# Patient Record
Sex: FEMALE | Race: WHITE | NOT HISPANIC OR LATINO | Employment: FULL TIME | URBAN - METROPOLITAN AREA
[De-identification: names, ages, dates, MRNs, and addresses within clinical notes are randomized per-mention and may not be internally consistent; named-entity substitution may affect disease eponyms.]

---

## 2017-01-06 ENCOUNTER — ALLSCRIPTS OFFICE VISIT (OUTPATIENT)
Dept: OTHER | Facility: OTHER | Age: 52
End: 2017-01-06

## 2017-01-06 DIAGNOSIS — Z01.818 ENCOUNTER FOR OTHER PREPROCEDURAL EXAMINATION: ICD-10-CM

## 2017-03-20 ENCOUNTER — ALLSCRIPTS OFFICE VISIT (OUTPATIENT)
Dept: OTHER | Facility: OTHER | Age: 52
End: 2017-03-20

## 2017-03-20 DIAGNOSIS — Z11.59 ENCOUNTER FOR SCREENING FOR OTHER VIRAL DISEASES: ICD-10-CM

## 2017-03-20 DIAGNOSIS — Z12.31 ENCOUNTER FOR SCREENING MAMMOGRAM FOR MALIGNANT NEOPLASM OF BREAST: ICD-10-CM

## 2017-03-20 DIAGNOSIS — L40.50 ARTHROPATHIC PSORIASIS (HCC): ICD-10-CM

## 2017-03-20 DIAGNOSIS — I10 ESSENTIAL (PRIMARY) HYPERTENSION: ICD-10-CM

## 2017-03-20 DIAGNOSIS — F41.1 GENERALIZED ANXIETY DISORDER: ICD-10-CM

## 2017-03-20 DIAGNOSIS — Z12.11 ENCOUNTER FOR SCREENING FOR MALIGNANT NEOPLASM OF COLON: ICD-10-CM

## 2017-07-03 ENCOUNTER — ALLSCRIPTS OFFICE VISIT (OUTPATIENT)
Dept: OTHER | Facility: OTHER | Age: 52
End: 2017-07-03

## 2017-09-11 ENCOUNTER — ALLSCRIPTS OFFICE VISIT (OUTPATIENT)
Dept: OTHER | Facility: OTHER | Age: 52
End: 2017-09-11

## 2017-09-11 DIAGNOSIS — L40.50 ARTHROPATHIC PSORIASIS (HCC): ICD-10-CM

## 2017-09-11 DIAGNOSIS — I10 ESSENTIAL (PRIMARY) HYPERTENSION: ICD-10-CM

## 2017-09-11 DIAGNOSIS — Z13.820 ENCOUNTER FOR SCREENING FOR OSTEOPOROSIS: ICD-10-CM

## 2017-09-11 DIAGNOSIS — Z12.31 ENCOUNTER FOR SCREENING MAMMOGRAM FOR MALIGNANT NEOPLASM OF BREAST: ICD-10-CM

## 2017-09-11 DIAGNOSIS — M85.80 OTHER SPECIFIED DISORDERS OF BONE DENSITY AND STRUCTURE, UNSPECIFIED SITE: ICD-10-CM

## 2017-10-05 ENCOUNTER — GENERIC CONVERSION - ENCOUNTER (OUTPATIENT)
Dept: OTHER | Facility: OTHER | Age: 52
End: 2017-10-05

## 2017-12-13 ENCOUNTER — ALLSCRIPTS OFFICE VISIT (OUTPATIENT)
Dept: OTHER | Facility: OTHER | Age: 52
End: 2017-12-13

## 2017-12-14 NOTE — PROGRESS NOTES
Assessment    1  Psoriatic arthropathy (696 0) (L40 50)   · To f/u with Rheum  2  Fatigue (780 79) (R53 83)   3  Gait abnormality (781 2) (R26 9)   4  At risk for decreased quality of life (V49 89) (Z91 89)   5  Difficulty performing writing activities (796 4) (R68 89)   6  Chronic pain syndrome (338 4) (G89 4)    Plan  At risk for decreased quality of life, Chronic pain syndrome, Difficulty performing writing activities,Fatigue, Gait abnormality, Psoriatic arthropathy    · Humira 40 MG/0 8ML Subcutaneous Prefilled Syringe Kit; Inject one syringe sq q every OTHERweek    Discussion/Summary  Discussion Summary:   Pt has progressed to the point where her quality of life is suffering  Constant pain and difficulty with ADL's  Continues to work full time  Struggling at times and more often  MTX and NSAID's  not working  Agree the Humira may help  Long discussion (25 minutes) with pt about the pro, cons, safety, and dosing  Pt understands that humira with or w/o the MTX impedes immunity and puts her at risk for infections and even some cancers  Pt understands she need to be compliant with f/u and labs  Needs to be proactive with monitoring for infections  Has had recent PPD that was negaive  Has not travel to [place], [place], [place], or out of the country  No h/o CHF, DM, Hepatitis, MS, or GB  Notes some GB in the family, but none personally and is willing to try the humira  Discussed the need to stay away from live vaccines, injection site reactions, etc  Pt wants to try humira  Given minimal skin involvement, would start with 40mg sq q other week rather than with 80mg initially  Would continue the MTX for now as the humira takes effect and re-eval to see if we should keep it  Pt understands this increase even further her immunocompromised state  Will orde the med and once available, will proceed with baseline labs, urine, Hep B status, and possibly a CXR prior to giving the first injection  RTC once med available  Medication SE Review and Pt Understands Tx: Possible side effects of new medications were reviewed with the patient/guardian today  The treatment plan was reviewed with the patient/guardian  The patient/guardian understands and agrees with the treatment plan      Chief Complaint  Chief Complaint Free Text Note Form: Patient is being seen today for a follow up visit  Patient would like to discuss medications and starting on a new medication  History of Present Illness  HPI: WF with Psoriatic arthropathy with minor skin involvement for the past several years, presents due to progression of overall total body pain, but especially her hands and legs  Now with limitations at work due to the pain and decrease ROM  Pt having difficulty writing and with fine motor skills  Difficulty getting to the day due to pain and fatigue  Notes increase and more persistent joint inflammation  Weight increasing due to inability to exercise  MTX and NSAID's not helping  Becoming depressed and notes a poor quality of life both professionally and socially  Doesn't want to go back to rheum and is considering Humira  Review of Systems  Complete-Female:  Constitutional: feeling poorly-- and-- feeling tired, but-- no fever-- and-- no chills  Eyes: No complaints of eye pain, no red eyes, no eyesight problems, no discharge, no dry eyes, no itching of eyes  ENT: no complaints of earache, no loss of hearing, no nose bleeds, no nasal discharge, no sore throat, no hoarseness  Cardiovascular: no chest pain,-- no intermittent leg claudication,-- no palpitations-- and-- no lower extremity edema  Respiratory: no shortness of breath,-- no cough,-- no orthopnea,-- no wheezing,-- no shortness of breath during exertion-- and-- no PND  Gastrointestinal: no abdominal pain,-- no nausea,-- no constipation-- and-- no diarrhea    Genitourinary: No complaints of dysuria, no incontinence, no pelvic pain, no dysmenorrhea, no vaginal discharge or bleeding  Musculoskeletal: arthralgias,-- joint swelling,-- myalgias-- and-- joint stiffness, but-- as noted in HPI  Integumentary: No complaints of skin rash or lesions, no itching, no skin wounds, no breast pain or lump  Neurological: difficulty walking, but-- no headache,-- no confusion-- and-- no convulsions  Psychiatric: anxiety,-- sleep disturbances-- and-- depression, but-- not suicidal   Endocrine: No complaints of proptosis, no hot flashes, no muscle weakness, no deepening of the voice, no feelings of weakness  Hematologic/Lymphatic: No complaints of swollen glands, no swollen glands in the neck, does not bleed easily, does not bruise easily  Active Problems  1  Allergic rhinitis (477 9) (J30 9)   2  Asthma (493 90) (J45 909)   3  Atrophy of muscle of right hand (728 2) (M62 541)   4  Benign essential hypertension (401 1) (I10)   5  Chronic pain of both wrists (729 37,131 13) (M25 531,M25 532,G89 29)   6  Depression screening (V79 0) (Z13 89)   7  Encounter for screening colonoscopy (V76 51) (Z12 11)   8  Encounter for screening mammogram for malignant neoplasm of breast (V76 12) (Z12 31)   9  Generalized anxiety disorder (300 02) (F41 1)   10  GERD without esophagitis (530 81) (K21 9)   11  Hand weakness (728 87) (R29 898)   12  Metatarsalgia of right foot (726 70) (M77 41)   13  Need for hepatitis C screening test (V73 89) (Z11 59)   14  Ophthalmoplegic migraine headache (346 20) (G43 B0)   15  Osteopenia (733 90) (M85 80)   16  Paresthesia of both hands (782 0) (R20 2)   17  Pre-operative examination (V72 84) (Z01 818)   18  Psoriatic arthropathy (696 0) (L40 50)   19  Screen for colon cancer (V76 51) (Z12 11)   20  Screening for osteoporosis (V82 81) (Z13 820)   21  Sleep disorder (780 50) (G47 9)   22  Visit for screening (V82 9) (Z13 9)    Past Medical History  1  History of Hemangioma of other sites (228 09) (D18 09)   2  History of dysfunctional uterine bleeding (V13 29) (Z87 42)   3  History of urinary tract infection (V13 02) (Z87 440)  Active Problems And Past Medical History Reviewed: The active problems and past medical history were reviewed and updated today  Surgical History  1  History of Appendectomy   2  History of Cholecystectomy   3  History of Foot Surgery   4  History of Hysterectomy   5  History of Kidney Surgery   6  History of Tonsillectomy   7  History of Tubal Ligation  Surgical History Reviewed: The surgical history was reviewed and updated today  Family History  Mother    1  No pertinent family history  Maternal Grandmother    2  Family history of Hypertension (V17 49)  Paternal Grandfather    3  Family history of Acute Myocardial Infarction (V17 3)   4  Family history of Hypertension (V17 49)   5  Family history of Malignant Pancreatic Neoplasm  Family History Reviewed: The family history was reviewed and updated today  Social History     · Always uses seat belt   · Employed   · Has 2 children   ·    · Never a smoker   · No illicit drug use   · Occasional caffeine consumption   · Social alcohol use (Z78 9)  Social History Reviewed: The social history was reviewed and updated today  Current Meds   1  ALPRAZolam 0 5 MG Oral Tablet; TAKE 1 TABLET EVERY 8 HOURS AS NEEDED; Therapy: [id number] to (Evaluate:11Oct2017)  Requested for: 79Qnq3159; Last Rx:05Gbk9587 Ordered   2  Folic Acid 1 MG Oral Tablet; TAKE 1 TABLET DAILY; Therapy: 32KMW3371 to (Carleton Sever)  Requested for: 17MDR2143; Last Rx:09Nov2015 Ordered   3  Methotrexate 2 5 MG Oral Tablet; TAKE 5 PILLS WEEKLY; Therapy: 98YDR3379 to (Evaluate:01Jan2018)  Requested for: 09UPE5260; Last Rx:06Jan2017 Ordered   4  Metoprolol Succinate ER 25 MG Oral Tablet Extended Release 24 Hour; TAKE 1 TABLET ONCE DAILY; Therapy: 19Apr2016 to (Sharri Henriquez)  Requested for: 52Gix5904 Recorded  Medication List Reviewed: The medication list was reviewed and updated today  Allergies  1   Arthrotec TABS   2  Bactrim TABS   3  Indocin SOLR   4  ZyrTEC Allergy TABS    Vitals  Vital Signs    Recorded: 29Yfs6406 10:25AM   Temperature 97 F, Tympanic   Heart Rate 91   Respiration 16   Systolic 931, Sitting   Diastolic 70, Sitting   Height 5 ft 5 in   Weight 180 lb 4 0 oz   BMI Calculated 30   BSA Calculated 1 89   O2 Saturation 97       Physical Exam   Constitutional  General appearance: No acute distress, well appearing and well nourished  Eyes  Conjunctiva and lids: No swelling, erythema or discharge  Pupils and irises: Equal, round and reactive to light  Ears, Nose, Mouth, and Throat  Oropharynx: Normal with no erythema, edema, exudate or lesions  Pulmonary  Respiratory effort: No increased work of breathing or signs of respiratory distress  Auscultation of lungs: Clear to auscultation  Cardiovascular  Auscultation of heart: Normal rate and rhythm, normal S1 and S2, without murmurs  Examination of extremities for edema and/or varicosities: Normal    Abdomen  Abdomen: Non-tender, no masses  Psychiatric  Orientation to person, place, and time: Normal    Mood and affect: Normal    Additional Exam:  Multiple joints w/o gross deformity, slight increase in temp, but no erythema  No swan neck deformity, ulnar deviation  Effusions noted with swelling and tenderness  Future Appointments    Date/Time Provider Specialty Site   02/05/2018 05:00 PM THOMAS Bryan   Internal Medicine [facility] 9[address]       Signatures   Electronically signed by : THOMAS Chatterjee ; Dec 13 2017 12:37PM EST                       (Author)

## 2017-12-22 DIAGNOSIS — Z51.81 ENCOUNTER FOR THERAPEUTIC DRUG LEVEL MONITORING: ICD-10-CM

## 2018-01-13 VITALS
DIASTOLIC BLOOD PRESSURE: 70 MMHG | BODY MASS INDEX: 28.82 KG/M2 | RESPIRATION RATE: 16 BRPM | SYSTOLIC BLOOD PRESSURE: 110 MMHG | HEART RATE: 70 BPM | TEMPERATURE: 97.7 F | WEIGHT: 173 LBS | HEIGHT: 65 IN

## 2018-01-14 VITALS
WEIGHT: 169 LBS | SYSTOLIC BLOOD PRESSURE: 112 MMHG | TEMPERATURE: 97.6 F | RESPIRATION RATE: 16 BRPM | HEART RATE: 78 BPM | BODY MASS INDEX: 28.16 KG/M2 | DIASTOLIC BLOOD PRESSURE: 70 MMHG | HEIGHT: 65 IN

## 2018-01-14 VITALS
RESPIRATION RATE: 16 BRPM | WEIGHT: 176 LBS | BODY MASS INDEX: 29.32 KG/M2 | DIASTOLIC BLOOD PRESSURE: 60 MMHG | HEIGHT: 65 IN | TEMPERATURE: 97.5 F | HEART RATE: 64 BPM | SYSTOLIC BLOOD PRESSURE: 90 MMHG

## 2018-01-14 VITALS
DIASTOLIC BLOOD PRESSURE: 60 MMHG | SYSTOLIC BLOOD PRESSURE: 100 MMHG | WEIGHT: 173 LBS | HEIGHT: 65 IN | RESPIRATION RATE: 16 BRPM | TEMPERATURE: 97.2 F | HEART RATE: 74 BPM | BODY MASS INDEX: 28.82 KG/M2

## 2018-01-23 VITALS
HEART RATE: 91 BPM | HEIGHT: 65 IN | DIASTOLIC BLOOD PRESSURE: 70 MMHG | SYSTOLIC BLOOD PRESSURE: 104 MMHG | WEIGHT: 180.25 LBS | OXYGEN SATURATION: 97 % | RESPIRATION RATE: 16 BRPM | TEMPERATURE: 97 F | BODY MASS INDEX: 30.03 KG/M2

## 2018-02-05 ENCOUNTER — OFFICE VISIT (OUTPATIENT)
Dept: INTERNAL MEDICINE CLINIC | Facility: CLINIC | Age: 53
End: 2018-02-05
Payer: COMMERCIAL

## 2018-02-05 VITALS
BODY MASS INDEX: 30.32 KG/M2 | WEIGHT: 182 LBS | HEART RATE: 72 BPM | SYSTOLIC BLOOD PRESSURE: 104 MMHG | TEMPERATURE: 97.2 F | DIASTOLIC BLOOD PRESSURE: 68 MMHG | HEIGHT: 65 IN | RESPIRATION RATE: 16 BRPM

## 2018-02-05 DIAGNOSIS — K21.9 GERD WITHOUT ESOPHAGITIS: ICD-10-CM

## 2018-02-05 DIAGNOSIS — F41.1 GENERALIZED ANXIETY DISORDER: ICD-10-CM

## 2018-02-05 DIAGNOSIS — G89.4 CHRONIC PAIN DISORDER: ICD-10-CM

## 2018-02-05 DIAGNOSIS — I10 BENIGN ESSENTIAL HYPERTENSION: ICD-10-CM

## 2018-02-05 DIAGNOSIS — J45.20 MILD INTERMITTENT ASTHMA WITHOUT COMPLICATION: ICD-10-CM

## 2018-02-05 DIAGNOSIS — G43.B0 OPHTHALMOPLEGIC MIGRAINE, NOT INTRACTABLE: ICD-10-CM

## 2018-02-05 DIAGNOSIS — L40.50 PSORIASIS WITH ARTHROPATHY (HCC): ICD-10-CM

## 2018-02-05 DIAGNOSIS — I10 HYPERTENSION, UNSPECIFIED TYPE: Primary | ICD-10-CM

## 2018-02-05 DIAGNOSIS — M85.80 OSTEOPENIA, UNSPECIFIED LOCATION: ICD-10-CM

## 2018-02-05 DIAGNOSIS — G47.9 SLEEP DISORDER: ICD-10-CM

## 2018-02-05 DIAGNOSIS — L40.50 PSORIATIC ARTHRITIS (HCC): ICD-10-CM

## 2018-02-05 PROCEDURE — 96372 THER/PROPH/DIAG INJ SC/IM: CPT | Performed by: INTERNAL MEDICINE

## 2018-02-05 PROCEDURE — 99214 OFFICE O/P EST MOD 30 MIN: CPT | Performed by: INTERNAL MEDICINE

## 2018-02-05 RX ORDER — METOPROLOL SUCCINATE 25 MG/1
25 TABLET, EXTENDED RELEASE ORAL DAILY
Qty: 90 TABLET | Refills: 3 | Status: SHIPPED | OUTPATIENT
Start: 2018-02-05 | End: 2018-10-22 | Stop reason: SDUPTHER

## 2018-02-05 RX ORDER — FOLIC ACID 1 MG/1
1 TABLET ORAL DAILY
COMMUNITY
Start: 2011-06-13 | End: 2018-10-22 | Stop reason: SDUPTHER

## 2018-02-05 RX ORDER — METOPROLOL SUCCINATE 25 MG/1
1 TABLET, EXTENDED RELEASE ORAL DAILY
COMMUNITY
Start: 2016-04-19 | End: 2018-02-05 | Stop reason: SDUPTHER

## 2018-02-05 RX ORDER — ALPRAZOLAM 0.5 MG/1
1 TABLET ORAL EVERY 8 HOURS PRN
COMMUNITY
Start: 2011-01-29 | End: 2018-04-16 | Stop reason: SDUPTHER

## 2018-02-05 NOTE — PROGRESS NOTES
Assessment/Plan:    No problem-specific Assessment & Plan notes found for this encounter  Diagnoses and all orders for this visit:    Hypertension, unspecified type  -     metoprolol succinate (TOPROL-XL) 25 mg 24 hr tablet; Take 1 tablet (25 mg total) by mouth daily    Psoriatic arthritis (HCC)  -     folic acid (FOLVITE) 1 mg tablet; Take 1 tablet by mouth daily  -     adalimumab (HUMIRA) 40 mg/0 8 mL PSKT; Inject under the skin  -     methotrexate 2 5 mg tablet; Take 5 tabs weekly as directed  GERD without esophagitis    Benign essential hypertension    Psoriasis with arthropathy (HCC)    Osteopenia, unspecified location    Ophthalmoplegic migraine, not intractable    Mild intermittent asthma without complication    Chronic pain disorder  -     adalimumab (HUMIRA) 40 mg/0 8 mL PSKT; Inject under the skin    Generalized anxiety disorder  -     ALPRAZolam (XANAX) 0 5 mg tablet; Take 1 tablet by mouth every 8 (eight) hours as needed    Sleep disorder    Other orders  -     Discontinue: methotrexate 2 5 mg tablet; Take by mouth  -     Discontinue: metoprolol succinate (TOPROL-XL) 25 mg 24 hr tablet; Take 1 tablet by mouth daily      A/P: Doing ok, but still with considerable  No s/s of infection  Will give her first injection of humira  Continue with MTX for now and consider weaning in several weeks  Due for LDL only, but want to wait since she just had labs  To get her mammo and dexa  Continue current treatment otherwise  RTC four weeks for f/u injections  Subjective:      Patient ID: Jaimee Brown is a 47 y o  female  WF RTC for f/u psoriatic arthropathy, htn, etc  Continues to do poorly due to increase pain and difficulty walking and sleeping  Increase stress at due to several issues and her decreased ability to perform her job from the pain and decrease ROM  No new issues  Awaiting dexa and mammo  Labs up to date  No recent illness, fever, chills, or sore throat             The following portions of the patient's history were reviewed and updated as appropriate:   She  has a past medical history of Hemangioma of other sites  She  does not have any pertinent problems on file  She  has a past surgical history that includes Appendectomy; Cholecystectomy; Foot surgery; Hysterectomy; Kidney surgery; Tonsillectomy; and Tubal ligation  Her family history includes Heart attack in her paternal grandfather; Hypertension in her maternal grandmother and paternal grandfather; Pancreatic cancer in her paternal grandfather  She  reports that she has never smoked  She has never used smokeless tobacco  She reports that she drinks alcohol  She reports that she does not use drugs  Current Outpatient Prescriptions   Medication Sig Dispense Refill    adalimumab (HUMIRA) 40 mg/0 8 mL PSKT Inject under the skin      ALPRAZolam (XANAX) 0 5 mg tablet Take 1 tablet by mouth every 8 (eight) hours as needed      folic acid (FOLVITE) 1 mg tablet Take 1 tablet by mouth daily      methotrexate 2 5 mg tablet Take 5 tabs weekly as directed  30 tablet 5    metoprolol succinate (TOPROL-XL) 25 mg 24 hr tablet Take 1 tablet (25 mg total) by mouth daily 90 tablet 3     No current facility-administered medications for this visit  No current outpatient prescriptions on file prior to visit  No current facility-administered medications on file prior to visit  She is allergic to cetirizine; diclofenac-misoprostol; indomethacin; and sulfamethoxazole-trimethoprim       Review of Systems   Constitutional: Positive for fatigue  Negative for activity change, chills, diaphoresis and fever  HENT: Negative for congestion, ear pain, sinus pain and sore throat  Eyes: Negative for pain  Respiratory: Negative for cough, chest tightness, shortness of breath and wheezing  Cardiovascular: Negative for chest pain, palpitations and leg swelling  Gastrointestinal: Negative for abdominal pain, constipation, diarrhea, nausea and vomiting  Genitourinary: Negative for difficulty urinating, dysuria, frequency and hematuria  Musculoskeletal: Positive for arthralgias, gait problem, joint swelling and myalgias  Neurological: Negative for dizziness, seizures, weakness, light-headedness and headaches  Hematological: Negative for adenopathy  Psychiatric/Behavioral: Positive for sleep disturbance  Negative for behavioral problems, confusion and suicidal ideas  The patient is not nervous/anxious  Objective:     Physical Exam   Constitutional: She is oriented to person, place, and time  She appears well-developed and well-nourished  No distress  HENT:   Head: Normocephalic and atraumatic  Mouth/Throat: No oropharyngeal exudate  Eyes: Conjunctivae and EOM are normal  Pupils are equal, round, and reactive to light  Cardiovascular: Normal rate, regular rhythm and normal heart sounds  Pulmonary/Chest: Breath sounds normal  No respiratory distress  Abdominal: Soft  Bowel sounds are normal  There is no tenderness  Musculoskeletal: She exhibits tenderness and deformity  She exhibits no edema  Lymphadenopathy:     She has no cervical adenopathy  Neurological: She is alert and oriented to person, place, and time  Psychiatric: She has a normal mood and affect  Her behavior is normal  Judgment and thought content normal    Nursing note and vitals reviewed

## 2018-02-05 NOTE — PATIENT INSTRUCTIONS
????? (Adalimumab) (??)   ????? (Adalimumab) (a-yl-SIQ-ue-mab)  ?????????????????????????????????????????????   ???? ? Humira   ?????????????  ??????????????   ???????????????????????????????  ?????   ???  · ?????????????????????????????????????  · ?????????????????  · ??????????????????????????????????????????????????????????????  · ?????????????????????????????????????????????????????? ??????????????????????????????????????????????????????????  · ??????????????????????????  · ???????????????????????????????????????????????????????????????  · ???????????????????? ??????????????????????????????????  ????????   ?????????????????????????????????????????  · ???????????????????????????????????????  ¨ ???? (abatacept)?????? (anakinra)?????? (azathioprine)? ???? (cyclosporine)? ??? (mercaptopurine)?????? (rituximab)? ?? (theophylline)  ¨ ?????????? (warfarin)? ¨ ????????????????????????  · ??????????????????????????????  ???????   · ??????????????????????????????????? (COPD)?????????????????????????????????-????????????????????????????????????????????????????  · ?????????????  ¨ ???????  ¨ ????????????????????  ¨ ???????????  · ???????????????????????????????????????  · ????????????? (TB) ????????????? TB ???????? TB???????  · ?????????????????????????????????????????  · ???????????????????????????????  · ??????????????????????????????????????  · ?????????????????????????????????  ??????????????   ????????????????????  · ??????????????????????????/???????????????  · ???????????????????  · ???????????????  · ????  · ?????????????  · ?????????????????????????  · ?????????????????????????????????  · ???????????????????????????  · ??????????????????  · ???????????????  · ?????????????  · ??????????????????  ?????????????????????????   · ??  · ??  · ???????????????????????  ?????????????????????????   ?????????????????????????????? 1-800-FDA-1088???????????????? (FDA)?   © 2017 [address] Information is for End User's use only and may not be sold, redistributed or otherwise used for commercial purposes  The above information is an  only  It is not intended as medical advice for individual conditions or treatments  Talk to your doctor, nurse or pharmacist before following any medical regimen to see if it is safe and effective for you  Adalimumab (By injection)   Adalimumab (s-oz-RDU-ue-mab)  Treats arthritis, plaque psoriasis, ankylosing spondylitis, Crohn disease, ulcerative colitis, hidradenitis suppurativa, and uveitis  Brand Name(s): Humira   There may be other brand names for this medicine  When This Medicine Should Not Be Used: This medicine is not right for everyone  Do not use it if you had an allergic reaction to adalimumab  How to Use This Medicine:   Injectable  · Your doctor will prescribe your exact dose and tell you how often it should be given  This medicine is given as a shot under your skin  · A nurse or other health provider will give you this medicine  · You may be taught how to give your medicine at home  Make sure you understand all instructions before giving yourself an injection  Do not use more medicine or use it more often than your doctor tells you to  · You will be shown the body areas where this shot can be given  Use a different body area each time you give yourself a shot  Keep track of where you give each shot to make sure you rotate body areas  Do not inject into skin areas that are red, bruised, tender, or hard  If you have psoriasis, do not inject into a raised, thick, red, or scaly skin patch or into skin lesions  · This medicine should come with a Medication Guide  Ask your pharmacist for a copy if you do not have one  · Missed dose: Take a dose as soon as you remember  If it is almost time for your next dose, wait until then and take a regular dose  Do not take extra medicine to make up for a missed dose    · If you store this medicine at home, keep it in the refrigerator  Do not freeze  Protect the medicine from light  Keep your medicine and supplies in the original packages until you are ready to use them  Drugs and Foods to Avoid:   Ask your doctor or pharmacist before using any other medicine, including over-the-counter medicines, vitamins, and herbal products  · Some foods and medicines can affect how adalimumab works  Tell your doctor if you are using any of the following:   ¨ Abatacept, anakinra, azathioprine, cyclosporine, mercaptopurine, rituximab, theophylline  ¨ A blood thinner (including warfarin)  ¨ Medicine that weakens the immune system (including a steroid or cancer medicine)  · This medicine may interfere with vaccines  Ask your doctor before you get a flu shot or any other vaccines  Warnings While Using This Medicine:   · Tell your doctor if you are pregnant or breastfeeding, or if you have liver disease, a history of cancer, COPD, heart failure, diabetes, psoriasis, multiple sclerosis, optic neuritis, problems with your immune system, or a history of Guillain-Barré syndrome  Tell your doctor if you have any type of infection (such as hepatitis B or tuberculosis) or an infection that keeps coming back  · This medicine may cause the following problems:   ¨ Increased risk for infection  ¨ Increased risk of certain cancers, such as lymphoma or leukemia  ¨ New or worsening heart failure  · Tell your doctor if you have a latex allergy  The needle cover of the syringe contains latex and may cause allergic reactions  · You will need to have a skin test for tuberculosis (TB) before you start this medicine  Tell your doctor if you or anyone in your home has ever had a positive TB skin test or been exposed to TB  · This medicine may make you bleed, bruise, or get infections more easily  Take precautions to prevent illness and injury  Wash your hands often  · Your doctor will do lab tests at regular visits to check on the effects of this medicine   Keep all appointments  · Throw away used needles in a hard, closed container that the needles cannot poke through  Keep this container away from children and pets  · Keep all medicine out of the reach of children  Never share your medicine with anyone  Possible Side Effects While Using This Medicine:   Call your doctor right away if you notice any of these side effects:  · Allergic reaction: Itching or hives, swelling in your face or hands, swelling or tingling in your mouth or throat, chest tightness, trouble breathing  · Blistering, peeling, red skin rash, or red, scaly patches on the skin  · Change in how much or how often you urinate, painful urination  · Changes in vision  · Chest pain, uneven heartbeat, trouble breathing  · Cough, fever, chills, runny or stuffy nose, sore throat, and body aches  · Dark urine or pale stools, nausea, vomiting, loss of appetite, stomach pain, yellow skin or eyes  · Numbness, tingling, or burning pain in your hands, arms, legs, or feet, or joint pain  · Rapid weight gain, swelling in your hands, ankles, lower legs, or feet  · Sores or white patches on your lips, mouth, or throat  · Swollen glands in your neck, underarms, or groin  · Unusual bleeding, bruising, tiredness, weakness, or weight loss  If you notice these less serious side effects, talk with your doctor:   · Back pain  · Headache  · Redness, itching, bruising, bleeding, pain, or swelling where the shot was given  If you notice other side effects that you think are caused by this medicine, tell your doctor  Call your doctor for medical advice about side effects  You may report side effects to FDA at 5-342-FDA-2311  © 2017 [address] Information is for End User's use only and may not be sold, redistributed or otherwise used for commercial purposes  The above information is an  only  It is not intended as medical advice for individual conditions or treatments   Talk to your doctor, nurse or pharmacist before following any medical regimen to see if it is safe and effective for you

## 2018-03-05 ENCOUNTER — OFFICE VISIT (OUTPATIENT)
Dept: INTERNAL MEDICINE CLINIC | Facility: CLINIC | Age: 53
End: 2018-03-05
Payer: COMMERCIAL

## 2018-03-05 VITALS
HEART RATE: 78 BPM | DIASTOLIC BLOOD PRESSURE: 62 MMHG | BODY MASS INDEX: 29.99 KG/M2 | WEIGHT: 180 LBS | HEIGHT: 65 IN | RESPIRATION RATE: 14 BRPM | SYSTOLIC BLOOD PRESSURE: 100 MMHG

## 2018-03-05 DIAGNOSIS — G89.4 CHRONIC PAIN DISORDER: ICD-10-CM

## 2018-03-05 DIAGNOSIS — L40.50 PSORIASIS WITH ARTHROPATHY (HCC): Primary | ICD-10-CM

## 2018-03-05 PROCEDURE — 99213 OFFICE O/P EST LOW 20 MIN: CPT | Performed by: INTERNAL MEDICINE

## 2018-03-05 NOTE — PATIENT INSTRUCTIONS
Adalimumab (By injection)   Adalimumab ([id number]-[id number])  Treats arthritis, plaque psoriasis, ankylosing spondylitis, Crohn disease, ulcerative colitis, hidradenitis suppurativa, and uveitis  Brand Name(s): Humira   There may be other brand names for this medicine  When This Medicine Should Not Be Used: This medicine is not right for everyone  Do not use it if you had an allergic reaction to adalimumab  How to Use This Medicine:   Injectable  · Your doctor will prescribe your exact dose and tell you how often it should be given  This medicine is given as a shot under your skin  · A nurse or other health provider will give you this medicine  · You may be taught how to give your medicine at home  Make sure you understand all instructions before giving yourself an injection  Do not use more medicine or use it more often than your doctor tells you to  · You will be shown the body areas where this shot can be given  Use a different body area each time you give yourself a shot  Keep track of where you give each shot to make sure you rotate body areas  Do not inject into skin areas that are red, bruised, tender, or hard  If you have psoriasis, do not inject into a raised, thick, red, or scaly skin patch or into skin lesions  · This medicine should come with a Medication Guide  Ask your pharmacist for a copy if you do not have one  · Missed dose: Take a dose as soon as you remember  If it is almost time for your next dose, wait until then and take a regular dose  Do not take extra medicine to make up for a missed dose  · If you store this medicine at home, keep it in the refrigerator  Do not freeze  Protect the medicine from light  Keep your medicine and supplies in the original packages until you are ready to use them  Drugs and Foods to Avoid:   Ask your doctor or pharmacist before using any other medicine, including over-the-counter medicines, vitamins, and herbal products    · Some foods and medicines can affect how adalimumab works  Tell your doctor if you are using any of the following:   ¨ Abatacept, anakinra, azathioprine, cyclosporine, mercaptopurine, rituximab, theophylline  ¨ A blood thinner (including warfarin)  ¨ Medicine that weakens the immune system (including a steroid or cancer medicine)  · This medicine may interfere with vaccines  Ask your doctor before you get a flu shot or any other vaccines  Warnings While Using This Medicine:   · Tell your doctor if you are pregnant or breastfeeding, or if you have liver disease, a history of cancer, COPD, heart failure, diabetes, psoriasis, multiple sclerosis, optic neuritis, problems with your immune system, or a history of Guillain-Barré syndrome  Tell your doctor if you have any type of infection (such as hepatitis B or tuberculosis) or an infection that keeps coming back  · This medicine may cause the following problems:   ¨ Increased risk for infection  ¨ Increased risk of certain cancers, such as lymphoma or leukemia  ¨ New or worsening heart failure  · Tell your doctor if you have a latex allergy  The needle cover of the syringe contains latex and may cause allergic reactions  · You will need to have a skin test for tuberculosis (TB) before you start this medicine  Tell your doctor if you or anyone in your home has ever had a positive TB skin test or been exposed to TB  · This medicine may make you bleed, bruise, or get infections more easily  Take precautions to prevent illness and injury  Wash your hands often  · Your doctor will do lab tests at regular visits to check on the effects of this medicine  Keep all appointments  · Throw away used needles in a hard, closed container that the needles cannot poke through  Keep this container away from children and pets  · Keep all medicine out of the reach of children  Never share your medicine with anyone    Possible Side Effects While Using This Medicine:   Call your doctor right away if you notice any of these side effects:  · Allergic reaction: Itching or hives, swelling in your face or hands, swelling or tingling in your mouth or throat, chest tightness, trouble breathing  · Blistering, peeling, red skin rash, or red, scaly patches on the skin  · Change in how much or how often you urinate, painful urination  · Changes in vision  · Chest pain, uneven heartbeat, trouble breathing  · Cough, fever, chills, runny or stuffy nose, sore throat, and body aches  · Dark urine or pale stools, nausea, vomiting, loss of appetite, stomach pain, yellow skin or eyes  · Numbness, tingling, or burning pain in your hands, arms, legs, or feet, or joint pain  · Rapid weight gain, swelling in your hands, ankles, lower legs, or feet  · Sores or white patches on your lips, mouth, or throat  · Swollen glands in your neck, underarms, or groin  · Unusual bleeding, bruising, tiredness, weakness, or weight loss  If you notice these less serious side effects, talk with your doctor:   · Back pain  · Headache  · Redness, itching, bruising, bleeding, pain, or swelling where the shot was given  If you notice other side effects that you think are caused by this medicine, tell your doctor  Call your doctor for medical advice about side effects  You may report side effects to FDA at 4-205-FDA-5891  © 2017 [address] [employer] Information is for End User's use only and may not be sold, redistributed or otherwise used for commercial purposes  The above information is an  only  It is not intended as medical advice for individual conditions or treatments  Talk to your doctor, nurse or pharmacist before following any medical regimen to see if it is safe and effective for you

## 2018-04-09 DIAGNOSIS — L40.50 PSORIATIC ARTHRITIS (HCC): ICD-10-CM

## 2018-04-09 DIAGNOSIS — G89.4 CHRONIC PAIN DISORDER: ICD-10-CM

## 2018-04-16 ENCOUNTER — OFFICE VISIT (OUTPATIENT)
Dept: INTERNAL MEDICINE CLINIC | Facility: CLINIC | Age: 53
End: 2018-04-16
Payer: COMMERCIAL

## 2018-04-16 VITALS
DIASTOLIC BLOOD PRESSURE: 62 MMHG | RESPIRATION RATE: 16 BRPM | HEART RATE: 76 BPM | SYSTOLIC BLOOD PRESSURE: 94 MMHG | HEIGHT: 65 IN | BODY MASS INDEX: 29.99 KG/M2 | TEMPERATURE: 97.6 F | WEIGHT: 180 LBS

## 2018-04-16 DIAGNOSIS — F41.1 GENERALIZED ANXIETY DISORDER: ICD-10-CM

## 2018-04-16 DIAGNOSIS — L40.50 PSORIASIS WITH ARTHROPATHY (HCC): Primary | ICD-10-CM

## 2018-04-16 DIAGNOSIS — I10 BENIGN ESSENTIAL HYPERTENSION: ICD-10-CM

## 2018-04-16 PROCEDURE — 99213 OFFICE O/P EST LOW 20 MIN: CPT | Performed by: INTERNAL MEDICINE

## 2018-04-16 RX ORDER — ALPRAZOLAM 0.5 MG/1
0.5 TABLET ORAL EVERY 8 HOURS PRN
Qty: 90 TABLET | Refills: 0 | Status: SHIPPED | OUTPATIENT
Start: 2018-04-16 | End: 2018-06-25 | Stop reason: SDUPTHER

## 2018-04-16 NOTE — PROGRESS NOTES
Assessment/Plan:    No problem-specific Assessment & Plan notes found for this encounter  Diagnoses and all orders for this visit:    Psoriasis with arthropathy ([facility] [address] )    Generalized anxiety disorder  -     ALPRAZolam (XANAX) 0 5 mg tablet; Take 1 tablet (0 5 mg total) by mouth every 8 (eight) hours as needed (prn)    Benign essential hypertension      A/P: Doing ok and no worse, but would like to see some more improvement  May just need more time  Continue current treatment along with the MTX and monitor labs  RTC two months for routine  Subjective:      Patient ID: Mary Mendez is a 47 y o  female  WF RTC for f/u psoriatic arthritis after starting humira  Remains on MTX as well  Doing ok, but minimal improvement so far  No worse except for the right thumb MCP pain got so bad, pt seen by ortho and injected  Told she may need a joint replacement  No fever or chills and labs were acceptable today with slight shift in the differential  BP is back to her normal range and no s/s  Still gets a headache after dosing the humira for three days, but not as bad as initially  The following portions of the patient's history were reviewed and updated as appropriate:   She  has a past medical history of Hemangioma of other sites  She   Patient Active Problem List    Diagnosis Date Noted    Chronic pain disorder 12/13/2017    Osteopenia 09/11/2017    Benign essential hypertension 04/19/2016    Ophthalmoplegic migraine headache 08/20/2012    Allergic rhinitis 08/15/2012    Asthma 08/15/2012    Generalized anxiety disorder 08/15/2012    GERD without esophagitis 08/15/2012    Psoriasis with arthropathy ([facility] ) 08/15/2012    Sleep disorder 08/15/2012     She  has a past surgical history that includes Appendectomy; Cholecystectomy; Foot surgery; Hysterectomy; Kidney surgery; Tonsillectomy; and Tubal ligation    Her family history includes Heart attack in her paternal grandfather; Hypertension in her maternal grandmother and paternal grandfather; Pancreatic cancer in her paternal grandfather  She  reports that she has never smoked  She has never used smokeless tobacco  She reports that she drinks alcohol  She reports that she does not use drugs  Current Outpatient Prescriptions   Medication Sig Dispense Refill    adalimumab (HUMIRA) 40 mg/0 8 mL PSKT Inject 0 8 mL (40 mg total) under the skin every 14 (fourteen) days 6 each 3    ALPRAZolam (XANAX) 0 5 mg tablet Take 1 tablet (0 5 mg total) by mouth every 8 (eight) hours as needed (prn) 90 tablet 0    folic acid (FOLVITE) 1 mg tablet Take 1 tablet by mouth daily      methotrexate 2 5 mg tablet Take 5 tabs weekly as directed  30 tablet 5    metoprolol succinate (TOPROL-XL) 25 mg 24 hr tablet Take 1 tablet (25 mg total) by mouth daily 90 tablet 3     No current facility-administered medications for this visit  Current Outpatient Prescriptions on File Prior to Visit   Medication Sig    adalimumab (HUMIRA) 40 mg/0 8 mL PSKT Inject 0 8 mL (40 mg total) under the skin every 14 (fourteen) days    folic acid (FOLVITE) 1 mg tablet Take 1 tablet by mouth daily    methotrexate 2 5 mg tablet Take 5 tabs weekly as directed   metoprolol succinate (TOPROL-XL) 25 mg 24 hr tablet Take 1 tablet (25 mg total) by mouth daily    [DISCONTINUED] ALPRAZolam (XANAX) 0 5 mg tablet Take 1 tablet by mouth every 8 (eight) hours as needed     No current facility-administered medications on file prior to visit  She is allergic to cetirizine; diclofenac-misoprostol; indomethacin; and sulfamethoxazole-trimethoprim       Review of Systems   Constitutional: Negative for activity change, chills, diaphoresis, fatigue and fever  Respiratory: Negative for cough, chest tightness, shortness of breath and wheezing  Cardiovascular: Negative for chest pain, palpitations and leg swelling  Gastrointestinal: Negative for abdominal pain, constipation, diarrhea, nausea and vomiting  Genitourinary: Negative for difficulty urinating, dysuria and frequency  Musculoskeletal: Positive for arthralgias  Negative for gait problem, joint swelling and myalgias  Neurological: Positive for headaches  Negative for dizziness, seizures and light-headedness  Hematological: Negative for adenopathy  Psychiatric/Behavioral: Negative for confusion and dysphoric mood  The patient is not nervous/anxious  Objective:      BP 94/62   Pulse 76   Temp 97 6 °F (36 4 °C) (Tympanic)   Resp 16   Ht 5' 5" (1 651 m)   Wt 81 6 kg (180 lb)   BMI 29 95 kg/m²          Physical Exam   Constitutional: She is oriented to person, place, and time  She appears well-developed and well-nourished  No distress  HENT:   Head: Normocephalic and atraumatic  Mouth/Throat: Oropharynx is clear and moist    Eyes: Conjunctivae and EOM are normal  Pupils are equal, round, and reactive to light  Cardiovascular: Normal rate, regular rhythm and normal heart sounds  No murmur heard  Pulmonary/Chest: Effort normal and breath sounds normal  No respiratory distress  She has no wheezes  Abdominal: Bowel sounds are normal  There is no tenderness  Musculoskeletal: She exhibits tenderness  She exhibits no edema or deformity  Lymphadenopathy:     She has no cervical adenopathy  Neurological: She is alert and oriented to person, place, and time  Psychiatric: She has a normal mood and affect  Her behavior is normal  Judgment and thought content normal    Nursing note and vitals reviewed

## 2018-06-25 ENCOUNTER — OFFICE VISIT (OUTPATIENT)
Dept: INTERNAL MEDICINE CLINIC | Facility: CLINIC | Age: 53
End: 2018-06-25
Payer: COMMERCIAL

## 2018-06-25 VITALS
HEART RATE: 72 BPM | WEIGHT: 186 LBS | BODY MASS INDEX: 30.99 KG/M2 | SYSTOLIC BLOOD PRESSURE: 102 MMHG | DIASTOLIC BLOOD PRESSURE: 70 MMHG | HEIGHT: 65 IN | RESPIRATION RATE: 14 BRPM | TEMPERATURE: 98.2 F

## 2018-06-25 DIAGNOSIS — K21.9 GERD WITHOUT ESOPHAGITIS: ICD-10-CM

## 2018-06-25 DIAGNOSIS — Z12.39 SCREENING FOR BREAST CANCER: Primary | ICD-10-CM

## 2018-06-25 DIAGNOSIS — Z13.220 SCREENING FOR LIPID DISORDERS: ICD-10-CM

## 2018-06-25 DIAGNOSIS — Z11.59 NEED FOR HEPATITIS C SCREENING TEST: ICD-10-CM

## 2018-06-25 DIAGNOSIS — G47.9 SLEEP DISORDER: ICD-10-CM

## 2018-06-25 DIAGNOSIS — G89.4 CHRONIC PAIN DISORDER: ICD-10-CM

## 2018-06-25 DIAGNOSIS — J45.20 MILD INTERMITTENT ASTHMA WITHOUT COMPLICATION: ICD-10-CM

## 2018-06-25 DIAGNOSIS — L40.50 PSORIASIS WITH ARTHROPATHY (HCC): ICD-10-CM

## 2018-06-25 DIAGNOSIS — F41.1 GENERALIZED ANXIETY DISORDER: ICD-10-CM

## 2018-06-25 DIAGNOSIS — M85.80 OSTEOPENIA, UNSPECIFIED LOCATION: ICD-10-CM

## 2018-06-25 DIAGNOSIS — I10 BENIGN ESSENTIAL HYPERTENSION: ICD-10-CM

## 2018-06-25 PROCEDURE — 3008F BODY MASS INDEX DOCD: CPT | Performed by: INTERNAL MEDICINE

## 2018-06-25 PROCEDURE — 99214 OFFICE O/P EST MOD 30 MIN: CPT | Performed by: INTERNAL MEDICINE

## 2018-06-25 RX ORDER — ALPRAZOLAM 0.5 MG/1
0.5 TABLET ORAL EVERY 8 HOURS PRN
Qty: 90 TABLET | Refills: 0 | Status: SHIPPED | OUTPATIENT
Start: 2018-06-25 | End: 2018-10-05 | Stop reason: SDUPTHER

## 2018-06-25 NOTE — PROGRESS NOTES
Assessment/Plan:    No problem-specific Assessment & Plan notes found for this encounter  Diagnoses and all orders for this visit:    Screening for breast cancer  -     Mammo screening bilateral w 3d & cad; Future    Generalized anxiety disorder  -     ALPRAZolam (XANAX) 0 5 mg tablet; Take 1 tablet (0 5 mg total) by mouth every 8 (eight) hours as needed (prn)    Benign essential hypertension  -     Comprehensive metabolic panel; Future  -     TSH, 3rd generation; Future  -     Microalbumin / creatinine urine ratio; Future    Psoriasis with arthropathy (HCC)  -     CBC and differential; Future  -     Comprehensive metabolic panel; Future  -     Vitamin B12; Future  -     Folate; Future    GERD without esophagitis    Mild intermittent asthma without complication    Chronic pain disorder    Sleep disorder    Osteopenia, unspecified location  -     TSH, 3rd generation; Future    Screening for lipid disorders  -     Lipid Panel with Direct LDL reflex; Future    Need for hepatitis C screening test  -     Hepatitis C antibody; Future      A/P: Pt looks worn out  Discussed the need to take time for herself, but needs to get her work done  Recommend pt see rheum for a second opinion  Check labs  Order mammo  Talked about some distractions to improve her mental state  Continue current treatment pending labs  RTC four months for routine  Subjective:      Patient ID: Giorgio Cruz is a 47 y o  female  WF RTC with her  for f/u Psoriatric athritis, htn, etc  Doing poorly  Very stressed at work and working long hours  Feels tired and humira not really helping her pain  Remains active in a way, but not as physical as before due to job and pain  Not eating or sleeping well  On edge a lot  Asthma is well controlled  Due for labs and mammo  The following portions of the patient's history were reviewed and updated as appropriate:   She  has a past medical history of Hemangioma of other sites    She   Patient Active Problem List    Diagnosis Date Noted    Chronic pain disorder 12/13/2017    Osteopenia 09/11/2017    Benign essential hypertension 04/19/2016    Ophthalmoplegic migraine headache 08/20/2012    Allergic rhinitis 08/15/2012    Asthma 08/15/2012    Generalized anxiety disorder 08/15/2012    GERD without esophagitis 08/15/2012    Psoriasis with arthropathy ([facility] [address] ) 08/15/2012    Sleep disorder 08/15/2012     She  has a past surgical history that includes Appendectomy; Cholecystectomy; Foot surgery; Hysterectomy; Kidney surgery; Tonsillectomy; and Tubal ligation  Her family history includes Heart attack in her paternal grandfather; Hypertension in her maternal grandmother and paternal grandfather; Pancreatic cancer in her paternal grandfather  She  reports that she has never smoked  She has never used smokeless tobacco  She reports that she drinks alcohol  She reports that she does not use drugs  Current Outpatient Prescriptions   Medication Sig Dispense Refill    adalimumab (HUMIRA) 40 mg/0 8 mL PSKT Inject 0 8 mL (40 mg total) under the skin every 14 (fourteen) days 6 each 3    folic acid (FOLVITE) 1 mg tablet Take 1 tablet by mouth daily      methotrexate 2 5 mg tablet Take 5 tabs weekly as directed  30 tablet 5    metoprolol succinate (TOPROL-XL) 25 mg 24 hr tablet Take 1 tablet (25 mg total) by mouth daily 90 tablet 3    ALPRAZolam (XANAX) 0 5 mg tablet Take 1 tablet (0 5 mg total) by mouth every 8 (eight) hours as needed (prn) 90 tablet 0     No current facility-administered medications for this visit  Current Outpatient Prescriptions on File Prior to Visit   Medication Sig    adalimumab (HUMIRA) 40 mg/0 8 mL PSKT Inject 0 8 mL (40 mg total) under the skin every 14 (fourteen) days    folic acid (FOLVITE) 1 mg tablet Take 1 tablet by mouth daily    methotrexate 2 5 mg tablet Take 5 tabs weekly as directed      metoprolol succinate (TOPROL-XL) 25 mg 24 hr tablet Take 1 tablet (25 mg total) by mouth daily    [DISCONTINUED] ALPRAZolam (XANAX) 0 5 mg tablet Take 1 tablet (0 5 mg total) by mouth every 8 (eight) hours as needed (prn)     No current facility-administered medications on file prior to visit  She is allergic to cetirizine; diclofenac-misoprostol; indomethacin; and sulfamethoxazole-trimethoprim       Review of Systems   Constitutional: Positive for activity change and fatigue  Negative for chills, diaphoresis and fever  HENT: Negative  Eyes: Negative for visual disturbance  Respiratory: Negative for cough, chest tightness, shortness of breath and wheezing  Cardiovascular: Negative for chest pain, palpitations and leg swelling  Gastrointestinal: Negative for abdominal pain, constipation, diarrhea, nausea and vomiting  Endocrine: Negative for cold intolerance and heat intolerance  Genitourinary: Negative for difficulty urinating, dysuria and frequency  Musculoskeletal: Positive for arthralgias and joint swelling  Negative for gait problem and myalgias  Neurological: Positive for headaches  Negative for dizziness, seizures, syncope, weakness and light-headedness  Psychiatric/Behavioral: Positive for dysphoric mood and sleep disturbance  Negative for confusion and suicidal ideas  The patient is nervous/anxious  Objective:      /70   Pulse 72   Temp 98 2 °F (36 8 °C) (Tympanic)   Resp 14   Ht 5' 5" (1 651 m)   Wt 84 4 kg (186 lb)   BMI 30 95 kg/m²          Physical Exam   Constitutional: She is oriented to person, place, and time  She appears well-developed and well-nourished  She appears distressed  Tired an pale  HENT:   Head: Normocephalic and atraumatic  Mouth/Throat: Oropharynx is clear and moist    Eyes: Conjunctivae and EOM are normal  Pupils are equal, round, and reactive to light  Neck: Normal range of motion  Neck supple  No JVD present  Cardiovascular: Normal rate, regular rhythm and normal heart sounds      No murmur heard   Pulmonary/Chest: Effort normal and breath sounds normal  No respiratory distress  She has no wheezes  Abdominal: Soft  Bowel sounds are normal  She exhibits no distension  There is no tenderness  Musculoskeletal: She exhibits tenderness  She exhibits no edema or deformity  Neurological: She is alert and oriented to person, place, and time  Psychiatric: Her behavior is normal  Judgment and thought content normal    Appears very tense  Nursing note and vitals reviewed

## 2018-08-11 LAB — HCV AB SER-ACNC: NONREACTIVE

## 2018-10-05 DIAGNOSIS — F41.1 GENERALIZED ANXIETY DISORDER: ICD-10-CM

## 2018-10-05 RX ORDER — ALPRAZOLAM 0.5 MG/1
0.5 TABLET ORAL EVERY 8 HOURS PRN
Qty: 90 TABLET | Refills: 0 | Status: SHIPPED | OUTPATIENT
Start: 2018-10-05 | End: 2018-10-12 | Stop reason: SDUPTHER

## 2018-10-12 DIAGNOSIS — F41.1 GENERALIZED ANXIETY DISORDER: ICD-10-CM

## 2018-10-12 RX ORDER — ALPRAZOLAM 0.5 MG/1
0.5 TABLET ORAL EVERY 8 HOURS PRN
Qty: 90 TABLET | Refills: 0 | Status: SHIPPED | OUTPATIENT
Start: 2018-10-12 | End: 2019-02-25

## 2018-10-17 RX ORDER — ERGOCALCIFEROL 1.25 MG/1
50000 CAPSULE ORAL WEEKLY
Refills: 0 | COMMUNITY
Start: 2018-08-22 | End: 2019-02-25 | Stop reason: SDUPTHER

## 2018-10-22 ENCOUNTER — OFFICE VISIT (OUTPATIENT)
Dept: INTERNAL MEDICINE CLINIC | Facility: CLINIC | Age: 53
End: 2018-10-22
Payer: COMMERCIAL

## 2018-10-22 VITALS
TEMPERATURE: 98.2 F | WEIGHT: 182 LBS | RESPIRATION RATE: 14 BRPM | HEART RATE: 70 BPM | SYSTOLIC BLOOD PRESSURE: 104 MMHG | DIASTOLIC BLOOD PRESSURE: 64 MMHG | HEIGHT: 65 IN | BODY MASS INDEX: 30.32 KG/M2

## 2018-10-22 DIAGNOSIS — K21.9 GERD WITHOUT ESOPHAGITIS: ICD-10-CM

## 2018-10-22 DIAGNOSIS — E55.9 VITAMIN D DEFICIENCY: ICD-10-CM

## 2018-10-22 DIAGNOSIS — E53.8 FOLATE DEFICIENCY: ICD-10-CM

## 2018-10-22 DIAGNOSIS — M85.80 OSTEOPENIA, UNSPECIFIED LOCATION: ICD-10-CM

## 2018-10-22 DIAGNOSIS — L40.50 PSORIASIS WITH ARTHROPATHY (HCC): ICD-10-CM

## 2018-10-22 DIAGNOSIS — G89.4 CHRONIC PAIN DISORDER: ICD-10-CM

## 2018-10-22 DIAGNOSIS — F41.1 GENERALIZED ANXIETY DISORDER: ICD-10-CM

## 2018-10-22 DIAGNOSIS — I10 HYPERTENSION, UNSPECIFIED TYPE: ICD-10-CM

## 2018-10-22 DIAGNOSIS — Z12.39 SCREENING FOR BREAST CANCER: ICD-10-CM

## 2018-10-22 DIAGNOSIS — I10 BENIGN ESSENTIAL HYPERTENSION: Primary | ICD-10-CM

## 2018-10-22 DIAGNOSIS — L40.50 PSORIATIC ARTHRITIS (HCC): ICD-10-CM

## 2018-10-22 DIAGNOSIS — G43.B0 OPHTHALMOPLEGIC MIGRAINE, NOT INTRACTABLE: ICD-10-CM

## 2018-10-22 DIAGNOSIS — J45.20 MILD INTERMITTENT ASTHMA WITHOUT COMPLICATION: ICD-10-CM

## 2018-10-22 PROCEDURE — 3074F SYST BP LT 130 MM HG: CPT | Performed by: INTERNAL MEDICINE

## 2018-10-22 PROCEDURE — 3078F DIAST BP <80 MM HG: CPT | Performed by: INTERNAL MEDICINE

## 2018-10-22 PROCEDURE — 99214 OFFICE O/P EST MOD 30 MIN: CPT | Performed by: INTERNAL MEDICINE

## 2018-10-22 RX ORDER — METOPROLOL SUCCINATE 25 MG/1
25 TABLET, EXTENDED RELEASE ORAL DAILY
Qty: 90 TABLET | Refills: 3 | Status: SHIPPED | OUTPATIENT
Start: 2018-10-22 | End: 2019-11-05 | Stop reason: SDUPTHER

## 2018-10-22 RX ORDER — FOLIC ACID 1 MG/1
1000 TABLET ORAL DAILY
Qty: 90 TABLET | Refills: 3 | Status: CANCELLED | OUTPATIENT
Start: 2018-10-22

## 2018-10-22 RX ORDER — FOLIC ACID 1 MG/1
1000 TABLET ORAL DAILY
Qty: 90 TABLET | Refills: 3 | Status: SHIPPED | OUTPATIENT
Start: 2018-10-22 | End: 2021-01-29

## 2018-10-22 NOTE — PROGRESS NOTES
Assessment/Plan:    No problem-specific Assessment & Plan notes found for this encounter  Diagnoses and all orders for this visit:    Benign essential hypertension  -     Cancel: Comprehensive metabolic panel; Future  -     Microalbumin / creatinine urine ratio  -     Cancel: TSH, 3rd generation with Free T4 reflex; Future    Ophthalmoplegic migraine, not intractable    Mild intermittent asthma without complication    GERD without esophagitis    Osteopenia, unspecified location  -     Cancel: TSH, 3rd generation with Free T4 reflex; Future    Psoriasis with arthropathy (HCC)  -     Cancel: CBC and differential; Future  -     Cancel: Comprehensive metabolic panel; Future  -     Cancel: Folate; Future  -     Cancel: Vitamin B12; Future    Chronic pain disorder    Generalized anxiety disorder    Psoriatic arthritis (HCC)  -     methotrexate 2 5 mg tablet; Take 5 tabs weekly as directed  -     folic acid (FOLVITE) 1 mg tablet; Take 1 tablet (1,000 mcg total) by mouth daily    Hypertension, unspecified type  -     metoprolol succinate (TOPROL-XL) 25 mg 24 hr tablet; Take 1 tablet (25 mg total) by mouth daily    Vitamin D deficiency    Folate deficiency    Screening for breast cancer  -     Mammo screening bilateral w 3d & cad; Future    Other orders  -     ergocalciferol (VITAMIN D2) 50,000 units; Take 50,000 Units by mouth once a week  -     Cancel: Hemoglobin A1C; Future  -     Cancel: Hepatitis C antibody; Future  -     Cancel: Lipid Panel with Direct LDL reflex; Future  -     Cancel: folic acid (FOLVITE) 1 mg tablet; Take 1 tablet (1,000 mcg total) by mouth daily      A/P: Doing okay, but no improvement  Discussed labs  Pt started on folate and vit d replacement  Discussed keeping active and try to get some stress relief  Order mammo and refusing all vaccines and CRC  Continue current treatment and RTC four months for routine  Subjective:      Patient ID: Brielle Bear is a 46 y o  female      WF RTC for f/u Psoriatric Arthritis, HTN, etc  Doing ok and no new issues  Remains as active as her arthritis will allow and no falls to report  Continues under a lot of work related stress  No longer on Humira and was stopped by the Rheum  Pain no worse  Sleep still an issue w/o the meds  No heartburn s/s and asthma well controlled  Due for CRC and vaccine  Also due for mammo  Labs done and show a new vit d and folate deficiencies  The following portions of the patient's history were reviewed and updated as appropriate:   She  has a past medical history of Generalized anxiety disorder; Hemangioma of other sites; and Hypertension  She   Patient Active Problem List    Diagnosis Date Noted    Vitamin D deficiency 10/22/2018    Folate deficiency 10/22/2018    Chronic pain disorder 12/13/2017    Osteopenia 09/11/2017    Benign essential hypertension 04/19/2016    Ophthalmoplegic migraine headache 08/20/2012    Allergic rhinitis 08/15/2012    Asthma 08/15/2012    Generalized anxiety disorder 08/15/2012    GERD without esophagitis 08/15/2012    Psoriasis with arthropathy ([facility] [address] ) 08/15/2012    Sleep disorder 08/15/2012     She  has a past surgical history that includes Appendectomy; Cholecystectomy; Foot surgery; Hysterectomy; Kidney surgery; Tonsillectomy; and Tubal ligation  Her family history includes Heart attack in her paternal grandfather; Hypertension in her maternal grandmother and paternal grandfather; Pancreatic cancer in her paternal grandfather  She  reports that she has never smoked  She has never used smokeless tobacco  She reports that she drinks alcohol  She reports that she does not use drugs    Current Outpatient Prescriptions   Medication Sig Dispense Refill    ALPRAZolam (XANAX) 0 5 mg tablet Take 1 tablet (0 5 mg total) by mouth every 8 (eight) hours as needed (prn) 90 tablet 0    ergocalciferol (VITAMIN D2) 50,000 units Take 50,000 Units by mouth once a week  0    folic acid (FOLVITE) 1 mg tablet Take 1 tablet (1,000 mcg total) by mouth daily 90 tablet 3    methotrexate 2 5 mg tablet Take 5 tabs weekly as directed  60 tablet 3    metoprolol succinate (TOPROL-XL) 25 mg 24 hr tablet Take 1 tablet (25 mg total) by mouth daily 90 tablet 3     No current facility-administered medications for this visit  Current Outpatient Prescriptions on File Prior to Visit   Medication Sig    ALPRAZolam (XANAX) 0 5 mg tablet Take 1 tablet (0 5 mg total) by mouth every 8 (eight) hours as needed (prn)    [DISCONTINUED] folic acid (FOLVITE) 1 mg tablet Take 1 tablet by mouth daily    [DISCONTINUED] methotrexate 2 5 mg tablet Take 5 tabs weekly as directed   [DISCONTINUED] metoprolol succinate (TOPROL-XL) 25 mg 24 hr tablet Take 1 tablet (25 mg total) by mouth daily    [DISCONTINUED] adalimumab (HUMIRA) 40 mg/0 8 mL PSKT Inject 0 8 mL (40 mg total) under the skin every 14 (fourteen) days (Patient not taking: Reported on 10/22/2018 )     No current facility-administered medications on file prior to visit  She is allergic to cetirizine; diclofenac-misoprostol; indomethacin; and sulfamethoxazole-trimethoprim       Review of Systems   Constitutional: Negative for activity change, chills, diaphoresis, fatigue and fever  HENT: Negative  Eyes: Negative for visual disturbance  Respiratory: Negative for cough, chest tightness, shortness of breath and wheezing  Cardiovascular: Negative for chest pain, palpitations and leg swelling  Gastrointestinal: Negative for abdominal pain, constipation, diarrhea, nausea and vomiting  Endocrine: Negative for cold intolerance and heat intolerance  Genitourinary: Negative for difficulty urinating, dysuria and frequency  Musculoskeletal: Positive for arthralgias and joint swelling  Negative for gait problem and myalgias  Allergic/Immunologic: Positive for environmental allergies     Neurological: Negative for dizziness, seizures, syncope, weakness, light-headedness and headaches  Psychiatric/Behavioral: Negative for confusion, dysphoric mood and sleep disturbance  The patient is nervous/anxious  Objective:      /64   Pulse 70   Temp 98 2 °F (36 8 °C) (Tympanic)   Resp 14   Ht 5' 5" (1 651 m)   Wt 82 6 kg (182 lb)   BMI 30 29 kg/m²          Physical Exam   Constitutional: She is oriented to person, place, and time  She appears well-developed and well-nourished  No distress  HENT:   Head: Normocephalic and atraumatic  Mouth/Throat: Oropharynx is clear and moist    Eyes: Pupils are equal, round, and reactive to light  Conjunctivae and EOM are normal    Neck: Neck supple  No JVD present  Cardiovascular: Normal rate, regular rhythm and normal heart sounds  No murmur heard  Pulmonary/Chest: Effort normal and breath sounds normal  No respiratory distress  She has no wheezes  Abdominal: Soft  Bowel sounds are normal  She exhibits no distension  There is no tenderness  Musculoskeletal: She exhibits edema, tenderness and deformity  Right hand and knee with diffuse swelling w/o increase temp or erythema  Tenderness to touch especially over the thumb  ROM wnl with decreased fine movement  Neurological: She is alert and oriented to person, place, and time  Psychiatric: She has a normal mood and affect  Her behavior is normal  Judgment and thought content normal    Nursing note and vitals reviewed

## 2018-10-22 NOTE — PATIENT INSTRUCTIONS

## 2018-11-09 DIAGNOSIS — L40.50 PSORIASIS WITH ARTHROPATHY (HCC): Primary | ICD-10-CM

## 2018-11-09 RX ORDER — PREDNISONE 10 MG/1
TABLET ORAL
Qty: 50 TABLET | Refills: 0 | Status: SHIPPED | OUTPATIENT
Start: 2018-11-09 | End: 2019-02-25

## 2018-11-26 ENCOUNTER — OFFICE VISIT (OUTPATIENT)
Dept: INTERNAL MEDICINE CLINIC | Facility: CLINIC | Age: 53
End: 2018-11-26
Payer: COMMERCIAL

## 2018-11-26 ENCOUNTER — TRANSCRIBE ORDERS (OUTPATIENT)
Dept: RADIOLOGY | Facility: CLINIC | Age: 53
End: 2018-11-26

## 2018-11-26 ENCOUNTER — APPOINTMENT (OUTPATIENT)
Dept: RADIOLOGY | Facility: CLINIC | Age: 53
End: 2018-11-26
Payer: COMMERCIAL

## 2018-11-26 VITALS
HEIGHT: 65 IN | DIASTOLIC BLOOD PRESSURE: 74 MMHG | BODY MASS INDEX: 30.99 KG/M2 | SYSTOLIC BLOOD PRESSURE: 104 MMHG | RESPIRATION RATE: 18 BRPM | WEIGHT: 186 LBS | HEART RATE: 85 BPM | TEMPERATURE: 98.7 F | OXYGEN SATURATION: 99 %

## 2018-11-26 DIAGNOSIS — M25.561 ACUTE PAIN OF RIGHT KNEE: ICD-10-CM

## 2018-11-26 DIAGNOSIS — L40.50 PSORIASIS WITH ARTHROPATHY (HCC): ICD-10-CM

## 2018-11-26 DIAGNOSIS — M23.91 INTERNAL DERANGEMENT OF RIGHT KNEE: Primary | ICD-10-CM

## 2018-11-26 DIAGNOSIS — R26.9 GAIT ABNORMALITY: ICD-10-CM

## 2018-11-26 DIAGNOSIS — M23.91 INTERNAL DERANGEMENT OF RIGHT KNEE: ICD-10-CM

## 2018-11-26 PROCEDURE — 73564 X-RAY EXAM KNEE 4 OR MORE: CPT

## 2018-11-26 PROCEDURE — 1036F TOBACCO NON-USER: CPT | Performed by: INTERNAL MEDICINE

## 2018-11-26 PROCEDURE — 99213 OFFICE O/P EST LOW 20 MIN: CPT | Performed by: INTERNAL MEDICINE

## 2018-11-26 PROCEDURE — 3008F BODY MASS INDEX DOCD: CPT | Performed by: INTERNAL MEDICINE

## 2018-11-26 NOTE — PROGRESS NOTES
Assessment/Plan:    No problem-specific Assessment & Plan notes found for this encounter  Diagnoses and all orders for this visit:    Internal derangement of right knee  -     XR knee 4+ vw right injury; Future  -     MRI knee right  wo contrast; Future    Gait abnormality  -     XR knee 4+ vw right injury; Future  -     MRI knee right  wo contrast; Future    Acute pain of right knee  -     XR knee 4+ vw right injury; Future  -     MRI knee right  wo contrast; Future    Psoriasis with arthropathy (HCC)  -     XR knee 4+ vw right injury; Future  -     MRI knee right  wo contrast; Future    Other orders  -     Discontinue: adalimumab (HUMIRA) 40 mg/0 8 mL PSKT; Humira 40 mg/0 8 mL subcutaneous syringe kit      A/P: Suspect a meniscal tear  Continue with rest, ice/heat, and start NSAID's when off the steroids  Discussed PT and defers for now  Will check an xray and most likely need an MRI and ortho eval  RTC two weeks for f/u  Subjective:      Patient ID: Rhiannon Em is a 46 y o  female  WF with a h/o psoriatric arthropathy, presents with a two week h/o acute onset right knee pain and swelling with difficulty working and walking  Unsure of any trauma, but occurred when she was walking and may have misstep  No prior problems  Felt a "pop" with the pain  Trouble wt bearing and ambulating since despite being on steroids and applying ice  Knee Pain          The following portions of the patient's history were reviewed and updated as appropriate:   She  has a past medical history of Asthma; Generalized anxiety disorder; GERD (gastroesophageal reflux disease); Hemangioma of other sites; and Hypertension    She   Patient Active Problem List    Diagnosis Date Noted    Vitamin D deficiency 10/22/2018    Folate deficiency 10/22/2018    Chronic pain disorder 12/13/2017    Osteopenia 09/11/2017    Benign essential hypertension 04/19/2016    Ophthalmoplegic migraine headache 08/20/2012    Allergic rhinitis 08/15/2012    Asthma 08/15/2012    Generalized anxiety disorder 08/15/2012    GERD without esophagitis 08/15/2012    Psoriasis with arthropathy ([facility] [address] ) 08/15/2012    Sleep disorder 08/15/2012     She  has a past surgical history that includes Appendectomy; Cholecystectomy; Foot surgery; Hysterectomy; Kidney surgery; Tonsillectomy; and Tubal ligation  Her family history includes Heart attack in her paternal grandfather; Hypertension in her maternal grandmother and paternal grandfather; Pancreatic cancer in her paternal grandfather  She  reports that she has never smoked  She has never used smokeless tobacco  She reports that she drinks alcohol  She reports that she does not use drugs  Current Outpatient Prescriptions   Medication Sig Dispense Refill    ALPRAZolam (XANAX) 0 5 mg tablet Take 1 tablet (0 5 mg total) by mouth every 8 (eight) hours as needed (prn) 90 tablet 0    ergocalciferol (VITAMIN D2) 50,000 units Take 50,000 Units by mouth once a week  0    folic acid (FOLVITE) 1 mg tablet Take 1 tablet (1,000 mcg total) by mouth daily 90 tablet 3    methotrexate 2 5 mg tablet Take 5 tabs weekly as directed  60 tablet 3    metoprolol succinate (TOPROL-XL) 25 mg 24 hr tablet Take 1 tablet (25 mg total) by mouth daily 90 tablet 3    predniSONE 10 mg tablet 40mg po q d x 5, then 30mg po q d x 5, then 20mg po q d x 5, then 10mg po q d x 5, then d/c  50 tablet 0     No current facility-administered medications for this visit  Current Outpatient Prescriptions on File Prior to Visit   Medication Sig    ALPRAZolam (XANAX) 0 5 mg tablet Take 1 tablet (0 5 mg total) by mouth every 8 (eight) hours as needed (prn)    ergocalciferol (VITAMIN D2) 50,000 units Take 50,000 Units by mouth once a week    folic acid (FOLVITE) 1 mg tablet Take 1 tablet (1,000 mcg total) by mouth daily    methotrexate 2 5 mg tablet Take 5 tabs weekly as directed      metoprolol succinate (TOPROL-XL) 25 mg 24 hr tablet Take 1 tablet (25 mg total) by mouth daily    predniSONE 10 mg tablet 40mg po q d x 5, then 30mg po q d x 5, then 20mg po q d x 5, then 10mg po q d x 5, then d/c  No current facility-administered medications on file prior to visit  She is allergic to cetirizine; diclofenac-misoprostol; indomethacin; and sulfamethoxazole-trimethoprim       Review of Systems   Constitutional: Positive for activity change  Negative for chills, diaphoresis, fatigue and fever  Respiratory: Negative for cough, chest tightness, shortness of breath and wheezing  Cardiovascular: Negative for chest pain, palpitations and leg swelling  Gastrointestinal: Negative for abdominal pain, constipation, diarrhea, nausea and vomiting  Genitourinary: Negative for difficulty urinating, dysuria and frequency  Musculoskeletal: Positive for arthralgias and gait problem  Negative for myalgias  Neurological: Negative for light-headedness and headaches  Psychiatric/Behavioral: Negative for confusion  The patient is not nervous/anxious  Objective:      /74 (BP Location: Left arm, Patient Position: Sitting, Cuff Size: Adult)   Pulse 85   Temp 98 7 °F (37 1 °C) (Tympanic)   Resp 18   Ht 5' 5" (1 651 m)   Wt 84 4 kg (186 lb)   SpO2 99%   BMI 30 95 kg/m²          Physical Exam   Constitutional: She appears well-developed and well-nourished  No distress  HENT:   Head: Normocephalic and atraumatic  Mouth/Throat: Oropharynx is clear and moist    Eyes: Pupils are equal, round, and reactive to light  Conjunctivae and EOM are normal    Musculoskeletal: She exhibits tenderness  She exhibits no edema or deformity  Right knee w/o any gross deformity, increase temp, erythema, or swelling, but small pre patellar effusion noted  ROM wnl with increase tenderness with flexion  Colat ligments are laxed  Negative drawer's  ??nayely's  Nursing note and vitals reviewed

## 2018-12-03 ENCOUNTER — HOSPITAL ENCOUNTER (OUTPATIENT)
Dept: MRI IMAGING | Facility: HOSPITAL | Age: 53
Discharge: HOME | End: 2018-12-03
Payer: COMMERCIAL

## 2018-12-03 DIAGNOSIS — L40.50 PSORIASIS WITH ARTHROPATHY (HCC): ICD-10-CM

## 2018-12-03 DIAGNOSIS — M25.561 ACUTE PAIN OF RIGHT KNEE: ICD-10-CM

## 2018-12-03 DIAGNOSIS — R26.9 GAIT ABNORMALITY: ICD-10-CM

## 2018-12-03 DIAGNOSIS — M23.91 INTERNAL DERANGEMENT OF RIGHT KNEE: ICD-10-CM

## 2018-12-03 PROCEDURE — 73721 MRI JNT OF LWR EXTRE W/O DYE: CPT

## 2018-12-14 ENCOUNTER — EVALUATION (OUTPATIENT)
Dept: PHYSICAL THERAPY | Facility: MEDICAL CENTER | Age: 53
End: 2018-12-14
Payer: COMMERCIAL

## 2018-12-14 DIAGNOSIS — M25.561 ACUTE PAIN OF RIGHT KNEE: ICD-10-CM

## 2018-12-14 DIAGNOSIS — S83.281A TEAR OF LATERAL MENISCUS OF RIGHT KNEE, CURRENT, UNSPECIFIED TEAR TYPE, INITIAL ENCOUNTER: Primary | ICD-10-CM

## 2018-12-14 PROCEDURE — 97535 SELF CARE MNGMENT TRAINING: CPT | Performed by: PHYSICAL THERAPIST

## 2018-12-14 PROCEDURE — G8978 MOBILITY CURRENT STATUS: HCPCS | Performed by: PHYSICAL THERAPIST

## 2018-12-14 PROCEDURE — G8979 MOBILITY GOAL STATUS: HCPCS | Performed by: PHYSICAL THERAPIST

## 2018-12-14 PROCEDURE — 97161 PT EVAL LOW COMPLEX 20 MIN: CPT | Performed by: PHYSICAL THERAPIST

## 2018-12-17 ENCOUNTER — OFFICE VISIT (OUTPATIENT)
Dept: PHYSICAL THERAPY | Facility: MEDICAL CENTER | Age: 53
End: 2018-12-17
Payer: COMMERCIAL

## 2018-12-17 DIAGNOSIS — S83.281A TEAR OF LATERAL MENISCUS OF RIGHT KNEE, CURRENT, UNSPECIFIED TEAR TYPE, INITIAL ENCOUNTER: Primary | ICD-10-CM

## 2018-12-17 PROCEDURE — 97110 THERAPEUTIC EXERCISES: CPT

## 2018-12-17 PROCEDURE — 97010 HOT OR COLD PACKS THERAPY: CPT

## 2018-12-17 NOTE — PROGRESS NOTES
Daily Note     Today's date: 2018  Patient name: Dory Mahan  : 1965  MRN: [id number]  Referring provider: Luis Stafford MD  Dx:   Encounter Diagnosis     ICD-10-CM    1  Tear of lateral meniscus of right knee, current, unspecified tear type, initial encounter S88 281A                   Subjective: Knee becoming progressively worse  Feels injection is wearing off  At rest no pain walking 6/10      Objective: See treatment diary below      Assessment: Tolerated treatment fair and Instructed patient only to perform therapeutic activity in pain free ranges  Patient exhibited good technique with therapeutic exercises, would benefit from continued PT and Monitor closely for signs of inflammation Significant palpable creptitense with SAQ 40 to 0 degrees  This motion is painful as is side lying abduction in medial knee area  No pain or crepitence noted 20 to 0 degrees      Plan: Continue per plan of care  Progress treatment as tolerated     progress ranges as pain decreases      Precautions Small Lateral Meniscus Tear in Right knee    Specialty Daily Treatment Diary     Manual         Right knee and LE stretching                             Exercise Diary         Pain level pre 6/10       Pain level post 10       NuStep        SRC        TR/HR X 30       Mini Squats        Fwd/Lat Step Ups        Standing SLR 3-way        Leg/Calf Press        Quad Set 30 x 5 sec hold       SAQ X 10       LAQ 90 to 50 X 30       Heel Slide        Hip Abd  SL  X 10       Hip Add Sl X 30       Bridge        SLR X 15       Hip extension prone x30       Terminal knee extension with red half roll x30       Sitting hamstring stretch 1 min           Modalities        CP x10

## 2018-12-19 ENCOUNTER — OFFICE VISIT (OUTPATIENT)
Dept: PHYSICAL THERAPY | Facility: MEDICAL CENTER | Age: 53
End: 2018-12-19
Payer: COMMERCIAL

## 2018-12-19 DIAGNOSIS — M25.561 ACUTE PAIN OF RIGHT KNEE: ICD-10-CM

## 2018-12-19 DIAGNOSIS — S83.281A TEAR OF LATERAL MENISCUS OF RIGHT KNEE, CURRENT, UNSPECIFIED TEAR TYPE, INITIAL ENCOUNTER: Primary | ICD-10-CM

## 2018-12-19 PROCEDURE — 97110 THERAPEUTIC EXERCISES: CPT

## 2018-12-19 PROCEDURE — 97010 HOT OR COLD PACKS THERAPY: CPT

## 2018-12-19 NOTE — PROGRESS NOTES
Daily Note     Today's date: 2018  Patient name: Adrián Reid  : 1965  MRN: [id number]  Referring provider: Ralph Pena MD  Dx:   Encounter Diagnosis     ICD-10-CM    1  Tear of lateral meniscus of right knee, current, unspecified tear type, initial encounter S83 281A    2  Acute pain of right knee M25 561                   Subjective: Pt denies R knee pain at rest  Reports pain increasing to, at times,  8/10 with ambulation  Objective: See treatment diary below      Assessment: Tolerated treatment well  Patient exhibited good technique with therapeutic exercises and would benefit from continued PT      Plan: Continue per plan of care           Precautions Small Lateral Meniscus Tear in Right knee    Specialty Daily Treatment Diary     Manual         Right knee and LE stretching                             Exercise Diary        Pain level pre 6/10 0/10     At rest      Pain level post 2/10 0/10        3/10            with amb      NuStep  L1 x6'      [facility]        TR/HR X 30 x30      Mini Squats        Fwd/Lat Step Ups        Standing SLR 3-way        Leg/Calf Press        Quad Set 30 x 5 sec hold 30 x5"      SAQ X 10 hold      LAQ 90 to 50 X 30 x30      Heel Slide  x30      Hip Abd  SL  X 10 x30      Hip Add Sl X 30 x30      Bridge        SLR X 15 x20      Hip extension prone x30 x30      Terminal knee extension with red half roll x30 x30      Sitting hamstring stretch 1 min 1 min          Modalities       CP x10 x10 min

## 2018-12-21 ENCOUNTER — OFFICE VISIT (OUTPATIENT)
Dept: PHYSICAL THERAPY | Facility: MEDICAL CENTER | Age: 53
End: 2018-12-21
Payer: COMMERCIAL

## 2018-12-21 DIAGNOSIS — S83.281A TEAR OF LATERAL MENISCUS OF RIGHT KNEE, CURRENT, UNSPECIFIED TEAR TYPE, INITIAL ENCOUNTER: Primary | ICD-10-CM

## 2018-12-21 DIAGNOSIS — M25.561 ACUTE PAIN OF RIGHT KNEE: ICD-10-CM

## 2018-12-21 PROCEDURE — 97110 THERAPEUTIC EXERCISES: CPT

## 2018-12-21 PROCEDURE — 97010 HOT OR COLD PACKS THERAPY: CPT

## 2018-12-21 NOTE — PROGRESS NOTES
Daily Note     Today's date: 2018  Patient name: Dory Mahan  : 1965  MRN: [id number]  Referring provider: Luis Stafford MD  Dx:   Encounter Diagnosis     ICD-10-CM    1  Tear of lateral meniscus of right knee, current, unspecified tear type, initial encounter S83 281A    2  Acute pain of right knee M25 561                   Subjective:  I don't see any difference with therapy yet  Notes pain 7-8/10 today mid lateral knee down to below knee cap      Objective: See treatment diary below      Assessment: Tolerated treatment well and occasiional verbal cues for correct performance    Patient would benefit from continued PT      Plan: Continue per plan of care  Progress treatment as tolerated     Continue with HEP    Precautions Small Lateral Meniscus Tear in Right knee    Specialty Daily Treatment Diary     Manual         Right knee and LE stretching                             Exercise Diary       Pain level pre 6/10 0/10     At rest 7-8/10     Pain level post 2/10 0/10        3/10            with amb      NuStep  L1 x6' L1 x 7'     [facility]        TR/HR X 30 x30 X 30     Mini Squats        Fwd/Lat Step Ups        Standing SLR 3-way        Leg/Calf Press        Quad Set 30 x 5 sec hold 30 x5" 30 x 5"     SAQ X 10 hold Hold      LAQ 90 to 50 X 30 x30 X 30     Heel Slide  x30 1# x 30      Hip Abd  SL  X 10 x30 X 30     Hip Add Sl X 30 x30 X 30     Bridge   X 10     SLR X 15 x20 X 30     Hip extension prone x30 x30 X 30     Terminal knee extension with red half roll x30 x30 X 30     Sitting hamstring stretch 1 min 1 min 1 min         Modalities      CP x10 x10 min X 10 min

## 2018-12-24 ENCOUNTER — OFFICE VISIT (OUTPATIENT)
Dept: PHYSICAL THERAPY | Facility: MEDICAL CENTER | Age: 53
End: 2018-12-24
Payer: COMMERCIAL

## 2018-12-24 DIAGNOSIS — S83.281A TEAR OF LATERAL MENISCUS OF RIGHT KNEE, CURRENT, UNSPECIFIED TEAR TYPE, INITIAL ENCOUNTER: Primary | ICD-10-CM

## 2018-12-24 PROCEDURE — G8978 MOBILITY CURRENT STATUS: HCPCS

## 2018-12-24 PROCEDURE — 97110 THERAPEUTIC EXERCISES: CPT

## 2018-12-24 PROCEDURE — G8979 MOBILITY GOAL STATUS: HCPCS

## 2018-12-24 NOTE — PROGRESS NOTES
Daily Note     Today's date: 2018  Patient name: Leanna Rodas  : 1965  MRN: [id number]  Referring provider: Domo Young MD  Dx:   Encounter Diagnosis     ICD-10-CM    1  Tear of lateral meniscus of right knee, current, unspecified tear type, initial encounter S83 281A        Start Time: 1102          Subjective: 2/10 when awakening in AM by 5PM 5/10 be bed 8/10      Objective: See treatment diary below      Assessment: Tolerated treatment well and able to complete full exercise program with progression  Still with swelling    Patient exhibited good technique with therapeutic exercises, would benefit from continued PT and Will see MD       Plan: Continue per plan of care  Progress treatment as tolerated            Precautions Small Lateral Meniscus Tear in Right knee    Specialty Daily Treatment Diary     Manual         Right knee and LE stretching                             Exercise Diary      Pain level pre 6/10 0/10     At rest 7-810 See above    Pain level post 2/10 0/10        3/10            with amb      NuStep  L1 x6' L1 x 7' L1 8 min    [facility]        TR/HR X 30 x30 X 30 X 30    Incline stretch    1 min    Mini Squats        Fwd/Lat Step Ups        Standing SLR 3-way        Leg/Calf Press    #10 x 30 quads    Quad Set 30 x 5 sec hold 30 x5" 30 x 5" 30 x 5 sec    SAQ X 10 hold Hold  hold    LAQ 90 to 50 X 30 x30 X 30 #1 x 30    Heel Slide  x30 1# x 30  #2 x 30    Hip Abd  SL  X 10 x30 X 30 #1 x 30    Hip Add Sl X 30 x30 X 30 #1 x 30    Bridge   X 10 x15    SLR X 15 x20 X 30 #1 x 30    Hip extension prone x30 x30 X 30 #1 x 30    Terminal knee extension with red half roll x30 x30 X 30 #1 x 30    Sitting hamstring stretch 1 min 1 min 1 min 1 min        Modalities      CP x10 x10 min X 10 min x10 min

## 2018-12-27 ENCOUNTER — OFFICE VISIT (OUTPATIENT)
Dept: PHYSICAL THERAPY | Facility: MEDICAL CENTER | Age: 53
End: 2018-12-27
Payer: COMMERCIAL

## 2018-12-27 DIAGNOSIS — S83.281A TEAR OF LATERAL MENISCUS OF RIGHT KNEE, CURRENT, UNSPECIFIED TEAR TYPE, INITIAL ENCOUNTER: Primary | ICD-10-CM

## 2018-12-27 DIAGNOSIS — M25.561 ACUTE PAIN OF RIGHT KNEE: ICD-10-CM

## 2018-12-27 PROCEDURE — 97110 THERAPEUTIC EXERCISES: CPT

## 2018-12-27 PROCEDURE — 97010 HOT OR COLD PACKS THERAPY: CPT

## 2018-12-27 NOTE — PROGRESS NOTES
Daily Note     Today's date: 2018  Patient name: Glenna Peterson  : 1965  MRN: [id number]  Referring provider: Delmy Ramirez MD  Dx:   Encounter Diagnosis     ICD-10-CM    1  Tear of lateral meniscus of right knee, current, unspecified tear type, initial encounter S83 281A    2  Acute pain of right knee M25 561                   Subjective: Pt c/o 3/10 R knee pain today  Cont to report difficulty sleeping, occ painful "clicking" R knee with ambulation  Pt states she currently performs stairs one at a time  Objective: See treatment diary below      Assessment: Tolerated treatment well  Jackie gradual progression with PREs  Patient exhibited good technique with therapeutic exercises and would benefit from continued PT      Plan: Continue per plan of care           Precautions Small Lateral Meniscus Tear in Right knee    Specialty Daily Treatment Diary     Manual         Right knee and LE stretching                             Exercise Diary     Pain level pre 6/10 0/10     At rest 7-8/10 See above 3/10   Pain level post 2/10 0/10        3/10            with amb      NuStep  L1 x6' L1 x 7' L1 8 min L2 x9 min   [address] [facility]        TR/HR X 30 x30 X 30 X 30 x30   Incline stretch    1 min 1 min   Mini Squats        Fwd/Lat Step Ups        Standing SLR 3-way        Leg/Calf Press    #10 x 30 quads 20# x30   Quad Set 30 x 5 sec hold 30 x5" 30 x 5" 30 x 5 sec 30x5"   SAQ X 10 hold Hold  hold hold   LAQ 90 to 50 X 30 x30 X 30 #1 x 30 2# x30   Heel Slide  x30 1# x 30  #2 x 30 2# x30   Hip Abd  SL  X 10 x30 X 30 #1 x 30 2# x30   Hip Add Sl X 30 x30 X 30 #1 x 30 1# x30   Bridge   X 10 x15 x20   SLR X 15 x20 X 30 #1 x 30 2# x30   Hip extension prone x30 x30 X 30 #1 x 30 2# x30   Terminal knee extension with red half roll x30 x30 X 30 #1 x 30 2# x30   Sitting hamstring stretch 1 min 1 min 1 min 1 min 1 min       Modalities    CP x10 x10 min X 10 min x10 min

## 2018-12-31 ENCOUNTER — OFFICE VISIT (OUTPATIENT)
Dept: PHYSICAL THERAPY | Facility: MEDICAL CENTER | Age: 53
End: 2018-12-31
Payer: COMMERCIAL

## 2018-12-31 DIAGNOSIS — S83.281A TEAR OF LATERAL MENISCUS OF RIGHT KNEE, CURRENT, UNSPECIFIED TEAR TYPE, INITIAL ENCOUNTER: Primary | ICD-10-CM

## 2018-12-31 DIAGNOSIS — M25.561 ACUTE PAIN OF RIGHT KNEE: ICD-10-CM

## 2018-12-31 PROCEDURE — 97010 HOT OR COLD PACKS THERAPY: CPT

## 2018-12-31 PROCEDURE — 97110 THERAPEUTIC EXERCISES: CPT

## 2018-12-31 NOTE — PROGRESS NOTES
Daily Note     Today's date: 2018  Patient name: Marvin Rosado  : 1965  MRN: [id number]  Referring provider: Indigo Velasco MD  Dx:   Encounter Diagnosis     ICD-10-CM    1  Tear of lateral meniscus of right knee, current, unspecified tear type, initial encounter S83 281A    2  Acute pain of right knee M25 561        Start Time: 0851          Subjective: I'm good in the morning, it's the end of the day that is the killer      Objective: See treatment diary below      Assessment: Tolerated treatment well and all exercises performed within painfree ranges  Till with significant crepitance of patella with SAQ Patient would benefit from continued PT and Will see MD next week      Plan: Continue per plan of care  Progress treatment as tolerated     Patient may benefit from patellar stabilizing brace      Precautions Small Lateral Meniscus Tear in Right knee    Specialty Daily Treatment Diary   Reassessment due 2019    Exercise Diary         Pain level pre 2/10       Pain level post 2/10       NuStep Lev 2 x 10 min       SRC        ITB stretch sidelying 1 min       TR/HR X 30       Incline stretch 1 min       Mini Squats X 5 reps 5 sec hold       Fwd/Lat Step Ups        Standing SLR 3-way        Leg/Calf Press #30 x 30       Quad Set 30 x 5 sec hold       SAQ X 10       LAQ 90 to 50 #3 x 30       Heel Slide #3 x30       Hip Abd  SL  #3 x 30       Hip Add Sl #2 x 15       Bridge x25       SLR #3 x 30       Hip extension prone #3 x 30       Terminal knee extension with red half roll #3 x30       Sitting hamstring stretch 1 min           Modalities        CP x10

## 2019-01-03 ENCOUNTER — OFFICE VISIT (OUTPATIENT)
Dept: PHYSICAL THERAPY | Facility: MEDICAL CENTER | Age: 54
End: 2019-01-03
Payer: COMMERCIAL

## 2019-01-03 DIAGNOSIS — S83.281A TEAR OF LATERAL MENISCUS OF RIGHT KNEE, CURRENT, UNSPECIFIED TEAR TYPE, INITIAL ENCOUNTER: Primary | ICD-10-CM

## 2019-01-03 DIAGNOSIS — M25.561 ACUTE PAIN OF RIGHT KNEE: ICD-10-CM

## 2019-01-03 PROCEDURE — 97010 HOT OR COLD PACKS THERAPY: CPT

## 2019-01-03 PROCEDURE — 97110 THERAPEUTIC EXERCISES: CPT

## 2019-01-03 NOTE — PROGRESS NOTES
Daily Note     Today's date: 1/3/2019  Patient name: Jaimee Brown  : 1965  MRN: [id number]  Referring provider: Kervin Varela MD  Dx:   Encounter Diagnosis     ICD-10-CM    1  Tear of lateral meniscus of right knee, current, unspecified tear type, initial encounter S83 281A    2  Acute pain of right knee M25 [id number]                   Subjective: Will see MD next week  Pain level decreased but still worse with pain and swelling afternoon      Objective: See treatment diary below      Assessment: Tolerated treatment well and cannot perform adduction, or step ups forward or lateral without pain  Patient demonstrated fatigue post treatment, exhibited good technique with therapeutic exercises and would benefit from continued PT      Plan: Continue per plan of care  Progress note during next visit         Specialty Daily Treatment Diary   Reassessment due 2019    Exercise Diary  12/31 1/3/2019      Pain level pre 2/10 1/10      Pain level post 2/10 1/10      NuStep Lev 2 x 10 min Lev 3 x 10      ITB stretch sidelying 1 min 1 min      TR/HR X 30 X 30      Incline stretch 1 min 1 min      Mini Squats X 5 reps 5 sec hold       Fwd/Lat Step Ups  pain      Leg/Calf Press #30 x 30 #40 x 30      Quad Set 30 x 5 sec hold 30 x 5  sec      SAQ X 10 X 10      LAQ 90 to 50 #3 x 30 #4 x 30      Heel Slide #3 x30 #4 x 30      Hip Abd  SL  #3 x 30 #4 x 30      Hip Add Sl #2 x 15 Hold due to pain      Add sets -------- x30      Bridge x25 X 30      SLR #3 x 30 #4 x 30      Hip extension prone #3 x 30 #4 x 10      Terminal knee extension with red half roll #3 x30 #4 x 30      Sitting hamstring stretch 1 min 1 min          Modalities 12/31 1/3/2019      CP x10 CP x 10

## 2019-01-07 ENCOUNTER — TRANSCRIBE ORDERS (OUTPATIENT)
Dept: PHYSICAL THERAPY | Facility: MEDICAL CENTER | Age: 54
End: 2019-01-07

## 2019-01-07 ENCOUNTER — EVALUATION (OUTPATIENT)
Dept: PHYSICAL THERAPY | Facility: MEDICAL CENTER | Age: 54
End: 2019-01-07
Payer: COMMERCIAL

## 2019-01-07 DIAGNOSIS — S83.281A ACUTE LATERAL MENISCUS TEAR OF RIGHT KNEE, INITIAL ENCOUNTER: Primary | ICD-10-CM

## 2019-01-07 DIAGNOSIS — S83.281A TEAR OF LATERAL MENISCUS OF RIGHT KNEE, CURRENT, UNSPECIFIED TEAR TYPE, INITIAL ENCOUNTER: Primary | ICD-10-CM

## 2019-01-07 PROCEDURE — 97110 THERAPEUTIC EXERCISES: CPT

## 2019-01-07 PROCEDURE — 97010 HOT OR COLD PACKS THERAPY: CPT

## 2019-01-07 PROCEDURE — G8978 MOBILITY CURRENT STATUS: HCPCS

## 2019-01-07 PROCEDURE — G8979 MOBILITY GOAL STATUS: HCPCS

## 2019-01-07 NOTE — LETTER
2019    [address] [id number]    Patient: Angelica Zendejas   YOB: 1965   Date of Visit: 2019     Encounter Diagnosis     ICD-10-CM    1  Tear of lateral meniscus of right knee, current, unspecified tear type, initial encounter S83 281A        Dear Dr Yip Needs:    Please review the attached Plan of Care from [facility] recent visit  Please verify that you agree therapy should continue by signing the attached document and sending it back to our office  If you have any questions or concerns, please don't hesitate to call  Sincerely,    Lynnette Olson PT      Referring Provider:      I certify that I have read the below Plan of Care and certify the need for these services furnished under this plan of treatment while under my care  MD [address]  [facility]: [phone]          Daily Note     Today's date: 2019  Patient name: Angelica Zendejas  : 1965  MRN: [id number]  Referring provider: Kylah Suero MD  Dx:   Encounter Diagnosis     ICD-10-CM    1  Tear of lateral meniscus of right knee, current, unspecified tear type, initial encounter S83 281A        Start Time: 1302        Assessment  Assessment details: Pt presents with chief complaint of acute right knee pain and swelling  PT notes the patient with decreased mm strength and ROM/flexibility of the right knee, impaired gait mechanics, decreased stair mobility can not perform reciprocal stairs, descending inclines remain difficult, decreased standing balance, decreased activity tolerance, and decreased functional mobility  Patient has been seen for 8 physical therapy visits and feels although there is some improvement there is not as much improvement as she would have expected  Her pain is worse as the day progresses      Impairments: abnormal gait, abnormal or restricted ROM, activity intolerance, impaired balance, impaired physical strength, lacks appropriate home exercise program and pain with function  Understanding of Dx/Px/POC: good       Goals  Short Term Goals  1  Pt will decrease pain in the right knee 25-50% in 4 wks  2  Pt will improve right knee ROM equal to left knee in 4 wks  3  Initiate HEP    Long Term Goals  1  Pt will reduce swelling of the right knee equal to left knee in 8 wks  2  Pt will improve right knee and LE strength to 5/5  3  Pt will report no limitations with ADLs in 8 wks  4  Pt will report no limitations with ambulation or stair mobility in 8 wks    Plan  Plan details: Discussed findings of evaluation with the patient, patient agreeable to skilled PT 3x/wk for 4 wks  Completed   POC and treatment goals discussed with PT/PTA  Planned therapy interventions: balance, flexibility, functional ROM exercises, gait training, graded exercise, home exercise program, joint mobilization, manual therapy, massage, neuromuscular re-education, patient education, postural training, strengthening, stretching and therapeutic exercise  Please advise on further reabilitation    Subjective Evaluation    History of Present Illness  Mechanism of injury: Pt presents with chief complaint of acute right knee pain and swelling  Reports about 5 weeks ago she was walking down a hallway and felt a pop in her right knee with pain  Reports pain and swelling continued to increase in the right knee, until she went to see MD  Was put on prednisone for about 20 days, but no significant change in sx noted  Pt then went to see Dr Tamiko Ponce, x-ray taken showing mild degeneration and MRI showing small oblique tear in lateral meniscus of right knee  Also given cortisone injection and has noticed reduction in swelling  Referred to OPPT  No report of previous injuries or surgeries to the right knee  Reports sx are worse going up the stairs and especially down an incline   Pt is recently retired, but worked as nurse for 25 years  Quality of life: good    Pain  Current pain ratin/10 in the middle of the day with walking  Pain increases as the day goes on pain level in am 1/10  At best pain ratin  At worst pain ratin  Location: Right knee   Relieving factors: ice and medications  Aggravating factors: standing, stair climbing and walking  Progression: minimal improvement      Diagnostic Tests  X-ray: abnormal  MRI studies: abnormal  Treatments  Previous treatment: injection treatment and medication  Current treatment: physical therapy  Patient Goals  Patient goals for therapy: decreased edema, decreased pain, improved balance, increased motion, increased strength, independence with ADLs/IADLs and return to sport/leisure activities          Objective     Palpation     Right Tenderness of the rectus femoris  Tenderness     Right Knee   Tenderness in the lateral joint line, medial joint line and superior patella  There is significant crepitance noted with quadriceps activity and with patellar mobility    Neurological Testing     Sensation     Knee   Left Knee   Intact: light touch    Right Knee   Intact: light touch     Active Range of Motion   Left Knee   Normal active range of motion  Flexion: 135 degrees   Extension: 5 degrees     Right Knee   Flexion: 115 degrees  Flexion 122  Extension: 3 degrees  Now full    Additional Active Range of Motion Details  Hip and ankle flexibility WNL bilaterally         Patellar Static Positioning   Left Knee: WFL  Right Knee: [facility]    Strength  Right hip flexion 4+/5  Right hip abduction 5-/5  Right hip adduction painful lateral knee area 3/5 strength  Left Knee   Normal strength  Flexion: 5  Extension: 5  Quadriceps contraction: good    Right Knee   Flexion: 4  Extension: 4-  Quadriceps contraction: good  No pain with closed kinetic chain quadriceps activity    Additional Strength Details  Pt reports minor pain with resisted hip abd/add on right LE   No pain with resisted knee MMT  Good quadriceps contraction noted bilaterally  Decreased strength of right knee compared to left  She cannot perform a forward or lateral step up without pain  Tests     Right Knee   Positive Apley's compression and lateral Eva  Negative anterior drawer, anterior Lachman, Apley's distraction, medial Eva, patellar apprehension, patella-femoral grind, posterior drawer, posterior sag, valgus stress test at 0 degrees, valgus stress test at 30 degrees, varus stress test at 0 degrees and varus stress test at 30 degrees  Swelling     Left Knee Girth Measurement (cm)   Joint line: 37 cm  10 cm above joint line: 44 5 cm  10 cm below joint line: 38 cm    Right Knee Girth Measurement (cm)   Joint line: 39 5 cm  10 cm above joint line: 45 cm  Now 46 cm  10 cm below joint line: 38 5 cm now 39 cm    Ambulation     Observational Gait   Gait: antalgic   Walking speed and stride length within functional limits  Decreased right stance time, right swing time and right step length  Quality of Movement During Gait     Knee    Knee (Right): Positive stiff knee  Subjective: I might see a slight improvement but not as much progress as I would have expected      Objective: See treatment diary below      Assessment: Tolerated treatment well and however remains concered over lack of progress  Patient exhibited good technique with therapeutic exercises and Will discuss further therapy with MD   May benefit from patellar stabilizing brace      Plan: Reassessment performed  Plan of care will be determined by physicaians options of patient care          Specialty Daily Treatment Diary   Reassessment due 1/13/2019    Exercise Diary  12/31 1/3/2019 1/7/2019     Pain level pre 2/10 1/10 7/10     Pain level post 2/10 1/10      NuStep Lev 2 x 10 min Lev 3 x 10 Lev 3 x10     ITB stretch sidelying 1 min 1 min 1 min     TR/HR X 30 X 30 X 30     Incline stretch 1 min 1 min 1 min     Mini Squats X 5 reps 5 sec hold X 5 X 10     Fwd/Lat Step Ups  pain 5/10     Leg/Calf Press #30 x 30 #40 x 30 #50 x 30     Quad Set 30 x 5 sec hold 30 x 5  sec 30 x 5 sec     SAQ X 10 X 10 x15     LAQ 90 to 50 #3 x 30 #4 x 30 #5 x 30     Heel Slide #3 x30 #4 x 30 #5 x 30     Hip Abd  SL  #3 x 30 #4 x 30 #5 x 30     Hip Add Sl #2 x 15 Hold due to pain X 10     Add sets -------- x30 X 30     Bridge x25 X 30 X 30     SLR #3 x 30 #4 x 30 #5 x 30     Hip extension prone #3 x 30 #4 x 10 #4 x 20     Terminal knee extension with red half roll #3 x30 #4 x 30 #5 x 30     Sitting hamstring stretch 1 min 1 min 1 min         Modalities 12/31 1/3/2019 1/7/2019     CP x10 CP x 10 Cp x 10

## 2019-01-07 NOTE — LETTER
January 7, 2019    [address] [id number]    Patient: Halley Ernst   YOB: 1965   Date of Visit: 1/7/2019     Encounter Diagnosis     ICD-10-CM    1  Tear of lateral meniscus of right knee, current, unspecified tear type, initial encounter S83 281A        Dear Dr Sowmya Clinton:    Please review the attached Plan of Care from [facility] recent visit  Please verify that you agree therapy should continue by signing the attached document and sending it back to our office  If you have any questions or concerns, please don't hesitate to call  Sincerely,    Krista Piper, PT      Referring Provider:      I certify that I have read the below Plan of Care and certify the need for these services furnished under this plan of treatment while under my care  MD [address] [facility] 80: [phone]          No notes on file

## 2019-01-07 NOTE — PROGRESS NOTES
Daily Note     Today's date: 2019  Patient name: Melany Lemus  : 1965  MRN: [id number]  Referring provider: Saba Jeffries MD  Dx:   Encounter Diagnosis     ICD-10-CM    1  Tear of lateral meniscus of right knee, current, unspecified tear type, initial encounter S83 281A        Start Time: 1302        Assessment  Assessment details: Pt presents with chief complaint of acute right knee pain and swelling  PT notes the patient with decreased mm strength and ROM/flexibility of the right knee, impaired gait mechanics, decreased stair mobility can not perform reciprocal stairs, descending inclines remain difficult, decreased standing balance, decreased activity tolerance, and decreased functional mobility  Patient has been seen for 8 physical therapy visits and feels although there is some improvement there is not as much improvement as she would have expected  Her pain is worse as the day progresses  Impairments: abnormal gait, abnormal or restricted ROM, activity intolerance, impaired balance, impaired physical strength, lacks appropriate home exercise program and pain with function  Understanding of Dx/Px/POC: good       Goals  Short Term Goals  1  Pt will decrease pain in the right knee 25-50% in 4 wks  2  Pt will improve right knee ROM equal to left knee in 4 wks  3  Initiate HEP    Long Term Goals  1  Pt will reduce swelling of the right knee equal to left knee in 8 wks  2  Pt will improve right knee and LE strength to 5/5  3  Pt will report no limitations with ADLs in 8 wks  4  Pt will report no limitations with ambulation or stair mobility in 8 wks    Plan  Plan details: Discussed findings of evaluation with the patient, patient agreeable to skilled PT 3x/wk for 4 wks  Completed   POC and treatment goals discussed with PT/PTA     Planned therapy interventions: balance, flexibility, functional ROM exercises, gait training, graded exercise, home exercise program, joint mobilization, manual therapy, massage, neuromuscular re-education, patient education, postural training, strengthening, stretching and therapeutic exercise  Please advise on further reabilitation    Subjective Evaluation    History of Present Illness  Mechanism of injury: Pt presents with chief complaint of acute right knee pain and swelling  Reports about 5 weeks ago she was walking down a hallway and felt a pop in her right knee with pain  Reports pain and swelling continued to increase in the right knee, until she went to see MD  Was put on prednisone for about 20 days, but no significant change in sx noted  Pt then went to see Dr Tamiko Ponce, x-ray taken showing mild degeneration and MRI showing small oblique tear in lateral meniscus of right knee  Also given cortisone injection and has noticed reduction in swelling  Referred to OPPT  No report of previous injuries or surgeries to the right knee  Reports sx are worse going up the stairs and especially down an incline  Pt is recently retired, but worked as nurse for 25 years  Quality of life: good    Pain  Current pain ratin/10 in the middle of the day with walking  Pain increases as the day goes on pain level in am 1/10  At best pain ratin  At worst pain ratin  Location: Right knee   Relieving factors: ice and medications  Aggravating factors: standing, stair climbing and walking  Progression: minimal improvement      Diagnostic Tests  X-ray: abnormal  MRI studies: abnormal  Treatments  Previous treatment: injection treatment and medication  Current treatment: physical therapy  Patient Goals  Patient goals for therapy: decreased edema, decreased pain, improved balance, increased motion, increased strength, independence with ADLs/IADLs and return to sport/leisure activities          Objective     Palpation     Right Tenderness of the rectus femoris  Tenderness     Right Knee   Tenderness in the lateral joint line, medial joint line and superior patella   There is significant crepitance noted with quadriceps activity and with patellar mobility    Neurological Testing     Sensation     Knee   Left Knee   Intact: light touch    Right Knee   Intact: light touch     Active Range of Motion   Left Knee   Normal active range of motion  Flexion: 135 degrees   Extension: 5 degrees     Right Knee   Flexion: 115 degrees  Flexion 122  Extension: 3 degrees  Now full    Additional Active Range of Motion Details  Hip and ankle flexibility WNL bilaterally         Patellar Static Positioning   Left Knee: WFL  [facility]    Strength  Right hip flexion 4+/5  Right hip abduction 5-/5  Right hip adduction painful lateral knee area 3/5 strength  Left Knee   Normal strength  Flexion: 5  Extension: 5  Quadriceps contraction: good    Right Knee   Flexion: 4  Extension: 4-  Quadriceps contraction: good  No pain with closed kinetic chain quadriceps activity    Additional Strength Details  Pt reports minor pain with resisted hip abd/add on right LE  No pain with resisted knee MMT  Good quadriceps contraction noted bilaterally  Decreased strength of right knee compared to left  She cannot perform a forward or lateral step up without pain  Tests     Right Knee   Positive Apley's compression and lateral Eva  Negative anterior drawer, anterior Lachman, Apley's distraction, medial Eva, patellar apprehension, patella-femoral grind, posterior drawer, posterior sag, valgus stress test at 0 degrees, valgus stress test at 30 degrees, varus stress test at 0 degrees and varus stress test at 30 degrees  Swelling     Left Knee Girth Measurement (cm)   Joint line: 37 cm  10 cm above joint line: 44 5 cm  10 cm below joint line: 38 cm    Right Knee Girth Measurement (cm)   Joint line: 39 5 cm  10 cm above joint line: 45 cm  Now 46 cm  10 cm below joint line: 38 5 cm now 39 cm    Ambulation     Observational Gait   Gait: antalgic   Walking speed and stride length within functional limits  Decreased right stance time, right swing time and right step length  Quality of Movement During Gait     Knee    Knee (Right): Positive stiff knee  Subjective: I might see a slight improvement but not as much progress as I would have expected      Objective: See treatment diary below  Flowsheet Rows      Most Recent Value   PT/OT G-Codes   Current Score  33   Projected Score  54   Assessment Type  Re-evaluation   G code set  Mobility: Walking & Moving Around   Mobility: Walking and Moving Around Current Status ()  CK   Mobility: Walking and Moving Around Goal Status ()  CK        Assessment: Tolerated treatment well and however remains concered over lack of progress  Patient exhibited good technique with therapeutic exercises and Will discuss further therapy with MD   May benefit from patellar stabilizing brace      Plan: Reassessment performed  Plan of care will be determined by physicaians options of patient care          Specialty Daily Treatment Diary   Reassessment due 1/13/2019    Exercise Diary  12/31 1/3/2019 1/7/2019     Pain level pre 2/10 1/10 7/10     Pain level post 2/10 1/10      NuStep Lev 2 x 10 min Lev 3 x 10 Lev 3 x10     ITB stretch sidelying 1 min 1 min 1 min     TR/HR X 30 X 30 X 30     Incline stretch 1 min 1 min 1 min     Mini Squats X 5 reps 5 sec hold X 5 X 10     Fwd/Lat Step Ups  pain 5/10     Leg/Calf Press #30 x 30 #40 x 30 #50 x 30     Quad Set 30 x 5 sec hold 30 x 5  sec 30 x 5 sec     SAQ X 10 X 10 x15     LAQ 90 to 50 #3 x 30 #4 x 30 #5 x 30     Heel Slide #3 x30 #4 x 30 #5 x 30     Hip Abd  SL  #3 x 30 #4 x 30 #5 x 30     Hip Add Sl #2 x 15 Hold due to pain X 10     Add sets -------- x30 X 30     Bridge x25 X 30 X 30     SLR #3 x 30 #4 x 30 #5 x 30     Hip extension prone #3 x 30 #4 x 10 #4 x 20     Terminal knee extension with red half roll #3 x30 #4 x 30 #5 x 30     Sitting hamstring stretch 1 min 1 min 1 min         Modalities 12/31 1/3/2019 1/7/2019     CP x10 CP x 10 Cp x 10

## 2019-01-10 ENCOUNTER — APPOINTMENT (OUTPATIENT)
Dept: PHYSICAL THERAPY | Facility: MEDICAL CENTER | Age: 54
End: 2019-01-10
Payer: COMMERCIAL

## 2019-01-11 ENCOUNTER — APPOINTMENT (OUTPATIENT)
Dept: PHYSICAL THERAPY | Facility: MEDICAL CENTER | Age: 54
End: 2019-01-11
Payer: COMMERCIAL

## 2019-01-28 ENCOUNTER — EVALUATION (OUTPATIENT)
Dept: PHYSICAL THERAPY | Facility: MEDICAL CENTER | Age: 54
End: 2019-01-28
Payer: COMMERCIAL

## 2019-01-28 DIAGNOSIS — Z98.890 S/P RIGHT KNEE ARTHROSCOPY: ICD-10-CM

## 2019-01-28 DIAGNOSIS — S83.281A TEAR OF LATERAL MENISCUS OF RIGHT KNEE, CURRENT, UNSPECIFIED TEAR TYPE, INITIAL ENCOUNTER: Primary | ICD-10-CM

## 2019-01-28 PROCEDURE — 97110 THERAPEUTIC EXERCISES: CPT

## 2019-01-28 PROCEDURE — G8979 MOBILITY GOAL STATUS: HCPCS

## 2019-01-28 PROCEDURE — 97164 PT RE-EVAL EST PLAN CARE: CPT

## 2019-01-28 PROCEDURE — G8978 MOBILITY CURRENT STATUS: HCPCS

## 2019-01-28 NOTE — LETTER
2019    [address] [place] [id number]    Patient: Dieudonne Cr   YOB: 1965   Date of Visit: 2019     Encounter Diagnosis     ICD-10-CM    1  Tear of lateral meniscus of right knee, current, unspecified tear type, initial encounter S83 281A    2  S/P right knee arthroscopy Z98 890        Dear Dr Albrecht July:    Please review the attached Plan of Care from [facility] recent visit  Please verify that you agree therapy should continue by signing the attached document and sending it back to our office  If you have any questions or concerns, please don't hesitate to call  Sincerely,    Jack Fernandes PT      Referring Provider:      I certify that I have read the below Plan of Care and certify the need for these services furnished under this plan of treatment while under my care  MD [address]  [facility]: [phone]          Reassessment     Today's date: 2019  Patient name: Dieudonne Cr  : 1965  MRN: [id number]  Referring provider: Agustin Escobedo MD  Dx:   Encounter Diagnosis     ICD-10-CM    1  Tear of lateral meniscus of right knee, current, unspecified tear type, initial encounter S83 281A    2  S/P right knee arthroscopy Z98 890        Start Time:         Assessment  Assessment details: Pt seen after arthroscopic procedure for partial lateral menisectomy  PT notes the patient with decreased mm strength and ROM/flexibility of the right knee, impaired gait mechanics, decreased stair mobility can not perform reciprocal stairs, descending inclines remain difficult, decreased standing balance, decreased activity tolerance, and decreased functional mobility  She had only needed her crutches for one day post operatively  Dressings have been changed  Sutures remain in place  There is no sign of infection or drainage       Impairments: abnormal gait, abnormal or restricted ROM, activity intolerance, impaired balance, impaired physical strength, lacks appropriate home exercise program and pain with function  Understanding of Dx/Px/POC: good       Goals  Short Term Goals 2-4 weeks  1  Pt will decrease pain in the right knee to 13-/10 with functional activites  2  Pt will improve right knee ROM equal to left knee   3  Strength of right lower extremity will e in the 4-/5 range  4  Patient will be independent in home exercise program  5  Functional score as measured by FOTO will increase to 40%  6  Patient will ascend descend stairs reciprocal pattern without difficulty    Long Term Goals  1  Pt will reduce swelling of the right knee equal to left knee in 8 wks  2  Pt will improve right knee and LE strength to 4+/5  3  Pt will report no limitations with ADLs in 8 wks  4  Pt will report no limitations with ambulation or stair mobility in 8 wks  5  Functional score as measured by FOTO will increase to 54%    Plan  Plan details: Discussed findings of evaluation with the patient, patient agreeable to skilled PT 3x/wk for 4-6wks  Therapeutic interventions may be added or discontinued at the discretion of the therapist   POC and treatment goals discussed with PT/PTA  Planned therapy interventions: balance, flexibility, functional ROM exercises, gait training, graded exercise, home exercise program, joint mobilization, manual therapy,  neuromuscular re-education, patient education, strengthening, stretching and therapeutic exercise  Please advise on further reabilitation    Subjective Evaluation    History of Present Illness  Mechanism of injury: Pt presents with chief complaint of acute right knee pain and swelling which occurred when she was walking down a hallway and felt a pop in her right knee with pain   Reports pain and swelling continued to increase in the right knee, until she went to see MD  Was put on prednisone for about 20 days, but no significant change in sx noted  Pt then went to see Dr Kellen March, x-ray taken showing mild degeneration and MRI showing small oblique tear in lateral meniscus of right knee  Also given cortisone injection and has noticed reduction in swelling  Referred to OPPT where she received conservative treatment of therapeutic exercise but pain and functional limitations continued  She returned to Dr Kellen March and surgical intervention was recommended  No report of previous injuries or surgeries to the right knee  Reports sx are worse going up the stairs and especially down an incline  Pt is recently retired, but worked as nurse for 25 years     Quality of life: good    Pain  Current pain ratin-3/10   At best pain ratin  At worst pain ratin-5  Location: Right knee   Relieving factors: ice and medications  Aggravating factors: standing, stair climbing and walking  Progression: improving      Diagnostic Tests  X-ray: abnormal  MRI studies: abnormal  Treatments  Previous treatment: injection treatment and medication and previous physical therapy prior to surgery  Current treatment: physical therapy  Patient Goals  Patient goals for therapy: decreased edema, decreased pain, improved balance, increased motion, increased strength, independence with ADLs/IADLs and return to sport/leisure activities      Flowsheet Rows      Most Recent Value   PT/OT G-Codes   Current Score  33   Projected Score  54   Assessment Type  Re-evaluation   G code set  Mobility: Walking & Moving Around   Mobility: Walking and Moving Around Current Status ()  CL   Mobility: Walking and Moving Around Goal Status ()  CK            Objective          Neurological Testing       Active Range of Motion   Left Knee   Normal active range of motion  Flexion: 135 degrees   Extension: 5 degrees     Right Knee   Flexion: 65 degrees in supine, 85 degrees in sitting  Extension: 4 degrees    Additional Active Range of Motion Details  Hip and ankle flexibility WNL bilaterally         Patellar Static Positioning   Left Knee: WFL  [facility]    Strength  Right hip flexion 3+/5    Left Knee   Normal strength  Flexion: 5  Extension: 5  Quadriceps contraction: good    Right Knee   Flexion: 3-/5  Extension: 3+/5  Quadriceps contraction: good      Swelling moderate R lower extremity around knee    Ambulation     Observational Gait   Gait: antalgic   Walking speed and stride length within functional limits  Decreased right stance time, right swing time and right step length  Quality of Movement During Gait     Knee    Knee (Right): Positive stiff knee  Subjective: I'm feeling better than before the surgery but I just feel stiff      Objective: See treatment diary below      Assessment: Tolerated treatment well  Patient exhibited good technique with therapeutic exercises      Plan: Plan of care updated    Goals and treatment plan sent to Dr Teresa Richards    Specialty Daily Treatment Diary   Reassessment due 2/27/2019    Exercise Diary  1/28/2019       Pain level pre 0-3/10       Pain level post 2/10       Ankle pumps x30       Patellar mobs done       Heel slides X 10       NuStep Lev 1x 10 min       ITB stretch sidelying        TR X 30       Incline stretch 1 min       Mini Squats        Fwd/Lat Step Ups        Leg/Calf Press        Ext stretch 1 min       Quad Set 30 x 5 sec hold       SAQ        LAQ 90 to 50        Hip Abd  SL         Hip Add Sl        Add sets X 30       Bridge        SLR #0 x 30       Hip extension prone        Terminal knee extension with red half roll        Sitting hamstring stretch 1 min       Modalities        CP

## 2019-01-28 NOTE — PROGRESS NOTES
Reassessment     Today's date: 2019  Patient name: Glenna Peterson  : 1965  MRN: [id number]  Referring provider: Delmy Ramirez MD  Dx:   Encounter Diagnosis     ICD-10-CM    1  Tear of lateral meniscus of right knee, current, unspecified tear type, initial encounter S83 281A    2  S/P right knee arthroscopy Z98 890        Start Time: 1426        Assessment  Assessment details: Pt seen after arthroscopic procedure for partial lateral menisectomy  PT notes the patient with decreased mm strength and ROM/flexibility of the right knee, impaired gait mechanics, decreased stair mobility can not perform reciprocal stairs, descending inclines remain difficult, decreased standing balance, decreased activity tolerance, and decreased functional mobility  She had only needed her crutches for one day post operatively  Dressings have been changed  Sutures remain in place  There is no sign of infection or drainage  Impairments: abnormal gait, abnormal or restricted ROM, activity intolerance, impaired balance, impaired physical strength, lacks appropriate home exercise program and pain with function  Understanding of Dx/Px/POC: good       Goals  Short Term Goals 2-4 weeks  1  Pt will decrease pain in the right knee to 13-/10 with functional activites  2  Pt will improve right knee ROM equal to left knee   3  Strength of right lower extremity will e in the 4-/5 range  4  Patient will be independent in home exercise program  5  Functional score as measured by FOTO will increase to 40%  6  Patient will ascend descend stairs reciprocal pattern without difficulty    Long Term Goals  1  Pt will reduce swelling of the right knee equal to left knee in 8 wks  2  Pt will improve right knee and LE strength to 4+/5  3  Pt will report no limitations with ADLs in 8 wks  4  Pt will report no limitations with ambulation or stair mobility in 8 wks  5   Functional score as measured by FOTO will increase to 54%    Plan  Plan details: Discussed findings of evaluation with the patient, patient agreeable to skilled PT 3x/wk for 4-6wks  Therapeutic interventions may be added or discontinued at the discretion of the therapist   POC and treatment goals discussed with PT/PTA  Planned therapy interventions: balance, flexibility, functional ROM exercises, gait training, graded exercise, home exercise program, joint mobilization, manual therapy,  neuromuscular re-education, patient education, strengthening, stretching and therapeutic exercise  Please advise on further reabilitation    Subjective Evaluation    History of Present Illness  Mechanism of injury: Pt presents with chief complaint of acute right knee pain and swelling which occurred when she was walking down a hallway and felt a pop in her right knee with pain  Reports pain and swelling continued to increase in the right knee, until she went to see MD  Was put on prednisone for about 20 days, but no significant change in sx noted  Pt then went to see Dr Theodora Carreno, x-ray taken showing mild degeneration and MRI showing small oblique tear in lateral meniscus of right knee  Also given cortisone injection and has noticed reduction in swelling  Referred to OPPT where she received conservative treatment of therapeutic exercise but pain and functional limitations continued  She returned to Dr Theodora Carreno and surgical intervention was recommended  No report of previous injuries or surgeries to the right knee  Reports sx are worse going up the stairs and especially down an incline  Pt is recently retired, but worked as nurse for 25 years     Quality of life: good    Pain  Current pain ratin-3/10   At best pain ratin  At worst pain ratin-5  Location: Right knee   Relieving factors: ice and medications  Aggravating factors: standing, stair climbing and walking  Progression: improving      Diagnostic Tests  X-ray: abnormal  MRI studies: abnormal  Treatments  Previous treatment: injection treatment and medication and previous physical therapy prior to surgery  Current treatment: physical therapy  Patient Goals  Patient goals for therapy: decreased edema, decreased pain, improved balance, increased motion, increased strength, independence with ADLs/IADLs and return to sport/leisure activities      Flowsheet Rows      Most Recent Value   PT/OT G-Codes   Current Score  33   Projected Score  54   Assessment Type  Re-evaluation   G code set  Mobility: Walking & Moving Around   Mobility: Walking and Moving Around Current Status ()  CL   Mobility: Walking and Moving Around Goal Status ()  CK            Objective          Neurological Testing       Active Range of Motion   Left Knee   Normal active range of motion  Flexion: 135 degrees   Extension: 5 degrees     Right Knee   Flexion: 65 degrees in supine, 85 degrees in sitting  Extension: 4 degrees    Additional Active Range of Motion Details  Hip and ankle flexibility WNL bilaterally         Patellar Static Positioning   Left Knee: WFL  Right Knee: [facility]    Strength  Right hip flexion 3+/5    Left Knee   Normal strength  Flexion: 5  Extension: 5  Quadriceps contraction: good    Right Knee   Flexion: 3-/5  Extension: 3+/5  Quadriceps contraction: good      Swelling moderate R lower extremity around knee    Ambulation     Observational Gait   Gait: antalgic   Walking speed and stride length within functional limits  Decreased right stance time, right swing time and right step length  Quality of Movement During Gait     Knee    Knee (Right): Positive stiff knee  Subjective: I'm feeling better than before the surgery but I just feel stiff      Objective: See treatment diary below      Assessment: Tolerated treatment well  Patient exhibited good technique with therapeutic exercises      Plan: Plan of care updated    Goals and treatment plan sent to Dr Sola Damon    Specialty Daily Treatment Diary   Reassessment due 2/27/2019    Exercise Diary 1/28/2019       Pain level pre 0-3/10       Pain level post 2/10       Ankle pumps x30       Patellar mobs done       Heel slides X 10       NuStep Lev 1x 10 min       ITB stretch sidelying        TR X 30       Incline stretch 1 min       Mini Squats        Fwd/Lat Step Ups        Leg/Calf Press        Ext stretch 1 min       Quad Set 30 x 5 sec hold       SAQ        LAQ 90 to 50        Hip Abd  SL         Hip Add Sl        Add sets X 30       Bridge        SLR #0 x 30       Hip extension prone        Terminal knee extension with red half roll        Sitting hamstring stretch 1 min       Modalities        CP

## 2019-01-30 ENCOUNTER — APPOINTMENT (OUTPATIENT)
Dept: PHYSICAL THERAPY | Facility: MEDICAL CENTER | Age: 54
End: 2019-01-30
Payer: COMMERCIAL

## 2019-02-01 ENCOUNTER — OFFICE VISIT (OUTPATIENT)
Dept: PHYSICAL THERAPY | Facility: MEDICAL CENTER | Age: 54
End: 2019-02-01
Payer: COMMERCIAL

## 2019-02-01 DIAGNOSIS — S83.281A TEAR OF LATERAL MENISCUS OF RIGHT KNEE, CURRENT, UNSPECIFIED TEAR TYPE, INITIAL ENCOUNTER: Primary | ICD-10-CM

## 2019-02-01 DIAGNOSIS — M25.561 ACUTE PAIN OF RIGHT KNEE: ICD-10-CM

## 2019-02-01 DIAGNOSIS — Z98.890 S/P RIGHT KNEE ARTHROSCOPY: ICD-10-CM

## 2019-02-01 PROCEDURE — 97110 THERAPEUTIC EXERCISES: CPT

## 2019-02-01 NOTE — PROGRESS NOTES
Daily Note     Today's date: 2019  Patient name: Memo Portillo  : 1965  MRN: [id number]  Referring provider: Elizabeth Spears MD  Dx:   Encounter Diagnosis     ICD-10-CM    1  Tear of lateral meniscus of right knee, current, unspecified tear type, initial encounter S83 281A    2  S/P right knee arthroscopy Z98 890    3  Acute pain of right knee M25 561                   Subjective: Pt denies R knee pain  Objective: See treatment diary below      Assessment: Tolerated treatment well  Sutures removed today as per Dr Anne Workman orders 7-10 days post/op  No signs of infection noted  Patient exhibited good technique with therapeutic exercises and would benefit from continued PT      Plan: Continue per plan of care           Specialty Daily Treatment Diary   Reassessment due 2019    Exercise Diary  2019      Pain level pre 0-3/10 0/10      Pain level post 210       Ankle pumps x30 HEP      Patellar mobs done       Heel slides X 10 x30      NuStep Lev 1x 10 min L2 x10 min      TR X 30       Incline stretch 1 min 1 min      Mini Squats        Fwd/Lat Step Ups        Leg/Calf Press  10# x30 ea      Ext stretch 1 min       Quad Set 30 x 5 sec hold x30       Hip Abd  SL   0# x30      Hip Add Sl  0# x30      Add sets X 30       SLR #0 x 30 0# x30      Hip extension prone  0# x30      Terminal knee extension with tband        lunges        Sitting hamstring stretch 1 min 1 min      Modalities        CP

## 2019-02-04 ENCOUNTER — OFFICE VISIT (OUTPATIENT)
Dept: PHYSICAL THERAPY | Facility: MEDICAL CENTER | Age: 54
End: 2019-02-04
Payer: COMMERCIAL

## 2019-02-04 DIAGNOSIS — M25.561 ACUTE PAIN OF RIGHT KNEE: ICD-10-CM

## 2019-02-04 DIAGNOSIS — Z98.890 S/P RIGHT KNEE ARTHROSCOPY: ICD-10-CM

## 2019-02-04 DIAGNOSIS — S83.281A TEAR OF LATERAL MENISCUS OF RIGHT KNEE, CURRENT, UNSPECIFIED TEAR TYPE, INITIAL ENCOUNTER: Primary | ICD-10-CM

## 2019-02-04 PROCEDURE — 97110 THERAPEUTIC EXERCISES: CPT

## 2019-02-04 NOTE — PROGRESS NOTES
Daily Note     Today's date: 2019  Patient name: Lyndsay Mcfarlane  : 1965  MRN: [id number]  Referring provider: Kemal Villegas MD  Dx:   Encounter Diagnosis     ICD-10-CM    1  Tear of lateral meniscus of right knee, current, unspecified tear type, initial encounter S83 281A    2  S/P right knee arthroscopy Z98 890    3  Acute pain of right knee M25 561                   Subjective: Pt denies R knee pain at rest       Objective: See treatment diary below      Assessment: Tolerated treatment well  Suture sites inspected, no signs of infection noted  Non painful  Clicking noted with initial TKE with theraband  Patient exhibited good technique with therapeutic exercises and would benefit from continued PT     Plan: Continue per plan of care         Specialty Daily Treatment Diary   Reassessment due 2019    Exercise Diary  2019 2/4     Pain level pre 0-3/10 0/10 0/10     Pain level post 2/10  0/10     Ankle pumps x30 HEP ----     Patellar mobs done       Heel slides X 10 x30 1# x30     NuStep Lev 1x 10 min L2 x10 min L2 x10 min     TR X 30       Incline stretch 1 min 1 min 1 min     Mini Squats        Fwd/Lat Step Ups        Leg/Calf Press  10# x30 ea 20# x30 ea     Ext stretch 1 min  ----     Quad Set 30 x 5 sec hold x30  x30     Hip Abd  SL   0# x30 1# x30     Hip Add Sl  0# x30 1# x30     Add sets X 30  -----     SLR #0 x 30 0# x30 1# x 30     Hip extension prone  0# x30 1# x30     Terminal knee extension with tband   blk x30     lunges        Sitting hamstring stretch 1 min 1 min 1 min     Modalities        CP

## 2019-02-05 ENCOUNTER — OFFICE VISIT (OUTPATIENT)
Dept: PHYSICAL THERAPY | Facility: MEDICAL CENTER | Age: 54
End: 2019-02-05
Payer: COMMERCIAL

## 2019-02-05 DIAGNOSIS — M25.561 ACUTE PAIN OF RIGHT KNEE: ICD-10-CM

## 2019-02-05 DIAGNOSIS — Z98.890 S/P RIGHT KNEE ARTHROSCOPY: ICD-10-CM

## 2019-02-05 DIAGNOSIS — S83.281A TEAR OF LATERAL MENISCUS OF RIGHT KNEE, CURRENT, UNSPECIFIED TEAR TYPE, INITIAL ENCOUNTER: Primary | ICD-10-CM

## 2019-02-05 PROCEDURE — 97110 THERAPEUTIC EXERCISES: CPT

## 2019-02-05 NOTE — PROGRESS NOTES
Daily Note     Today's date: 2019  Patient name: Leanna Rodas  : 1965  MRN: [id number]  Referring provider: Domo Young MD  Dx:   Encounter Diagnosis     ICD-10-CM    1  Tear of lateral meniscus of right knee, current, unspecified tear type, initial encounter S83 281A    2  S/P right knee arthroscopy Z98 890    3  Acute pain of right knee M25 561                   Subjective: Pt c/o increased R knee pain last night along patellar tendon  rating 5/10  States it feels swollen today  Denies pain at rest, 4/10 with ambulation  States noted clicking below kneecap with ambulation      Objective: See treatment diary below      Assessment: Tolerated treatment well  Tolerated there ex without c/o clicking R knee  Min to moderate edema noted R knee    Patient exhibited good technique with therapeutic exercises and would benefit from continued PT      Plan: Continue per plan of care             Specialty Daily Treatment Diary   Reassessment due 2019    Exercise Diary  2019 2 2/5    Pain level pre 0-3/10 0/10 0/10 0/10    Pain level post 210  0/10     Ankle pumps x30 HEP ---- ---    Patellar mobs done       Heel slides X 10 x30 1# x30 1# x30    NuStep Lev 1x 10 min L2 x10 min L2 x10 min L3 x10 min    TR X 30       Incline stretch 1 min 1 min 1 min 1 min    Fwd/Lat Step Ups    Lat x10    Leg/Calf Press  10# x30 ea 20# x30 ea 30# x30    Ext stretch 1 min  ---- -----    Quad Set 30 x 5 sec hold x30  x30 x30    Hip Abd  SL   0# x30 1# x30 1# x30    Hip Add Sl  0# x30 1# x30 1# x30    Add sets X 30  -----     SLR #0 x 30 0# x30 1# x 30 1# x30    Gentle prone quad stretch    3 x10"    Hip extension prone  0# x30 1# x30 1# x30    Terminal knee extension with tband   blk x30 blk x30    Rocker board        Wall squats 0-60         SLS         lunges        Sitting hamstring stretch 1 min 1 min 1 min 1 min    Modalities        CP    x10 min

## 2019-02-08 ENCOUNTER — OFFICE VISIT (OUTPATIENT)
Dept: PHYSICAL THERAPY | Facility: MEDICAL CENTER | Age: 54
End: 2019-02-08
Payer: COMMERCIAL

## 2019-02-08 DIAGNOSIS — Z98.890 S/P RIGHT KNEE ARTHROSCOPY: ICD-10-CM

## 2019-02-08 DIAGNOSIS — S83.281A TEAR OF LATERAL MENISCUS OF RIGHT KNEE, CURRENT, UNSPECIFIED TEAR TYPE, INITIAL ENCOUNTER: Primary | ICD-10-CM

## 2019-02-08 DIAGNOSIS — M25.561 ACUTE PAIN OF RIGHT KNEE: ICD-10-CM

## 2019-02-08 PROCEDURE — 97110 THERAPEUTIC EXERCISES: CPT

## 2019-02-08 NOTE — PROGRESS NOTES
Daily Note     Today's date: 2019  Patient name: Dieudonne Cr  : 1965  MRN: [id number]  Referring provider: Agustin Escobedo MD  Dx:   Encounter Diagnosis     ICD-10-CM    1  Tear of lateral meniscus of right knee, current, unspecified tear type, initial encounter S83 281A    2  S/P right knee arthroscopy Z98 890    3  Acute pain of right knee M25 561                   Subjective:Pt denies R knee pain at rest , c/o clicking R knee with ambulation during swing thru  Pt reports she slipped on black ice prior to Dr appt 19  States no apparent injury noted R knee with fall  Objective: See treatment diary below      Assessment: Tolerated treatment well  Pt demonstrates full R knee extension and flexion  Minimal edema noted R knee  Patient exhibited good technique with therapeutic exercises and would benefit from continued PT      Plan: Continue per plan of care             Specialty Daily Treatment Diary   Reassessment due 2019    Exercise Diary  2019 2   Pain level pre 0-3/10 0/10 0/10 0/10 0/10   Pain level post 2/10  0/10     Ankle pumps x30 HEP ---- ---    Patellar mobs done       Heel slides X 10 x30 1# x30 1# x30 2# x30   NuStep Lev 1x 10 min L2 x10 min L2 x10 min L3 x10 min L3 x10 min   TR X 30       Incline stretch 1 min 1 min 1 min 1 min 1 min   Fwd/Lat Step Ups    Lat x10 Lat x10  Forward x10   Leg/Calf Press  10# x30 ea 20# x30 ea 30# x30 40# x30 ea   Ext stretch 1 min  ---- ----- ----   Quad Set 30 x 5 sec hold x30  x30 x30 x30   Hip Abd  SL   0# x30 1# x30 1# x30 1# x30   Hip Add Sl  0# x30 1# x30 1# x30 1# x30   Add sets X 30  -----     SLR #0 x 30 0# x30 1# x 30 1# x30 1# x30   Gentle prone quad stretch    3 x10" 4 x10"   Hip extension prone  0# x30 1# x30 1# x30 1# x30   Terminal knee extension with tband   blk x30 blk x30 blk x30   Rocker board        Wall squats 0-60      10 x5"   SLS         lunges        Sitting hamstring stretch 1 min 1 min 1 min 1 min 1 min   Modalities        CP    x10 min x10 min

## 2019-02-11 ENCOUNTER — OFFICE VISIT (OUTPATIENT)
Dept: PHYSICAL THERAPY | Facility: MEDICAL CENTER | Age: 54
End: 2019-02-11
Payer: COMMERCIAL

## 2019-02-11 DIAGNOSIS — Z98.890 S/P RIGHT KNEE ARTHROSCOPY: ICD-10-CM

## 2019-02-11 DIAGNOSIS — S83.281A TEAR OF LATERAL MENISCUS OF RIGHT KNEE, CURRENT, UNSPECIFIED TEAR TYPE, INITIAL ENCOUNTER: Primary | ICD-10-CM

## 2019-02-11 DIAGNOSIS — M25.561 ACUTE PAIN OF RIGHT KNEE: ICD-10-CM

## 2019-02-11 PROCEDURE — 97110 THERAPEUTIC EXERCISES: CPT

## 2019-02-11 NOTE — PROGRESS NOTES
Daily Note     Today's date: 2019  Patient name: Norman Espino  : 1965  MRN: [id number]  Referring provider: Rod Gómez MD  Dx:   Encounter Diagnosis     ICD-10-CM    1  Tear of lateral meniscus of right knee, current, unspecified tear type, initial encounter S83 281A    2  S/P right knee arthroscopy Z98 890    3  Acute pain of right knee M25 561                   Subjective: Pt primarily c/o morning stiffness R knee  Objective: See treatment diary below      Assessment: Tolerated treatment well  Progressing well with there ex without c/o increased symptoms  Patient exhibited good technique with therapeutic exercises and would benefit from continued PT      Plan: Continue per plan of care           Specialty Daily Treatment Diary   Reassessment due 2019    Exercise Diary         Pain level pre 0/10 0/10       Pain level post         Ankle pumps         Patellar mobs         Heel slides 2# x30 3# x30       NuStep L3 x10 min L4 x10 min       TR         forw step up  x15       Lat step up  x15       Incline stretch 1 min 1 min       Leg/Calf Press 40# x30 ea 50# x30 ea       Ext stretch ----        Quad Set x30 x30       Hip Abd  SL  1# x30 2# x 20       Hip Add Sl 1# x30 2# x10       Add sets         SLR 1# x30 2# x20       Gentle prone quad stretch 4 x10" 4x10"       Hip extension prone 1# x30 2# x20       Terminal knee extension with tband blk x30 blk x30       Rocker board  x30 ea       Wall squats 0-60  10 x5" 15 x5"       SLS   10" x2       lunges         Sitting hamstring stretch 1 min 1 min       Modalities         CP x10 min x10 min

## 2019-02-13 ENCOUNTER — APPOINTMENT (OUTPATIENT)
Dept: PHYSICAL THERAPY | Facility: MEDICAL CENTER | Age: 54
End: 2019-02-13
Payer: COMMERCIAL

## 2019-02-15 ENCOUNTER — OFFICE VISIT (OUTPATIENT)
Dept: PHYSICAL THERAPY | Facility: MEDICAL CENTER | Age: 54
End: 2019-02-15
Payer: COMMERCIAL

## 2019-02-15 DIAGNOSIS — Z98.890 S/P RIGHT KNEE ARTHROSCOPY: ICD-10-CM

## 2019-02-15 DIAGNOSIS — S83.281A TEAR OF LATERAL MENISCUS OF RIGHT KNEE, CURRENT, UNSPECIFIED TEAR TYPE, INITIAL ENCOUNTER: Primary | ICD-10-CM

## 2019-02-15 PROCEDURE — 97110 THERAPEUTIC EXERCISES: CPT

## 2019-02-15 NOTE — PROGRESS NOTES
Daily Note     Today's date: 2/15/2019  Patient name: Doug Chase  : 1965  MRN: [id number]  Referring provider: Kim Casper MD  Dx:   Encounter Diagnosis     ICD-10-CM    1  Tear of lateral meniscus of right knee, current, unspecified tear type, initial encounter S83 281A    2  S/P right knee arthroscopy Z98 890                   Subjective: Pt denies R knee pain  Continues to c/o "clicking" R knee, inferior patella, with ambulation during swing thru  Objective: See treatment diary below      Assessment: Tolerated treatment well  Patient exhibited good technique with therapeutic exercises and would benefit from continued PT      Plan: Continue per plan of care         Specialty Daily Treatment Diary   Reassessment due 2019    Exercise Diary  2/8 2/11 2/15     Pain level pre 0/10 0/10 0/10     Pain level post        Ankle pumps   ----     Patellar mobs   ----     Heel slides 2# x30 3# x30 3# x30     NuStep L3 x10 min L4 x10 min L4 x10 min     TR        forw step up  x15 x20     Lat step up  x15 x20     Incline stretch 1 min 1 min 1 min     Leg/Calf Press 40# x30 ea 50# x30 ea 60# x30     Quad Set x30 x30 x30     Hip Abd  SL  1# x30 2# x 20 2# x30     Hip Add Sl 1# x30 2# x10 2# x30     SLR 1# x30 2# x20 2# x30     Gentle prone quad stretch 4 x10" 4x10" 4 x10"     Hip extension prone 1# x30 2# x20 2# x30     Terminal knee extension with tband blk x30 blk x30 blk x30     Rocker board  x30 ea x30 ea     Wall squats 0-60  10 x5" 15 x5" 20 x5"     SLS   10" x2 20" x2     lunges        Sitting hamstring stretch 1 min 1 min 1 min     Modalities        CP x10 min x10 min x10 min

## 2019-02-19 ENCOUNTER — OFFICE VISIT (OUTPATIENT)
Dept: PHYSICAL THERAPY | Facility: MEDICAL CENTER | Age: 54
End: 2019-02-19
Payer: COMMERCIAL

## 2019-02-19 DIAGNOSIS — Z98.890 S/P RIGHT KNEE ARTHROSCOPY: ICD-10-CM

## 2019-02-19 DIAGNOSIS — M25.561 ACUTE PAIN OF RIGHT KNEE: ICD-10-CM

## 2019-02-19 DIAGNOSIS — S83.281A TEAR OF LATERAL MENISCUS OF RIGHT KNEE, CURRENT, UNSPECIFIED TEAR TYPE, INITIAL ENCOUNTER: Primary | ICD-10-CM

## 2019-02-19 PROCEDURE — 97110 THERAPEUTIC EXERCISES: CPT

## 2019-02-19 NOTE — PROGRESS NOTES
Daily Note     Today's date: 2019  Patient name: Honey Hough  : 1965  MRN: [id number]  Referring provider: Alex Posada MD  Dx:   Encounter Diagnosis     ICD-10-CM    1  Tear of lateral meniscus of right knee, current, unspecified tear type, initial encounter S83 281A    2  S/P right knee arthroscopy Z98 890    3  Acute pain of right knee M25 561                   Subjective: Pt denies R knee pain today  Reports she is able to ascend stairs reciprocally, descends one at a time  Objective: See treatment diary below      Assessment: Tolerated treatment well  Pt demonstrated 4 in step downs today without difficulty  Patient exhibited good technique with therapeutic exercises and would benefit from continued PT      Plan: Continue per plan of care             Specialty Daily Treatment Diary   Reassessment due 2019    Exercise Diary  2/8 2/11 2/15 2/19    Pain level pre 0/10 0/10 0/10 0/10    Pain level post        Ankle pumps   ---- ----    Patellar mobs   ---- ----    Heel slides 2# x30 3# x30 3# x30 4# x30    NuStep L3 x10 min L4 x10 min L4 x10 min L5 x10 min    TR    ----    forw step up  x15 x20 x25    Lat step up  x15 x20 x25    Incline stretch 1 min 1 min 1 min 1 min    Leg/Calf Press 40# x30 ea 50# x30 ea 60# x30 70# x30    Quad Set x30 x30 x30 x30    Hip Abd  SL  1# x30 2# x 20 2# x30 3# x20    Hip Add Sl 1# x30 2# x10 2# x30 3# x20    SLR 1# x30 2# x20 2# x30 3# x 30    Gentle prone quad stretch 4 x10" 4x10" 4 x10" 4x10"    Hip extension prone 1# x30 2# x20 2# x30 3# x30    Terminal knee extension with tband blk x30 blk x30 blk x30 blk x30    Rocker board  x30 ea x30 ea x30 ea    Wall squats 0-60  10 x5" 15 x5" 20 x5" 25 x5"    SLS   10" x2 20" x2 30" x2    lunges    Walk x3 ea at bar    Sitting hamstring stretch 1 min 1 min 1 min 1 min    Step downs     4 in x10    Modalities        CP x10 min x10 min x10 min X 10 min

## 2019-02-21 ENCOUNTER — APPOINTMENT (OUTPATIENT)
Dept: PHYSICAL THERAPY | Facility: MEDICAL CENTER | Age: 54
End: 2019-02-21
Payer: COMMERCIAL

## 2019-02-22 ENCOUNTER — OFFICE VISIT (OUTPATIENT)
Dept: PHYSICAL THERAPY | Facility: MEDICAL CENTER | Age: 54
End: 2019-02-22
Payer: COMMERCIAL

## 2019-02-22 DIAGNOSIS — Z98.890 S/P RIGHT KNEE ARTHROSCOPY: ICD-10-CM

## 2019-02-22 DIAGNOSIS — S83.281A TEAR OF LATERAL MENISCUS OF RIGHT KNEE, CURRENT, UNSPECIFIED TEAR TYPE, INITIAL ENCOUNTER: Primary | ICD-10-CM

## 2019-02-22 DIAGNOSIS — M25.561 ACUTE PAIN OF RIGHT KNEE: ICD-10-CM

## 2019-02-22 PROCEDURE — 97110 THERAPEUTIC EXERCISES: CPT

## 2019-02-22 NOTE — PROGRESS NOTES
Daily Note     Today's date: 2019  Patient name: Memo Portillo  : 1965  MRN: [id number]  Referring provider: Elizabeth Spears MD  Dx:   Encounter Diagnosis     ICD-10-CM    1  Tear of lateral meniscus of right knee, current, unspecified tear type, initial encounter S83 281A    2  S/P right knee arthroscopy Z98 890    3  Acute pain of right knee M25 561                   Subjective: Pt c/o 2/10 pain R knee along patellar tendon  Objective: See treatment diary below      Assessment: Tolerated treatment well  Decreased resistance with sidelying ADD today secondary to c/o increased pain with 3 lbs  Held step downs today  All other there ex performed without c/o  Patient exhibited good technique with therapeutic exercises and would benefit from continued PT      Plan: Continue per plan of care             Specialty Daily Treatment Diary   Reassessment due 2019    Exercise Diary  2/8 2/11 2/15 2/19 2/22   Pain level pre 0/10 0/10 0/10 0/10 2/10   Pain level post        Ankle pumps   ---- ----    Patellar mobs   ---- ----    Heel slides 2# x30 3# x30 3# x30 4# x30 4# x30   NuStep L3 x10 min L4 x10 min L4 x10 min L5 x10 min L5 x10   TR    ----    forw step up  x15 x20 x25 x25   Lat step up  x15 x20 x25 x25   Incline stretch 1 min 1 min 1 min 1 min 1 min   Leg/Calf Press 40# x30 ea 50# x30 ea 60# x30 70# x30 80#x30   Quad Set x30 x30 x30 x30 x30   Hip Abd  SL  1# x30 2# x 20 2# x30 3# x20 3# x30   Hip Add Sl 1# x30 2# x10 2# x30 3# x20 2# x20   SLR 1# x30 2# x20 2# x30 3# x 30 3# x30   Gentle prone quad stretch 4 x10" 4x10" 4 x10" 4x10" 4 x10"   Hip extension prone 1# x30 2# x20 2# x30 3# x30 3# x30   Terminal knee extension with tband blk x30 blk x30 blk x30 blk x30 blk x30   Rocker board  x30 ea x30 ea x30 ea x30 ea   Wall squats 0-60  10 x5" 15 x5" 20 x5" 25 x5" 25 x5"   SLS   10" x2 20" x2 30" x2 45"   lunges    Walk x3 ea at bar Walk x4 ea   Sitting hamstring stretch 1 min 1 min 1 min 1 min 1 min Step downs     4 in x10 hold   Modalities        CP x10 min x10 min x10 min X 10 min x10 min

## 2019-02-25 ENCOUNTER — OFFICE VISIT (OUTPATIENT)
Dept: INTERNAL MEDICINE CLINIC | Facility: CLINIC | Age: 54
End: 2019-02-25
Payer: COMMERCIAL

## 2019-02-25 ENCOUNTER — OFFICE VISIT (OUTPATIENT)
Dept: PHYSICAL THERAPY | Facility: MEDICAL CENTER | Age: 54
End: 2019-02-25
Payer: COMMERCIAL

## 2019-02-25 VITALS
RESPIRATION RATE: 14 BRPM | DIASTOLIC BLOOD PRESSURE: 70 MMHG | BODY MASS INDEX: 31.32 KG/M2 | HEIGHT: 65 IN | SYSTOLIC BLOOD PRESSURE: 120 MMHG | TEMPERATURE: 97.6 F | WEIGHT: 188 LBS | HEART RATE: 76 BPM

## 2019-02-25 DIAGNOSIS — M25.561 ACUTE PAIN OF RIGHT KNEE: ICD-10-CM

## 2019-02-25 DIAGNOSIS — Z13.1 SCREENING FOR DIABETES MELLITUS (DM): ICD-10-CM

## 2019-02-25 DIAGNOSIS — Z12.39 SCREENING FOR BREAST CANCER: ICD-10-CM

## 2019-02-25 DIAGNOSIS — K21.9 GERD WITHOUT ESOPHAGITIS: ICD-10-CM

## 2019-02-25 DIAGNOSIS — G89.4 CHRONIC PAIN DISORDER: ICD-10-CM

## 2019-02-25 DIAGNOSIS — Z98.890 S/P RIGHT KNEE ARTHROSCOPY: ICD-10-CM

## 2019-02-25 DIAGNOSIS — Z13.220 SCREENING FOR LIPID DISORDERS: ICD-10-CM

## 2019-02-25 DIAGNOSIS — S83.281A TEAR OF LATERAL MENISCUS OF RIGHT KNEE, CURRENT, UNSPECIFIED TEAR TYPE, INITIAL ENCOUNTER: Primary | ICD-10-CM

## 2019-02-25 DIAGNOSIS — J45.20 MILD INTERMITTENT ASTHMA WITHOUT COMPLICATION: ICD-10-CM

## 2019-02-25 DIAGNOSIS — L40.50 PSORIASIS WITH ARTHROPATHY (HCC): ICD-10-CM

## 2019-02-25 DIAGNOSIS — I10 BENIGN ESSENTIAL HYPERTENSION: Primary | ICD-10-CM

## 2019-02-25 DIAGNOSIS — G43.B0 OPHTHALMOPLEGIC MIGRAINE, NOT INTRACTABLE: ICD-10-CM

## 2019-02-25 DIAGNOSIS — M85.80 OSTEOPENIA, UNSPECIFIED LOCATION: ICD-10-CM

## 2019-02-25 DIAGNOSIS — E28.39 MENOPAUSE OVARIAN FAILURE: ICD-10-CM

## 2019-02-25 DIAGNOSIS — E55.9 VITAMIN D DEFICIENCY: ICD-10-CM

## 2019-02-25 DIAGNOSIS — I10 HYPERTENSION, UNSPECIFIED TYPE: ICD-10-CM

## 2019-02-25 DIAGNOSIS — F41.1 GENERALIZED ANXIETY DISORDER: ICD-10-CM

## 2019-02-25 DIAGNOSIS — Z12.11 SCREEN FOR COLON CANCER: ICD-10-CM

## 2019-02-25 PROCEDURE — 3008F BODY MASS INDEX DOCD: CPT | Performed by: INTERNAL MEDICINE

## 2019-02-25 PROCEDURE — 3074F SYST BP LT 130 MM HG: CPT | Performed by: INTERNAL MEDICINE

## 2019-02-25 PROCEDURE — 3078F DIAST BP <80 MM HG: CPT | Performed by: INTERNAL MEDICINE

## 2019-02-25 PROCEDURE — 99214 OFFICE O/P EST MOD 30 MIN: CPT | Performed by: INTERNAL MEDICINE

## 2019-02-25 PROCEDURE — 1036F TOBACCO NON-USER: CPT | Performed by: INTERNAL MEDICINE

## 2019-02-25 PROCEDURE — 97110 THERAPEUTIC EXERCISES: CPT

## 2019-02-25 RX ORDER — ERGOCALCIFEROL 1.25 MG/1
50000 CAPSULE ORAL WEEKLY
Qty: 12 CAPSULE | Refills: 3 | Status: SHIPPED | OUTPATIENT
Start: 2019-02-25 | End: 2021-06-04

## 2019-02-25 RX ORDER — METOPROLOL SUCCINATE 25 MG/1
25 TABLET, EXTENDED RELEASE ORAL DAILY
Qty: 90 TABLET | Refills: 3 | Status: SHIPPED | OUTPATIENT
Start: 2019-02-25 | End: 2021-01-25

## 2019-02-25 RX ORDER — ALPRAZOLAM 0.25 MG/1
0.25 TABLET ORAL 3 TIMES DAILY PRN
Qty: 90 TABLET | Refills: 0 | Status: SHIPPED | OUTPATIENT
Start: 2019-02-25 | End: 2019-07-01 | Stop reason: SDUPTHER

## 2019-02-25 NOTE — PATIENT INSTRUCTIONS
Chronic Hypertension   AMBULATORY CARE:   Hypertension  is high blood pressure (BP)  Your BP is the force of your blood moving against the walls of your arteries  Normal BP is less than 120/80  Prehypertension is between 120/80 and 139/89  Hypertension is 140/90 or higher  Hypertension causes your BP to get so high that your heart has to work much harder than normal  This can damage your heart  Chronic hypertension is a long-term condition that you can control with a healthy lifestyle or medicines  A controlled blood pressure helps protect your organs, such as your heart, lungs, brain, and kidneys  Common symptoms include the following:   · Headache     · Blurred vision    · Chest pain     · Dizziness or weakness     · Trouble breathing     · Nosebleeds  Call 911 for any of the following:   · You have discomfort in your chest that feels like squeezing, pressure, fullness, or pain  · You become confused or have difficulty speaking  · You suddenly feel lightheaded or have trouble breathing  · You have pain or discomfort in your back, neck, jaw, stomach, or arm  Seek care immediately if:   · You have a severe headache or vision loss  · You have weakness in an arm or leg  Contact your healthcare provider if:   · You feel faint, dizzy, confused, or drowsy  · You have been taking your BP medicine and your BP is still higher than your healthcare provider says it should be  · You have questions or concerns about your condition or care  Treatment for chronic hypertension  may include medicine to lower your BP and lower your cholesterol level  A low cholesterol level helps prevent heart disease and makes it easier to control your blood pressure  Heart disease can make your blood pressure harder to control  You may also need to make lifestyle changes  Take your medicine exactly as directed    Manage chronic hypertension:  Talk with your healthcare provider about these and other ways to manage hypertension:  · Take your BP at home  Sit and rest for 5 minutes before you take your BP  Extend your arm and support it on a flat surface  Your arm should be at the same level as your heart  Follow the directions that came with your BP monitor  If possible, take at least 2 BP readings each time  Take your BP at least twice a day at the same times each day, such as morning and evening  Keep a record of your BP readings and bring it to your follow-up visits  Ask your healthcare provider what your blood pressure should be  · Limit sodium (salt) as directed  Too much sodium can affect your fluid balance  Check labels to find low-sodium or no-salt-added foods  Some low-sodium foods use potassium salts for flavor  Too much potassium can also cause health problems  Your healthcare provider will tell you how much sodium and potassium are safe for you to have in a day  He or she may recommend that you limit sodium to 2,300 mg a day  · Follow the meal plan recommended by your healthcare provider  A dietitian or your provider can give you more information on low-sodium plans or the DASH (Dietary Approaches to Stop Hypertension) eating plan  The DASH plan is low in sodium, unhealthy fats, and total fat  It is high in potassium, calcium, and fiber  · Exercise to maintain a healthy weight  Exercise at least 30 minutes per day, on most days of the week  This will help decrease your blood pressure  Ask about the best exercise plan for you  · Decrease stress  This may help lower your BP  Learn ways to relax, such as deep breathing or listening to music  · Limit alcohol  Women should limit alcohol to 1 drink a day  Men should limit alcohol to 2 drinks a day  A drink of alcohol is 12 ounces of beer, 5 ounces of wine, or 1½ ounces of liquor  · Do not smoke  Nicotine and other chemicals in cigarettes and cigars can increase your BP and also cause lung damage   Ask your healthcare provider for information if you currently smoke and need help to quit  E-cigarettes or smokeless tobacco still contain nicotine  Talk to your healthcare provider before you use these products  Follow up with your healthcare provider as directed: You will need to return to have your BP checked and to have other lab tests done  Write down your questions so you remember to ask them during your visits  © 2017 [address] [employer] Information is for End User's use only and may not be sold, redistributed or otherwise used for commercial purposes  All illustrations and images included in CareNotes® are the copyrighted property of [employer]  or [employer]io Agu[employer]  The above information is an  only  It is not intended as medical advice for individual conditions or treatments  Talk to your doctor, nurse or pharmacist before following any medical regimen to see if it is safe and effective for you  Obesity   AMBULATORY CARE:   Obesity  is when your body mass index (BMI) is greater than 30  Your healthcare provider will use your height and weight to measure your BMI  The risks of obesity include  many health problems, such as injuries or physical disability  You may need tests to check for the following:  · Diabetes     · High blood pressure or high cholesterol     · Heart disease     · Gallbladder or liver disease     · Cancer of the colon, breast, prostate, liver, or kidney     · Sleep apnea     · Arthritis or gout  Seek care immediately if:   · You have a severe headache, confusion, or difficulty speaking  · You have weakness on one side of your body  · You have chest pain, sweating, or shortness of breath  Contact your healthcare provider if:   · You have symptoms of gallbladder or liver disease, such as pain in your upper abdomen  · You have knee or hip pain and discomfort while walking  · You have symptoms of diabetes, such as intense hunger and thirst, and frequent urination  · You have symptoms of sleep apnea, such as snoring or daytime sleepiness  · You have questions or concerns about your condition or care  Treatment for obesity  focuses on helping you lose weight to improve your health  Even a small decrease in BMI can reduce the risk for many health problems  Your healthcare provider will help you set a weight-loss goal   · Lifestyle changes  are the first step in treating obesity  These include making healthy food choices and getting regular physical activity  Your healthcare provider may suggest a weight-loss program that involves coaching, education, and therapy  · Medicine  may help you lose weight when it is used with a healthy diet and physical activity  · Surgery  can help you lose weight if you are very obese and have other health problems  There are several types of weight-loss surgery  Ask your healthcare provider for more information  Be successful losing weight:   · Set small, realistic goals  An example of a small goal is to walk for 20 minutes 5 days a week  Anther goal is to lose 5% of your body weight  · Tell friends, family members, and coworkers about your goals  and ask for their support  Ask a friend to lose weight with you, or join a weight-loss support group  · Identify foods or triggers that may cause you to overeat , and find ways to avoid them  Remove tempting high-calorie foods from your home and workplace  Place a bowl of fresh fruit on your kitchen counter  If stress causes you to eat, then find other ways to cope with stress  · Keep a diary to track what you eat and drink  Also write down how many minutes of physical activity you do each day  Weigh yourself once a week and record it in your diary  Eating changes: You will need to eat 500 to 1,000 fewer calories each day than you currently eat to lose 1 to 2 pounds a week  The following changes will help you cut calories:  · Eat smaller portions    Use small plates, no larger than 9 inches in diameter  Fill your plate half full of fruits and vegetables  Measure your food using measuring cups until you know what a serving size looks like  · Eat 3 meals and 1 or 2 snacks each day  Plan your meals in advance  Meseret Grajeda and eat at home most of the time  Eat slowly  · Eat fruits and vegetables at every meal   They are low in calories and high in fiber, which makes you feel full  Do not add butter, margarine, or cream sauce to vegetables  Use herbs to season steamed vegetables  · Eat less fat and fewer fried foods  Eat more baked or grilled chicken and fish  These protein sources are lower in calories and fat than red meat  Limit fast food  Dress your salads with olive oil and vinegar instead of bottled dressing  · Limit the amount of sugar you eat  Do not drink sugary beverages  Limit alcohol  Activity changes:  Physical activity is good for your body in many ways  It helps you burn calories and build strong muscles  It decreases stress and depression, and improves your mood  It can also help you sleep better  Talk to your healthcare provider before you begin an exercise program   · Exercise for at least 30 minutes 5 days a week  Start slowly  Set aside time each day for physical activity that you enjoy and that is convenient for you  It is best to do both weight training and an activity that increases your heart rate, such as walking, bicycling, or swimming  · Find ways to be more active  Do yard work and housecleaning  Walk up the stairs instead of using elevators  Spend your leisure time going to events that require walking, such as outdoor festivals or fairs  This extra physical activity can help you lose weight and keep it off  Follow up with your healthcare provider as directed: You may need to meet with a dietitian  Write down your questions so you remember to ask them during your visits     © 2017 [employer] Information is for End User's use only and may not be sold, redistributed or otherwise used for commercial purposes  All illustrations and images included in CareNotes® are the copyrighted property of [employer]  or [employer]io Aguilar  The above information is an  only  It is not intended as medical advice for individual conditions or treatments  Talk to your doctor, nurse or pharmacist before following any medical regimen to see if it is safe and effective for you  Heart Healthy Diet   AMBULATORY CARE:   A heart healthy diet  is an eating plan low in total fat, unhealthy fats, and sodium (salt)  A heart healthy diet helps decrease your risk for heart disease and stroke  Limit the amount of fat you eat to 25% to 35% of your total daily calories  Limit sodium to less than 2,300 mg each day  Healthy fats:  Healthy fats can help improve cholesterol levels  The risk for heart disease is decreased when cholesterol levels are normal  Choose healthy fats, such as the following:  · Unsaturated fat  is found in foods such as soybean, canola, olive, corn, and safflower oils  It is also found in soft tub margarine that is made with liquid vegetable oil  · Omega-3 fat  is found in certain fish, such as salmon, tuna, and trout, and in walnuts and flaxseed  Unhealthy fats:  Unhealthy fats can cause unhealthy cholesterol levels in your blood and increase your risk of heart disease  Limit unhealthy fats, such as the following:  · Cholesterol  is found in animal foods, such as eggs and lobster, and in dairy products made from whole milk  Limit cholesterol to less than 300 milligrams (mg) each day  You may need to limit cholesterol to 200 mg each day if you have heart disease  · Saturated fat  is found in meats, such as canales and hamburger  It is also found in chicken or turkey skin, whole milk, and butter  Limit saturated fat to less than 7% of your total daily calories   Limit saturated fat to less than 6% if you have heart disease or are at increased risk for it  · Trans fat  is found in packaged foods, such as potato chips and cookies  It is also in hard margarine, some fried foods, and shortening  Avoid trans fats as much as possible    Heart healthy foods and drinks to include:  Ask your dietitian or healthcare provider how many servings to have from each of the following food groups:  · Grains:      ¨ Whole-wheat breads, cereals, and pastas, and brown rice    ¨ Low-fat, low-sodium crackers and chips    · Vegetables:      ¨ Broccoli, green beans, green peas, and spinach    ¨ Collards, kale, and lima beans    ¨ Carrots, sweet potatoes, tomatoes, and peppers    ¨ Canned vegetables with no salt added    · Fruits:      ¨ Bananas, peaches, pears, and pineapple    ¨ Grapes, raisins, and dates    ¨ Oranges, tangerines, grapefruit, orange juice, and grapefruit juice    ¨ Apricots, mangoes, melons, and papaya    ¨ Raspberries and strawberries    ¨ Canned fruit with no added sugar    · Low-fat dairy products:      ¨ Nonfat (skim) milk, 1% milk, and low-fat almond, cashew, or soy milks fortified with calcium    ¨ Low-fat cheese, regular or frozen yogurt, and cottage cheese    · Meats and proteins , such as lean cuts of beef and pork (loin, leg, round), skinless chicken and turkey, legumes, soy products, egg whites, and nuts  Foods and drinks to limit or avoid:  Ask your dietitian or healthcare provider about these and other foods that are high in unhealthy fat, sodium, and sugar:  · Snack or packaged foods , such as frozen dinners, cookies, macaroni and cheese, and cereals with more than 300 mg of sodium per serving    · Canned or dry mixes  for cakes, soups, sauces, or gravies    · Vegetables with added sodium , such as instant potatoes, vegetables with added sauces, or regular canned vegetables    · Other foods high in sodium , such as ketchup, barbecue sauce, salad dressing, pickles, olives, soy sauce, and miso    · High-fat dairy foods such as whole or 2% milk, cream cheese, or sour cream, and cheeses     · High-fat protein foods  such as high-fat cuts of beef (T-bone steaks, ribs), chicken or turkey with skin, and organ meats, such as liver    · Cured or smoked meats , such as hot dogs, canales, and sausage    · Unhealthy fats and oils , such as butter, stick margarine, shortening, and cooking oils such as coconut or palm oil    · Food and drinks high in sugar , such as soft drinks (soda), sports drinks, sweetened tea, candy, cake, cookies, pies, and doughnuts  Other diet guidelines to follow:   · Eat more foods containing omega-3 fats  Eat fish high in omega-3 fats at least 2 times a week  · Limit alcohol  Too much alcohol can damage your heart and raise your blood pressure  Women should limit alcohol to 1 drink a day  Men should limit alcohol to 2 drinks a day  A drink of alcohol is 12 ounces of beer, 5 ounces of wine, or 1½ ounces of liquor  · Choose low-sodium foods  High-sodium foods can lead to high blood pressure  Add little or no salt to food you prepare  Use herbs and spices in place of salt  · Eat more fiber  to help lower cholesterol levels  Eat at least 5 servings of fruits and vegetables each day  Eat 3 ounces of whole-grain foods each day  Legumes (beans) are also a good source of fiber  Lifestyle guidelines:   · Do not smoke  Nicotine and other chemicals in cigarettes and cigars can cause lung and heart damage  Ask your healthcare provider for information if you currently smoke and need help to quit  E-cigarettes or smokeless tobacco still contain nicotine  Talk to your healthcare provider before you use these products  · Exercise regularly  to help you maintain a healthy weight and improve your blood pressure and cholesterol levels  Ask your healthcare provider about the best exercise plan for you  Do not start an exercise program without asking your healthcare provider     Follow up with your healthcare provider as directed:  Write down your questions so you remember to ask them during your visits  © 2017 [address] [employer] Information is for End User's use only and may not be sold, redistributed or otherwise used for commercial purposes  All illustrations and images included in CareNotes® are the copyrighted property of [employer]  or [employer] Agu[employer]  The above information is an  only  It is not intended as medical advice for individual conditions or treatments  Talk to your doctor, nurse or pharmacist before following any medical regimen to see if it is safe and effective for you  Obesity   AMBULATORY CARE:   Obesity  is when your body mass index (BMI) is greater than 30  Your healthcare provider will use your height and weight to measure your BMI  The risks of obesity include  many health problems, such as injuries or physical disability  You may need tests to check for the following:  · Diabetes     · High blood pressure or high cholesterol     · Heart disease     · Gallbladder or liver disease     · Cancer of the colon, breast, prostate, liver, or kidney     · Sleep apnea     · Arthritis or gout  Seek care immediately if:   · You have a severe headache, confusion, or difficulty speaking  · You have weakness on one side of your body  · You have chest pain, sweating, or shortness of breath  Contact your healthcare provider if:   · You have symptoms of gallbladder or liver disease, such as pain in your upper abdomen  · You have knee or hip pain and discomfort while walking  · You have symptoms of diabetes, such as intense hunger and thirst, and frequent urination  · You have symptoms of sleep apnea, such as snoring or daytime sleepiness  · You have questions or concerns about your condition or care  Treatment for obesity  focuses on helping you lose weight to improve your health  Even a small decrease in BMI can reduce the risk for many health problems  Your healthcare provider will help you set a weight-loss goal   · Lifestyle changes  are the first step in treating obesity  These include making healthy food choices and getting regular physical activity  Your healthcare provider may suggest a weight-loss program that involves coaching, education, and therapy  · Medicine  may help you lose weight when it is used with a healthy diet and physical activity  · Surgery  can help you lose weight if you are very obese and have other health problems  There are several types of weight-loss surgery  Ask your healthcare provider for more information  Be successful losing weight:   · Set small, realistic goals  An example of a small goal is to walk for 20 minutes 5 days a week  Anther goal is to lose 5% of your body weight  · Tell friends, family members, and coworkers about your goals  and ask for their support  Ask a friend to lose weight with you, or join a weight-loss support group  · Identify foods or triggers that may cause you to overeat , and find ways to avoid them  Remove tempting high-calorie foods from your home and workplace  Place a bowl of fresh fruit on your kitchen counter  If stress causes you to eat, then find other ways to cope with stress  · Keep a diary to track what you eat and drink  Also write down how many minutes of physical activity you do each day  Weigh yourself once a week and record it in your diary  Eating changes: You will need to eat 500 to 1,000 fewer calories each day than you currently eat to lose 1 to 2 pounds a week  The following changes will help you cut calories:  · Eat smaller portions  Use small plates, no larger than 9 inches in diameter  Fill your plate half full of fruits and vegetables  Measure your food using measuring cups until you know what a serving size looks like  · Eat 3 meals and 1 or 2 snacks each day  Plan your meals in advance  Isak Saenz and eat at home most of the time  Eat slowly       · Eat fruits and vegetables at every meal   They are low in calories and high in fiber, which makes you feel full  Do not add butter, margarine, or cream sauce to vegetables  Use herbs to season steamed vegetables  · Eat less fat and fewer fried foods  Eat more baked or grilled chicken and fish  These protein sources are lower in calories and fat than red meat  Limit fast food  Dress your salads with olive oil and vinegar instead of bottled dressing  · Limit the amount of sugar you eat  Do not drink sugary beverages  Limit alcohol  Activity changes:  Physical activity is good for your body in many ways  It helps you burn calories and build strong muscles  It decreases stress and depression, and improves your mood  It can also help you sleep better  Talk to your healthcare provider before you begin an exercise program   · Exercise for at least 30 minutes 5 days a week  Start slowly  Set aside time each day for physical activity that you enjoy and that is convenient for you  It is best to do both weight training and an activity that increases your heart rate, such as walking, bicycling, or swimming  · Find ways to be more active  Do yard work and housecleaning  Walk up the stairs instead of using elevators  Spend your leisure time going to events that require walking, such as outdoor festivals or fairs  This extra physical activity can help you lose weight and keep it off  Follow up with your healthcare provider as directed: You may need to meet with a dietitian  Write down your questions so you remember to ask them during your visits  © 2017 [address] [employer] Information is for End User's use only and may not be sold, redistributed or otherwise used for commercial purposes  All illustrations and images included in CareNotes® are the copyrighted property of [employer]  or [employer] Agu[employer]  The above information is an  only   It is not intended as medical advice for individual conditions or treatments  Talk to your doctor, nurse or pharmacist before following any medical regimen to see if it is safe and effective for you  Heart Healthy Diet   AMBULATORY CARE:   A heart healthy diet  is an eating plan low in total fat, unhealthy fats, and sodium (salt)  A heart healthy diet helps decrease your risk for heart disease and stroke  Limit the amount of fat you eat to 25% to 35% of your total daily calories  Limit sodium to less than 2,300 mg each day  Healthy fats:  Healthy fats can help improve cholesterol levels  The risk for heart disease is decreased when cholesterol levels are normal  Choose healthy fats, such as the following:  · Unsaturated fat  is found in foods such as soybean, canola, olive, corn, and safflower oils  It is also found in soft tub margarine that is made with liquid vegetable oil  · Omega-3 fat  is found in certain fish, such as salmon, tuna, and trout, and in walnuts and flaxseed  Unhealthy fats:  Unhealthy fats can cause unhealthy cholesterol levels in your blood and increase your risk of heart disease  Limit unhealthy fats, such as the following:  · Cholesterol  is found in animal foods, such as eggs and lobster, and in dairy products made from whole milk  Limit cholesterol to less than 300 milligrams (mg) each day  You may need to limit cholesterol to 200 mg each day if you have heart disease  · Saturated fat  is found in meats, such as canales and hamburger  It is also found in chicken or turkey skin, whole milk, and butter  Limit saturated fat to less than 7% of your total daily calories  Limit saturated fat to less than 6% if you have heart disease or are at increased risk for it  · Trans fat  is found in packaged foods, such as potato chips and cookies  It is also in hard margarine, some fried foods, and shortening  Avoid trans fats as much as possible    Heart healthy foods and drinks to include:  Ask your dietitian or healthcare provider how many servings to have from each of the following food groups:  · Grains:      ¨ Whole-wheat breads, cereals, and pastas, and brown rice    ¨ Low-fat, low-sodium crackers and chips    · Vegetables:      ¨ Broccoli, green beans, green peas, and spinach    ¨ Collards, kale, and lima beans    ¨ Carrots, sweet potatoes, tomatoes, and peppers    ¨ Canned vegetables with no salt added    · Fruits:      ¨ Bananas, peaches, pears, and pineapple    ¨ Grapes, raisins, and dates    ¨ Oranges, tangerines, grapefruit, orange juice, and grapefruit juice    ¨ Apricots, mangoes, melons, and papaya    ¨ Raspberries and strawberries    ¨ Canned fruit with no added sugar    · Low-fat dairy products:      ¨ Nonfat (skim) milk, 1% milk, and low-fat almond, cashew, or soy milks fortified with calcium    ¨ Low-fat cheese, regular or frozen yogurt, and cottage cheese    · Meats and proteins , such as lean cuts of beef and pork (loin, leg, round), skinless chicken and turkey, legumes, soy products, egg whites, and nuts  Foods and drinks to limit or avoid:  Ask your dietitian or healthcare provider about these and other foods that are high in unhealthy fat, sodium, and sugar:  · Snack or packaged foods , such as frozen dinners, cookies, macaroni and cheese, and cereals with more than 300 mg of sodium per serving    · Canned or dry mixes  for cakes, soups, sauces, or gravies    · Vegetables with added sodium , such as instant potatoes, vegetables with added sauces, or regular canned vegetables    · Other foods high in sodium , such as ketchup, barbecue sauce, salad dressing, pickles, olives, soy sauce, and miso    · High-fat dairy foods  such as whole or 2% milk, cream cheese, or sour cream, and cheeses     · High-fat protein foods  such as high-fat cuts of beef (T-bone steaks, ribs), chicken or turkey with skin, and organ meats, such as liver    · Cured or smoked meats , such as hot dogs, canales, and sausage    · Unhealthy fats and oils , such as butter, stick margarine, shortening, and cooking oils such as coconut or palm oil    · Food and drinks high in sugar , such as soft drinks (soda), sports drinks, sweetened tea, candy, cake, cookies, pies, and doughnuts  Other diet guidelines to follow:   · Eat more foods containing omega-3 fats  Eat fish high in omega-3 fats at least 2 times a week  · Limit alcohol  Too much alcohol can damage your heart and raise your blood pressure  Women should limit alcohol to 1 drink a day  Men should limit alcohol to 2 drinks a day  A drink of alcohol is 12 ounces of beer, 5 ounces of wine, or 1½ ounces of liquor  · Choose low-sodium foods  High-sodium foods can lead to high blood pressure  Add little or no salt to food you prepare  Use herbs and spices in place of salt  · Eat more fiber  to help lower cholesterol levels  Eat at least 5 servings of fruits and vegetables each day  Eat 3 ounces of whole-grain foods each day  Legumes (beans) are also a good source of fiber  Lifestyle guidelines:   · Do not smoke  Nicotine and other chemicals in cigarettes and cigars can cause lung and heart damage  Ask your healthcare provider for information if you currently smoke and need help to quit  E-cigarettes or smokeless tobacco still contain nicotine  Talk to your healthcare provider before you use these products  · Exercise regularly  to help you maintain a healthy weight and improve your blood pressure and cholesterol levels  Ask your healthcare provider about the best exercise plan for you  Do not start an exercise program without asking your healthcare provider  Follow up with your healthcare provider as directed:  Write down your questions so you remember to ask them during your visits  © 2017 [employer] Information is for End User's use only and may not be sold, redistributed or otherwise used for commercial purposes   All illustrations and images included in CareNotes® are the copyrighted property of A [employer] M [employer]  or [employer]io Agu[employer]  The above information is an  only  It is not intended as medical advice for individual conditions or treatments  Talk to your doctor, nurse or pharmacist before following any medical regimen to see if it is safe and effective for you

## 2019-02-25 NOTE — PROGRESS NOTES
Daily Note     Today's date: 2019  Patient name: Dory Mahan  : 1965  MRN: [id number]  Referring provider: Luis Stafford MD  Dx:   Encounter Diagnosis     ICD-10-CM    1  Tear of lateral meniscus of right knee, current, unspecified tear type, initial encounter S83 281A    2  S/P right knee arthroscopy Z98 890    3  Acute pain of right knee M25 561                   Subjective: Pt reports R knee is feeling better, c/o increased pain with "certain" motions but not constant  No c/o pain at present  Primarily c/o pain with descending stairs, continues to perform them one at a time  States noticed improvement going down incline, no pain, just noted "pressure" under patella  Also reports non-painful "clicking" continues R knee with ambulation  Objective: See treatment diary below      Assessment: Tolerated treatment well  Unable to perform step downs without increased pain  Patient exhibited good technique with therapeutic exercises and would benefit from continued PT      Plan: Continue per plan of care           Specialty Daily Treatment Diary   Reassessment due 2019    Exercise Diary         Pain level pre 2/10 0/10       Pain level post         Patellar mobs  x20       Heel slides 4# x30 4# x30       NuStep L5 x10 L 6 x10 min       TR         forw step up x25 x30       Lat step up x25 x30       Incline stretch 1 min 1 min       Leg/Calf Press 80#x30 90# x30       Quad Set x30 x30       Hip Abd  SL  3# x30 4# x20       Hip Add Sl 2# x20 2# x30       SLR 3# x30 4# x30       Gentle prone quad stretch 4 x10" 4 x10"       Hip extension prone 3# x30 4# x20       Terminal knee extension with tband blk x30 blk x30       Rocker board x30 ea x30 ea       Wall squats 0-60  25 x5" 90 deg       SLS  45" 1 min       lunges Walk x4 ea 4 x8' walking lunges       Sitting hamstring stretch 1 min 1 min       Disc with ball toss         Step downs  hold 4" x3       Modalities         CP x10 min x10 min

## 2019-02-25 NOTE — PROGRESS NOTES
Assessment/Plan:    No problem-specific Assessment & Plan notes found for this encounter  Diagnoses and all orders for this visit:    Benign essential hypertension  -     Microalbumin / creatinine urine ratio  -     Comprehensive metabolic panel; Future    Ophthalmoplegic migraine, not intractable    Mild intermittent asthma without complication    GERD without esophagitis    Psoriasis with arthropathy (HCC)    Osteopenia, unspecified location  -     ergocalciferol (VITAMIN D2) 50,000 units; Take 1 capsule (50,000 Units total) by mouth once a week    Chronic pain disorder    Generalized anxiety disorder  -     ALPRAZolam (XANAX) 0 25 mg tablet; Take 1 tablet (0 25 mg total) by mouth 3 (three) times a day as needed for anxiety    Vitamin D deficiency    Screen for colon cancer  -     Occult Blood, Fecal Immunochemical; Future    Screening for diabetes mellitus (DM)  -     Hemoglobin A1C; Future    Menopause ovarian failure  -     DXA bone density spine hip and pelvis; Future    Screening for breast cancer  -     Mammo screening bilateral w 3d & cad; Future    BMI 31 0-31 9,adult    Hypertension, unspecified type  -     metoprolol succinate (TOPROL-XL) 25 mg 24 hr tablet; Take 1 tablet (25 mg total) by mouth daily    Screening for lipid disorders  -     LDL cholesterol, direct; Future  -     Triglycerides; Future      A/P: Doing ok  Will check labs  Order mammo, dexa, and re-try the FIT test  Refusing all vaccines  ??shoulder issues  To see ortho next for the knee and see what they recommend with the shoulder  May need PT  Will try and track down prior imaging last year from [facility]  Continue current treatment and RTC four months, but will call next week for update  Subjective:      Patient ID: Yuri Ralph is a 46 y o  female  WF RTC for f/u htn, GERD, etc  Doing ok, but new c/o several months of right shoulder pain  No trauma  NO swelling or redness  Pt is right handed   Had right knee arthroscopy and finished PT  Psoriatric arthritis no better and is back on the MTX  Asthma is well controlled and no rescue MDI use  Now retired  Due for labs, CRC, vaccines, Mammo, and dexa  The following portions of the patient's history were reviewed and updated as appropriate:   She  has a past medical history of Asthma, Generalized anxiety disorder, GERD (gastroesophageal reflux disease), Hemangioma of other sites, Hypertension, and Psoriatic arthritis ([address] )  She   Patient Active Problem List    Diagnosis Date Noted    BMI 31 0-31 9,adult 02/25/2019    Vitamin D deficiency 10/22/2018    Folate deficiency 10/22/2018    Chronic pain disorder 12/13/2017    Osteopenia 09/11/2017    Benign essential hypertension 04/19/2016    Ophthalmoplegic migraine headache 08/20/2012    Allergic rhinitis 08/15/2012    Asthma 08/15/2012    Generalized anxiety disorder 08/15/2012    GERD without esophagitis 08/15/2012    [facility] ) 08/15/2012    Sleep disorder 08/15/2012     She  has a past surgical history that includes Appendectomy; Cholecystectomy; Foot surgery; Hysterectomy; Kidney surgery; Tonsillectomy; Tubal ligation; and Knee surgery  Her family history includes Heart attack in her paternal grandfather; Hypertension in her maternal grandmother and paternal grandfather; Pancreatic cancer in her paternal grandfather  She  reports that she has never smoked  She has never used smokeless tobacco  She reports that she drinks alcohol  She reports that she does not use drugs  Current Outpatient Medications   Medication Sig Dispense Refill    ergocalciferol (VITAMIN D2) 50,000 units Take 1 capsule (50,000 Units total) by mouth once a week 12 capsule 3    folic acid (FOLVITE) 1 mg tablet Take 1 tablet (1,000 mcg total) by mouth daily 90 tablet 3    methotrexate 2 5 mg tablet Take 5 tabs weekly as directed   60 tablet 3    metoprolol succinate (TOPROL-XL) 25 mg 24 hr tablet Take 1 tablet (25 mg total) by mouth daily 90 tablet 3    ALPRAZolam (XANAX) 0 25 mg tablet Take 1 tablet (0 25 mg total) by mouth 3 (three) times a day as needed for anxiety 90 tablet 0     No current facility-administered medications for this visit  Current Outpatient Medications on File Prior to Visit   Medication Sig    folic acid (FOLVITE) 1 mg tablet Take 1 tablet (1,000 mcg total) by mouth daily    methotrexate 2 5 mg tablet Take 5 tabs weekly as directed   [DISCONTINUED] ALPRAZolam (XANAX) 0 5 mg tablet Take 1 tablet (0 5 mg total) by mouth every 8 (eight) hours as needed (prn)    [DISCONTINUED] ergocalciferol (VITAMIN D2) 50,000 units Take 50,000 Units by mouth once a week    [DISCONTINUED] metoprolol succinate (TOPROL-XL) 25 mg 24 hr tablet Take 1 tablet (25 mg total) by mouth daily    [DISCONTINUED] predniSONE 10 mg tablet 40mg po q d x 5, then 30mg po q d x 5, then 20mg po q d x 5, then 10mg po q d x 5, then d/c  (Patient not taking: Reported on 2/25/2019)     No current facility-administered medications on file prior to visit  She is allergic to cetirizine; diclofenac-misoprostol; indomethacin; and sulfamethoxazole-trimethoprim       Review of Systems   Constitutional: Positive for activity change  Negative for chills, diaphoresis, fatigue and fever  HENT: Negative  Eyes: Negative for visual disturbance  Respiratory: Negative for cough, chest tightness, shortness of breath and wheezing  Cardiovascular: Negative for chest pain, palpitations and leg swelling  Gastrointestinal: Negative for abdominal pain, constipation, diarrhea, nausea and vomiting  Endocrine: Negative for cold intolerance and heat intolerance  Genitourinary: Negative for difficulty urinating, dysuria and frequency  Musculoskeletal: Positive for arthralgias, joint swelling and myalgias  Negative for gait problem     Neurological: Negative for dizziness, tremors, seizures, syncope, weakness, light-headedness and headaches  Psychiatric/Behavioral: Negative for confusion and dysphoric mood  The patient is not nervous/anxious  Objective:      /70   Pulse 76   Temp 97 6 °F (36 4 °C) (Tympanic)   Resp 14   Ht 5' 5" (1 651 m)   Wt 85 3 kg (188 lb)   BMI 31 28 kg/m²          Physical Exam   Constitutional: She is oriented to person, place, and time  She appears well-developed and well-nourished  No distress  HENT:   Head: Normocephalic and atraumatic  Mouth/Throat: Oropharynx is clear and moist    Eyes: Pupils are equal, round, and reactive to light  Conjunctivae and EOM are normal    Neck: Neck supple  No JVD present  Cardiovascular: Normal rate, regular rhythm and normal heart sounds  Pulmonary/Chest: Effort normal and breath sounds normal  No respiratory distress  She has no wheezes  She has no rales  Abdominal: Soft  Bowel sounds are normal  She exhibits no distension  There is no tenderness  Musculoskeletal: She exhibits tenderness  She exhibits no edema or deformity  Right shoulder w/o gross deformity, increase temp, erythema, or swelling  Tenderness or the superior mid rotator cuff/deltoid area  ROM wnl  Negative drop test     Neurological: She is alert and oriented to person, place, and time  Psychiatric: She has a normal mood and affect  Her behavior is normal  Judgment and thought content normal    Nursing note and vitals reviewed  BMI Counseling: Body mass index is 31 28 kg/m²  Discussed the patient's BMI with her  The BMI is above average  BMI counseling and education was provided to the patient  Nutrition recommendations include reducing portion sizes, decreasing overall calorie intake, reducing intake of saturated fat and trans fat and reducing intake of cholesterol  Exercise recommendations include moderate aerobic physical activity for 150 minutes/week

## 2019-02-27 ENCOUNTER — EVALUATION (OUTPATIENT)
Dept: PHYSICAL THERAPY | Facility: MEDICAL CENTER | Age: 54
End: 2019-02-27
Payer: COMMERCIAL

## 2019-02-27 ENCOUNTER — TRANSCRIBE ORDERS (OUTPATIENT)
Dept: PHYSICAL THERAPY | Facility: MEDICAL CENTER | Age: 54
End: 2019-02-27

## 2019-02-27 DIAGNOSIS — M25.561 ACUTE PAIN OF RIGHT KNEE: ICD-10-CM

## 2019-02-27 DIAGNOSIS — Z98.890 S/P RIGHT KNEE ARTHROSCOPY: ICD-10-CM

## 2019-02-27 DIAGNOSIS — S83.281A TEAR OF LATERAL MENISCUS OF RIGHT KNEE, CURRENT, UNSPECIFIED TEAR TYPE, INITIAL ENCOUNTER: Primary | ICD-10-CM

## 2019-02-27 DIAGNOSIS — S83.281A ACUTE LATERAL MENISCUS TEAR OF RIGHT KNEE, INITIAL ENCOUNTER: Primary | ICD-10-CM

## 2019-02-27 PROCEDURE — 97010 HOT OR COLD PACKS THERAPY: CPT

## 2019-02-27 PROCEDURE — 97110 THERAPEUTIC EXERCISES: CPT

## 2019-02-27 NOTE — PROGRESS NOTES
Daily Note     Today's date: 2019  Patient name: Emma Hemphill  : 1965  MRN: [id number]  Referring provider: Rich Castañeda MD  Dx:   Encounter Diagnosis     ICD-10-CM    1  Tear of lateral meniscus of right knee, current, unspecified tear type, initial encounter S83 281A    2  S/P right knee arthroscopy Z98 890    3  Acute pain of right knee M25 561        Assessment  Assessment details: Pt seen after arthroscopic procedure for partial lateral menisectomy performed on 2019  She has improved significantly  Her gait is [employer] without assistive device  Her pain level is 0-1/10  Her ROM of the hip and knee is WFL  Her strength is in the 4/5 range  Balance and proprioception are improving  There is mild swelling which she states increases toward evening  Her chief complaints are of popping under the patella with movement, pain with descending stairs and inclines  Impairments: activity intolerance, impaired balance, impaired physical strength, pain with function  Understanding of Dx/Px/POC: good       Goals  Short Term Goals 2-4 weeks  1  Pt will decrease pain in the right knee to 1-3/10 with functional activities met   2  Pt will improve right knee ROM equal to left knee met  3  Strength of right lower extremity will be in the 4-/5 range met  4  Patient will be independent in home exercise program met  5  Functional score as measured by FOTO will increase to 40% met  6  Patient will ascend descend stairs reciprocal pattern without difficulty can ascend steps reciprocal pattern without pain  Still descend stairs non reciprocal because of pain near patellar tendon and inferior patella    Long Term Goals  1  Pt will reduce swelling of the right knee equal to left knee in 8 wks progressing swelling increases toward evening  2  Pt will improve right knee and LE strength to 4+/5  3  Pt will report no limitations with ADLs in 8 wks cannot kneel or squat  4   Pt will report no limitations with ambulation or stair mobility in 8 wks painlevel 1/10 with descending hills  5  Functional score as measured by FOTO will increase to 54%     Plan  Plan details: Discussed findings of evaluation with the patient, patient agreeable to skilled PT 2- 3x/wk for 4-6wks  Therapeutic interventions may be added or discontinued at the discretion of the therapist   POC and treatment goals discussed with PT/PTA  Planned therapy interventions: balance, flexibility, functional ROM exercises, gait training, graded exercise, home exercise program, joint mobilization, manual therapy,  neuromuscular re-education, patient education, strengthening, stretching and therapeutic exercise  Please advise on further reabilitation    Subjective Evaluation    History of Present Illness  Mechanism of injury: Pt presents with chief complaint of acute right knee pain and swelling which occurred when she was walking down a hallway and felt a pop in her right knee with pain  Reports pain and swelling continued to increase in the right knee, until she went to see MD  Was put on prednisone for about 20 days, but no significant change in sx noted  Pt then went to see Dr Sola Damon, x-ray taken showing mild degeneration and MRI showing small oblique tear in lateral meniscus of right knee  Also given cortisone injection and has noticed reduction in swelling  Referred to OPPT where she received conservative treatment of therapeutic exercise but pain and functional limitations continued  She returned to Dr Sola Damon and surgical intervention was recommended  No report of previous injuries or surgeries to the right knee  Reports sx are worse going up the stairs and especially down an incline  Pt is recently retired, but worked as nurse for 25 years     Quality of life: good    Pain  Current pain ratin-3/10   At best pain ratin  At worst pain ratin  Location: Right knee   Relieving factors: ice and medications  Aggravating factors: standing, stair climbing and walking  Progression: improving      Diagnostic Tests  X-ray: abnormal  MRI studies: abnormal  Treatments  Previous treatment: injection treatment and medication and previous physical therapy prior to surgery  Current treatment: physical therapy  Patient Goals  Patient goals for therapy: decreased edema, decreased pain, improved balance, increased motion, increased strength, independence with ADLs/IADLs and return to sport/leisure activities    Objective      Active Range of Motion   [facility]      Patellar Static Positioning   Left Knee: WFL  Right Knee: [facility]    Strength  Right hip flexion 4-/5    Right Knee   Flexion: 4-/5  Extension: 4-/5  Quadriceps contraction: good      Swelling minimal R lower extremity around knee    Observational Gait   Gait: normal  Walking speed and stride length within functional limits  Quality of Movement During Gait            Subjective: I'm doing good  Just minimal issues such as pain descending stairs and my knee cap popping      Objective: See treatment diary below      Assessment: Tolerated treatment well  Patient demonstrated fatigue post treatment, exhibited good technique with therapeutic exercises and would benefit from continued PT      Plan: Continue per plan of care  Progress treatment as tolerated  Progress treament per protocol         Specialty Daily Treatment Diary   Reassessment due 3/29/2019    Exercise Diary  2/22 2/25 2/27/2019      Pain level pre 2/10 0/10 0/10      Pain level post         Patellar mobs  x20 X 20      Heel slides 4# x30 4# x30 #5  x30      NuStep L5 x10 L 6 x10 min Lev 6 x 10      TR   HEP      forw step up x25 x30 X 30      Lat step up x25 x30 x30      Incline stretch 1 min 1 min 1 min      Leg/Calf Press 80#x30 90# x30 #100 x 30      Quad Set x30 x30 x30      Hip Abd  SL  3# x30 4# x20 #5 x x 30      Hip Add Sl 2# x20 2# x30 #3 x 30      SLR 3# x30 4# x30 #5 x 30      Gentle prone quad stretch 4 x10" 4 x10" 4 x 10 Hip extension prone 3# x30 4# x20 #5 x 30      Terminal knee extension with tband blk x30 blk x30 blk x 30      Rocker board x30 ea x30 ea X 30      Wall squats 0-60  25 x5" 90 deg 25 x 5sec      SLS  45" 1 min On blue cushion 1 min      lunges Walk x4 ea 4 x8' walking lunges 4 x 8 feet      Sitting hamstring stretch 1 min 1 min 1min      Disc with ball toss   X 10      squats         Step downs  hold 4" x3 5 x 3 sec      Modalities         CP x10 min x10 min X 10

## 2019-02-27 NOTE — LETTER
2019    Lord Saint, MD [address] [place] [address]    Patient: Jaelyn Hurt   YOB: 1965   Date of Visit: 2019     Encounter Diagnosis     ICD-10-CM    1  Tear of lateral meniscus of right knee, current, unspecified tear type, initial encounter S83 281A    2  S/P right knee arthroscopy Z98 890    3  Acute pain of right knee M25 561        Dear Dr Sola Damon:    Please review the attached Plan of Care from [facility] recent visit  Please verify that you agree therapy should continue by signing the attached document and sending it back to our office  If you have any questions or concerns, please don't hesitate to call  Sincerely,    Prabhu Astorga PT      Referring Provider:      I certify that I have read the below Plan of Care and certify the need for these services furnished under this plan of treatment while under my care  Lord Saint, MD [address] [place] [address]  [facility] [address]: [phone]          Daily Note     Today's date: 2019  Patient name: Jaelyn Hurt  : 1965  MRN: [id number]  Referring provider: Tessy Barry MD  Dx:   Encounter Diagnosis     ICD-10-CM    1  Tear of lateral meniscus of right knee, current, unspecified tear type, initial encounter S83 281A    2  S/P right knee arthroscopy Z98 890    3  Acute pain of right knee M25 561        Assessment  Assessment details: Pt seen after arthroscopic procedure for partial lateral menisectomy performed on 2019  She has improved significantly  Her gait is [facility] without assistive device  Her pain level is 0-1/10  Her ROM of the hip and knee is WFL  Her strength is in the 4/5 range  Balance and proprioception are improving  There is mild swelling which she states increases toward evening  Her chief complaints are of popping under the patella with movement, pain with descending stairs and inclines  Impairments: activity intolerance, impaired balance, impaired physical strength, pain with function  Understanding of Dx/Px/POC: good       Goals  Short Term Goals 2-4 weeks  1  Pt will decrease pain in the right knee to 1-3/10 with functional activities met   2  Pt will improve right knee ROM equal to left knee met  3  Strength of right lower extremity will be in the 4-/5 range met  4  Patient will be independent in home exercise program met  5  Functional score as measured by FOTO will increase to 40% met  6  Patient will ascend descend stairs reciprocal pattern without difficulty can ascend steps reciprocal pattern without pain  Still descend stairs non reciprocal because of pain near patellar tendon and inferior patella    Long Term Goals  1  Pt will reduce swelling of the right knee equal to left knee in 8 wks progressing swelling increases toward evening  2  Pt will improve right knee and LE strength to 4+/5  3  Pt will report no limitations with ADLs in 8 wks cannot kneel or squat  4  Pt will report no limitations with ambulation or stair mobility in 8 wks painlevel 1/10 with descending hills  5  Functional score as measured by FOTO will increase to 54%     Plan  Plan details: Discussed findings of evaluation with the patient, patient agreeable to skilled PT 2- 3x/wk for 4-6wks  Therapeutic interventions may be added or discontinued at the discretion of the therapist   POC and treatment goals discussed with PT/PTA     Planned therapy interventions: balance, flexibility, functional ROM exercises, gait training, graded exercise, home exercise program, joint mobilization, manual therapy,  neuromuscular re-education, patient education, strengthening, stretching and therapeutic exercise  Please advise on further reabilitation    Subjective Evaluation    History of Present Illness  Mechanism of injury: Pt presents with chief complaint of acute right knee pain and swelling which occurred when she was walking down a hallway and felt a pop in her right knee with pain  Reports pain and swelling continued to increase in the right knee, until she went to see MD  Was put on prednisone for about 20 days, but no significant change in sx noted  Pt then went to see Dr Jabier Pettit, x-ray taken showing mild degeneration and MRI showing small oblique tear in lateral meniscus of right knee  Also given cortisone injection and has noticed reduction in swelling  Referred to [facility]PPT where she received conservative treatment of therapeutic exercise but pain and functional limitations continued  She returned to Dr Jabier Pettit and surgical intervention was recommended  No report of previous injuries or surgeries to the right knee  Reports sx are worse going up the stairs and especially down an incline  Pt is recently retired, but worked as nurse for 25 years  Quality of life: good    Pain  Current pain ratin-3/10   At best pain ratin  At worst pain ratin  Location: Right knee   Relieving factors: ice and medications  Aggravating factors: standing, stair climbing and walking  Progression: improving      Diagnostic Tests  X-ray: abnormal  MRI studies: abnormal  Treatments  Previous treatment: injection treatment and medication and previous physical therapy prior to surgery  Current treatment: physical therapy  Patient Goals  Patient goals for therapy: decreased edema, decreased pain, improved balance, increased motion, increased strength, independence with ADLs/IADLs and return to sport/leisure activities    Objective      Active Range of Motion   ROM now [facility]      Patellar Static Positioning   Left Knee: WFL  Right Knee: [facility]    Strength  Right hip flexion 4-/5    Right Knee   Flexion: 4-/5  Extension: 4-/5  Quadriceps contraction: good      Swelling minimal R lower extremity around knee    Observational Gait   Gait: normal  Walking speed and stride length within functional limits        Quality of Movement During Gait Subjective: I'm doing good  Just minimal issues such as pain descending stairs and my knee cap popping      Objective: See treatment diary below      Assessment: Tolerated treatment well  Patient demonstrated fatigue post treatment, exhibited good technique with therapeutic exercises and would benefit from continued PT      Plan: Continue per plan of care  Progress treatment as tolerated  Progress treament per protocol         Specialty Daily Treatment Diary   Reassessment due 3/29/2019    Exercise Diary  2/22 2/25 2/27/2019      Pain level pre 2/10 0/10 0/10      Pain level post         Patellar mobs  x20 X 20      Heel slides 4# x30 4# x30 #5  x30      NuStep L5 x10 L 6 x10 min Lev 6 x 10      TR   HEP      forw step up x25 x30 X 30      Lat step up x25 x30 x30      Incline stretch 1 min 1 min 1 min      Leg/Calf Press 80#x30 90# x30 #100 x 30      Quad Set x30 x30 x30      Hip Abd  SL  3# x30 4# x20 #5 x x 30      Hip Add Sl 2# x20 2# x30 #3 x 30      SLR 3# x30 4# x30 #5 x 30      Gentle prone quad stretch 4 x10" 4 x10" 4 x 10      Hip extension prone 3# x30 4# x20 #5 x 30      Terminal knee extension with tband blk x30 blk x30 blk x 30      Rocker board x30 ea x30 ea X 30      Wall squats 0-60  25 x5" 90 deg 25 x 5sec      SLS  45" 1 min On blue cushion 1 min      lunges Walk x4 ea 4 x8' walking lunges 4 x 8 feet      Sitting hamstring stretch 1 min 1 min 1min      Disc with ball toss   X 10      squats         Step downs  hold 4" x3 5 x 3 sec      Modalities         CP x10 min x10 min X 10

## 2019-03-01 ENCOUNTER — OFFICE VISIT (OUTPATIENT)
Dept: PHYSICAL THERAPY | Facility: MEDICAL CENTER | Age: 54
End: 2019-03-01
Payer: COMMERCIAL

## 2019-03-01 DIAGNOSIS — M25.561 ACUTE PAIN OF RIGHT KNEE: ICD-10-CM

## 2019-03-01 DIAGNOSIS — Z98.890 S/P RIGHT KNEE ARTHROSCOPY: ICD-10-CM

## 2019-03-01 DIAGNOSIS — S83.281A TEAR OF LATERAL MENISCUS OF RIGHT KNEE, CURRENT, UNSPECIFIED TEAR TYPE, INITIAL ENCOUNTER: Primary | ICD-10-CM

## 2019-03-01 PROCEDURE — 97110 THERAPEUTIC EXERCISES: CPT

## 2019-03-01 NOTE — PROGRESS NOTES
Daily Note     Today's date: 3/1/2019  Patient name: Chelyl Levi  : 1965  MRN: [id number]  Referring provider: Azeb Kim MD  Dx:   Encounter Diagnosis     ICD-10-CM    1  Tear of lateral meniscus of right knee, current, unspecified tear type, initial encounter S83 281A    2  S/P right knee arthroscopy Z98 890    3  Acute pain of right knee M25 561                   Subjective: Pt reports 1/10 R knee pain today, but reports R quad "cramping" the night following last PT session  Objective: See treatment diary below      Assessment: Tolerated treatment well  Patient exhibited good technique with therapeutic exercises and would benefit from continued PT      Plan: Continue per plan of care             Specialty Daily Treatment Diary   Reassessment due 3/29/2019    Exercise Diary  2/22 2/25 2019 3/1     Pain level pre 2/10 0/10 0/10 1/10     Pain level post         Patellar mobs  x20 X 20 x20     Heel slides 4# x30 4# x30 #5  x30 5# x30     NuStep L5 x10 L 6 x10 min Lev 6 x 10 L6 x10 min     TR   HEP      forw step up x25 x30 X 30 x30     Lat step up x25 x30 x30 x30     Incline stretch 1 min 1 min 1 min 1 min     Leg/Calf Press 80#x30 90# x30 #100 x 30 100# x30     Quad Set x30 x30 x30 x30     Hip Abd  SL  3# x30 4# x20 #5 x x 30 5# x30     Hip Add Sl 2# x20 2# x30 #3 x 30 3#     SLR 3# x30 4# x30 #5 x 30 5# x30     Gentle prone quad stretch 4 x10" 4 x10" 4 x 10 4 x10     Hip extension prone 3# x30 4# x20 #5 x 30 5# x30     Terminal knee extension with tband blk x30 blk x30 blk x 30 blk x30     Rocker board x30 ea x30 ea X 30 x30     Wall squats 0-60  25 x5" 90 deg 25 x 5sec 20 x5"     SLS  45" 1 min On blue cushion 1 min 1 min on blue foam     lunges Walk x4 ea 4 x8' walking lunges 4 x 8 feet 4 x8 '     Sitting hamstring stretch 1 min 1 min 1min 1 min     Disc with ball toss   X 10 x10      squats         Step downs  hold 4" x3 5 x 3 sec x5     Modalities         CP x10 min x10 min X 10 x10

## 2019-03-05 ENCOUNTER — OFFICE VISIT (OUTPATIENT)
Dept: PHYSICAL THERAPY | Facility: MEDICAL CENTER | Age: 54
End: 2019-03-05
Payer: COMMERCIAL

## 2019-03-05 DIAGNOSIS — M25.561 ACUTE PAIN OF RIGHT KNEE: ICD-10-CM

## 2019-03-05 DIAGNOSIS — Z98.890 S/P RIGHT KNEE ARTHROSCOPY: ICD-10-CM

## 2019-03-05 DIAGNOSIS — S83.281A TEAR OF LATERAL MENISCUS OF RIGHT KNEE, CURRENT, UNSPECIFIED TEAR TYPE, INITIAL ENCOUNTER: Primary | ICD-10-CM

## 2019-03-05 PROCEDURE — 97110 THERAPEUTIC EXERCISES: CPT

## 2019-03-05 NOTE — PROGRESS NOTES
Daily Note     Today's date: 3/5/2019  Patient name: Halley Ernst  : 1965  MRN: [id number]  Referring provider: Justus Eduardo MD  Dx:   Encounter Diagnosis     ICD-10-CM    1  Tear of lateral meniscus of right knee, current, unspecified tear type, initial encounter S83 281A    2  S/P right knee arthroscopy Z98 890    3  Acute pain of right knee M25 561                   Subjective: Pt reports1-2/10 R knee pain today, c/o increased pain last night following standing for 2 hrs making easter eggs  Objective: See treatment diary below      Assessment: Tolerated treatment well  Patient exhibited good technique with therapeutic exercises and would benefit from continued PT      Plan: Continue per plan of care           Specialty Daily Treatment Diary   Reassessment due 3/29/2019    Exercise Diary  2/22 2/25 2019 3/1 3/5   Pain level pre 210 0/10 0/10 1/10 1-2/10   Pain level post        Patellar mobs  x20 X 20 x20 x20   Heel slides 4# x30 4# x30 #5  x30 5# x30 6# x20   NuStep L5 x10 L 6 x10 min Lev 6 x 10 L6 x10 min L6 x10 min   TR   HEP     forw step up x25 x30 X 30 x30 x30   Lat step up x25 x30 x30 x30 x30   Incline stretch 1 min 1 min 1 min 1 min 1 min   Leg/Calf Press 80#x30 90# x30 #100 x 30 100# x30 110# x30   Quad Set x30 x30 x30 x30 x30   Hip Abd  SL  3# x30 4# x20 #5 x x 30 5# x30 5# x30   Hip Add Sl 2# x20 2# x30 #3 x 30 3# x30 4# x15   SLR 3# x30 4# x30 #5 x 30 5# x30 5# x30   Gentle prone quad stretch 4 x10" 4 x10" 4 x 10 4 x10 4 x10"   Hip extension prone 3# x30 4# x20 #5 x 30 5# x30 5# x30   Terminal knee extension with tband blk x30 blk x30 blk x 30 blk x30 blk x30   Rocker board x30 ea x30 ea X 30 x30 x30   Wall squats 0-60  25 x5" 90 deg 25 x 5sec 20 x5" 25 x5"   SLS  45" 1 min On blue cushion 1 min 1 min on blue foam 1 min on blue   lunges Walk x4 ea 4 x8' walking lunges 4 x 8 feet 4 x8 ' 4 x8'   Sitting hamstring stretch 1 min 1 min 1min 1 min 1 min   Disc with ball toss   X 10 x10  x15   squats        Step downs  hold 4" x3 5 x 3 sec x5    Modalities        CP x10 min x10 min X 10 x10

## 2019-03-08 ENCOUNTER — OFFICE VISIT (OUTPATIENT)
Dept: PHYSICAL THERAPY | Facility: MEDICAL CENTER | Age: 54
End: 2019-03-08
Payer: COMMERCIAL

## 2019-03-08 DIAGNOSIS — M25.561 ACUTE PAIN OF RIGHT KNEE: ICD-10-CM

## 2019-03-08 DIAGNOSIS — Z98.890 S/P RIGHT KNEE ARTHROSCOPY: ICD-10-CM

## 2019-03-08 DIAGNOSIS — S83.281A TEAR OF LATERAL MENISCUS OF RIGHT KNEE, CURRENT, UNSPECIFIED TEAR TYPE, INITIAL ENCOUNTER: Primary | ICD-10-CM

## 2019-03-08 PROCEDURE — 97110 THERAPEUTIC EXERCISES: CPT

## 2019-03-08 NOTE — PROGRESS NOTES
Daily Note     Today's date: 3/8/2019  Patient name: Nia Jack  : 1965  MRN: [id number]  Referring provider: Ector Chaves MD  Dx:   Encounter Diagnosis     ICD-10-CM    1  Tear of lateral meniscus of right knee, current, unspecified tear type, initial encounter S83 281A    2  S/P right knee arthroscopy Z98 890    3  Acute pain of right knee M25 561                   Subjective: Pt reports she had appointment with Dr Lorraine Eden 3/6/19  States he was pleased with her progress and told her to continue PT for 2 -3 weeks then D/C to HEP  Objective: See treatment diary below      Assessment: Tolerated treatment well  Patient exhibited good technique with therapeutic exercises and would benefit from continued PT      Plan: Continue per plan of care         Specialty Daily Treatment Diary   Reassessment due 3/29/2019    Exercise Diary  2019 3/1 3 3/8    Pain level pre 0/10 1/10 1-2/10 0/10    Pain level post        Patellar mobs X 20 x20 x20 x25 ea    Heel slides #5  x30 5# x30 6# x20 6# x30    NuStep Lev 6 x 10 L6 x10 min L6 x10 min L6 x10 min    TR HEP       forw step up X 30 x30 x30 x30 On bosu   Lat step up x30 x30 x30 x30    Incline stretch 1 min 1 min 1 min 1 min    Leg/Calf Press #100 x 30 100# x30 110# x30 120# x30    Quad Set x30 x30 x30 x30    Hip Abd  SL  #5 x x 30 5# x30 5# x30 5# x30    Hip Add Sl #3 x 30 3# x30 4# x15 4# x    SLR #5 x 30 5# x30 5# x30 5# x30    Gentle prone quad stretch 4 x 10 4 x10 4 x10" 4 x10"    Hip extension prone #5 x 30 5# x30 5# x30 5# x30    Terminal knee extension with tband blk x 30 blk x30 blk x30 blk x30    Rocker board X 30 x30 x30 x30 ea    Wall squats 0-60  25 x 5sec 20 x5" 25 x5" 30 x5"    SLS  On blue cushion 1 min 1 min on blue foam 1 min on blue 1 min on blue cushion    lunges 4 x 8 feet 4 x8 ' 4 x8' 4 x8'    Sitting hamstring stretch 1min 1 min 1 min 1 min    Disc with ball toss X 10 x10  x15 x15    squats        Step downs  5 x 3 sec x5 x10 Single leg squat at bar x15    Modalities        CP X 10 x10 x10 min x10 min

## 2019-03-13 ENCOUNTER — OFFICE VISIT (OUTPATIENT)
Dept: PHYSICAL THERAPY | Facility: MEDICAL CENTER | Age: 54
End: 2019-03-13
Payer: COMMERCIAL

## 2019-03-13 DIAGNOSIS — Z98.890 S/P RIGHT KNEE ARTHROSCOPY: ICD-10-CM

## 2019-03-13 DIAGNOSIS — S83.281A TEAR OF LATERAL MENISCUS OF RIGHT KNEE, CURRENT, UNSPECIFIED TEAR TYPE, INITIAL ENCOUNTER: Primary | ICD-10-CM

## 2019-03-13 DIAGNOSIS — M25.561 ACUTE PAIN OF RIGHT KNEE: ICD-10-CM

## 2019-03-13 PROCEDURE — 97110 THERAPEUTIC EXERCISES: CPT

## 2019-03-13 NOTE — PROGRESS NOTES
Daily Note     Today's date: 3/13/2019  Patient name: Dory Mahan  : 1965  MRN: [id number]  Referring provider: Luis Stafford MD  Dx:   Encounter Diagnosis     ICD-10-CM    1  Tear of lateral meniscus of right knee, current, unspecified tear type, initial encounter S83 281A    2  S/P right knee arthroscopy Z98 890    3  Acute pain of right knee M25 561                   Subjective: Pt denies pain R knee  C/o increased pain L knee since carrying groceries up stairs over weekend  States she felt and heard a "pop" post/lateral L knee  Objective: See treatment diary below      Assessment: Tolerated treatment well  Held inversion/eversion on rockerboard secondary to L knee discomfort today  Patient exhibited good technique with therapeutic exercises and would benefit from continued PT      Plan: Continue per plan of care         Specialty Daily Treatment Diary   Reassessment due 3/29/2019    Exercise Diary  2019 3/1 3/5 3/8 3/13   Pain level pre 0/10 1/10 1-2/10 0/10 0/10   Pain level post        Patellar mobs X 20 x20 x20 x25 ea ----   Heel slides #5  x30 5# x30 6# x20 6# x30 6# x30   NuStep Lev 6 x 10 L6 x10 min L6 x10 min L6 x10 min L6 x10 min   TR HEP       forw step up X 30 x30 x30 x30 On bosu x15    Lat step up x30 x30 x30 x30 On bosu  x15   Incline stretch 1 min 1 min 1 min 1 min 1 min   Leg/Calf Press #100 x 30 100# x30 110# x30 120# x30 120# x30   Quad Set x30 x30 x30 x30 x30   Hip Abd  SL  #5 x x 30 5# x30 5# x30 5# x30 5# x30   Hip Add Sl #3 x 30 3# x30 4# x15 4# x 20 4# x30   SLR #5 x 30 5# x30 5# x30 5# x30 5# x30   Gentle prone quad stretch 4 x 10 4 x10 4 x10" 4 x10" 4 x10"   Hip extension prone #5 x 30 5# x30 5# x30 5# x30 5# x30   Terminal knee extension with tband blk x 30 blk x30 blk x30 blk x30 blk x30   Rocker board X 30 x30 x30 x30 ea x30 ea   Wall squats 0-60  25 x 5sec 20 x5" 25 x5" 30 x5" 30 x5"   SLS  On blue cushion 1 min 1 min on blue foam 1 min on blue 1 min on blue cushion 1 min on blue cushion   lunges 4 x 8 feet 4 x8 ' 4 x8' 4 x8' 4 x8'   Sitting hamstring stretch 1min 1 min 1 min 1 min 1 min   Disc with ball toss X 10 x10  x15 x15 x15   squats        Step downs  5 x 3 sec x5 x10 Single leg squat at bar x15 2x10 R Single leg squat   Modalities        CP X 10 x10 x10 min x10 min x10 min

## 2019-03-15 ENCOUNTER — OFFICE VISIT (OUTPATIENT)
Dept: PHYSICAL THERAPY | Facility: MEDICAL CENTER | Age: 54
End: 2019-03-15
Payer: COMMERCIAL

## 2019-03-15 DIAGNOSIS — M25.561 ACUTE PAIN OF RIGHT KNEE: ICD-10-CM

## 2019-03-15 DIAGNOSIS — S83.281A TEAR OF LATERAL MENISCUS OF RIGHT KNEE, CURRENT, UNSPECIFIED TEAR TYPE, INITIAL ENCOUNTER: Primary | ICD-10-CM

## 2019-03-15 DIAGNOSIS — Z98.890 S/P RIGHT KNEE ARTHROSCOPY: ICD-10-CM

## 2019-03-15 PROCEDURE — 97110 THERAPEUTIC EXERCISES: CPT

## 2019-03-15 NOTE — PROGRESS NOTES
Daily Note     Today's date: 3/15/2019  Patient name: Leanna Rodas  : 1965  MRN: [id number]  Referring provider: Domo Young MD  Dx:   Encounter Diagnosis     ICD-10-CM    1  Tear of lateral meniscus of right knee, current, unspecified tear type, initial encounter S83 281A    2  S/P right knee arthroscopy Z98 890    3  Acute pain of right knee M25 561                   Subjective: Pt denies R knee pain, decreased pain L knee  Objective: See treatment diary below      Assessment: Tolerated treatment well  Pt demonstrated ability to perform 4 in step down today without c/o R knee pain  Patient exhibited good technique with therapeutic exercises and would benefit from continued PT      Plan: Continue per plan of care         Specialty Daily Treatment Diary   Reassessment due 3/29/2019    Exercise Diary  3/13 3/15       Pain level pre 0/10 0/10       Pain level post         Patellar mobs ---- ----       Heel slides 6# x30 6# x30       NuStep L6 x10 min L6 x10 min       TR         forw step up On bosu x15  On bosu  x20       Lat step up On bosu  x15 On bosu  x20       Incline stretch 1 min 1 min       Leg/Calf Press 120# x30 120# x30       Quad Set x30 x30       Hip Abd  SL  5# x30 5# x30       Hip Add Sl 4# x30 4# x30       SLR 5# x30 5# x30       Gentle prone quad stretch 4 x10" 4x10"       Hip extension prone 5# x30 5# x30       Terminal knee extension with tband blk x30 blk x30       Rocker board x30 ea x30 ea       Wall squats 0-60  30 x5" 30 x5"       SLS  1 min on blue cushion 1 min on blue cushion       lunges 4 x8' 4 x8'       Sitting hamstring stretch 1 min 1 min       Disc with ball toss x15 x15       Step downs  4" x5       R Single leg squat at bar 2x10 R Single leg squat x25       Modalities         CP x10 min x10 min

## 2019-03-19 ENCOUNTER — OFFICE VISIT (OUTPATIENT)
Dept: PHYSICAL THERAPY | Facility: MEDICAL CENTER | Age: 54
End: 2019-03-19
Payer: COMMERCIAL

## 2019-03-19 DIAGNOSIS — M25.561 ACUTE PAIN OF RIGHT KNEE: ICD-10-CM

## 2019-03-19 DIAGNOSIS — Z98.890 S/P RIGHT KNEE ARTHROSCOPY: ICD-10-CM

## 2019-03-19 DIAGNOSIS — S83.281A TEAR OF LATERAL MENISCUS OF RIGHT KNEE, CURRENT, UNSPECIFIED TEAR TYPE, INITIAL ENCOUNTER: Primary | ICD-10-CM

## 2019-03-19 PROCEDURE — 97110 THERAPEUTIC EXERCISES: CPT

## 2019-03-19 NOTE — PROGRESS NOTES
Daily Note     Today's date: 3/19/2019  Patient name: Rhiannon Em  : 1965  MRN: [id number]  Referring provider: Alex Castro MD  Dx:   Encounter Diagnosis     ICD-10-CM    1  Tear of lateral meniscus of right knee, current, unspecified tear type, initial encounter S83 281A    2  S/P right knee arthroscopy Z98 890    3  Acute pain of right knee M25 561                   Subjective: Pt continues to deny R knee pain    Objective: See treatment diary below      Assessment: Tolerated treatment well  Toterated 4" step down without c/o pain  Patient exhibited good technique with therapeutic exercises      Plan: Potential discharge next visit        Specialty Daily Treatment Diary   Reassessment due 3/29/2019    Exercise Diary  3/13 3/15 3/19      Pain level pre 0/10 0/10 0/10      Pain level post         Patellar mobs ---- ----       Heel slides 6# x30 6# x30 6# x30      NuStep L6 x10 min L6 x10 min L6 x10 min      TR         forw step up On bosu x15  On bosu  x20 On bosu  x30      Lat step up On bosu  x15 On bosu  x20 On bosu       Incline stretch 1 min 1 min 1 min      Leg/Calf Press 120# x30 120# x30 120# x30      Quad Set x30 x30 x30      Hip Abd  SL  5# x30 5# x30 5# x30      Hip Add Sl 4# x30 4# x30 5# x15      SLR 5# x30 5# x30 5# x30      Gentle prone quad stretch 4 x10" 4x10" 4 x10"      Hip extension prone 5# x30 5# x30 5# x30      Terminal knee extension with tband blk x30 blk x30 blk x30      Rocker board x30 ea x30 ea x30 ea      Wall squats 0-60  30 x5" 30 x5" 30 x5"      SLS  1 min on blue cushion 1 min on blue cushion 1 min on blue cushion      lunges 4 x8' 4 x8' 4x8'      Sitting hamstring stretch 1 min 1 min 1 min      Disc with ball toss x15 x15 x20      Step downs  4" x5 4"x10      R Single leg squat at bar 2x10 R Single leg squat x25 np      Modalities         CP x10 min x10 min x10 min

## 2019-03-22 ENCOUNTER — OFFICE VISIT (OUTPATIENT)
Dept: PHYSICAL THERAPY | Facility: MEDICAL CENTER | Age: 54
End: 2019-03-22
Payer: COMMERCIAL

## 2019-03-22 DIAGNOSIS — Z98.890 S/P RIGHT KNEE ARTHROSCOPY: ICD-10-CM

## 2019-03-22 DIAGNOSIS — S83.281A TEAR OF LATERAL MENISCUS OF RIGHT KNEE, CURRENT, UNSPECIFIED TEAR TYPE, INITIAL ENCOUNTER: Primary | ICD-10-CM

## 2019-03-22 DIAGNOSIS — M25.561 ACUTE PAIN OF RIGHT KNEE: ICD-10-CM

## 2019-03-22 PROCEDURE — 97110 THERAPEUTIC EXERCISES: CPT

## 2019-03-22 NOTE — PROGRESS NOTES
Daily Note     Today's date: 3/22/2019  Patient name: Keith Leija  : 1965  MRN: [id number]  Referring provider: Cathy Wild MD  Dx:   Encounter Diagnosis     ICD-10-CM    1  Tear of lateral meniscus of right knee, current, unspecified tear type, initial encounter S83 281A    2  S/P right knee arthroscopy Z98 890    3  Acute pain of right knee M25 561        Start Time: 6003  Stop Time: 0959  Total time in clinic (min): 55 minutes    Subjective: I'm doing well  No problems      Objective: See treatment diary below      Assessment: Tolerated treatment well  Patient exhibited good technique with therapeutic exercises and all goals met  Discharge as per plan   Goals achieved      Plan: Discharge to home exercise program at this time      Specialty Daily Treatment Diary   Reassessment due 3/29/2019    Exercise Diary  3/13 3/15 3/19 3/22/2019     Pain level pre 0/10 0/10 0/10 0/10     Pain level post         Patellar mobs ---- ---- --------- ---------     Heel slides 6# x30 6# x30 6# x30 #6 x 30     NuStep L6 x10 min L6 x10 min L6 x10 min Lev 6 x 10min     TR         forw step up On bosu x15  On bosu  x20 On bosu  x30 X 30     Lat step up On bosu  x15 On bosu  x20 On bosu  X 30     Incline stretch 1 min 1 min 1 min 1 min     Leg/Calf Press 120# x30 120# x30 120# x30 #120 x 30     Quad Set x30 x30 x30 X 30     Hip Abd  SL  5# x30 5# x30 5# x30 #6 x 20     Hip Add Sl 4# x30 4# x30 5# x15 #5 x 30     SLR 5# x30 5# x30 5# x30 #6 x 20     Gentle prone quad stretch 4 x10" 4x10" 4 x10" 4 x 10     Hip extension prone 5# x30 5# x30 5# x30 #6 x 20     Terminal knee extension with tband blk x30 blk x30 blk x30 blk x30     Rocker board x30 ea x30 ea x30 ea X 30     Wall squats 0-60  30 x5" 30 x5" 30 x5" 30 x 5 sec     SLS  1 min on blue cushion 1 min on blue cushion 1 min on blue cushion 1 min on blue cushion     lunges 4 x8' 4 x8' 4x8' 4 x 8 ft     Sitting hamstring stretch 1 min 1 min 1 min 1 min     Disc with ball toss x15 x15 x20 X 25     Step downs  4" x5 4"x10 4 x 20     R Single leg squat at bar 2x10 R Single leg squat x25 np np     Modalities         CP x10 min x10 min x10 min X 10 min

## 2019-03-23 LAB — HBA1C MFR BLD HPLC: 5.8 %

## 2019-03-29 ENCOUNTER — APPOINTMENT (OUTPATIENT)
Dept: PHYSICAL THERAPY | Facility: MEDICAL CENTER | Age: 54
End: 2019-03-29
Payer: COMMERCIAL

## 2019-04-05 ENCOUNTER — TRANSCRIBE ORDERS (OUTPATIENT)
Dept: ADMINISTRATIVE | Facility: HOSPITAL | Age: 54
End: 2019-04-05

## 2019-04-05 DIAGNOSIS — M25.562 LEFT KNEE PAIN, UNSPECIFIED CHRONICITY: Primary | ICD-10-CM

## 2019-04-15 ENCOUNTER — HOSPITAL ENCOUNTER (OUTPATIENT)
Dept: MRI IMAGING | Facility: HOSPITAL | Age: 54
Discharge: HOME | End: 2019-04-15
Payer: COMMERCIAL

## 2019-04-15 DIAGNOSIS — M25.562 LEFT KNEE PAIN, UNSPECIFIED CHRONICITY: ICD-10-CM

## 2019-04-15 PROCEDURE — 73721 MRI JNT OF LWR EXTRE W/O DYE: CPT

## 2019-06-17 ENCOUNTER — TRANSCRIBE ORDERS (OUTPATIENT)
Dept: PHYSICAL THERAPY | Facility: MEDICAL CENTER | Age: 54
End: 2019-06-17

## 2019-06-17 ENCOUNTER — EVALUATION (OUTPATIENT)
Dept: PHYSICAL THERAPY | Facility: MEDICAL CENTER | Age: 54
End: 2019-06-17
Payer: COMMERCIAL

## 2019-06-17 DIAGNOSIS — S83.282S ACUTE LATERAL MENISCUS TEAR OF LEFT KNEE, SEQUELA: Primary | ICD-10-CM

## 2019-06-17 DIAGNOSIS — S83.282S ACUTE LATERAL MENISCAL TEAR, LEFT, SEQUELA: Primary | ICD-10-CM

## 2019-06-17 DIAGNOSIS — Z98.890 S/P LEFT KNEE ARTHROSCOPY: ICD-10-CM

## 2019-06-17 PROCEDURE — 97162 PT EVAL MOD COMPLEX 30 MIN: CPT

## 2019-06-17 PROCEDURE — 97110 THERAPEUTIC EXERCISES: CPT

## 2019-06-21 ENCOUNTER — OFFICE VISIT (OUTPATIENT)
Dept: PHYSICAL THERAPY | Facility: MEDICAL CENTER | Age: 54
End: 2019-06-21
Payer: COMMERCIAL

## 2019-06-21 DIAGNOSIS — S83.282S ACUTE LATERAL MENISCAL TEAR, LEFT, SEQUELA: Primary | ICD-10-CM

## 2019-06-21 DIAGNOSIS — Z98.890 S/P LEFT KNEE ARTHROSCOPY: ICD-10-CM

## 2019-06-21 PROCEDURE — 97110 THERAPEUTIC EXERCISES: CPT

## 2019-06-24 ENCOUNTER — OFFICE VISIT (OUTPATIENT)
Dept: PHYSICAL THERAPY | Facility: MEDICAL CENTER | Age: 54
End: 2019-06-24
Payer: COMMERCIAL

## 2019-06-24 DIAGNOSIS — Z98.890 S/P LEFT KNEE ARTHROSCOPY: ICD-10-CM

## 2019-06-24 DIAGNOSIS — S83.282S ACUTE LATERAL MENISCAL TEAR, LEFT, SEQUELA: Primary | ICD-10-CM

## 2019-06-24 PROCEDURE — 97110 THERAPEUTIC EXERCISES: CPT

## 2019-06-25 ENCOUNTER — TELEPHONE (OUTPATIENT)
Dept: INTERNAL MEDICINE CLINIC | Facility: CLINIC | Age: 54
End: 2019-06-25

## 2019-06-27 ENCOUNTER — OFFICE VISIT (OUTPATIENT)
Dept: PHYSICAL THERAPY | Facility: MEDICAL CENTER | Age: 54
End: 2019-06-27
Payer: COMMERCIAL

## 2019-06-27 DIAGNOSIS — Z98.890 S/P LEFT KNEE ARTHROSCOPY: ICD-10-CM

## 2019-06-27 DIAGNOSIS — S83.282S ACUTE LATERAL MENISCAL TEAR, LEFT, SEQUELA: Primary | ICD-10-CM

## 2019-06-27 PROCEDURE — 97110 THERAPEUTIC EXERCISES: CPT

## 2019-07-01 ENCOUNTER — OFFICE VISIT (OUTPATIENT)
Dept: INTERNAL MEDICINE CLINIC | Facility: CLINIC | Age: 54
End: 2019-07-01
Payer: COMMERCIAL

## 2019-07-01 ENCOUNTER — OFFICE VISIT (OUTPATIENT)
Dept: PHYSICAL THERAPY | Facility: MEDICAL CENTER | Age: 54
End: 2019-07-01
Payer: COMMERCIAL

## 2019-07-01 VITALS
HEART RATE: 82 BPM | BODY MASS INDEX: 31.82 KG/M2 | RESPIRATION RATE: 14 BRPM | DIASTOLIC BLOOD PRESSURE: 78 MMHG | TEMPERATURE: 98.5 F | HEIGHT: 65 IN | WEIGHT: 191 LBS | SYSTOLIC BLOOD PRESSURE: 128 MMHG

## 2019-07-01 DIAGNOSIS — F41.1 GENERALIZED ANXIETY DISORDER: ICD-10-CM

## 2019-07-01 DIAGNOSIS — M85.80 OSTEOPENIA, UNSPECIFIED LOCATION: ICD-10-CM

## 2019-07-01 DIAGNOSIS — G89.4 CHRONIC PAIN DISORDER: ICD-10-CM

## 2019-07-01 DIAGNOSIS — Z98.890 S/P LEFT KNEE ARTHROSCOPY: ICD-10-CM

## 2019-07-01 DIAGNOSIS — I10 BENIGN ESSENTIAL HYPERTENSION: Primary | ICD-10-CM

## 2019-07-01 DIAGNOSIS — S83.282S ACUTE LATERAL MENISCAL TEAR, LEFT, SEQUELA: Primary | ICD-10-CM

## 2019-07-01 DIAGNOSIS — L40.50 PSORIASIS WITH ARTHROPATHY (HCC): ICD-10-CM

## 2019-07-01 DIAGNOSIS — K21.9 GERD WITHOUT ESOPHAGITIS: ICD-10-CM

## 2019-07-01 DIAGNOSIS — G43.B0 OPHTHALMOPLEGIC MIGRAINE, NOT INTRACTABLE: ICD-10-CM

## 2019-07-01 DIAGNOSIS — E55.9 VITAMIN D DEFICIENCY: ICD-10-CM

## 2019-07-01 DIAGNOSIS — J45.20 MILD INTERMITTENT ASTHMA WITHOUT COMPLICATION: ICD-10-CM

## 2019-07-01 PROCEDURE — 97110 THERAPEUTIC EXERCISES: CPT

## 2019-07-01 PROCEDURE — 99214 OFFICE O/P EST MOD 30 MIN: CPT | Performed by: INTERNAL MEDICINE

## 2019-07-01 PROCEDURE — 3008F BODY MASS INDEX DOCD: CPT | Performed by: INTERNAL MEDICINE

## 2019-07-01 PROCEDURE — 1036F TOBACCO NON-USER: CPT | Performed by: INTERNAL MEDICINE

## 2019-07-01 RX ORDER — ALPRAZOLAM 0.25 MG/1
0.25 TABLET ORAL 3 TIMES DAILY PRN
Qty: 90 TABLET | Refills: 0 | Status: SHIPPED | OUTPATIENT
Start: 2019-07-01 | End: 2019-08-10 | Stop reason: SDUPTHER

## 2019-07-01 NOTE — PROGRESS NOTES
Daily Note     Today's date: 2019  Patient name: Claritza Martinez  : 1965  MRN: [id number]  Referring provider: Deb Edwards MD  Dx:   Encounter Diagnosis     ICD-10-CM    1  Acute lateral meniscal tear, left, sequela S83 282S    2  S/P left knee arthroscopy Z98 890                   Subjective: Had return to MD and pleased with progress  Feels she can d/c shortly to home program  Still some difficulty reported on stairs ml with descending  Objective: See treatment diary below      Precautions: None      Exercise Diary  2019 7      Pain level pre 0/10 0/10 0/10 0/10 0/10      Add set X 30 X 30 x30 x30 X 30      abduction  blk x 30 blk x30 blk x30 blk x30 blk x 30      QS/AP 30/30 30/30 QS x30 QS x30 QS x 30      SLR X 30 #1 x 30 1# x30 2# x30 3# x 30      SAQ  #1 x 30 1# x30 2# x30 3# x 30      Heel Slides  #1 x 30 2# x30 2# x30 3# x 30      Patellar mobs  done done done done done      Sitting hamstring stretch 1 min 1 min 1 min 1 min 1 min      Incline stretch KE 1 min 1min 1 min 1 min 1 min      Incline stretch KB 1 min 1 min 1 min 1 min 1 min      NuStep Lev 1 x 10 Lev 2 x 10 L2 x10 min L3 x10 min L4 x 10 min      Leg press knees and ankles  #10 x 30 20# x30 30# x30 40 # x30      Sl abd  #1x 30 2# x30 2# x30 3# x 30      Prone hip ext  #1 x 30 2# x30 2# x30 3# x 30      SL  add  #1 x30 2# x10  1# x20 2# x20 2# x 30      Modalities           CP x10 X 10 x10 min x10 min X 10 min                 AROM  flex   135 deg                       Assessment: Tolerated treatment well   Patient exhibited good technique with therapeutic exercises      Plan: Continue per plan of care  Potential discharge next visit

## 2019-07-01 NOTE — PATIENT INSTRUCTIONS

## 2019-07-01 NOTE — PROGRESS NOTES
Assessment/Plan:  Problem List Items Addressed This Visit        Digestive    GERD without esophagitis       Respiratory    Asthma       Cardiovascular and Mediastinum    Benign essential hypertension - Primary    Ophthalmoplegic migraine headache       Musculoskeletal and Integument    Osteopenia    Psoriasis with arthropathy (HCC)       Other    Chronic pain disorder    Generalized anxiety disorder    Relevant Medications    ALPRAZolam (XANAX) 0 25 mg tablet    Vitamin D deficiency           Diagnoses and all orders for this visit:    Benign essential hypertension    Mild intermittent asthma without complication    GERD without esophagitis    Ophthalmoplegic migraine, not intractable    Osteopenia, unspecified location    Psoriasis with arthropathy (HCC)    Chronic pain disorder    Generalized anxiety disorder  -     ALPRAZolam (XANAX) 0 25 mg tablet; Take 1 tablet (0 25 mg total) by mouth 3 (three) times a day as needed for anxiety    Vitamin D deficiency        No problem-specific Assessment & Plan notes found for this encounter  A/P: Doing better and labs up to date  Continue current treatment  Keep f/u with rheum and PT  RTC four months for routine  Subjective:      Patient ID: Nona Young is a 46 y o  female  WF RTC for f/u htn, asthma, etc  Doing ok and no new issue and is s/p knee sx for meniscal tear on the right and also on the left knee  Remains active and attending PT  No falls  Acute and chronic pain is controlled  Psoriatic arthritis no worse and no better  Migraines have been good and RYNE better since retiring  Asthma well controlled and minimal rescue MDI use  Labs are up to date  The following portions of the patient's history were reviewed and updated as appropriate:   She has a past medical history of Asthma, Generalized anxiety disorder, GERD (gastroesophageal reflux disease), Hemangioma of other sites, Hypertension, and Psoriatic arthritis ([facility] [address] )  ,  does not have any pertinent problems on file  ,   has a past surgical history that includes Appendectomy; Cholecystectomy; Foot surgery; Hysterectomy; Kidney surgery; Tonsillectomy; Tubal ligation; and Knee surgery  ,  family history includes Heart attack in her paternal grandfather; Hypertension in her maternal grandmother and paternal grandfather; Pancreatic cancer in her paternal grandfather  ,   reports that she has never smoked  She has never used smokeless tobacco  She reports that she drinks alcohol  She reports that she does not use drugs  ,  is allergic to cetirizine; diclofenac-misoprostol; indomethacin; and sulfamethoxazole-trimethoprim     Current Outpatient Medications   Medication Sig Dispense Refill    ALPRAZolam (XANAX) 0 25 mg tablet Take 1 tablet (0 25 mg total) by mouth 3 (three) times a day as needed for anxiety 90 tablet 0    ergocalciferol (VITAMIN D2) 50,000 units Take 1 capsule (50,000 Units total) by mouth once a week 12 capsule 3    folic acid (FOLVITE) 1 mg tablet Take 1 tablet (1,000 mcg total) by mouth daily 90 tablet 3    methotrexate 2 5 mg tablet Take 5 tabs weekly as directed  (Patient taking differently: Take 6 tabs weekly as directed ) 60 tablet 3    metoprolol succinate (TOPROL-XL) 25 mg 24 hr tablet Take 1 tablet (25 mg total) by mouth daily 90 tablet 3     No current facility-administered medications for this visit  Review of Systems   Constitutional: Negative for activity change, chills, diaphoresis, fatigue and fever  HENT: Negative  Eyes: Negative for visual disturbance  Respiratory: Negative for cough, chest tightness, shortness of breath and wheezing  Cardiovascular: Negative for chest pain, palpitations and leg swelling  Gastrointestinal: Negative for abdominal pain, constipation, diarrhea, nausea and vomiting  Endocrine: Negative for cold intolerance and heat intolerance  Genitourinary: Negative for difficulty urinating, dysuria and frequency     Musculoskeletal: Positive for arthralgias  Negative for gait problem, joint swelling and myalgias  Neurological: Negative for dizziness, seizures, syncope, weakness, light-headedness and headaches  Psychiatric/Behavioral: Negative for confusion, dysphoric mood and sleep disturbance  The patient is nervous/anxious  Objective:  Vitals:    07/01/19 1646   BP: 128/78   Pulse: 82   Resp: 14   Temp: 98 5 °F (36 9 °C)   TempSrc: Tympanic   Weight: 86 6 kg (191 lb)   Height: 5' 5" (1 651 m)     Body mass index is 31 78 kg/m²  Physical Exam   Constitutional: She is oriented to person, place, and time  She appears well-developed and well-nourished  HENT:   Head: Normocephalic and atraumatic  Mouth/Throat: Oropharynx is clear and moist    Eyes: Pupils are equal, round, and reactive to light  Conjunctivae and EOM are normal    Neck: Neck supple  No JVD present  Cardiovascular: Normal rate, regular rhythm and normal heart sounds  No murmur heard  Pulmonary/Chest: Effort normal and breath sounds normal  No respiratory distress  She has no wheezes  She has no rales  Abdominal: Soft  Bowel sounds are normal  She exhibits no distension  There is no tenderness  Musculoskeletal: She exhibits no edema  Neurological: She is alert and oriented to person, place, and time  Psychiatric: She has a normal mood and affect  Her behavior is normal  Judgment and thought content normal    Nursing note and vitals reviewed

## 2019-07-05 ENCOUNTER — OFFICE VISIT (OUTPATIENT)
Dept: PHYSICAL THERAPY | Facility: MEDICAL CENTER | Age: 54
End: 2019-07-05
Payer: COMMERCIAL

## 2019-07-05 DIAGNOSIS — Z98.890 S/P LEFT KNEE ARTHROSCOPY: ICD-10-CM

## 2019-07-05 DIAGNOSIS — S83.282S ACUTE LATERAL MENISCAL TEAR, LEFT, SEQUELA: Primary | ICD-10-CM

## 2019-07-05 PROCEDURE — 97110 THERAPEUTIC EXERCISES: CPT

## 2019-07-05 NOTE — PROGRESS NOTES
Daily Note     Today's date: 2019  Patient name: Roberto Holcomb  : 1965  MRN: [id number]  Referring provider: Brenton Severance, MD  Dx:   Encounter Diagnosis     ICD-10-CM    1  Acute lateral meniscal tear, left, sequela S83 282S    2  S/P left knee arthroscopy Z98 890                   Subjective: Doing well      Objective: See treatment diary below      Assessment: Tolerated treatment well and improved nicely  Patient exhibited good technique with therapeutic exercises      Plan: Discharge as per MD orders    Improved     Precautions: None          Exercise Diary  2019     Pain level pre 0/10 0/10 0/10 0/10 0/10     Add set X 30 x30 x30 X 30 X 30     abduction  blk x30 blk x30 blk x30 blk x 30 blk x 30     QS/AP 30/30 QS x30 QS x30 QS x 30 QS x 30     SLR #1 x 30 1# x30 2# x30 3# x 30 #4 x 30     SAQ #1 x 30 1# x30 2# x30 3# x 30 #4 x 30     Heel Slides #1 x 30 2# x30 2# x30 3# x 30 #4 x 30     Patellar mobs  done done done done done     Sitting hamstring stretch 1 min 1 min 1 min 1 min 1 min     Incline stretch KE 1min 1 min 1 min 1 min 1 min     Incline stretch KB 1 min 1 min 1 min 1 min 1 min     NuStep Lev 2 x 10 L2 x10 min L3 x10 min L4 x 10 min Lev 5 x 10 min     Leg press knees and ankles #10 x 30 20# x30 30# x30 40 # x30 #50 x 30     Sl abd #1x 30 2# x30 2# x30 3# x 30 #4 x 20     Prone hip ext #1 x 30 2# x30 2# x30 3# x 30 #4 x 30     SL  add #1 x30 2# x10  1# x20 2# x20 2# x 30 #3 x 30     Modalities          CP X 10 x10 min x10 min X 10 min X 10 min               AROM  flex  135 deg

## 2019-08-08 ENCOUNTER — APPOINTMENT (OUTPATIENT)
Dept: RADIOLOGY | Facility: CLINIC | Age: 54
End: 2019-08-08
Payer: COMMERCIAL

## 2019-08-08 ENCOUNTER — OFFICE VISIT (OUTPATIENT)
Dept: INTERNAL MEDICINE CLINIC | Facility: CLINIC | Age: 54
End: 2019-08-08
Payer: COMMERCIAL

## 2019-08-08 VITALS
SYSTOLIC BLOOD PRESSURE: 130 MMHG | BODY MASS INDEX: 30.66 KG/M2 | TEMPERATURE: 98.5 F | DIASTOLIC BLOOD PRESSURE: 80 MMHG | RESPIRATION RATE: 14 BRPM | HEIGHT: 65 IN | WEIGHT: 184 LBS | HEART RATE: 74 BPM

## 2019-08-08 DIAGNOSIS — R26.9 GAIT ABNORMALITY: ICD-10-CM

## 2019-08-08 DIAGNOSIS — R20.2 PARESTHESIA OF BOTH LOWER EXTREMITIES: ICD-10-CM

## 2019-08-08 DIAGNOSIS — R20.2 PARESTHESIA OF BOTH LOWER EXTREMITIES: Primary | ICD-10-CM

## 2019-08-08 DIAGNOSIS — M54.42 ACUTE MIDLINE LOW BACK PAIN WITH BILATERAL SCIATICA: ICD-10-CM

## 2019-08-08 DIAGNOSIS — M54.41 ACUTE MIDLINE LOW BACK PAIN WITH BILATERAL SCIATICA: ICD-10-CM

## 2019-08-08 PROCEDURE — 72110 X-RAY EXAM L-2 SPINE 4/>VWS: CPT

## 2019-08-08 PROCEDURE — 99213 OFFICE O/P EST LOW 20 MIN: CPT | Performed by: INTERNAL MEDICINE

## 2019-08-08 NOTE — PROGRESS NOTES
Assessment/Plan:  Problem List Items Addressed This Visit     None      Visit Diagnoses     Paresthesia of both lower extremities    -  Primary    Relevant Orders    XR spine lumbar minimum 4 views non injury    MRI lumbar spine wo contrast    Acute midline low back pain with bilateral sciatica        Gait abnormality        Relevant Orders    XR spine lumbar minimum 4 views non injury    MRI lumbar spine wo contrast           Diagnoses and all orders for this visit:    Paresthesia of both lower extremities  -     XR spine lumbar minimum 4 views non injury; Future  -     MRI lumbar spine wo contrast; Future    Acute midline low back pain with bilateral sciatica    Gait abnormality  -     XR spine lumbar minimum 4 views non injury; Future  -     MRI lumbar spine wo contrast; Future        No problem-specific Assessment & Plan notes found for this encounter  A/P: ?? Cause  Suspect radiculopathy given h/x rather than neuropathy  Pt refusing EMG  Will check and xray  Would like to start PT, but pt only has six visits left due to recent knee sx  Continue current treatment and most likely need an MRI  ? ?connection to her psoriatic arthritis  RTC two weeks for f/u  Subjective:      Patient ID: Fritz De Souza is a 46 y o  female  WF with osteopenia and psoriatric arthritis currently on MTX and previously on steroids and humira, presents with a several week h/o bilat thigh numbness, tingling, and pain  No trauma and no prior back issues  Pt reports s/s start after sleeping on her left side with her legs flexed  Upon getting up, has her s/s and has difficulty ambulation  NO changes in bowel or bladder habits  Taking NSAID's w/o improvement         The following portions of the patient's history were reviewed and updated as appropriate:   She has a past medical history of Asthma, Generalized anxiety disorder, GERD (gastroesophageal reflux disease), Hemangioma of other sites, Hypertension, and Psoriatic arthritis [facility]) ,  does not have any pertinent problems on file  ,   has a past surgical history that includes Appendectomy; Cholecystectomy; Foot surgery; Hysterectomy; Kidney surgery; Tonsillectomy; Tubal ligation; and Knee surgery  ,  family history includes Heart attack in her paternal grandfather; Hypertension in her maternal grandmother and paternal grandfather; Pancreatic cancer in her paternal grandfather  ,   reports that she has never smoked  She has never used smokeless tobacco  She reports that she drinks alcohol  She reports that she does not use drugs  ,  is allergic to cetirizine; diclofenac-misoprostol; indomethacin; and sulfamethoxazole-trimethoprim     Current Outpatient Medications   Medication Sig Dispense Refill    ALPRAZolam (XANAX) 0 25 mg tablet Take 1 tablet (0 25 mg total) by mouth 3 (three) times a day as needed for anxiety 90 tablet 0    ergocalciferol (VITAMIN D2) 50,000 units Take 1 capsule (50,000 Units total) by mouth once a week 12 capsule 3    folic acid (FOLVITE) 1 mg tablet Take 1 tablet (1,000 mcg total) by mouth daily 90 tablet 3    methotrexate 2 5 mg tablet Take 5 tabs weekly as directed  (Patient taking differently: Take 6 tabs weekly as directed ) 60 tablet 3    metoprolol succinate (TOPROL-XL) 25 mg 24 hr tablet Take 1 tablet (25 mg total) by mouth daily 90 tablet 3     No current facility-administered medications for this visit  Review of Systems   Constitutional: Negative for activity change, chills, diaphoresis, fatigue and fever  Respiratory: Negative for cough, chest tightness, shortness of breath and wheezing  Cardiovascular: Negative for chest pain, palpitations and leg swelling  Gastrointestinal: Negative for abdominal pain, constipation, diarrhea, nausea and vomiting  Genitourinary: Negative for difficulty urinating, dysuria and frequency  Musculoskeletal: Positive for back pain and gait problem  Negative for arthralgias, joint swelling and myalgias  Neurological: Negative for weakness, light-headedness and headaches  Psychiatric/Behavioral: Negative for confusion  The patient is not nervous/anxious  Objective:  Vitals:    08/08/19 1052   BP: 130/80   Pulse: 74   Resp: 14   Temp: 98 5 °F (36 9 °C)   TempSrc: Tympanic   Weight: 83 5 kg (184 lb)   Height: 5' 5" (1 651 m)     Body mass index is 30 62 kg/m²  Physical Exam   Constitutional: She is oriented to person, place, and time  She appears well-developed and well-nourished  No distress  HENT:   Head: Normocephalic and atraumatic  Mouth/Throat: Oropharynx is clear and moist    Musculoskeletal: Normal range of motion  She exhibits no edema, tenderness or deformity  LS spine w/o any gross deformities, increase temp, erythema, or swelling  No lesions  ROM wnl  No tenderness or spasms  LE strength 5/5 with tone/rom wnl  DTR 3/4  No gait abnormalities  Negative straight leg raises  Neurological: She is alert and oriented to person, place, and time  She displays normal reflexes  No cranial nerve deficit  She exhibits normal muscle tone  Coordination normal    Psychiatric: She has a normal mood and affect  Her behavior is normal  Judgment and thought content normal    Nursing note and vitals reviewed

## 2019-08-10 DIAGNOSIS — F41.1 GENERALIZED ANXIETY DISORDER: ICD-10-CM

## 2019-08-11 RX ORDER — ALPRAZOLAM 0.25 MG/1
0.25 TABLET ORAL 3 TIMES DAILY PRN
Qty: 90 TABLET | Refills: 0 | Status: SHIPPED | OUTPATIENT
Start: 2019-08-11 | End: 2021-10-07 | Stop reason: SDUPTHER

## 2019-08-15 ENCOUNTER — HOSPITAL ENCOUNTER (OUTPATIENT)
Dept: MRI IMAGING | Facility: HOSPITAL | Age: 54
Discharge: HOME | End: 2019-08-15
Payer: COMMERCIAL

## 2019-08-15 DIAGNOSIS — R26.9 GAIT ABNORMALITY: ICD-10-CM

## 2019-08-15 DIAGNOSIS — R20.2 PARESTHESIA OF BOTH LOWER EXTREMITIES: ICD-10-CM

## 2019-08-15 PROCEDURE — 72148 MRI LUMBAR SPINE W/O DYE: CPT

## 2019-08-16 DIAGNOSIS — E28.39 MENOPAUSE OVARIAN FAILURE: ICD-10-CM

## 2019-08-16 DIAGNOSIS — Z12.39 SCREENING FOR BREAST CANCER: ICD-10-CM

## 2019-08-22 ENCOUNTER — OFFICE VISIT (OUTPATIENT)
Dept: INTERNAL MEDICINE CLINIC | Facility: CLINIC | Age: 54
End: 2019-08-22
Payer: COMMERCIAL

## 2019-08-22 VITALS
SYSTOLIC BLOOD PRESSURE: 116 MMHG | RESPIRATION RATE: 18 BRPM | BODY MASS INDEX: 30.42 KG/M2 | WEIGHT: 182.6 LBS | HEART RATE: 61 BPM | DIASTOLIC BLOOD PRESSURE: 72 MMHG | HEIGHT: 65 IN | TEMPERATURE: 97.6 F | OXYGEN SATURATION: 98 %

## 2019-08-22 DIAGNOSIS — M54.42 ACUTE MIDLINE LOW BACK PAIN WITH BILATERAL SCIATICA: ICD-10-CM

## 2019-08-22 DIAGNOSIS — M51.37 DDD (DEGENERATIVE DISC DISEASE), LUMBOSACRAL: ICD-10-CM

## 2019-08-22 DIAGNOSIS — R20.2 PARESTHESIA OF BOTH LOWER EXTREMITIES: Primary | ICD-10-CM

## 2019-08-22 DIAGNOSIS — M48.07 SPINAL STENOSIS OF LUMBOSACRAL REGION: ICD-10-CM

## 2019-08-22 DIAGNOSIS — R26.9 GAIT ABNORMALITY: ICD-10-CM

## 2019-08-22 DIAGNOSIS — M54.41 ACUTE MIDLINE LOW BACK PAIN WITH BILATERAL SCIATICA: ICD-10-CM

## 2019-08-22 DIAGNOSIS — Z11.59 NEED FOR HEPATITIS C SCREENING TEST: ICD-10-CM

## 2019-08-22 PROCEDURE — 99213 OFFICE O/P EST LOW 20 MIN: CPT | Performed by: INTERNAL MEDICINE

## 2019-08-22 PROCEDURE — 3008F BODY MASS INDEX DOCD: CPT | Performed by: INTERNAL MEDICINE

## 2019-08-22 PROCEDURE — 1036F TOBACCO NON-USER: CPT | Performed by: INTERNAL MEDICINE

## 2019-08-22 NOTE — PROGRESS NOTES
Assessment/Plan:  Problem List Items Addressed This Visit     None      Visit Diagnoses     Paresthesia of both lower extremities    -  Primary    Acute midline low back pain with bilateral sciatica        Gait abnormality        DDD (degenerative disc disease), lumbosacral        Spinal stenosis of lumbosacral region        Need for hepatitis C screening test               Diagnoses and all orders for this visit:    Paresthesia of both lower extremities    Acute midline low back pain with bilateral sciatica    Gait abnormality    DDD (degenerative disc disease), lumbosacral    Spinal stenosis of lumbosacral region    Need for hepatitis C screening test    Other orders  -     Cancel: Hepatitis C antibody        No problem-specific Assessment & Plan notes found for this encounter  A/P: I have spent 20 minutes with Patient  today in which greater than 50% of this time was spent in counseling/coordination of care regarding Diagnostic results, Prognosis, Risks and benefits of tx options, Intructions for management and Risk factor reductions  Recommend pt see her Rheum for possible other preventative interventions  Discussed spinal traction, seeing pain management, etc  Discussed wt loss and maintaining ROM  Continue current treatment and RTC three months for routine  Subjective:      Patient ID: Kris Mock is a 46 y o  female  WF RTC for f/u bilat LE numbness and tingling w/o weakness of unknown cause  Xray with DDD and MRI with spinal stenosis with probable impingement  Slightly less frequent and continues to occur with lying on her left side  No new issues  The following portions of the patient's history were reviewed and updated as appropriate:   She has a past medical history of Asthma, Generalized anxiety disorder, GERD (gastroesophageal reflux disease), Hemangioma of other sites, Hypertension, and Psoriatic arthritis ([facility] [address] )  ,  does not have any pertinent problems on file  ,   has a past surgical history that includes Appendectomy; Cholecystectomy; Foot surgery; Hysterectomy; Kidney surgery; Tonsillectomy; Tubal ligation; and Knee surgery  ,  family history includes Heart attack in her paternal grandfather; Hypertension in her maternal grandmother and paternal grandfather; Pancreatic cancer in her paternal grandfather  ,   reports that she has never smoked  She has never used smokeless tobacco  She reports that she drinks alcohol  She reports that she does not use drugs  ,  is allergic to cetirizine; diclofenac-misoprostol; indomethacin; and sulfamethoxazole-trimethoprim     Current Outpatient Medications   Medication Sig Dispense Refill    ALPRAZolam (XANAX) 0 25 mg tablet TAKE 1 TABLET (0 25 MG TOTAL) BY MOUTH 3 (THREE) TIMES A DAY AS NEEDED FOR ANXIETY 90 tablet 0    ergocalciferol (VITAMIN D2) 50,000 units Take 1 capsule (50,000 Units total) by mouth once a week 12 capsule 3    folic acid (FOLVITE) 1 mg tablet Take 1 tablet (1,000 mcg total) by mouth daily 90 tablet 3    methotrexate 2 5 mg tablet Take 5 tabs weekly as directed  (Patient taking differently: No sig reported) 60 tablet 3    metoprolol succinate (TOPROL-XL) 25 mg 24 hr tablet Take 1 tablet (25 mg total) by mouth daily 90 tablet 3     No current facility-administered medications for this visit  Review of Systems   Constitutional: Positive for activity change  Negative for chills, diaphoresis, fatigue and fever  Respiratory: Negative for cough, chest tightness, shortness of breath and wheezing  Cardiovascular: Negative for chest pain, palpitations and leg swelling  Gastrointestinal: Negative for abdominal pain, constipation, diarrhea, nausea and vomiting  Genitourinary: Negative for difficulty urinating, dysuria and frequency  Musculoskeletal: Positive for back pain and gait problem  Negative for arthralgias and myalgias  Neurological: Negative for light-headedness and headaches     Psychiatric/Behavioral: Negative for confusion  The patient is not nervous/anxious  Objective:  Vitals:    08/22/19 0742   BP: 116/72   BP Location: Left arm   Patient Position: Sitting   Cuff Size: Large   Pulse: 61   Resp: 18   Temp: 97 6 °F (36 4 °C)   SpO2: 98%   Weight: 82 8 kg (182 lb 9 6 oz)   Height: 5' 5" (1 651 m)     Body mass index is 30 39 kg/m²  Physical Exam   Constitutional: She is oriented to person, place, and time  She appears well-developed and well-nourished  No distress  HENT:   Head: Normocephalic and atraumatic  Mouth/Throat: Oropharynx is clear and moist    Eyes: Pupils are equal, round, and reactive to light  Conjunctivae and EOM are normal    Musculoskeletal: Normal range of motion  She exhibits no edema, tenderness or deformity  LS spine w/o gross deformities, increase temp, erythema, or swelling  ROM wnl  No tenderness or spasm  LE strength 5/5 with tone/rom wnl  DTR 2/4   Neurological: She is alert and oriented to person, place, and time  Psychiatric: She has a normal mood and affect   Her behavior is normal  Judgment and thought content normal

## 2019-11-05 DIAGNOSIS — I10 HYPERTENSION, UNSPECIFIED TYPE: ICD-10-CM

## 2019-11-05 RX ORDER — METOPROLOL SUCCINATE 25 MG/1
25 TABLET, EXTENDED RELEASE ORAL DAILY
Qty: 90 TABLET | Refills: 3 | Status: SHIPPED | OUTPATIENT
Start: 2019-11-05 | End: 2019-11-11

## 2019-11-11 ENCOUNTER — OFFICE VISIT (OUTPATIENT)
Dept: INTERNAL MEDICINE CLINIC | Facility: CLINIC | Age: 54
End: 2019-11-11
Payer: COMMERCIAL

## 2019-11-11 VITALS
BODY MASS INDEX: 29.32 KG/M2 | HEIGHT: 65 IN | SYSTOLIC BLOOD PRESSURE: 108 MMHG | TEMPERATURE: 98.1 F | DIASTOLIC BLOOD PRESSURE: 70 MMHG | RESPIRATION RATE: 14 BRPM | WEIGHT: 176 LBS | HEART RATE: 74 BPM

## 2019-11-11 DIAGNOSIS — E55.9 VITAMIN D DEFICIENCY: ICD-10-CM

## 2019-11-11 DIAGNOSIS — L40.50 PSORIASIS WITH ARTHROPATHY (HCC): ICD-10-CM

## 2019-11-11 DIAGNOSIS — K21.9 GERD WITHOUT ESOPHAGITIS: ICD-10-CM

## 2019-11-11 DIAGNOSIS — G89.4 CHRONIC PAIN DISORDER: ICD-10-CM

## 2019-11-11 DIAGNOSIS — G47.9 SLEEP DISORDER: ICD-10-CM

## 2019-11-11 DIAGNOSIS — I10 BENIGN ESSENTIAL HYPERTENSION: Primary | ICD-10-CM

## 2019-11-11 DIAGNOSIS — Z23 ENCOUNTER FOR VACCINATION: ICD-10-CM

## 2019-11-11 DIAGNOSIS — J45.20 MILD INTERMITTENT ASTHMA WITHOUT COMPLICATION: ICD-10-CM

## 2019-11-11 DIAGNOSIS — F41.1 GENERALIZED ANXIETY DISORDER: ICD-10-CM

## 2019-11-11 DIAGNOSIS — M85.80 OSTEOPENIA, UNSPECIFIED LOCATION: ICD-10-CM

## 2019-11-11 DIAGNOSIS — Z13.220 SCREENING FOR LIPID DISORDERS: ICD-10-CM

## 2019-11-11 DIAGNOSIS — Z13.31 NEGATIVE DEPRESSION SCREENING: ICD-10-CM

## 2019-11-11 PROCEDURE — 99214 OFFICE O/P EST MOD 30 MIN: CPT | Performed by: INTERNAL MEDICINE

## 2019-11-11 PROCEDURE — 1036F TOBACCO NON-USER: CPT | Performed by: INTERNAL MEDICINE

## 2019-11-11 PROCEDURE — 90715 TDAP VACCINE 7 YRS/> IM: CPT | Performed by: INTERNAL MEDICINE

## 2019-11-11 PROCEDURE — 90682 RIV4 VACC RECOMBINANT DNA IM: CPT | Performed by: INTERNAL MEDICINE

## 2019-11-11 PROCEDURE — 90471 IMMUNIZATION ADMIN: CPT | Performed by: INTERNAL MEDICINE

## 2019-11-11 PROCEDURE — 90472 IMMUNIZATION ADMIN EACH ADD: CPT | Performed by: INTERNAL MEDICINE

## 2019-11-11 NOTE — PROGRESS NOTES
Assessment/Plan:  Problem List Items Addressed This Visit        Digestive    GERD without esophagitis       Respiratory    Asthma       Cardiovascular and Mediastinum    Benign essential hypertension - Primary       Musculoskeletal and Integument    Osteopenia    Psoriasis with arthropathy (HCC)       Other    Chronic pain disorder    Generalized anxiety disorder    Relevant Orders    TSH, 3rd generation with Free T4 reflex    Sleep disorder    Vitamin D deficiency    Relevant Orders    Vitamin D 25 hydroxy      Other Visit Diagnoses     Negative depression screening        Encounter for vaccination        Screening for lipid disorders        Relevant Orders    Lipid Panel with Direct LDL reflex           Diagnoses and all orders for this visit:    Benign essential hypertension    Mild intermittent asthma without complication    GERD without esophagitis    Osteopenia, unspecified location    Psoriasis with arthropathy (HCC)    Chronic pain disorder    Generalized anxiety disorder  -     TSH, 3rd generation with Free T4 reflex; Future    Sleep disorder    Vitamin D deficiency  -     Vitamin D 25 hydroxy; Future    Negative depression screening    Encounter for vaccination    Screening for lipid disorders  -     Lipid Panel with Direct LDL reflex; Future        No problem-specific Assessment & Plan notes found for this encounter  A/P: Doing well and will check labs  Will up date her Td and flu  Continue current treatment and RTC four months for routine  Subjective:      Patient ID: So Diez is a 53 y o  female  WF RTC for f/u htn, psoriatic arthritis, etc  Doing good and no new issues  New job teaching  Remains active w/o difficulty and no falls    Arthritis is good, but some recent sciatica and given steroids by DPM  ASthma is well controlled with minimal MDI use  No recent headaches  Oly Mello RYNE is much better  Due for vaccines and labs         The following portions of the patient's history were reviewed and updated as appropriate:   She has a past medical history of Asthma, Generalized anxiety disorder, GERD (gastroesophageal reflux disease), Hemangioma of other sites, Hypertension, and Psoriatic arthritis ([address] )  ,  does not have any pertinent problems on file  ,   has a past surgical history that includes Appendectomy; Cholecystectomy; Foot surgery; Hysterectomy; Kidney surgery; Tonsillectomy; Tubal ligation; and Knee surgery  ,  family history includes Heart attack in her paternal grandfather; Hypertension in her maternal grandmother and paternal grandfather; Pancreatic cancer in her paternal grandfather  ,   reports that she has never smoked  She has never used smokeless tobacco  She reports that she drinks alcohol  She reports that she does not use drugs  ,  is allergic to cetirizine; diclofenac-misoprostol; indomethacin; and sulfamethoxazole-trimethoprim     Current Outpatient Medications   Medication Sig Dispense Refill    ALPRAZolam (XANAX) 0 25 mg tablet TAKE 1 TABLET (0 25 MG TOTAL) BY MOUTH 3 (THREE) TIMES A DAY AS NEEDED FOR ANXIETY 90 tablet 0    ergocalciferol (VITAMIN D2) 50,000 units Take 1 capsule (50,000 Units total) by mouth once a week 12 capsule 3    folic acid (FOLVITE) 1 mg tablet Take 1 tablet (1,000 mcg total) by mouth daily 90 tablet 3    methotrexate 2 5 mg tablet Take 5 tabs weekly as directed  (Patient taking differently: No sig reported) 60 tablet 3    metoprolol succinate (TOPROL-XL) 25 mg 24 hr tablet Take 1 tablet (25 mg total) by mouth daily 90 tablet 3     No current facility-administered medications for this visit  Review of Systems   Constitutional: Negative for activity change, chills, diaphoresis, fatigue and fever  HENT: Negative  Eyes: Negative for visual disturbance  Respiratory: Negative for cough, chest tightness, shortness of breath and wheezing  Cardiovascular: Negative for chest pain, palpitations and leg swelling     Gastrointestinal: Negative for abdominal pain, constipation, diarrhea, nausea and vomiting  Endocrine: Negative for cold intolerance and heat intolerance  Genitourinary: Negative for difficulty urinating, dysuria and frequency  Musculoskeletal: Negative for arthralgias, gait problem and myalgias  Neurological: Negative for dizziness, seizures, syncope, weakness, light-headedness and headaches  Psychiatric/Behavioral: Negative for confusion, dysphoric mood and sleep disturbance  The patient is not nervous/anxious  PHQ-9 Depression Screening    PHQ-9:    Frequency of the following problems over the past two weeks:       Little interest or pleasure in doing things:  0 - not at all  Feeling down, depressed, or hopeless:  0 - not at all  PHQ-2 Score:  0        Objective:  Vitals:    11/11/19 1651   BP: 108/70   Pulse: 74   Resp: 14   Temp: 98 1 °F (36 7 °C)   TempSrc: Tympanic   Weight: 79 8 kg (176 lb)   Height: 5' 5" (1 651 m)     Body mass index is 29 29 kg/m²  Physical Exam   Constitutional: She is oriented to person, place, and time  She appears well-developed and well-nourished  No distress  HENT:   Head: Normocephalic and atraumatic  Mouth/Throat: Oropharynx is clear and moist    Eyes: Pupils are equal, round, and reactive to light  Conjunctivae and EOM are normal    Neck: Neck supple  No JVD present  Cardiovascular: Normal rate, regular rhythm and normal heart sounds  Pulmonary/Chest: Effort normal and breath sounds normal  No respiratory distress  She has no wheezes  She has no rales  Abdominal: Soft  Bowel sounds are normal  She exhibits no distension  There is no tenderness  Musculoskeletal: She exhibits no edema  Neurological: She is alert and oriented to person, place, and time  Psychiatric: She has a normal mood and affect  Her behavior is normal  Judgment and thought content normal    Nursing note and vitals reviewed

## 2019-11-11 NOTE — PATIENT INSTRUCTIONS

## 2019-12-02 DIAGNOSIS — L40.50 PSORIATIC ARTHRITIS (HCC): ICD-10-CM

## 2020-03-20 ENCOUNTER — OFFICE VISIT (OUTPATIENT)
Dept: INTERNAL MEDICINE CLINIC | Facility: CLINIC | Age: 55
End: 2020-03-20
Payer: COMMERCIAL

## 2020-03-20 VITALS
DIASTOLIC BLOOD PRESSURE: 60 MMHG | WEIGHT: 178 LBS | HEART RATE: 66 BPM | HEIGHT: 65 IN | TEMPERATURE: 97.7 F | RESPIRATION RATE: 16 BRPM | BODY MASS INDEX: 29.66 KG/M2 | OXYGEN SATURATION: 98 % | SYSTOLIC BLOOD PRESSURE: 102 MMHG

## 2020-03-20 DIAGNOSIS — G83.4 CAUDA EQUINA SYNDROME (HCC): ICD-10-CM

## 2020-03-20 DIAGNOSIS — M48.07 SPINAL STENOSIS OF LUMBOSACRAL REGION: ICD-10-CM

## 2020-03-20 DIAGNOSIS — I10 BENIGN ESSENTIAL HYPERTENSION: ICD-10-CM

## 2020-03-20 DIAGNOSIS — Z12.11 SCREENING FOR COLON CANCER: ICD-10-CM

## 2020-03-20 DIAGNOSIS — Z01.818 VISIT FOR PRE-OPERATIVE EXAMINATION: Primary | ICD-10-CM

## 2020-03-20 DIAGNOSIS — J45.20 MILD INTERMITTENT ASTHMA WITHOUT COMPLICATION: ICD-10-CM

## 2020-03-20 DIAGNOSIS — D84.9 IMMUNOCOMPROMISED (HCC): ICD-10-CM

## 2020-03-20 PROCEDURE — 99244 OFF/OP CNSLTJ NEW/EST MOD 40: CPT | Performed by: INTERNAL MEDICINE

## 2020-03-20 RX ORDER — GABAPENTIN 100 MG/1
CAPSULE ORAL 3 TIMES DAILY PRN
COMMUNITY
Start: 2020-02-19 | End: 2020-08-25 | Stop reason: SDUPTHER

## 2020-03-20 NOTE — PROGRESS NOTES
BMI Counseling: There is no height or weight on file to calculate BMI  The BMI is above normal  Nutrition recommendations include decreasing portion sizes and encouraging healthy choices of fruits and vegetables  Exercise recommendations include moderate physical activity 150 minutes/week  No pharmacotherapy was ordered  Assessment/Plan:  Problem List Items Addressed This Visit        Respiratory    Asthma       Cardiovascular and Mediastinum    Benign essential hypertension      Other Visit Diagnoses     Visit for pre-operative examination    -  Primary    Immunocompromised ([facility] [address] )        Spinal stenosis of lumbosacral region        Cauda equina syndrome ([facility] )        Screening for colon cancer        Relevant Orders    Occult Blood, Fecal Immunochemical           Diagnoses and all orders for this visit:    Visit for pre-operative examination    Benign essential hypertension    Mild intermittent asthma without complication    Immunocompromised ([facility] [address] )    Spinal stenosis of lumbosacral region    Cauda equina syndrome ([facility] [address] )    Screening for colon cancer  -     Occult Blood, Fecal Immunochemical; Future    Other orders  -     gabapentin (NEURONTIN) 100 mg capsule        No problem-specific Assessment & Plan notes found for this encounter  A/P: PAT tests done, but unavailable at this time  Pt and co-morbidities are stable  Pt is a Dillon's Class I at this time w/o reviewing PAT's and carries a cardiac risk of 1%  Recommend holding any ASA, NSAID's, omega 3, and MVT one week prior to the procedure  Pt is off her MTX already  On the day of sx, may take her metoprolol with a sip of water  Make anesthesia aware of her Hyperemesis with prior anesthesia and immunocompromised status  No other recs at this time  Pt will be cleared once PAT reviewed, but given past h/o, do not expect any problems  Thanks and good kellie  Addendum 3/20/20 @1034: Received labs and an ekg  Reviewed and no acute issues   Awaiting CXR results prior to clearing  Subjective:      Patient ID: Fatoumata Lind is a 53 y o  female  WF presents at the request of Dr Irvin Harada for pre-op eval for upcoming spinal surgery for severe stenosis with cauda equina s/s  tentatively scheduled for 3/26/20  Since last visit, doing well and no recent illnesses  Remains active w/o difficulty and no falls  No travel history  Denies depression  No recent illnesses  No fever, chills, or sweats  No unexplained wt changes  Denies CP, SOB, or palpitations  No edema  No orthopnea or PND  No sz or syncope  No changes in bowel or bladder habits  PMH includes Psoriatic arthritis, htn, ocular migraines, GERD, allergies, asthma, osteopenia,RYNE, and vit d/folate deficiencies   Past sx include appy, knee sx, choly, foot sx, hsyto, tubal, tonsillectomy, and kidney sx and reports no problems with prior procedures or anesthesia, EXCEPT for hyperemsis  Non- smoking and occasional ETOH use  No history of DVT or PE  NO history of bleeding issues and is not on anti-coagulants  Denies dental plates  Denies C spine issues  No objections to getting blood products if deemed necessary  Had PAT testing done  Pt is on immunosuppressant MTX, but has been on hold  The following portions of the patient's history were reviewed and updated as appropriate:   She has a past medical history of Asthma, Generalized anxiety disorder, GERD (gastroesophageal reflux disease), Hemangioma of other sites, Hypertension, and Psoriatic arthritis ([facility] [address] )  ,  does not have any pertinent problems on file  ,   has a past surgical history that includes Appendectomy; Cholecystectomy; Foot surgery; Hysterectomy; Kidney surgery; Tonsillectomy; Tubal ligation; and Knee surgery  ,  family history includes Heart attack in her paternal grandfather; Hypertension in her maternal grandmother and paternal grandfather; Pancreatic cancer in her paternal grandfather  ,   reports that she has never smoked   She has never used smokeless tobacco  She reports that she drinks alcohol  She reports that she does not use drugs  ,  is allergic to cetirizine; diclofenac-misoprostol; indomethacin; and sulfamethoxazole-trimethoprim     Current Outpatient Medications   Medication Sig Dispense Refill    ALPRAZolam (XANAX) 0 25 mg tablet TAKE 1 TABLET (0 25 MG TOTAL) BY MOUTH 3 (THREE) TIMES A DAY AS NEEDED FOR ANXIETY 90 tablet 0    ergocalciferol (VITAMIN D2) 50,000 units Take 1 capsule (50,000 Units total) by mouth once a week 12 capsule 3    folic acid (FOLVITE) 1 mg tablet Take 1 tablet (1,000 mcg total) by mouth daily 90 tablet 3    methotrexate 2 5 mg tablet TAKE 5 TABS WEEKLY AS DIRECTED  60 tablet 3    metoprolol succinate (TOPROL-XL) 25 mg 24 hr tablet Take 1 tablet (25 mg total) by mouth daily 90 tablet 3    gabapentin (NEURONTIN) 100 mg capsule        No current facility-administered medications for this visit  Review of Systems   Constitutional: Negative for activity change, chills, diaphoresis, fatigue and fever  HENT: Negative  Eyes: Negative for visual disturbance  Respiratory: Negative for cough, chest tightness, shortness of breath and wheezing  Cardiovascular: Negative for chest pain, palpitations and leg swelling  Gastrointestinal: Negative for abdominal pain, constipation, diarrhea, nausea and vomiting  Endocrine: Negative for cold intolerance and heat intolerance  Genitourinary: Negative for difficulty urinating, dysuria and frequency  Musculoskeletal: Positive for back pain and gait problem  Negative for arthralgias and myalgias  Allergic/Immunologic: Positive for immunocompromised state  Neurological: Negative for dizziness, seizures, syncope, weakness, light-headedness and headaches  Psychiatric/Behavioral: Negative for confusion, dysphoric mood and sleep disturbance  The patient is not nervous/anxious          PHQ-9 Depression Screening    PHQ-9:    Frequency of the following problems over the past two weeks:             Objective:  Vitals:    03/20/20 0858   BP: 102/60   Pulse: 66   Resp: 16   Temp: 97 7 °F (36 5 °C)   TempSrc: Tympanic   SpO2: 98%   Weight: 80 7 kg (178 lb)   Height: 5' 5" (1 651 m)     Body mass index is 29 62 kg/m²  Physical Exam   Constitutional: She is oriented to person, place, and time  She appears well-developed and well-nourished  No distress  HENT:   Head: Normocephalic and atraumatic  Mouth/Throat: Oropharynx is clear and moist  No oropharyngeal exudate  No oropharyngeal obstruction  Eyes: Pupils are equal, round, and reactive to light  Conjunctivae and EOM are normal    Neck: Normal range of motion  Neck supple  No JVD present  No tracheal deviation present  No thyromegaly present  No C spine restriction  Cardiovascular: Normal rate, regular rhythm and normal heart sounds  Pulmonary/Chest: Effort normal and breath sounds normal  No respiratory distress  She has no wheezes  She has no rales  Abdominal: Soft  Bowel sounds are normal  She exhibits no distension  There is no tenderness  Musculoskeletal: Normal range of motion  She exhibits no edema, tenderness or deformity  Lymphadenopathy:     She has no cervical adenopathy  Neurological: She is alert and oriented to person, place, and time  She displays normal reflexes  No cranial nerve deficit  She exhibits normal muscle tone  Coordination normal    Psychiatric: She has a normal mood and affect  Her behavior is normal  Judgment and thought content normal    Nursing note and vitals reviewed  BMI Counseling: Body mass index is 29 62 kg/m²  The BMI is above normal  Nutrition recommendations include reducing portion sizes, decreasing overall calorie intake, reducing intake of saturated fat and trans fat and reducing intake of cholesterol  Exercise recommendations include moderate aerobic physical activity for 150 minutes/week

## 2020-03-20 NOTE — PATIENT INSTRUCTIONS
Weight Management   AMBULATORY CARE:   Why it is important to manage your weight:  Being overweight increases your risk of health conditions such as heart disease, high blood pressure, type 2 diabetes, and certain types of cancer  It can also increase your risk for osteoarthritis, sleep apnea, and other respiratory problems  Aim for a slow, steady weight loss  Even a small amount of weight loss can lower your risk of health problems  How to lose weight safely:  A safe and healthy way to lose weight is to eat fewer calories and get regular exercise  You can lose up about 1 pound a week by decreasing the number of calories you eat by 500 calories each day  You can decrease calories by eating smaller portion sizes or by cutting out high-calorie foods  Read labels to find out how many calories are in the foods you eat  You can also burn calories with exercise such as walking, swimming, or biking  You will be more likely to keep weight off if you make these changes part of your lifestyle  Healthy meal plan for weight management:  A healthy meal plan includes a variety of foods, contains fewer calories, and helps you stay healthy  A healthy meal plan includes the following:  · Eat whole-grain foods more often  A healthy meal plan should contain fiber  Fiber is the part of grains, fruits, and vegetables that is not broken down by your body  Whole-grain foods are healthy and provide extra fiber in your diet  Some examples of whole-grain foods are whole-wheat breads and pastas, oatmeal, brown rice, and bulgur  · Eat a variety of vegetables every day  Include dark, leafy greens such as spinach, kale, b[place] greens, and mustard greens  Eat yellow and orange vegetables such as carrots, sweet potatoes, and winter squash  · Eat a variety of fruits every day  Choose fresh or canned fruit (canned in its own juice or light syrup) instead of juice  Fruit juice has very little or no fiber  · Eat low-fat dairy foods  Drink fat-free (skim) milk or 1% milk  Eat fat-free yogurt and low-fat cottage cheese  Try low-fat cheeses such as mozzarella and other reduced-fat cheeses  · Choose meat and other protein foods that are low in fat  Choose beans or other legumes such as split peas or lentils  Choose fish, skinless poultry (chicken or turkey), or lean cuts of red meat (beef or pork)  Before you cook meat or poultry, cut off any visible fat  · Use less fat and oil  Try baking foods instead of frying them  Add less fat, such as margarine, sour cream, regular salad dressing and mayonnaise to foods  Eat fewer high-fat foods  Some examples of high-fat foods include french fries, doughnuts, ice cream, and cakes  · Eat fewer sweets  Limit foods and drinks that are high in sugar  This includes candy, cookies, regular soda, and sweetened drinks  Ways to decrease calories:   · Eat smaller portions  ¨ Use a small plate with smaller servings  ¨ Do not eat second helpings  ¨ When you eat at a restaurant, ask for a box and place half of your meal in the box before you eat  ¨ Share an entrée with someone else  · Replace high-calorie snacks with healthy, low-calorie snacks  ¨ Choose fresh fruit, vegetables, fat-free rice cakes, or air-popped popcorn instead of potato chips, nuts, or chocolate  ¨ Choose water or calorie-free drinks instead of soda or sweetened drinks  · Eat regular meals  Skipping meals can lead to overeating later in the day  Eat a healthy snack in place of a meal if you do not have time to eat a regular meal      · Do not shop for groceries when you are hungry  You may be more likely to make unhealthy food choices  Take a grocery list of healthy foods and shop after you have eaten  Exercise:  Exercise at least 30 minutes per day on most days of the week  Some examples of exercise include walking, biking, dancing, and swimming   You can also fit in more physical activity by taking the stairs instead of the elevator or parking farther away from stores  Ask your healthcare provider about the best exercise plan for you  Other things to consider as you try to lose weight:   · Be aware of situations that may give you the urge to overeat, such as eating while watching television  Find ways to avoid these situations  For example, read a book, go for a walk, or do crafts  · Meet with a weight loss support group or friends who are also trying to lose weight  This may help you stay motivated to continue working on your weight loss goals  © 2017 [facility] [facility] Information is for End User's use only and may not be sold, redistributed or otherwise used for commercial purposes  All illustrations and images included in CareNotes® are the copyrighted property of [employer]  or Fabio Aguilar  The above information is an  only  It is not intended as medical advice for individual conditions or treatments  Talk to your doctor, nurse or pharmacist before following any medical regimen to see if it is safe and effective for you  Low Fat Diet   AMBULATORY CARE:   A low-fat diet  is an eating plan that is low in total fat, unhealthy fat, and cholesterol  You may need to follow a low-fat diet if you have trouble digesting or absorbing fat  You may also need to follow this diet if you have high cholesterol  You can also lower your cholesterol by increasing the amount of fiber in your diet  Soluble fiber is a type of fiber that helps to decrease cholesterol levels  Different types of fat in food:   · Limit unhealthy fats  A diet that is high in cholesterol, saturated fat, and trans fat may cause unhealthy cholesterol levels  Unhealthy cholesterol levels increase your risk of heart disease  ¨ Cholesterol:  Limit intake of cholesterol to less than 200 mg per day  Cholesterol is found in meat, eggs, and dairy      ¨ Saturated fat:  Limit saturated fat to less than 7% of your total daily calories  Ask your dietitian how many calories you need each day  Saturated fat is found in butter, cheese, ice cream, whole milk, and palm oil  Saturated fat is also found in meat, such as beef, pork, chicken skin, and processed meats  Processed meats include sausage, hot dogs, and bologna  ¨ Trans fat:  Avoid trans fat as much as possible  Trans fat is used in fried and baked foods  Foods that say trans fat free on the label may still have up to 0 5 grams of trans fat per serving  · Include healthy fats  Replace foods that are high in saturated and trans fat with foods high in healthy fats  This may help to decrease high cholesterol levels  ¨ Monounsaturated fats: These are found in avocados, nuts, and vegetable oils, such as olive, canola, and sunflower oil  ¨ Polyunsaturated fats: These can be found in vegetable oils, such as soybean or corn oil  Omega-3 fats can help to decrease the risk of heart disease  Omega-3 fats are found in fish, such as salmon, herring, trout, and tuna  Omega-3 fats can also be found in plant foods, such as walnuts, flaxseed, soybeans, and canola oil    Foods to limit or avoid:   · Grains:      ¨ Snacks that are made with partially hydrogenated oils, such as chips, regular crackers, and butter-flavored popcorn    ¨ High-fat baked goods, such as biscuits, croissants, doughnuts, pies, cookies, and pastries    · Dairy:      ¨ Whole milk, 2% milk, and yogurt and ice cream made with whole milk    ¨ Half and half creamer, heavy cream, and whipping cream    ¨ Cheese, cream cheese, and sour cream    · Meats and proteins:      ¨ High-fat cuts of meat (T-bone steak, regular hamburger, and ribs)    ¨ Fried meat, poultry (turkey and chicken), and fish    ¨ Poultry (chicken and turkey) with skin    ¨ Cold cuts (salami or bologna), hot dogs, canales, and sausage    ¨ Whole eggs and egg yolks    · Vegetables and fruits with added fat:      ¨ Fried vegetables or vegetables in butter or high-fat sauces, such as cream or cheese sauces    ¨ Fried fruit or fruit served with butter or cream    · Fats:      ¨ Butter, stick margarine, and shortening    ¨ Coconut, palm oil, and palm kernel oil  Foods to include:   · Grains:      ¨ Whole-grain breads, cereals, pasta, and brown rice    ¨ Low-fat crackers and pretzels    · Vegetables and fruits:      ¨ Fresh, frozen, or canned vegetables (no salt or low-sodium)    ¨ Fresh, frozen, dried, or canned fruit (canned in light syrup or fruit juice)    ¨ Avocado    · Low-fat dairy products:      ¨ Nonfat (skim) or 1% milk    ¨ Nonfat or low-fat cheese, yogurt, and cottage cheese    · Meats and proteins:      ¨ Chicken or turkey with no skin    ¨ Baked or broiled fish    ¨ Lean beef and pork (loin, round, extra lean hamburger)    ¨ Beans and peas, unsalted nuts, soy products    ¨ Egg whites and substitutes    ¨ Seeds and nuts    · Fats:      ¨ Unsaturated oil, such as canola, olive, peanut, soybean, or sunflower oil    ¨ Soft or liquid margarine and vegetable oil spread    ¨ Low-fat salad dressing  Other ways to decrease fat:   · Read food labels before you buy foods  Choose foods that have less than 30% of calories from fat  Choose low-fat or fat-free dairy products  Remember that fat free does not mean calorie free  These foods still contain calories, and too many calories can lead to weight gain  · Trim fat from meat and avoid fried food  Trim all visible fat from meat before you cook it  Remove the skin from poultry  Do not knight meat, fish, or poultry  Bake, roast, boil, or broil these foods instead  Avoid fried foods  Eat a baked potato instead of [place] fries  Steam vegetables instead of sautéing them in butter  · Add less fat to foods  Use imitation canales bits on salads and baked potatoes instead of regular canales bits  Use fat-free or low-fat salad dressings instead of regular dressings   Use low-fat or nonfat butter-flavored topping instead of regular butter or margarine on popcorn and other foods  Ways to decrease fat in recipes:  Replace high-fat ingredients with low-fat or nonfat ones  This may cause baked goods to be drier than usual  You may need to use nonfat cooking spray on pans to prevent food from sticking  You also may need to change the amount of other ingredients, such as water, in the recipe  Try the following:  · Use low-fat or light margarine instead of regular margarine or shortening  · Use lean ground turkey breast or chicken, or lean ground beef (less than 5% fat) instead of hamburger  · Add 1 teaspoon of canola oil to 8 ounces of skim milk instead of using cream or half and half  · Use grated zucchini, carrots, or apples in breads instead of coconut  · Use blenderized, low-fat cottage cheese, plain tofu, or low-fat ricotta cheese instead of cream cheese  · Use 1 egg white and 1 teaspoon of canola oil, or use ¼ cup (2 ounces) of fat-free egg substitute instead of a whole egg  · Replace half of the oil that is called for in a recipe with applesauce when you bake  Use 3 tablespoons of cocoa powder and 1 tablespoon of canola oil instead of a square of baking chocolate  How to increase fiber:  Eat enough high-fiber foods to get 20 to 30 grams of fiber every day  Slowly increase your fiber intake to avoid stomach cramps, gas, and other problems  · Eat 3 ounces of whole-grain foods each day  An ounce is about 1 slice of bread  Eat whole-grain breads, such as whole-wheat bread  Whole wheat, whole-wheat flour, or other whole grains should be listed as the first ingredient on the food label  Replace white flour with whole-grain flour or use half of each in recipes  Whole-grain flour is heavier than white flour, so you may have to add more yeast or baking powder  · Eat a high-fiber cereal for breakfast   Oatmeal is a good source of soluble fiber  Look for cereals that have bran or fiber in the name   Choose whole-grain products, such as brown rice, barley, and whole-wheat pasta  · Eat more beans, peas, and lentils  For example, add beans to soups or salads  Eat at least 5 cups of fruits and vegetables each day  Eat fruits and vegetables with the peel because the peel is high in fiber  © 2017 [address] [employer]  Information is for End User's use only and may not be sold, redistributed or otherwise used for commercial purposes  All illustrations and images included in CareNotes® are the copyrighted property of [employer] , [employer]  or Fabio Aguilar  The above information is an  only  It is not intended as medical advice for individual conditions or treatments  Talk to your doctor, nurse or pharmacist before following any medical regimen to see if it is safe and effective for you  Heart Healthy Diet   AMBULATORY CARE:   A heart healthy diet  is an eating plan low in total fat, unhealthy fats, and sodium (salt)  A heart healthy diet helps decrease your risk for heart disease and stroke  Limit the amount of fat you eat to 25% to 35% of your total daily calories  Limit sodium to less than 2,300 mg each day  Healthy fats:  Healthy fats can help improve cholesterol levels  The risk for heart disease is decreased when cholesterol levels are normal  Choose healthy fats, such as the following:  · Unsaturated fat  is found in foods such as soybean, canola, olive, corn, and safflower oils  It is also found in soft tub margarine that is made with liquid vegetable oil  · Omega-3 fat  is found in certain fish, such as salmon, tuna, and trout, and in walnuts and flaxseed  Unhealthy fats:  Unhealthy fats can cause unhealthy cholesterol levels in your blood and increase your risk of heart disease  Limit unhealthy fats, such as the following:  · Cholesterol  is found in animal foods, such as eggs and lobster, and in dairy products made from whole milk   Limit cholesterol to less than 300 milligrams (mg) each day  You may need to limit cholesterol to 200 mg each day if you have heart disease  · Saturated fat  is found in meats, such as canales and hamburger  It is also found in chicken or turkey skin, whole milk, and butter  Limit saturated fat to less than 7% of your total daily calories  Limit saturated fat to less than 6% if you have heart disease or are at increased risk for it  · Trans fat  is found in packaged foods, such as potato chips and cookies  It is also in hard margarine, some fried foods, and shortening  Avoid trans fats as much as possible    Heart healthy foods and drinks to include:  Ask your dietitian or healthcare provider how many servings to have from each of the following food groups:  · Grains:      ¨ Whole-wheat breads, cereals, and pastas, and brown rice    ¨ Low-fat, low-sodium crackers and chips    · Vegetables:      ¨ Broccoli, green beans, green peas, and spinach    ¨ Collards, kale, and lima beans    ¨ Carrots, sweet potatoes, tomatoes, and peppers    ¨ Canned vegetables with no salt added    · Fruits:      ¨ Bananas, peaches, pears, and pineapple    ¨ Grapes, raisins, and dates    ¨ Oranges, tangerines, grapefruit, orange juice, and grapefruit juice    ¨ Apricots, mangoes, melons, and papaya    ¨ Raspberries and strawberries    ¨ Canned fruit with no added sugar    · Low-fat dairy products:      ¨ Nonfat (skim) milk, 1% milk, and low-fat almond, cashew, or soy milks fortified with calcium    ¨ Low-fat cheese, regular or frozen yogurt, and cottage cheese    · Meats and proteins , such as lean cuts of beef and pork (loin, leg, round), skinless chicken and turkey, legumes, soy products, egg whites, and nuts  Foods and drinks to limit or avoid:  Ask your dietitian or healthcare provider about these and other foods that are high in unhealthy fat, sodium, and sugar:  · Snack or packaged foods , such as frozen dinners, cookies, macaroni and cheese, and cereals with more than 300 mg of sodium per serving    · Canned or dry mixes  for cakes, soups, sauces, or gravies    · Vegetables with added sodium , such as instant potatoes, vegetables with added sauces, or regular canned vegetables    · Other foods high in sodium , such as ketchup, barbecue sauce, salad dressing, pickles, olives, soy sauce, and miso    · High-fat dairy foods  such as whole or 2% milk, cream cheese, or sour cream, and cheeses     · High-fat protein foods  such as high-fat cuts of beef (T-bone steaks, ribs), chicken or turkey with skin, and organ meats, such as liver    · Cured or smoked meats , such as hot dogs, canales, and sausage    · Unhealthy fats and oils , such as butter, stick margarine, shortening, and cooking oils such as coconut or palm oil    · Food and drinks high in sugar , such as soft drinks (soda), sports drinks, sweetened tea, candy, cake, cookies, pies, and doughnuts  Other diet guidelines to follow:   · Eat more foods containing omega-3 fats  Eat fish high in omega-3 fats at least 2 times a week  · Limit alcohol  Too much alcohol can damage your heart and raise your blood pressure  Women should limit alcohol to 1 drink a day  Men should limit alcohol to 2 drinks a day  A drink of alcohol is 12 ounces of beer, 5 ounces of wine, or 1½ ounces of liquor  · Choose low-sodium foods  High-sodium foods can lead to high blood pressure  Add little or no salt to food you prepare  Use herbs and spices in place of salt  · Eat more fiber  to help lower cholesterol levels  Eat at least 5 servings of fruits and vegetables each day  Eat 3 ounces of whole-grain foods each day  Legumes (beans) are also a good source of fiber  Lifestyle guidelines:   · Do not smoke  Nicotine and other chemicals in cigarettes and cigars can cause lung and heart damage  Ask your healthcare provider for information if you currently smoke and need help to quit  E-cigarettes or smokeless tobacco still contain nicotine  Talk to your healthcare provider before you use these products  · Exercise regularly  to help you maintain a healthy weight and improve your blood pressure and cholesterol levels  Ask your healthcare provider about the best exercise plan for you  Do not start an exercise program without asking your healthcare provider  Follow up with your healthcare provider as directed:  Write down your questions so you remember to ask them during your visits  © 2017 [address] [employer]  Information is for End User's use only and may not be sold, redistributed or otherwise used for commercial purposes  All illustrations and images included in CareNotes® are the copyrighted property of [employer]  or Fabio Agu[employer]  The above information is an  only  It is not intended as medical advice for individual conditions or treatments  Talk to your doctor, nurse or pharmacist before following any medical regimen to see if it is safe and effective for you

## 2020-03-25 ENCOUNTER — TELEPHONE (OUTPATIENT)
Dept: INTERNAL MEDICINE CLINIC | Facility: CLINIC | Age: 55
End: 2020-03-25

## 2020-03-25 NOTE — TELEPHONE ENCOUNTER
No I called twice for it, the first time it came in all black, still waiting for the second one to come through

## 2020-05-05 ENCOUNTER — TELEMEDICINE (OUTPATIENT)
Dept: INTERNAL MEDICINE CLINIC | Facility: CLINIC | Age: 55
End: 2020-05-05
Payer: COMMERCIAL

## 2020-05-05 DIAGNOSIS — M48.07 SPINAL STENOSIS OF LUMBOSACRAL REGION: ICD-10-CM

## 2020-05-05 DIAGNOSIS — G83.4 CAUDA EQUINA SYNDROME (HCC): ICD-10-CM

## 2020-05-05 DIAGNOSIS — Z01.818 VISIT FOR PRE-OPERATIVE EXAMINATION: Primary | ICD-10-CM

## 2020-05-05 PROCEDURE — 99214 OFFICE O/P EST MOD 30 MIN: CPT | Performed by: INTERNAL MEDICINE

## 2020-05-20 ENCOUNTER — APPOINTMENT (OUTPATIENT)
Dept: LAB | Facility: CLINIC | Age: 55
End: 2020-05-20
Payer: COMMERCIAL

## 2020-05-20 ENCOUNTER — OFFICE VISIT (OUTPATIENT)
Dept: INTERNAL MEDICINE CLINIC | Facility: CLINIC | Age: 55
End: 2020-05-20
Payer: COMMERCIAL

## 2020-05-20 VITALS
OXYGEN SATURATION: 95 % | TEMPERATURE: 98.3 F | SYSTOLIC BLOOD PRESSURE: 108 MMHG | RESPIRATION RATE: 16 BRPM | WEIGHT: 184 LBS | DIASTOLIC BLOOD PRESSURE: 66 MMHG | BODY MASS INDEX: 30.66 KG/M2 | HEART RATE: 73 BPM | HEIGHT: 65 IN

## 2020-05-20 DIAGNOSIS — Z13.220 SCREENING FOR LIPID DISORDERS: ICD-10-CM

## 2020-05-20 DIAGNOSIS — J45.20 MILD INTERMITTENT ASTHMA WITHOUT COMPLICATION: ICD-10-CM

## 2020-05-20 DIAGNOSIS — K21.9 GERD WITHOUT ESOPHAGITIS: ICD-10-CM

## 2020-05-20 DIAGNOSIS — I10 BENIGN ESSENTIAL HYPERTENSION: Primary | ICD-10-CM

## 2020-05-20 DIAGNOSIS — E55.9 VITAMIN D DEFICIENCY: ICD-10-CM

## 2020-05-20 DIAGNOSIS — Z12.39 SCREENING FOR BREAST CANCER: ICD-10-CM

## 2020-05-20 DIAGNOSIS — L40.50 PSORIASIS WITH ARTHROPATHY (HCC): ICD-10-CM

## 2020-05-20 DIAGNOSIS — M85.80 OSTEOPENIA, UNSPECIFIED LOCATION: ICD-10-CM

## 2020-05-20 DIAGNOSIS — F41.1 GENERALIZED ANXIETY DISORDER: ICD-10-CM

## 2020-05-20 DIAGNOSIS — G43.B0 OPHTHALMOPLEGIC MIGRAINE, NOT INTRACTABLE: ICD-10-CM

## 2020-05-20 LAB
25(OH)D3 SERPL-MCNC: 38.2 NG/ML (ref 30–100)
CHOLEST SERPL-MCNC: 170 MG/DL (ref 50–200)
CREAT UR-MCNC: 88.3 MG/DL
HDLC SERPL-MCNC: 49 MG/DL
LDLC SERPL CALC-MCNC: 101 MG/DL (ref 0–100)
MICROALBUMIN UR-MCNC: 5 MG/L (ref 0–20)
MICROALBUMIN/CREAT 24H UR: 6 MG/G CREATININE (ref 0–30)
TRIGL SERPL-MCNC: 100 MG/DL
TSH SERPL DL<=0.05 MIU/L-ACNC: 2.75 UIU/ML (ref 0.36–3.74)

## 2020-05-20 PROCEDURE — 82570 ASSAY OF URINE CREATININE: CPT | Performed by: INTERNAL MEDICINE

## 2020-05-20 PROCEDURE — 3008F BODY MASS INDEX DOCD: CPT | Performed by: INTERNAL MEDICINE

## 2020-05-20 PROCEDURE — 36415 COLL VENOUS BLD VENIPUNCTURE: CPT

## 2020-05-20 PROCEDURE — 3078F DIAST BP <80 MM HG: CPT | Performed by: INTERNAL MEDICINE

## 2020-05-20 PROCEDURE — 80061 LIPID PANEL: CPT

## 2020-05-20 PROCEDURE — 82043 UR ALBUMIN QUANTITATIVE: CPT | Performed by: INTERNAL MEDICINE

## 2020-05-20 PROCEDURE — 3074F SYST BP LT 130 MM HG: CPT | Performed by: INTERNAL MEDICINE

## 2020-05-20 PROCEDURE — 84443 ASSAY THYROID STIM HORMONE: CPT

## 2020-05-20 PROCEDURE — 1036F TOBACCO NON-USER: CPT | Performed by: INTERNAL MEDICINE

## 2020-05-20 PROCEDURE — 82306 VITAMIN D 25 HYDROXY: CPT

## 2020-05-20 PROCEDURE — 99214 OFFICE O/P EST MOD 30 MIN: CPT | Performed by: INTERNAL MEDICINE

## 2020-05-20 RX ORDER — OXYCODONE HYDROCHLORIDE AND ACETAMINOPHEN 5; 325 MG/1; MG/1
TABLET ORAL
COMMUNITY
Start: 2020-05-08 | End: 2020-09-24

## 2020-05-20 RX ORDER — DIAZEPAM 5 MG/1
TABLET ORAL
COMMUNITY
Start: 2020-05-08 | End: 2021-10-07 | Stop reason: SDUPTHER

## 2020-08-25 DIAGNOSIS — F41.1 GENERALIZED ANXIETY DISORDER: Primary | ICD-10-CM

## 2020-08-25 DIAGNOSIS — L40.50 PSORIATIC ARTHRITIS (HCC): ICD-10-CM

## 2020-08-25 RX ORDER — GABAPENTIN 100 MG/1
100 CAPSULE ORAL 3 TIMES DAILY
Qty: 90 CAPSULE | Refills: 5 | Status: SHIPPED | OUTPATIENT
Start: 2020-08-25 | End: 2022-02-09 | Stop reason: SDUPTHER

## 2020-09-24 ENCOUNTER — APPOINTMENT (OUTPATIENT)
Dept: LAB | Facility: CLINIC | Age: 55
End: 2020-09-24
Payer: COMMERCIAL

## 2020-09-24 ENCOUNTER — OFFICE VISIT (OUTPATIENT)
Dept: INTERNAL MEDICINE CLINIC | Facility: CLINIC | Age: 55
End: 2020-09-24
Payer: COMMERCIAL

## 2020-09-24 VITALS
SYSTOLIC BLOOD PRESSURE: 104 MMHG | TEMPERATURE: 98.5 F | RESPIRATION RATE: 16 BRPM | WEIGHT: 184 LBS | HEART RATE: 70 BPM | BODY MASS INDEX: 30.66 KG/M2 | HEIGHT: 65 IN | DIASTOLIC BLOOD PRESSURE: 66 MMHG | OXYGEN SATURATION: 98 %

## 2020-09-24 DIAGNOSIS — M85.80 OSTEOPENIA, UNSPECIFIED LOCATION: ICD-10-CM

## 2020-09-24 DIAGNOSIS — F41.1 GENERALIZED ANXIETY DISORDER: ICD-10-CM

## 2020-09-24 DIAGNOSIS — Z13.31 NEGATIVE DEPRESSION SCREENING: ICD-10-CM

## 2020-09-24 DIAGNOSIS — L40.50 PSORIATIC ARTHRITIS (HCC): ICD-10-CM

## 2020-09-24 DIAGNOSIS — Z23 ENCOUNTER FOR VACCINATION: ICD-10-CM

## 2020-09-24 DIAGNOSIS — I10 BENIGN ESSENTIAL HYPERTENSION: Primary | ICD-10-CM

## 2020-09-24 DIAGNOSIS — I10 BENIGN ESSENTIAL HYPERTENSION: ICD-10-CM

## 2020-09-24 DIAGNOSIS — K21.9 GERD WITHOUT ESOPHAGITIS: ICD-10-CM

## 2020-09-24 DIAGNOSIS — J45.20 MILD INTERMITTENT ASTHMA WITHOUT COMPLICATION: ICD-10-CM

## 2020-09-24 PROBLEM — R76.8 ELEVATED ANTINUCLEAR ANTIBODY (ANA) LEVEL: Status: ACTIVE | Noted: 2020-09-21

## 2020-09-24 PROBLEM — M17.0 BILATERAL PRIMARY OSTEOARTHRITIS OF KNEE: Status: ACTIVE | Noted: 2020-09-21

## 2020-09-24 LAB
ALBUMIN SERPL BCP-MCNC: 3.6 G/DL (ref 3.5–5)
ALP SERPL-CCNC: 96 U/L (ref 46–116)
ALT SERPL W P-5'-P-CCNC: 20 U/L (ref 12–78)
ANION GAP SERPL CALCULATED.3IONS-SCNC: 4 MMOL/L (ref 4–13)
AST SERPL W P-5'-P-CCNC: 20 U/L (ref 5–45)
BILIRUB SERPL-MCNC: 0.33 MG/DL (ref 0.2–1)
BUN SERPL-MCNC: 12 MG/DL (ref 5–25)
CALCIUM SERPL-MCNC: 9.8 MG/DL (ref 8.3–10.1)
CHLORIDE SERPL-SCNC: 108 MMOL/L (ref 100–108)
CO2 SERPL-SCNC: 28 MMOL/L (ref 21–32)
CREAT SERPL-MCNC: 0.92 MG/DL (ref 0.6–1.3)
GFR SERPL CREATININE-BSD FRML MDRD: 70 ML/MIN/1.73SQ M
GLUCOSE P FAST SERPL-MCNC: 80 MG/DL (ref 65–99)
POTASSIUM SERPL-SCNC: 4.1 MMOL/L (ref 3.5–5.3)
PROT SERPL-MCNC: 7.6 G/DL (ref 6.4–8.2)
SODIUM SERPL-SCNC: 140 MMOL/L (ref 136–145)
TSH SERPL DL<=0.05 MIU/L-ACNC: 2.32 UIU/ML (ref 0.36–3.74)

## 2020-09-24 PROCEDURE — 99214 OFFICE O/P EST MOD 30 MIN: CPT | Performed by: INTERNAL MEDICINE

## 2020-09-24 PROCEDURE — 84443 ASSAY THYROID STIM HORMONE: CPT

## 2020-09-24 PROCEDURE — 90682 RIV4 VACC RECOMBINANT DNA IM: CPT | Performed by: INTERNAL MEDICINE

## 2020-09-24 PROCEDURE — 90471 IMMUNIZATION ADMIN: CPT | Performed by: INTERNAL MEDICINE

## 2020-09-24 PROCEDURE — 3074F SYST BP LT 130 MM HG: CPT | Performed by: INTERNAL MEDICINE

## 2020-09-24 PROCEDURE — 80053 COMPREHEN METABOLIC PANEL: CPT

## 2020-09-24 PROCEDURE — 3725F SCREEN DEPRESSION PERFORMED: CPT | Performed by: INTERNAL MEDICINE

## 2020-09-24 PROCEDURE — 3078F DIAST BP <80 MM HG: CPT | Performed by: INTERNAL MEDICINE

## 2020-09-24 PROCEDURE — 36415 COLL VENOUS BLD VENIPUNCTURE: CPT

## 2020-09-24 PROCEDURE — 1036F TOBACCO NON-USER: CPT | Performed by: INTERNAL MEDICINE

## 2020-09-24 RX ORDER — FAMOTIDINE 20 MG/1
TABLET, FILM COATED ORAL DAILY
COMMUNITY
End: 2021-10-07

## 2020-09-24 NOTE — PROGRESS NOTES
Assessment/Plan:  Problem List Items Addressed This Visit        Digestive    GERD without esophagitis    Relevant Medications    famotidine (PEPCID) 20 mg tablet       Respiratory    Asthma       Cardiovascular and Mediastinum    Benign essential hypertension - Primary    Relevant Orders    Comprehensive metabolic panel       Musculoskeletal and Integument    Osteopenia    Psoriatic arthritis ([facility] )       Other    Generalized anxiety disorder    Relevant Orders    TSH, 3rd generation      Other Visit Diagnoses     Encounter for vaccination        Relevant Orders    influenza vaccine, quadrivalent, recombinant, PF, 0 5 mL, for patients 18 yr+ (FLUBLOK)    Negative depression screening               Diagnoses and all orders for this visit:    Benign essential hypertension  -     Comprehensive metabolic panel; Future    Mild intermittent asthma without complication    GERD without esophagitis    Osteopenia, unspecified location    Psoriatic arthritis ([facility] )    Generalized anxiety disorder  -     TSH, 3rd generation; Future    Encounter for vaccination  -     influenza vaccine, quadrivalent, recombinant, PF, 0 5 mL, for patients 18 yr+ (FLUBLOK)    Negative depression screening    Other orders  -     Cancel: LDL cholesterol, direct; Future  -     Cancel: Triglycerides; Future  -     famotidine (PEPCID) 20 mg tablet; daily        No problem-specific Assessment & Plan notes found for this encounter  A/P: Doing well and will check labs  Discussed vaccines and will up date her flu vaccine  Continue current treatment and RTC four months for routine  Keep f/u with the specialist      Subjective:      Patient ID: Halley Ernst is a 63 y o  female  WF RTC for f/u htn, GERD, etc  Doing well and no new issues  Remains active w/o difficulty and no falls  No reflux  Asthma well controlled  RYNE is good  Psoriatic Arthritis stable, but not back on her MTX due to recent sx and s/s could be better    Due for labs and vaccines  The following portions of the patient's history were reviewed and updated as appropriate:   She has a past medical history of Asthma, Generalized anxiety disorder, GERD (gastroesophageal reflux disease), Hemangioma of other sites, Hypertension, Psoriatic arthritis ([facility] ), and Psoriatic arthritis ([facility] )  ,  does not have any pertinent problems on file  ,   has a past surgical history that includes Appendectomy; Cholecystectomy; Foot surgery; Hysterectomy; Kidney surgery; Tonsillectomy; Tubal ligation; Knee surgery; and Spinal fusion  ,  family history includes Heart attack in her paternal grandfather; Hypertension in her maternal grandmother and paternal grandfather; Pancreatic cancer in her paternal grandfather  ,   reports that she has never smoked  She has never used smokeless tobacco  She reports current alcohol use  She reports that she does not use drugs  ,  is allergic to cetirizine; diclofenac-misoprostol; indomethacin; and sulfamethoxazole-trimethoprim     Current Outpatient Medications   Medication Sig Dispense Refill    diazepam (VALIUM) 5 mg tablet TAKE EVERY 4-6 HOURS PRN PAIN      ergocalciferol (VITAMIN D2) 50,000 units Take 1 capsule (50,000 Units total) by mouth once a week 12 capsule 3    famotidine (PEPCID) 20 mg tablet daily      folic acid (FOLVITE) 1 mg tablet Take 1 tablet (1,000 mcg total) by mouth daily 90 tablet 3    gabapentin (NEURONTIN) 100 mg capsule Take 1 capsule (100 mg total) by mouth 3 (three) times a day 90 capsule 5    metoprolol succinate (TOPROL-XL) 25 mg 24 hr tablet Take 1 tablet (25 mg total) by mouth daily 90 tablet 3    ALPRAZolam (XANAX) 0 25 mg tablet TAKE 1 TABLET (0 25 MG TOTAL) BY MOUTH 3 (THREE) TIMES A DAY AS NEEDED FOR ANXIETY (Patient not taking: Reported on 5/5/2020) 90 tablet 0    methotrexate 2 5 mg tablet TAKE 5 TABS WEEKLY AS DIRECTED  (Patient not taking: Reported on 5/5/2020) 60 tablet 3     No current facility-administered medications for this visit  Review of Systems   Constitutional: Negative for activity change, chills, diaphoresis, fatigue and fever  HENT: Negative  Eyes: Negative for visual disturbance  Respiratory: Negative for cough, chest tightness, shortness of breath and wheezing  Cardiovascular: Negative for chest pain, palpitations and leg swelling  Gastrointestinal: Negative for abdominal pain, constipation, diarrhea, nausea and vomiting  Endocrine: Negative for cold intolerance and heat intolerance  Genitourinary: Negative for difficulty urinating, dysuria and frequency  Musculoskeletal: Positive for arthralgias  Negative for gait problem and myalgias  Neurological: Negative for dizziness, seizures, syncope, weakness, light-headedness and headaches  Psychiatric/Behavioral: Negative for confusion, dysphoric mood and sleep disturbance  The patient is not nervous/anxious  PHQ-9 Depression Screening    PHQ-9:    Frequency of the following problems over the past two weeks:       Little interest or pleasure in doing things:  0 - not at all  Feeling down, depressed, or hopeless:  0 - not at all  PHQ-2 Score:  0        Objective:  Vitals:    09/24/20 0724   BP: 104/66   BP Location: Left arm   Patient Position: Sitting   Cuff Size: Large   Pulse: 70   Resp: 16   Temp: 98 5 °F (36 9 °C)   SpO2: 98%   Weight: 83 5 kg (184 lb)   Height: 5' 5" (1 651 m)     Body mass index is 30 62 kg/m²  Physical Exam  Vitals signs and nursing note reviewed  Constitutional:       General: She is not in acute distress  Appearance: Normal appearance  HENT:      Head: Normocephalic and atraumatic  Mouth/Throat:      Mouth: Mucous membranes are moist    Eyes:      Extraocular Movements: Extraocular movements intact  Conjunctiva/sclera: Conjunctivae normal       Pupils: Pupils are equal, round, and reactive to light  Neck:      Musculoskeletal: Neck supple  Vascular: No carotid bruit     Cardiovascular:      Rate and Rhythm: Normal rate and regular rhythm  Heart sounds: Normal heart sounds  Pulmonary:      Effort: Pulmonary effort is normal  No respiratory distress  Breath sounds: Normal breath sounds  No wheezing or rales  Abdominal:      General: Bowel sounds are normal  There is no distension  Palpations: Abdomen is soft  Tenderness: There is no abdominal tenderness  Musculoskeletal:      Right lower leg: No edema  Left lower leg: No edema  Neurological:      General: No focal deficit present  Mental Status: She is alert and oriented to person, place, and time  Mental status is at baseline  Psychiatric:         Mood and Affect: Mood normal          Behavior: Behavior normal          Thought Content:  Thought content normal          Judgment: Judgment normal

## 2020-09-24 NOTE — PATIENT INSTRUCTIONS

## 2020-11-02 DIAGNOSIS — L40.50 PSORIATIC ARTHRITIS (HCC): ICD-10-CM

## 2020-12-18 ENCOUNTER — TELEMEDICINE (OUTPATIENT)
Dept: INTERNAL MEDICINE CLINIC | Facility: CLINIC | Age: 55
End: 2020-12-18
Payer: COMMERCIAL

## 2020-12-18 DIAGNOSIS — Z20.822 CLOSE EXPOSURE TO COVID-19 VIRUS: ICD-10-CM

## 2020-12-18 DIAGNOSIS — J06.9 UPPER RESPIRATORY TRACT INFECTION, UNSPECIFIED TYPE: ICD-10-CM

## 2020-12-18 DIAGNOSIS — J06.9 UPPER RESPIRATORY TRACT INFECTION, UNSPECIFIED TYPE: Primary | ICD-10-CM

## 2020-12-18 PROCEDURE — U0003 INFECTIOUS AGENT DETECTION BY NUCLEIC ACID (DNA OR RNA); SEVERE ACUTE RESPIRATORY SYNDROME CORONAVIRUS 2 (SARS-COV-2) (CORONAVIRUS DISEASE [COVID-19]), AMPLIFIED PROBE TECHNIQUE, MAKING USE OF HIGH THROUGHPUT TECHNOLOGIES AS DESCRIBED BY CMS-2020-01-R: HCPCS | Performed by: INTERNAL MEDICINE

## 2020-12-18 PROCEDURE — 99213 OFFICE O/P EST LOW 20 MIN: CPT | Performed by: INTERNAL MEDICINE

## 2020-12-19 LAB — SARS-COV-2 RNA SPEC QL NAA+PROBE: NOT DETECTED

## 2020-12-22 ENCOUNTER — VBI (OUTPATIENT)
Dept: ADMINISTRATIVE | Facility: OTHER | Age: 55
End: 2020-12-22

## 2021-01-25 DIAGNOSIS — I10 HYPERTENSION, UNSPECIFIED TYPE: ICD-10-CM

## 2021-01-25 RX ORDER — METOPROLOL SUCCINATE 25 MG/1
25 TABLET, EXTENDED RELEASE ORAL DAILY
Qty: 90 TABLET | Refills: 3 | Status: SHIPPED | OUTPATIENT
Start: 2021-01-25 | End: 2022-02-09 | Stop reason: SDUPTHER

## 2021-01-29 ENCOUNTER — OFFICE VISIT (OUTPATIENT)
Dept: INTERNAL MEDICINE CLINIC | Facility: CLINIC | Age: 56
End: 2021-01-29
Payer: COMMERCIAL

## 2021-01-29 VITALS
DIASTOLIC BLOOD PRESSURE: 76 MMHG | WEIGHT: 178.25 LBS | BODY MASS INDEX: 29.7 KG/M2 | HEART RATE: 67 BPM | HEIGHT: 65 IN | TEMPERATURE: 96.2 F | OXYGEN SATURATION: 98 % | SYSTOLIC BLOOD PRESSURE: 114 MMHG

## 2021-01-29 DIAGNOSIS — Z13.220 SCREENING FOR LIPID DISORDERS: ICD-10-CM

## 2021-01-29 DIAGNOSIS — Z13.31 NEGATIVE DEPRESSION SCREENING: ICD-10-CM

## 2021-01-29 DIAGNOSIS — D84.9 IMMUNOSUPPRESSION (HCC): ICD-10-CM

## 2021-01-29 DIAGNOSIS — E55.9 VITAMIN D DEFICIENCY: ICD-10-CM

## 2021-01-29 DIAGNOSIS — G43.B0 OPHTHALMOPLEGIC MIGRAINE, NOT INTRACTABLE: ICD-10-CM

## 2021-01-29 DIAGNOSIS — I10 BENIGN ESSENTIAL HYPERTENSION: Primary | ICD-10-CM

## 2021-01-29 DIAGNOSIS — F41.1 GENERALIZED ANXIETY DISORDER: ICD-10-CM

## 2021-01-29 DIAGNOSIS — Z11.4 SCREENING FOR HIV (HUMAN IMMUNODEFICIENCY VIRUS): ICD-10-CM

## 2021-01-29 DIAGNOSIS — M85.80 OSTEOPENIA, UNSPECIFIED LOCATION: ICD-10-CM

## 2021-01-29 DIAGNOSIS — L40.50 PSORIATIC ARTHRITIS (HCC): ICD-10-CM

## 2021-01-29 DIAGNOSIS — K21.9 GERD WITHOUT ESOPHAGITIS: ICD-10-CM

## 2021-01-29 DIAGNOSIS — E53.8 FOLATE DEFICIENCY: ICD-10-CM

## 2021-01-29 DIAGNOSIS — J45.20 MILD INTERMITTENT ASTHMA WITHOUT COMPLICATION: ICD-10-CM

## 2021-01-29 PROBLEM — R76.8 ELEVATED ANTINUCLEAR ANTIBODY (ANA) LEVEL: Status: RESOLVED | Noted: 2020-09-21 | Resolved: 2021-01-29

## 2021-01-29 PROCEDURE — 3008F BODY MASS INDEX DOCD: CPT | Performed by: INTERNAL MEDICINE

## 2021-01-29 PROCEDURE — 99214 OFFICE O/P EST MOD 30 MIN: CPT | Performed by: INTERNAL MEDICINE

## 2021-01-29 RX ORDER — DULOXETIN HYDROCHLORIDE 30 MG/1
30 CAPSULE, DELAYED RELEASE ORAL DAILY
COMMUNITY
End: 2021-06-04

## 2021-01-29 RX ORDER — LEFLUNOMIDE 10 MG/1
10 TABLET ORAL DAILY
COMMUNITY
End: 2021-06-04

## 2021-01-29 NOTE — PROGRESS NOTES
BMI Counseling: There is no height or weight on file to calculate BMI  The BMI is above normal  Nutrition recommendations include decreasing portion sizes and encouraging healthy choices of fruits and vegetables  Exercise recommendations include moderate physical activity 150 minutes/week  No pharmacotherapy was ordered  Assessment/Plan:  Problem List Items Addressed This Visit        Digestive    GERD without esophagitis       Respiratory    Asthma       Cardiovascular and Mediastinum    Benign essential hypertension - Primary    Relevant Orders    CBC and differential    Comprehensive metabolic panel    Ophthalmoplegic migraine headache    Relevant Medications    DULoxetine (Cymbalta) 30 mg delayed release capsule    leflunomide (ARAVA) 10 MG tablet       Musculoskeletal and Integument    Osteopenia    Psoriatic arthritis (HCC)    Relevant Medications    leflunomide (ARAVA) 10 MG tablet       Other    BMI 29 0-29 9,adult    Folate deficiency    Relevant Orders    Folate    Vitamin B12    Generalized anxiety disorder    Relevant Medications    DULoxetine (Cymbalta) 30 mg delayed release capsule    Immunosuppression (HCC)    Vitamin D deficiency    Relevant Orders    Vitamin D 25 hydroxy      Other Visit Diagnoses     Screening for lipid disorders        Relevant Orders    LDL cholesterol, direct    Triglycerides    Screening for HIV (human immunodeficiency virus)        Relevant Orders    HIV 1/2 Antigen/Antibody (4th Generation) w Reflex [facility]    Negative depression screening               Diagnoses and all orders for this visit:    Benign essential hypertension  -     CBC and differential; Future  -     Comprehensive metabolic panel;  Future    Mild intermittent asthma without complication    GERD without esophagitis    Ophthalmoplegic migraine, not intractable    Psoriatic arthritis (HCC)    Osteopenia, unspecified location    Generalized anxiety disorder    Vitamin D deficiency  -     Vitamin D 25 hydroxy; Future    Folate deficiency  -     Folate; Future  -     Vitamin B12; Future    Screening for lipid disorders  -     LDL cholesterol, direct; Future  -     Triglycerides; Future    Screening for HIV (human immunodeficiency virus)  -     HIV 1/2 Antigen/Antibody (4th Generation) w Reflex [facility]; Future    Negative depression screening    BMI 29 0-29 9,adult    Immunosuppression ([facility] )    Other orders  -     DULoxetine (Cymbalta) 30 mg delayed release capsule; Take 30 mg by mouth daily  -     leflunomide (ARAVA) 10 MG tablet; Take 10 mg by mouth daily        No problem-specific Assessment & Plan notes found for this encounter  A/P: Doing well and will check labs  Discussed BMI and will give information on diet and exercise  Defers mammo at this time due to immunosuppression  Continue current treatment and RTC four months for routine  Subjective:      Patient ID: Jaelyn Todd is a 63 y o  female  WF RTC for f/u HTN, GERD, etc  Doing well and no new issues  Remains active w/o difficulty and no falls  RAD is good  NO reflux  Psoriatric arthritis stable and a little better with Rheum med switch  RYNE is good  Due for labs  The following portions of the patient's history were reviewed and updated as appropriate:   She has a past medical history of Asthma, Generalized anxiety disorder, GERD (gastroesophageal reflux disease), Hemangioma of other sites, Hypertension, Psoriatic arthritis ([facility] ), and [facility]atic arthritis ([facility] )  ,  does not have any pertinent problems on file  ,   has a past surgical history that includes Appendectomy; Cholecystectomy; Foot surgery; Hysterectomy; Kidney surgery; Tonsillectomy; Tubal ligation; Knee surgery; and Spinal fusion  ,  family history includes Heart attack in her paternal grandfather; Hypertension in her maternal grandmother and paternal grandfather; Pancreatic cancer in her paternal grandfather  ,   reports that she has never smoked   She has never used smokeless tobacco  She reports current alcohol use  She reports that she does not use drugs  ,  is allergic to cetirizine; diclofenac-misoprostol; indomethacin; and sulfamethoxazole-trimethoprim     Current Outpatient Medications   Medication Sig Dispense Refill    DULoxetine (Cymbalta) 30 mg delayed release capsule Take 30 mg by mouth daily      ergocalciferol (VITAMIN D2) 50,000 units Take 1 capsule (50,000 Units total) by mouth once a week 12 capsule 3    famotidine (PEPCID) 20 mg tablet daily      gabapentin (NEURONTIN) 100 mg capsule Take 1 capsule (100 mg total) by mouth 3 (three) times a day 90 capsule 5    leflunomide (ARAVA) 10 MG tablet Take 10 mg by mouth daily      metoprolol succinate (TOPROL-XL) 25 mg 24 hr tablet TAKE 1 TABLET (25 MG TOTAL) BY MOUTH DAILY 90 tablet 3    ALPRAZolam (XANAX) 0 25 mg tablet TAKE 1 TABLET (0 25 MG TOTAL) BY MOUTH 3 (THREE) TIMES A DAY AS NEEDED FOR ANXIETY (Patient not taking: Reported on 5/5/2020) 90 tablet 0    diazepam (VALIUM) 5 mg tablet TAKE EVERY 4-6 HOURS PRN PAIN       No current facility-administered medications for this visit  Review of Systems   Constitutional: Negative for activity change, chills, diaphoresis, fatigue and fever  HENT: Negative  Eyes: Negative for visual disturbance  Respiratory: Negative for cough, chest tightness, shortness of breath and wheezing  Cardiovascular: Negative for chest pain, palpitations and leg swelling  Gastrointestinal: Negative for abdominal pain, constipation, diarrhea, nausea and vomiting  Endocrine: Negative for cold intolerance and heat intolerance  Genitourinary: Negative for difficulty urinating, dysuria and frequency  Musculoskeletal: Negative for arthralgias, gait problem and myalgias  Neurological: Negative for dizziness, seizures, syncope, weakness, light-headedness and headaches  Psychiatric/Behavioral: Negative for confusion, dysphoric mood and sleep disturbance  The patient is not nervous/anxious  PHQ-9 Depression Screening    PHQ-9:   Frequency of the following problems over the past two weeks:      Little interest or pleasure in doing things: 0 - not at all  Feeling down, depressed, or hopeless: 0 - not at all  PHQ-2 Score: 0        Objective:  Vitals:    01/29/21 0756   BP: 114/76   Pulse: 67   Temp: (!) 96 2 °F (35 7 °C)   SpO2: 98%   Weight: 80 9 kg (178 lb 4 oz)   Height: 5' 5" (1 651 m)     Body mass index is 29 66 kg/m²  Physical Exam  Vitals signs and nursing note reviewed  Constitutional:       General: She is not in acute distress  Appearance: Normal appearance  She is not ill-appearing  HENT:      Head: Normocephalic and atraumatic  Mouth/Throat:      Mouth: Mucous membranes are moist    Eyes:      Extraocular Movements: Extraocular movements intact  Conjunctiva/sclera: Conjunctivae normal       Pupils: Pupils are equal, round, and reactive to light  Neck:      Musculoskeletal: Neck supple  Vascular: No carotid bruit  Cardiovascular:      Rate and Rhythm: Normal rate and regular rhythm  Heart sounds: Normal heart sounds  Pulmonary:      Effort: Pulmonary effort is normal  No respiratory distress  Breath sounds: Normal breath sounds  No wheezing or rales  Abdominal:      General: Bowel sounds are normal  There is no distension  Palpations: Abdomen is soft  Tenderness: There is no abdominal tenderness  Musculoskeletal:      Right lower leg: No edema  Left lower leg: No edema  Neurological:      General: No focal deficit present  Mental Status: She is alert and oriented to person, place, and time  Mental status is at baseline  Psychiatric:         Mood and Affect: Mood normal          Behavior: Behavior normal          Thought Content:  Thought content normal          Judgment: Judgment normal

## 2021-01-29 NOTE — PATIENT INSTRUCTIONS

## 2021-02-01 NOTE — PROGRESS NOTES
PT Evaluation     Today's date: 2018  Patient name: Norman Espino  : 1965  MRN: [id number]  Referring provider: Rod Gómez MD  Dx:   Encounter Diagnosis     ICD-10-CM    1  Tear of lateral meniscus of right knee, current, unspecified tear type, initial encounter S83 281A    2  Acute pain of right knee M25 561        Start Time: 1100  Stop Time: 1150  Total time in clinic (min): 50 minutes    Assessment  Assessment details: Pt presents with chief complaint of acute right knee pain and swelling  PT notes the patient with decreased mm strength and ROM/flexibility of the right knee, impaired gait mechanics, decreased stair mobility, decreased standing balance, decreased activity tolerance, and decreased functional mobility  Pt would benefit from skilled PT with emphasis on LE strengthening and ROM/flexibility to improve balance, gait mechanics, stair mobility, activity tolerance, and functional mobility, as well as to decrease pain/swelling and risk of future injury/falls  Prognosis is good with adherence to skilled PT 2-3x/wk and compliance to HEP  Based upon examination, no other referral appears necessary at this time  Impairments: abnormal gait, abnormal or restricted ROM, activity intolerance, impaired balance, impaired physical strength, lacks appropriate home exercise program and pain with function  Understanding of Dx/Px/POC: good   Prognosis: good    Goals  Short Term Goals  1  Pt will decrease pain in the right knee 25-50% in 4 wks  2  Pt will improve right knee ROM equal to left knee in 4 wks  3  Initiate HEP    Long Term Goals  1  Pt will reduce swelling of the right knee equal to left knee in 8 wks  2  Pt will improve right knee and LE strength to 5/5  3  Pt will report no limitations with ADLs in 8 wks  4   Pt will report no limitations with ambulation or stair mobility in 8 wks    Plan  Plan details: Discussed findings of evaluation with the patient, patient agreeable to skilled PT 3x/wk for 4 wks  POC and treatment goals discussed with PT/PTA  Patient would benefit from: PT eval and skilled physical therapy  Referral necessary: No  Planned modality interventions: cryotherapy, unattended electrical stimulation and thermotherapy: hydrocollator packs  Planned therapy interventions: abdominal trunk stabilization, balance, flexibility, functional ROM exercises, gait training, graded exercise, home exercise program, joint mobilization, manual therapy, massage, neuromuscular re-education, patient education, postural training, strengthening, stretching and therapeutic exercise  Frequency: 3x week  Duration in weeks: 8  Treatment plan discussed with: patient and PTA        Subjective Evaluation    History of Present Illness  Mechanism of injury: Pt presents with chief complaint of acute right knee pain and swelling  Reports about 5 weeks ago she was walking down a hallway and felt a pop in her right knee with pain  Reports pain and swelling continued to increase in the right knee, until she went to see MD  Was put on prednisone for about 20 days, but no significant change in sx noted  Pt then went to see Dr Billy Mckeon, x-ray taken showing mild degeneration and MRI showing small oblique tear in lateral meniscus of right knee  Also given cortisone injection and has noticed reduction in swelling  Referred to OPPT  No report of previous injuries or surgeries to the right knee  Reports sx are worse going up the stairs and especially down an incline  Pt is recently retired, but worked as nurse for 25 years     Quality of life: good    Pain  Current pain ratin  At best pain ratin  At worst pain ratin  Location: Right knee   Relieving factors: ice and medications  Aggravating factors: standing, stair climbing and walking  Progression: no change    Social Support  Steps to enter house: yes  5  Stairs in house: yes   13  Lives in: [place] house (13 steps to basement with laundry)    Employment status: not working (Recently retired)  Hand dominance: right      Diagnostic Tests  X-ray: abnormal  MRI studies: abnormal  Treatments  Previous treatment: injection treatment and medication  Current treatment: physical therapy  Patient Goals  Patient goals for therapy: decreased edema, decreased pain, improved balance, increased motion, increased strength, independence with ADLs/IADLs and return to sport/leisure activities          Objective     Palpation     Right Tenderness of the rectus femoris  Tenderness     Right Knee   Tenderness in the lateral joint line, medial joint line and superior patella  Neurological Testing     Sensation     Knee   Left Knee   Intact: light touch    Right Knee   Intact: light touch     Active Range of Motion   Left Knee   Normal active range of motion  Flexion: 135 degrees   Extension: 5 degrees     Right Knee   Flexion: 115 degrees   Extension: 3 degrees     Additional Active Range of Motion Details  Hip and ankle flexibility WNL bilaterally     Mobility   Patellar Mobility:   Left Knee   WFL: medial, lateral, superior and inferior  Right Knee   WFL: medial, lateral, superior and inferior    Patellar Static Positioning   Left Knee: WFL  Right Knee: [facility]    Strength/Myotome Testing     Left Knee   Normal strength  Flexion: 5  Extension: 5  Quadriceps contraction: good    Right Knee   Flexion: 4  Extension: 4-  Quadriceps contraction: good    Additional Strength Details  Pt reports minor pain with resisted hip abd/add on right LE  No pain with resisted knee MMT  Good quadriceps contraction noted bilaterally  Decreased strength of right knee compared to left    Tests     Right Knee   Positive Apley's compression and lateral Eva     Negative anterior drawer, anterior Lachman, Apley's distraction, medial Eva, patellar apprehension, patella-femoral grind, posterior drawer, posterior sag, valgus stress test at 0 degrees, valgus stress test at 30 degrees, varus stress test at 0 degrees and varus stress test at 30 degrees  Swelling     Left Knee Girth Measurement (cm)   Joint line: 37 cm  10 cm above joint line: 44 5 cm  10 cm below joint line: 38 cm    Right Knee Girth Measurement (cm)   Joint line: 39 5 cm  10 cm above joint line: 45 cm  10 cm below joint line: 38 5 cm    Ambulation     Observational Gait   Gait: antalgic   Walking speed and stride length within functional limits  Decreased right stance time, right swing time and right step length  Quality of Movement During Gait     Knee    Knee (Right): Positive stiff knee         Flowsheet Rows      Most Recent Value   PT/OT G-Codes   FOTO information reviewed  N/A   Assessment Type  Evaluation   G code set  Mobility: Walking & Moving Around   Mobility: Walking and Moving Around Current Status ()  CJ   Mobility: Walking and Moving Around Goal Status ()  CI          Precautions Small Lateral Meniscus Tear in Right knee    Specialty Daily Treatment Diary     Manual         Right knee and LE stretching                             Exercise Diary         [facility]        [id number] [facility]        TR/HR        Mini Squats        Fwd/Lat Step Ups        Standing SLR 3-way        Leg/Calf Press        Quad Set        SAQ        LAQ        Heel Slide        Hip Abd         Hip Add        Bridge        SLR                                    Modalities        CP Skyrizi Counseling: I discussed with the patient the risks of risankizumab-rzaa including but not limited to immunosuppression, and serious infections.  The patient understands that monitoring is required including a PPD at baseline and must alert us or the primary physician if symptoms of infection or other concerning signs are noted.

## 2021-03-05 ENCOUNTER — TRANSCRIBE ORDERS (OUTPATIENT)
Dept: LAB | Facility: CLINIC | Age: 56
End: 2021-03-05

## 2021-03-05 ENCOUNTER — LAB (OUTPATIENT)
Dept: LAB | Facility: CLINIC | Age: 56
End: 2021-03-05
Payer: COMMERCIAL

## 2021-03-05 DIAGNOSIS — L40.50 PSORIATIC ARTHRITIS (HCC): Primary | ICD-10-CM

## 2021-03-05 DIAGNOSIS — M47.816 LUMBAR SPONDYLOSIS: ICD-10-CM

## 2021-03-05 DIAGNOSIS — I10 BENIGN ESSENTIAL HYPERTENSION: ICD-10-CM

## 2021-03-05 DIAGNOSIS — Z13.220 SCREENING FOR LIPID DISORDERS: ICD-10-CM

## 2021-03-05 DIAGNOSIS — Z11.4 SCREENING FOR HIV (HUMAN IMMUNODEFICIENCY VIRUS): ICD-10-CM

## 2021-03-05 DIAGNOSIS — E53.8 FOLATE DEFICIENCY: ICD-10-CM

## 2021-03-05 DIAGNOSIS — M17.0 BILATERAL PRIMARY OSTEOARTHRITIS OF KNEE: ICD-10-CM

## 2021-03-05 DIAGNOSIS — L40.50 PSORIATIC ARTHRITIS (HCC): ICD-10-CM

## 2021-03-05 DIAGNOSIS — R76.8 ELEVATED ANTINUCLEAR ANTIBODY (ANA) LEVEL: ICD-10-CM

## 2021-03-05 DIAGNOSIS — E55.9 VITAMIN D DEFICIENCY: ICD-10-CM

## 2021-03-05 LAB
25(OH)D3 SERPL-MCNC: 34.5 NG/ML (ref 30–100)
ALBUMIN SERPL BCP-MCNC: 3.6 G/DL (ref 3.5–5)
ALP SERPL-CCNC: 107 U/L (ref 46–116)
ALT SERPL W P-5'-P-CCNC: 25 U/L (ref 12–78)
ANION GAP SERPL CALCULATED.3IONS-SCNC: 5 MMOL/L (ref 4–13)
AST SERPL W P-5'-P-CCNC: 25 U/L (ref 5–45)
BASOPHILS # BLD AUTO: 0.03 THOUSANDS/ΜL (ref 0–0.1)
BASOPHILS NFR BLD AUTO: 1 % (ref 0–1)
BILIRUB SERPL-MCNC: 0.52 MG/DL (ref 0.2–1)
BUN SERPL-MCNC: 11 MG/DL (ref 5–25)
CALCIUM SERPL-MCNC: 9.7 MG/DL (ref 8.3–10.1)
CHLORIDE SERPL-SCNC: 109 MMOL/L (ref 100–108)
CO2 SERPL-SCNC: 28 MMOL/L (ref 21–32)
CREAT SERPL-MCNC: 0.86 MG/DL (ref 0.6–1.3)
CRP SERPL QL: <3 MG/L
EOSINOPHIL # BLD AUTO: 0.12 THOUSAND/ΜL (ref 0–0.61)
EOSINOPHIL NFR BLD AUTO: 2 % (ref 0–6)
ERYTHROCYTE [DISTWIDTH] IN BLOOD BY AUTOMATED COUNT: 13.5 % (ref 11.6–15.1)
ERYTHROCYTE [SEDIMENTATION RATE] IN BLOOD: 21 MM/HOUR (ref 0–29)
FOLATE SERPL-MCNC: 15.6 NG/ML (ref 3.1–17.5)
GFR SERPL CREATININE-BSD FRML MDRD: 76 ML/MIN/1.73SQ M
GLUCOSE P FAST SERPL-MCNC: 87 MG/DL (ref 65–99)
HCT VFR BLD AUTO: 42.9 % (ref 34.8–46.1)
HGB BLD-MCNC: 13.7 G/DL (ref 11.5–15.4)
IMM GRANULOCYTES # BLD AUTO: 0.01 THOUSAND/UL (ref 0–0.2)
IMM GRANULOCYTES NFR BLD AUTO: 0 % (ref 0–2)
LDLC SERPL DIRECT ASSAY-MCNC: 119 MG/DL (ref 0–100)
LYMPHOCYTES # BLD AUTO: 2.17 THOUSANDS/ΜL (ref 0.6–4.47)
LYMPHOCYTES NFR BLD AUTO: 33 % (ref 14–44)
MCH RBC QN AUTO: 28.8 PG (ref 26.8–34.3)
MCHC RBC AUTO-ENTMCNC: 31.9 G/DL (ref 31.4–37.4)
MCV RBC AUTO: 90 FL (ref 82–98)
MONOCYTES # BLD AUTO: 0.9 THOUSAND/ΜL (ref 0.17–1.22)
MONOCYTES NFR BLD AUTO: 14 % (ref 4–12)
NEUTROPHILS # BLD AUTO: 3.31 THOUSANDS/ΜL (ref 1.85–7.62)
NEUTS SEG NFR BLD AUTO: 50 % (ref 43–75)
NRBC BLD AUTO-RTO: 0 /100 WBCS
PLATELET # BLD AUTO: 335 THOUSANDS/UL (ref 149–390)
PMV BLD AUTO: 10.3 FL (ref 8.9–12.7)
POTASSIUM SERPL-SCNC: 3.9 MMOL/L (ref 3.5–5.3)
PROT SERPL-MCNC: 7.6 G/DL (ref 6.4–8.2)
RBC # BLD AUTO: 4.76 MILLION/UL (ref 3.81–5.12)
SODIUM SERPL-SCNC: 142 MMOL/L (ref 136–145)
TRIGL SERPL-MCNC: 61 MG/DL
VIT B12 SERPL-MCNC: 375 PG/ML (ref 100–900)
WBC # BLD AUTO: 6.54 THOUSAND/UL (ref 4.31–10.16)

## 2021-03-05 PROCEDURE — 85652 RBC SED RATE AUTOMATED: CPT

## 2021-03-05 PROCEDURE — 82607 VITAMIN B-12: CPT

## 2021-03-05 PROCEDURE — 86140 C-REACTIVE PROTEIN: CPT

## 2021-03-05 PROCEDURE — 80053 COMPREHEN METABOLIC PANEL: CPT

## 2021-03-05 PROCEDURE — 36415 COLL VENOUS BLD VENIPUNCTURE: CPT

## 2021-03-05 PROCEDURE — 82746 ASSAY OF FOLIC ACID SERUM: CPT

## 2021-03-05 PROCEDURE — 82306 VITAMIN D 25 HYDROXY: CPT

## 2021-03-05 PROCEDURE — 83721 ASSAY OF BLOOD LIPOPROTEIN: CPT

## 2021-03-05 PROCEDURE — 84478 ASSAY OF TRIGLYCERIDES: CPT

## 2021-03-05 PROCEDURE — 87389 HIV-1 AG W/HIV-1&-2 AB AG IA: CPT

## 2021-03-05 PROCEDURE — 85025 COMPLETE CBC W/AUTO DIFF WBC: CPT

## 2021-03-08 LAB — HIV 1+2 AB+HIV1 P24 AG SERPL QL IA: NORMAL

## 2021-06-04 ENCOUNTER — OFFICE VISIT (OUTPATIENT)
Dept: INTERNAL MEDICINE CLINIC | Facility: CLINIC | Age: 56
End: 2021-06-04
Payer: COMMERCIAL

## 2021-06-04 VITALS
HEART RATE: 67 BPM | DIASTOLIC BLOOD PRESSURE: 76 MMHG | HEIGHT: 65 IN | WEIGHT: 181.13 LBS | OXYGEN SATURATION: 98 % | TEMPERATURE: 96.2 F | SYSTOLIC BLOOD PRESSURE: 120 MMHG | BODY MASS INDEX: 30.18 KG/M2

## 2021-06-04 DIAGNOSIS — F41.1 GENERALIZED ANXIETY DISORDER: ICD-10-CM

## 2021-06-04 DIAGNOSIS — Z12.31 ENCOUNTER FOR SCREENING MAMMOGRAM FOR MALIGNANT NEOPLASM OF BREAST: ICD-10-CM

## 2021-06-04 DIAGNOSIS — J45.20 MILD INTERMITTENT ASTHMA WITHOUT COMPLICATION: ICD-10-CM

## 2021-06-04 DIAGNOSIS — I10 BENIGN ESSENTIAL HYPERTENSION: Primary | ICD-10-CM

## 2021-06-04 DIAGNOSIS — M85.80 OSTEOPENIA, UNSPECIFIED LOCATION: ICD-10-CM

## 2021-06-04 DIAGNOSIS — G43.B0 OPHTHALMOPLEGIC MIGRAINE, NOT INTRACTABLE: ICD-10-CM

## 2021-06-04 DIAGNOSIS — L40.50 PSORIATIC ARTHRITIS (HCC): ICD-10-CM

## 2021-06-04 DIAGNOSIS — K21.9 GERD WITHOUT ESOPHAGITIS: ICD-10-CM

## 2021-06-04 DIAGNOSIS — J30.9 ALLERGIC RHINITIS, UNSPECIFIED SEASONALITY, UNSPECIFIED TRIGGER: ICD-10-CM

## 2021-06-04 DIAGNOSIS — E55.9 VITAMIN D DEFICIENCY: ICD-10-CM

## 2021-06-04 PROCEDURE — 3008F BODY MASS INDEX DOCD: CPT | Performed by: INTERNAL MEDICINE

## 2021-06-04 PROCEDURE — 1036F TOBACCO NON-USER: CPT | Performed by: INTERNAL MEDICINE

## 2021-06-04 PROCEDURE — 3074F SYST BP LT 130 MM HG: CPT | Performed by: INTERNAL MEDICINE

## 2021-06-04 PROCEDURE — 3078F DIAST BP <80 MM HG: CPT | Performed by: INTERNAL MEDICINE

## 2021-06-04 PROCEDURE — 99214 OFFICE O/P EST MOD 30 MIN: CPT | Performed by: INTERNAL MEDICINE

## 2021-06-04 RX ORDER — OMEPRAZOLE 40 MG/1
40 CAPSULE, DELAYED RELEASE ORAL
Qty: 90 CAPSULE | Refills: 3 | Status: SHIPPED | OUTPATIENT
Start: 2021-06-04 | End: 2022-04-25

## 2021-06-04 RX ORDER — APREMILAST 30 MG/1
1 TABLET, FILM COATED ORAL DAILY
COMMUNITY
End: 2022-02-09

## 2021-06-04 RX ORDER — ALBUTEROL SULFATE 90 UG/1
2 AEROSOL, METERED RESPIRATORY (INHALATION) EVERY 6 HOURS PRN
Qty: 6.7 G | Refills: 1 | Status: SHIPPED | OUTPATIENT
Start: 2021-06-04 | End: 2021-07-19

## 2021-06-04 NOTE — PROGRESS NOTES
Assessment/Plan:  Problem List Items Addressed This Visit        Digestive    GERD without esophagitis    Relevant Medications    omeprazole (PriLOSEC) 40 MG capsule       Respiratory    Asthma    Relevant Medications    albuterol (PROVENTIL HFA,VENTOLIN HFA) 90 mcg/act inhaler    Allergic rhinitis    Relevant Medications    Apremilast (Otezla) 30 MG TABS       Cardiovascular and Mediastinum    Ophthalmoplegic migraine headache    Relevant Medications    Apremilast (Otezla) 30 MG TABS    Benign essential hypertension - Primary       Musculoskeletal and Integument    Psoriatic arthritis (HCC)    Relevant Medications    Apremilast (Otezla) 30 MG TABS    Osteopenia       Other    Vitamin D deficiency    Generalized anxiety disorder      Other Visit Diagnoses     Encounter for screening mammogram for malignant neoplasm of breast        Relevant Orders    Mammo screening bilateral w 3d & cad           Diagnoses and all orders for this visit:    Benign essential hypertension    Encounter for screening mammogram for malignant neoplasm of breast  -     Mammo screening bilateral w 3d & cad; Future    Mild intermittent asthma without complication  -     albuterol (PROVENTIL HFA,VENTOLIN HFA) 90 mcg/act inhaler; Inhale 2 puffs every 6 (six) hours as needed for wheezing or shortness of breath    GERD without esophagitis  -     omeprazole (PriLOSEC) 40 MG capsule; Take 1 capsule (40 mg total) by mouth daily before breakfast    Ophthalmoplegic migraine, not intractable    Osteopenia, unspecified location    Psoriatic arthritis ([facility] [address] )    Generalized anxiety disorder    Vitamin D deficiency    Allergic rhinitis, unspecified seasonality, unspecified trigger    Other orders  -     Apremilast (Otezla) 30 MG TABS; Take 1 tablet by mouth 2 (two) times a day        No problem-specific Assessment & Plan notes found for this encounter  A/P: Doing ok and will check labs  Order mammo   Discussed trying singulair or kenalog for RAD and allergies, but declines  Worried about the breakthrough GERD  Will start PPI and if persists, will need an EGD  Continue current treatment and RTC four months for routine  Keep f/u with rheum  Will call in several weeks for for an update  Subjective:      Patient ID: Ez Workman is a 63 y o  female  WF RTC for f/u htn, GERD, etc  Doing well and no new issues  Remains active w/o difficulty and no falls  Allergies and asthma flaring  Using OTC meds  Chronic pain is manageable  Psoriatic arthritis is still bad and Rheum is changing meds  Janae Nissen RYNE is good  Reflus is worse and is taking pepcid  Due for  mammo  The following portions of the patient's history were reviewed and updated as appropriate:   She has a past medical history of Asthma, Generalized anxiety disorder, GERD (gastroesophageal reflux disease), Hemangioma of other sites, Hypertension, Psoriatic arthritis ([facility] [address] ), and Psoriatic arthritis ([facility] [address] )  ,  does not have any pertinent problems on file  ,   has a past surgical history that includes Appendectomy; Cholecystectomy; Foot surgery; Hysterectomy; Kidney surgery; Tonsillectomy; Tubal ligation; Knee surgery; and Spinal fusion  ,  family history includes Heart attack in her paternal grandfather; Hypertension in her maternal grandmother and paternal grandfather; Pancreatic cancer in her paternal grandfather  ,   reports that she has never smoked  She has never used smokeless tobacco  She reports current alcohol use  She reports that she does not use drugs  ,  is allergic to cetirizine; diclofenac-misoprostol; indomethacin; and sulfamethoxazole-trimethoprim     Current Outpatient Medications   Medication Sig Dispense Refill    ALPRAZolam (XANAX) 0 25 mg tablet TAKE 1 TABLET (0 25 MG TOTAL) BY MOUTH 3 (THREE) TIMES A DAY AS NEEDED FOR ANXIETY 90 tablet 0    Apremilast (Otezla) 30 MG TABS Take 1 tablet by mouth 2 (two) times a day      diazepam (VALIUM) 5 mg tablet TAKE EVERY 4-6 HOURS PRN PAIN      famotidine (PEPCID) 20 mg tablet daily      metoprolol succinate (TOPROL-XL) 25 mg 24 hr tablet TAKE 1 TABLET (25 MG TOTAL) BY MOUTH DAILY 90 tablet 3    albuterol (PROVENTIL HFA,VENTOLIN HFA) 90 mcg/act inhaler Inhale 2 puffs every 6 (six) hours as needed for wheezing or shortness of breath 6 7 g 1    gabapentin (NEURONTIN) 100 mg capsule Take 1 capsule (100 mg total) by mouth 3 (three) times a day 90 capsule 5    omeprazole (PriLOSEC) 40 MG capsule Take 1 capsule (40 mg total) by mouth daily before breakfast 90 capsule 3     No current facility-administered medications for this visit  Review of Systems   Constitutional: Negative for activity change, chills, diaphoresis, fatigue and fever  HENT: Positive for congestion, postnasal drip and sneezing  Negative for ear discharge, ear pain, rhinorrhea, sinus pressure, sinus pain and sore throat  Eyes: Negative for visual disturbance  Respiratory: Positive for chest tightness, shortness of breath and wheezing  Negative for cough  Cardiovascular: Negative for chest pain, palpitations and leg swelling  Gastrointestinal: Negative for abdominal pain, constipation, diarrhea, nausea and vomiting  Reflux   Endocrine: Negative for cold intolerance and heat intolerance  Genitourinary: Negative for difficulty urinating, dysuria and frequency  Musculoskeletal: Positive for arthralgias  Negative for gait problem, joint swelling and myalgias  Neurological: Negative for dizziness, seizures, syncope, weakness, light-headedness and headaches  Psychiatric/Behavioral: Negative for confusion, dysphoric mood and sleep disturbance  The patient is not nervous/anxious          PHQ-9 Depression Screening    PHQ-9:   Frequency of the following problems over the past two weeks:            Objective:  Vitals:    06/04/21 0754   BP: 120/76   Pulse: 67   Temp: (!) 96 2 °F (35 7 °C)   SpO2: 98%   Weight: 82 2 kg (181 lb 2 oz)   Height: 5' 5" (1 651 m)     Body mass index is 30 14 kg/m²  Physical Exam  Vitals signs and nursing note reviewed  Constitutional:       General: She is not in acute distress  Appearance: Normal appearance  She is not ill-appearing  HENT:      Head: Normocephalic and atraumatic  Mouth/Throat:      Mouth: Mucous membranes are moist    Eyes:      Extraocular Movements: Extraocular movements intact  Conjunctiva/sclera: Conjunctivae normal       Pupils: Pupils are equal, round, and reactive to light  Neck:      Musculoskeletal: Neck supple  Vascular: No carotid bruit  Cardiovascular:      Rate and Rhythm: Normal rate and regular rhythm  Heart sounds: Normal heart sounds  Pulmonary:      Effort: Pulmonary effort is normal  No respiratory distress  Breath sounds: Normal breath sounds  No wheezing or rales  Abdominal:      General: Bowel sounds are normal  There is no distension  Palpations: Abdomen is soft  Tenderness: There is no abdominal tenderness  Musculoskeletal:      Right lower leg: No edema  Left lower leg: No edema  Neurological:      General: No focal deficit present  Mental Status: She is alert and oriented to person, place, and time  Mental status is at baseline  Psychiatric:         Mood and Affect: Mood normal          Behavior: Behavior normal          Thought Content:  Thought content normal          Judgment: Judgment normal

## 2021-06-04 NOTE — PATIENT INSTRUCTIONS

## 2021-06-21 ENCOUNTER — VBI (OUTPATIENT)
Dept: ADMINISTRATIVE | Facility: OTHER | Age: 56
End: 2021-06-21

## 2021-07-02 ENCOUNTER — TELEPHONE (OUTPATIENT)
Dept: INTERNAL MEDICINE CLINIC | Facility: CLINIC | Age: 56
End: 2021-07-02

## 2021-07-02 NOTE — TELEPHONE ENCOUNTER
----- Message from Ton Robertson DO sent at 6/4/2021  8:08 AM EDT -----  Call pt in four weeks and get update on asthma, allergies, and heartburn

## 2021-07-09 ENCOUNTER — APPOINTMENT (OUTPATIENT)
Dept: LAB | Facility: CLINIC | Age: 56
End: 2021-07-09
Payer: COMMERCIAL

## 2021-07-09 DIAGNOSIS — L40.50 PSORIATIC ARTHRITIS (HCC): ICD-10-CM

## 2021-07-09 DIAGNOSIS — L40.9 PSORIASIS: ICD-10-CM

## 2021-07-09 LAB
ALBUMIN SERPL BCP-MCNC: 3.5 G/DL (ref 3.5–5)
ALP SERPL-CCNC: 103 U/L (ref 46–116)
ALT SERPL W P-5'-P-CCNC: 26 U/L (ref 12–78)
ANION GAP SERPL CALCULATED.3IONS-SCNC: 4 MMOL/L (ref 4–13)
AST SERPL W P-5'-P-CCNC: 20 U/L (ref 5–45)
BASOPHILS # BLD AUTO: 0.09 THOUSANDS/ΜL (ref 0–0.1)
BASOPHILS NFR BLD AUTO: 1 % (ref 0–1)
BILIRUB SERPL-MCNC: 0.46 MG/DL (ref 0.2–1)
BUN SERPL-MCNC: 8 MG/DL (ref 5–25)
CALCIUM SERPL-MCNC: 9.8 MG/DL (ref 8.3–10.1)
CHLORIDE SERPL-SCNC: 108 MMOL/L (ref 100–108)
CO2 SERPL-SCNC: 27 MMOL/L (ref 21–32)
CREAT SERPL-MCNC: 0.88 MG/DL (ref 0.6–1.3)
CRP SERPL QL: <3 MG/L
EOSINOPHIL # BLD AUTO: 0.28 THOUSAND/ΜL (ref 0–0.61)
EOSINOPHIL NFR BLD AUTO: 3 % (ref 0–6)
ERYTHROCYTE [DISTWIDTH] IN BLOOD BY AUTOMATED COUNT: 13.3 % (ref 11.6–15.1)
ERYTHROCYTE [SEDIMENTATION RATE] IN BLOOD: 32 MM/HOUR (ref 0–29)
GFR SERPL CREATININE-BSD FRML MDRD: 74 ML/MIN/1.73SQ M
GLUCOSE P FAST SERPL-MCNC: 93 MG/DL (ref 65–99)
HCT VFR BLD AUTO: 42.6 % (ref 34.8–46.1)
HGB BLD-MCNC: 13.2 G/DL (ref 11.5–15.4)
IMM GRANULOCYTES # BLD AUTO: 0.02 THOUSAND/UL (ref 0–0.2)
IMM GRANULOCYTES NFR BLD AUTO: 0 % (ref 0–2)
LYMPHOCYTES # BLD AUTO: 3.16 THOUSANDS/ΜL (ref 0.6–4.47)
LYMPHOCYTES NFR BLD AUTO: 37 % (ref 14–44)
MCH RBC QN AUTO: 28.2 PG (ref 26.8–34.3)
MCHC RBC AUTO-ENTMCNC: 31 G/DL (ref 31.4–37.4)
MCV RBC AUTO: 91 FL (ref 82–98)
MONOCYTES # BLD AUTO: 0.64 THOUSAND/ΜL (ref 0.17–1.22)
MONOCYTES NFR BLD AUTO: 8 % (ref 4–12)
NEUTROPHILS # BLD AUTO: 4.25 THOUSANDS/ΜL (ref 1.85–7.62)
NEUTS SEG NFR BLD AUTO: 51 % (ref 43–75)
NRBC BLD AUTO-RTO: 0 /100 WBCS
PLATELET # BLD AUTO: 468 THOUSANDS/UL (ref 149–390)
PMV BLD AUTO: 9.9 FL (ref 8.9–12.7)
POTASSIUM SERPL-SCNC: 4.3 MMOL/L (ref 3.5–5.3)
PROT SERPL-MCNC: 7.3 G/DL (ref 6.4–8.2)
RBC # BLD AUTO: 4.68 MILLION/UL (ref 3.81–5.12)
SODIUM SERPL-SCNC: 139 MMOL/L (ref 136–145)
WBC # BLD AUTO: 8.44 THOUSAND/UL (ref 4.31–10.16)

## 2021-07-09 PROCEDURE — 86140 C-REACTIVE PROTEIN: CPT

## 2021-07-09 PROCEDURE — 80053 COMPREHEN METABOLIC PANEL: CPT

## 2021-07-09 PROCEDURE — 85025 COMPLETE CBC W/AUTO DIFF WBC: CPT

## 2021-07-09 PROCEDURE — 36415 COLL VENOUS BLD VENIPUNCTURE: CPT

## 2021-07-09 PROCEDURE — 85652 RBC SED RATE AUTOMATED: CPT

## 2021-08-09 ENCOUNTER — VBI (OUTPATIENT)
Dept: ADMINISTRATIVE | Facility: OTHER | Age: 56
End: 2021-08-09

## 2021-09-25 DIAGNOSIS — Z13.220 SCREENING FOR LIPID DISORDERS: ICD-10-CM

## 2021-09-25 DIAGNOSIS — E55.9 VITAMIN D DEFICIENCY: Primary | ICD-10-CM

## 2021-09-25 DIAGNOSIS — I10 HYPERTENSION, UNSPECIFIED TYPE: ICD-10-CM

## 2021-09-25 DIAGNOSIS — F41.1 GENERALIZED ANXIETY DISORDER: ICD-10-CM

## 2021-09-28 ENCOUNTER — APPOINTMENT (OUTPATIENT)
Dept: LAB | Facility: CLINIC | Age: 56
End: 2021-09-28
Payer: COMMERCIAL

## 2021-09-28 DIAGNOSIS — M47.816 LUMBAR SPONDYLOSIS: ICD-10-CM

## 2021-09-28 DIAGNOSIS — F41.1 GENERALIZED ANXIETY DISORDER: ICD-10-CM

## 2021-09-28 DIAGNOSIS — R76.8 ELEVATED ANTINUCLEAR ANTIBODY (ANA) LEVEL: ICD-10-CM

## 2021-09-28 DIAGNOSIS — Z13.220 SCREENING FOR LIPID DISORDERS: ICD-10-CM

## 2021-09-28 DIAGNOSIS — L40.50 PSORIATIC ARTHRITIS (HCC): ICD-10-CM

## 2021-09-28 DIAGNOSIS — M17.0 BILATERAL PRIMARY OSTEOARTHRITIS OF KNEE: ICD-10-CM

## 2021-09-28 DIAGNOSIS — L40.9 PSORIASIS: ICD-10-CM

## 2021-09-28 LAB
ALBUMIN SERPL BCP-MCNC: 3.6 G/DL (ref 3.5–5)
ALP SERPL-CCNC: 123 U/L (ref 46–116)
ALT SERPL W P-5'-P-CCNC: 21 U/L (ref 12–78)
ANION GAP SERPL CALCULATED.3IONS-SCNC: 2 MMOL/L (ref 4–13)
AST SERPL W P-5'-P-CCNC: 18 U/L (ref 5–45)
BASOPHILS # BLD AUTO: 0.06 THOUSANDS/ΜL (ref 0–0.1)
BASOPHILS NFR BLD AUTO: 1 % (ref 0–1)
BILIRUB SERPL-MCNC: 0.36 MG/DL (ref 0.2–1)
BUN SERPL-MCNC: 13 MG/DL (ref 5–25)
CALCIUM SERPL-MCNC: 9.8 MG/DL (ref 8.3–10.1)
CHLORIDE SERPL-SCNC: 108 MMOL/L (ref 100–108)
CO2 SERPL-SCNC: 27 MMOL/L (ref 21–32)
CREAT SERPL-MCNC: 0.87 MG/DL (ref 0.6–1.3)
CRP SERPL QL: <3 MG/L
EOSINOPHIL # BLD AUTO: 0.23 THOUSAND/ΜL (ref 0–0.61)
EOSINOPHIL NFR BLD AUTO: 3 % (ref 0–6)
ERYTHROCYTE [DISTWIDTH] IN BLOOD BY AUTOMATED COUNT: 13.8 % (ref 11.6–15.1)
ERYTHROCYTE [SEDIMENTATION RATE] IN BLOOD: 29 MM/HOUR (ref 0–29)
GFR SERPL CREATININE-BSD FRML MDRD: 74 ML/MIN/1.73SQ M
GLUCOSE P FAST SERPL-MCNC: 93 MG/DL (ref 65–99)
HCT VFR BLD AUTO: 43.1 % (ref 34.8–46.1)
HGB BLD-MCNC: 13.5 G/DL (ref 11.5–15.4)
IMM GRANULOCYTES # BLD AUTO: 0.01 THOUSAND/UL (ref 0–0.2)
IMM GRANULOCYTES NFR BLD AUTO: 0 % (ref 0–2)
LYMPHOCYTES # BLD AUTO: 3.42 THOUSANDS/ΜL (ref 0.6–4.47)
LYMPHOCYTES NFR BLD AUTO: 42 % (ref 14–44)
MCH RBC QN AUTO: 28.1 PG (ref 26.8–34.3)
MCHC RBC AUTO-ENTMCNC: 31.3 G/DL (ref 31.4–37.4)
MCV RBC AUTO: 90 FL (ref 82–98)
MONOCYTES # BLD AUTO: 0.52 THOUSAND/ΜL (ref 0.17–1.22)
MONOCYTES NFR BLD AUTO: 6 % (ref 4–12)
NEUTROPHILS # BLD AUTO: 3.89 THOUSANDS/ΜL (ref 1.85–7.62)
NEUTS SEG NFR BLD AUTO: 48 % (ref 43–75)
NRBC BLD AUTO-RTO: 0 /100 WBCS
PLATELET # BLD AUTO: 431 THOUSANDS/UL (ref 149–390)
PMV BLD AUTO: 9.7 FL (ref 8.9–12.7)
POTASSIUM SERPL-SCNC: 4.1 MMOL/L (ref 3.5–5.3)
PROT SERPL-MCNC: 7.8 G/DL (ref 6.4–8.2)
RBC # BLD AUTO: 4.81 MILLION/UL (ref 3.81–5.12)
SODIUM SERPL-SCNC: 137 MMOL/L (ref 136–145)
WBC # BLD AUTO: 8.13 THOUSAND/UL (ref 4.31–10.16)

## 2021-09-28 PROCEDURE — 85025 COMPLETE CBC W/AUTO DIFF WBC: CPT

## 2021-09-28 PROCEDURE — 80061 LIPID PANEL: CPT

## 2021-09-28 PROCEDURE — 80053 COMPREHEN METABOLIC PANEL: CPT

## 2021-09-28 PROCEDURE — 36415 COLL VENOUS BLD VENIPUNCTURE: CPT

## 2021-09-28 PROCEDURE — 84443 ASSAY THYROID STIM HORMONE: CPT

## 2021-09-28 PROCEDURE — 85652 RBC SED RATE AUTOMATED: CPT

## 2021-09-28 PROCEDURE — 86140 C-REACTIVE PROTEIN: CPT

## 2021-09-29 LAB
CHOLEST SERPL-MCNC: 222 MG/DL (ref 50–200)
HDLC SERPL-MCNC: 50 MG/DL
LDLC SERPL CALC-MCNC: 153 MG/DL (ref 0–100)
TRIGL SERPL-MCNC: 93 MG/DL
TSH SERPL DL<=0.05 MIU/L-ACNC: 2.81 UIU/ML (ref 0.36–3.74)

## 2021-10-01 ENCOUNTER — RA CDI HCC (OUTPATIENT)
Dept: OTHER | Facility: HOSPITAL | Age: 56
End: 2021-10-01

## 2021-10-07 ENCOUNTER — OFFICE VISIT (OUTPATIENT)
Dept: INTERNAL MEDICINE CLINIC | Facility: CLINIC | Age: 56
End: 2021-10-07
Payer: COMMERCIAL

## 2021-10-07 ENCOUNTER — TELEPHONE (OUTPATIENT)
Dept: INTERNAL MEDICINE CLINIC | Facility: CLINIC | Age: 56
End: 2021-10-07

## 2021-10-07 ENCOUNTER — APPOINTMENT (OUTPATIENT)
Dept: LAB | Facility: CLINIC | Age: 56
End: 2021-10-07
Payer: COMMERCIAL

## 2021-10-07 VITALS
HEART RATE: 72 BPM | RESPIRATION RATE: 14 BRPM | TEMPERATURE: 98.7 F | BODY MASS INDEX: 29.82 KG/M2 | SYSTOLIC BLOOD PRESSURE: 104 MMHG | HEIGHT: 65 IN | WEIGHT: 179 LBS | DIASTOLIC BLOOD PRESSURE: 66 MMHG | OXYGEN SATURATION: 98 %

## 2021-10-07 DIAGNOSIS — M85.80 OSTEOPENIA, UNSPECIFIED LOCATION: ICD-10-CM

## 2021-10-07 DIAGNOSIS — Z13.31 NEGATIVE DEPRESSION SCREENING: ICD-10-CM

## 2021-10-07 DIAGNOSIS — D84.9 IMMUNOSUPPRESSION (HCC): ICD-10-CM

## 2021-10-07 DIAGNOSIS — G47.9 SLEEP DISORDER: ICD-10-CM

## 2021-10-07 DIAGNOSIS — K21.9 GERD WITHOUT ESOPHAGITIS: ICD-10-CM

## 2021-10-07 DIAGNOSIS — F41.1 GENERALIZED ANXIETY DISORDER: ICD-10-CM

## 2021-10-07 DIAGNOSIS — J45.20 MILD INTERMITTENT ASTHMA WITHOUT COMPLICATION: ICD-10-CM

## 2021-10-07 DIAGNOSIS — Z23 ENCOUNTER FOR VACCINATION: ICD-10-CM

## 2021-10-07 DIAGNOSIS — L40.50 PSORIATIC ARTHRITIS (HCC): ICD-10-CM

## 2021-10-07 DIAGNOSIS — E55.9 VITAMIN D DEFICIENCY: ICD-10-CM

## 2021-10-07 DIAGNOSIS — I10 BENIGN ESSENTIAL HYPERTENSION: Primary | ICD-10-CM

## 2021-10-07 LAB
CREAT UR-MCNC: 207 MG/DL
MICROALBUMIN UR-MCNC: 15.1 MG/L (ref 0–20)
MICROALBUMIN/CREAT 24H UR: 7 MG/G CREATININE (ref 0–30)

## 2021-10-07 PROCEDURE — 3008F BODY MASS INDEX DOCD: CPT | Performed by: INTERNAL MEDICINE

## 2021-10-07 PROCEDURE — 99214 OFFICE O/P EST MOD 30 MIN: CPT | Performed by: INTERNAL MEDICINE

## 2021-10-07 PROCEDURE — 1036F TOBACCO NON-USER: CPT | Performed by: INTERNAL MEDICINE

## 2021-10-07 PROCEDURE — 3725F SCREEN DEPRESSION PERFORMED: CPT | Performed by: INTERNAL MEDICINE

## 2021-10-07 PROCEDURE — 3078F DIAST BP <80 MM HG: CPT | Performed by: INTERNAL MEDICINE

## 2021-10-07 PROCEDURE — 90471 IMMUNIZATION ADMIN: CPT | Performed by: INTERNAL MEDICINE

## 2021-10-07 PROCEDURE — 82043 UR ALBUMIN QUANTITATIVE: CPT | Performed by: INTERNAL MEDICINE

## 2021-10-07 PROCEDURE — 90682 RIV4 VACC RECOMBINANT DNA IM: CPT

## 2021-10-07 PROCEDURE — 3074F SYST BP LT 130 MM HG: CPT | Performed by: INTERNAL MEDICINE

## 2021-10-07 PROCEDURE — 82570 ASSAY OF URINE CREATININE: CPT | Performed by: INTERNAL MEDICINE

## 2021-10-07 RX ORDER — DULOXETIN HYDROCHLORIDE 30 MG/1
30 CAPSULE, DELAYED RELEASE ORAL DAILY
COMMUNITY
Start: 2021-10-04 | End: 2021-11-03 | Stop reason: ALTCHOICE

## 2021-10-07 RX ORDER — LORATADINE 10 MG/1
TABLET ORAL DAILY PRN
COMMUNITY

## 2021-10-07 RX ORDER — PREDNISONE 2.5 MG
7.5 TABLET ORAL AS NEEDED
COMMUNITY
Start: 2021-06-22

## 2021-10-07 RX ORDER — ALPRAZOLAM 0.25 MG/1
0.25 TABLET ORAL 3 TIMES DAILY PRN
Qty: 90 TABLET | Refills: 0 | Status: SHIPPED | OUTPATIENT
Start: 2021-10-07

## 2021-10-07 RX ORDER — TRAZODONE HYDROCHLORIDE 50 MG/1
50 TABLET ORAL
Qty: 90 TABLET | Refills: 3 | Status: SHIPPED | OUTPATIENT
Start: 2021-10-07 | End: 2022-08-07

## 2021-10-07 RX ORDER — DIAZEPAM 5 MG/1
5 TABLET ORAL EVERY 6 HOURS PRN
Qty: 30 TABLET | Refills: 0 | Status: SHIPPED | OUTPATIENT
Start: 2021-10-07 | End: 2022-05-02 | Stop reason: SDUPTHER

## 2021-10-21 ENCOUNTER — VBI (OUTPATIENT)
Dept: ADMINISTRATIVE | Facility: OTHER | Age: 56
End: 2021-10-21

## 2021-12-08 ENCOUNTER — VBI (OUTPATIENT)
Dept: ADMINISTRATIVE | Facility: OTHER | Age: 56
End: 2021-12-08

## 2021-12-15 ENCOUNTER — VBI (OUTPATIENT)
Dept: ADMINISTRATIVE | Facility: OTHER | Age: 56
End: 2021-12-15

## 2021-12-16 ENCOUNTER — VBI (OUTPATIENT)
Dept: ADMINISTRATIVE | Facility: OTHER | Age: 56
End: 2021-12-16

## 2022-02-09 ENCOUNTER — OFFICE VISIT (OUTPATIENT)
Dept: INTERNAL MEDICINE CLINIC | Facility: CLINIC | Age: 57
End: 2022-02-09
Payer: COMMERCIAL

## 2022-02-09 VITALS
SYSTOLIC BLOOD PRESSURE: 118 MMHG | DIASTOLIC BLOOD PRESSURE: 82 MMHG | BODY MASS INDEX: 30.49 KG/M2 | WEIGHT: 183 LBS | HEART RATE: 69 BPM | RESPIRATION RATE: 12 BRPM | OXYGEN SATURATION: 99 % | HEIGHT: 65 IN | TEMPERATURE: 99 F

## 2022-02-09 DIAGNOSIS — L40.50 PSORIATIC ARTHRITIS (HCC): ICD-10-CM

## 2022-02-09 DIAGNOSIS — I10 HYPERTENSION, UNSPECIFIED TYPE: ICD-10-CM

## 2022-02-09 DIAGNOSIS — M85.80 OSTEOPENIA, UNSPECIFIED LOCATION: ICD-10-CM

## 2022-02-09 DIAGNOSIS — E55.9 VITAMIN D DEFICIENCY: ICD-10-CM

## 2022-02-09 DIAGNOSIS — F41.1 GENERALIZED ANXIETY DISORDER: ICD-10-CM

## 2022-02-09 DIAGNOSIS — D84.9 IMMUNOSUPPRESSION (HCC): ICD-10-CM

## 2022-02-09 DIAGNOSIS — Z12.11 SCREEN FOR COLON CANCER: ICD-10-CM

## 2022-02-09 DIAGNOSIS — K21.9 GERD WITHOUT ESOPHAGITIS: ICD-10-CM

## 2022-02-09 DIAGNOSIS — Z12.31 ENCOUNTER FOR SCREENING MAMMOGRAM FOR MALIGNANT NEOPLASM OF BREAST: ICD-10-CM

## 2022-02-09 DIAGNOSIS — J45.20 MILD INTERMITTENT ASTHMA WITHOUT COMPLICATION: ICD-10-CM

## 2022-02-09 DIAGNOSIS — I10 BENIGN ESSENTIAL HYPERTENSION: Primary | ICD-10-CM

## 2022-02-09 DIAGNOSIS — R52 PAIN MANAGEMENT: ICD-10-CM

## 2022-02-09 PROCEDURE — 99214 OFFICE O/P EST MOD 30 MIN: CPT | Performed by: INTERNAL MEDICINE

## 2022-02-09 RX ORDER — DULOXETIN HYDROCHLORIDE 30 MG/1
30 CAPSULE, DELAYED RELEASE ORAL DAILY
Qty: 90 CAPSULE | Refills: 1 | Status: SHIPPED | OUTPATIENT
Start: 2022-02-09

## 2022-02-09 RX ORDER — DULOXETIN HYDROCHLORIDE 30 MG/1
30 CAPSULE, DELAYED RELEASE ORAL DAILY
COMMUNITY
Start: 2021-12-30 | End: 2022-02-09 | Stop reason: SDUPTHER

## 2022-02-09 RX ORDER — GABAPENTIN 100 MG/1
100 CAPSULE ORAL 3 TIMES DAILY
Qty: 270 CAPSULE | Refills: 1 | Status: SHIPPED | OUTPATIENT
Start: 2022-02-09

## 2022-02-09 RX ORDER — METOPROLOL SUCCINATE 25 MG/1
25 TABLET, EXTENDED RELEASE ORAL DAILY
Qty: 90 TABLET | Refills: 1 | Status: SHIPPED | OUTPATIENT
Start: 2022-02-09 | End: 2022-06-14

## 2022-02-09 NOTE — PROGRESS NOTES
BMI Counseling: There is no height or weight on file to calculate BMI  The BMI is above normal  Nutrition recommendations include decreasing portion sizes and encouraging healthy choices of fruits and vegetables  Exercise recommendations include moderate physical activity 150 minutes/week  No pharmacotherapy was ordered  Rationale for BMI follow-up plan is due to patient being overweight or obese  Assessment/Plan:  Problem List Items Addressed This Visit        Digestive    GERD without esophagitis       Respiratory    Asthma       Cardiovascular and Mediastinum    Benign essential hypertension - Primary    Relevant Medications    metoprolol succinate (TOPROL-XL) 25 mg 24 hr tablet       Musculoskeletal and Integument    Psoriatic arthritis (HCC)    Relevant Medications    gabapentin (NEURONTIN) 100 mg capsule    Osteopenia       Other    Vitamin D deficiency    Immunosuppression (HCC)    Generalized anxiety disorder    Relevant Medications    DULoxetine (CYMBALTA) 30 mg delayed release capsule      Other Visit Diagnoses     Hypertension, unspecified type        Relevant Medications    metoprolol succinate (TOPROL-XL) 25 mg 24 hr tablet    Pain management        Relevant Medications    DULoxetine (CYMBALTA) 30 mg delayed release capsule    Encounter for screening mammogram for malignant neoplasm of breast        Screen for colon cancer        Relevant Orders    Occult Blood, Fecal Immunochemical           Diagnoses and all orders for this visit:    Benign essential hypertension    Mild intermittent asthma without complication    GERD without esophagitis    Psoriatic arthritis (HCC)  -     gabapentin (NEURONTIN) 100 mg capsule; Take 1 capsule (100 mg total) by mouth 3 (three) times a day    Osteopenia, unspecified location    Generalized anxiety disorder    Vitamin D deficiency    Hypertension, unspecified type  -     metoprolol succinate (TOPROL-XL) 25 mg 24 hr tablet;  Take 1 tablet (25 mg total) by mouth daily    Pain management  -     DULoxetine (CYMBALTA) 30 mg delayed release capsule; Take 1 capsule (30 mg total) by mouth daily    Encounter for screening mammogram for malignant neoplasm of breast    Screen for colon cancer  -     Occult Blood, Fecal Immunochemical    Immunosuppression ([facility] [address] )    Other orders  -     Discontinue: DULoxetine (CYMBALTA) 30 mg delayed release capsule; Take 30 mg by mouth daily        No problem-specific Assessment & Plan notes found for this encounter  A/P: Doing ok and labs are up to date  Order mammo and CRC  Discussed BMI and will give information on diet and exercise  Continue current treatment and RTC four months for routine  Keep f/u with specialist      Subjective:      Patient ID: Melany Lemus is a 61 y o  female  WF RTC for f/u htn, GERD,  Etc  Doing ok and no new issues,but will be having bilat thumb sx in the near future    Remains active w/o difficulty and no falls  No reflux  Psoriatic Arthritis manageable  RAD is controlled with limited rescue med use  RYNE is controlled  Labs are up to date  DUe for CRC and mammo          The following portions of the patient's history were reviewed and updated as appropriate:   She has a past medical history of Asthma, Generalized anxiety disorder, GERD (gastroesophageal reflux disease), Hemangioma of other sites, Hypertension, Psoriatic arthritis ([facility] [address] ), and Psoriatic arthritis ([facility] [address] )  ,  does not have any pertinent problems on file  ,   has a past surgical history that includes Appendectomy; Cholecystectomy; Foot surgery; Hysterectomy; Kidney surgery; Tonsillectomy; Tubal ligation; Knee surgery; and Spinal fusion  ,  family history includes Heart attack in her paternal grandfather; Hypertension in her maternal grandmother and paternal grandfather; Pancreatic cancer in her paternal grandfather  ,   reports that she has never smoked  She has never used smokeless tobacco  She reports current alcohol use   She reports that she does not use drugs ,  is allergic to diclofenac-misoprostol, indomethacin, sulfamethoxazole-trimethoprim, and cetirizine     Current Outpatient Medications   Medication Sig Dispense Refill    albuterol (PROVENTIL HFA,VENTOLIN HFA) 90 mcg/act inhaler TAKE 2 PUFFS BY MOUTH EVERY 6 HOURS AS NEEDED FOR WHEEZE OR FOR SHORTNESS OF BREATH 18 g 5    ALPRAZolam (XANAX) 0 25 mg tablet Take 1 tablet (0 25 mg total) by mouth 3 (three) times a day as needed for anxiety 90 tablet 0    diazepam (VALIUM) 5 mg tablet Take 1 tablet (5 mg total) by mouth every 6 (six) hours as needed for anxiety TAKE EVERY 4-6 HOURS PRN PAIN 30 tablet 0    DULoxetine (CYMBALTA) 30 mg delayed release capsule Take 1 capsule (30 mg total) by mouth daily 90 capsule 1    gabapentin (NEURONTIN) 100 mg capsule Take 1 capsule (100 mg total) by mouth 3 (three) times a day 270 capsule 1    loratadine (CLARITIN) 10 mg tablet daily as needed        metoprolol succinate (TOPROL-XL) 25 mg 24 hr tablet Take 1 tablet (25 mg total) by mouth daily 90 tablet 1    omeprazole (PriLOSEC) 40 MG capsule Take 1 capsule (40 mg total) by mouth daily before breakfast 90 capsule 3    predniSONE 2 5 mg tablet Take 7 5 mg by mouth as needed        traZODone (DESYREL) 50 mg tablet Take 1 tablet (50 mg total) by mouth daily at bedtime 90 tablet 3     No current facility-administered medications for this visit  Review of Systems   Constitutional: Negative for activity change, chills, diaphoresis, fatigue and fever  HENT: Negative  Eyes: Negative for visual disturbance  Respiratory: Negative for cough, chest tightness, shortness of breath and wheezing  Cardiovascular: Negative for chest pain, palpitations and leg swelling  Gastrointestinal: Negative for abdominal pain, constipation, diarrhea, nausea and vomiting  Endocrine: Negative for cold intolerance and heat intolerance  Genitourinary: Negative for difficulty urinating, dysuria and frequency     Musculoskeletal: Positive for arthralgias  Negative for gait problem, joint swelling and myalgias  Neurological: Negative for dizziness, seizures, syncope, weakness, light-headedness and headaches  Psychiatric/Behavioral: Negative for confusion, dysphoric mood and sleep disturbance  The patient is not nervous/anxious  PHQ-2/9 Depression Screening    Little interest or pleasure in doing things: 0 - not at all  Feeling down, depressed, or hopeless: 0 - not at all        Objective:  Vitals:    02/09/22 0752   BP: 118/82   BP Location: Left arm   Patient Position: Sitting   Cuff Size: Standard   Pulse: 69   Resp: 12   Temp: 99 °F (37 2 °C)   SpO2: 99%   Weight: 83 kg (183 lb)   Height: 5' 5" (1 651 m)     Body mass index is 30 45 kg/m²  Physical Exam  Vitals and nursing note reviewed  Constitutional:       General: She is not in acute distress  Appearance: Normal appearance  She is not ill-appearing  HENT:      Head: Normocephalic and atraumatic  Mouth/Throat:      Mouth: Mucous membranes are moist    Eyes:      Extraocular Movements: Extraocular movements intact  Conjunctiva/sclera: Conjunctivae normal       Pupils: Pupils are equal, round, and reactive to light  Neck:      Vascular: No carotid bruit  Cardiovascular:      Rate and Rhythm: Normal rate and regular rhythm  Heart sounds: Normal heart sounds  Pulmonary:      Effort: Pulmonary effort is normal  No respiratory distress  Breath sounds: Normal breath sounds  No wheezing or rales  Abdominal:      General: Bowel sounds are normal  There is no distension  Palpations: Abdomen is soft  Tenderness: There is no abdominal tenderness  Musculoskeletal:      Cervical back: Neck supple  Right lower leg: No edema  Left lower leg: No edema  Neurological:      General: No focal deficit present  Mental Status: She is alert and oriented to person, place, and time  Mental status is at baseline     Psychiatric: Mood and Affect: Mood normal          Behavior: Behavior normal          Thought Content: Thought content normal          Judgment: Judgment normal          BMI Counseling: Body mass index is 30 45 kg/m²  The BMI is above normal  Nutrition recommendations include reducing portion sizes, decreasing overall calorie intake, reducing intake of saturated fat and trans fat and reducing intake of cholesterol  Exercise recommendations include moderate aerobic physical activity for 150 minutes/week

## 2022-02-09 NOTE — PATIENT INSTRUCTIONS
Chronic Hypertension   AMBULATORY CARE:   Hypertension  is high blood pressure  Your blood pressure is the force of your blood moving against the walls of your arteries  Hypertension causes your blood pressure to get so high that your heart has to work much harder than normal  This can damage your heart  Even if you have hypertension for years, lifestyle changes, medicines, or both can help bring your blood pressure to normal   Call your local emergency number (911 in the [address] [facility]) or have someone call if:   · You have chest pain  · You have any of the following signs of a heart attack:      ? Squeezing, pressure, or pain in your chest    ? You may  also have any of the following:     § Discomfort or pain in your back, neck, jaw, stomach, or arm    § Shortness of breath    § Nausea or vomiting    § Lightheadedness or a sudden cold sweat    · You become confused or have difficulty speaking  · You suddenly feel lightheaded or have trouble breathing  Seek care immediately if:   · You have a severe headache or vision loss  · You have weakness in an arm or leg  Call your doctor or cardiologist if:   · You feel faint, dizzy, confused, or drowsy  · You have been taking your blood pressure medicine but your pressure is higher than your provider says it should be  · You have questions or concerns about your condition or care  Treatment for chronic hypertension  may include medicine to lower your blood pressure and cholesterol levels  A low cholesterol level helps prevent heart disease and makes it easier to control your blood pressure  Heart disease can make your blood pressure harder to control  You may also need to make lifestyle changes  What you need to know about the stages of hypertension:       · Normal blood pressure is 119/79 or lower   Your healthcare provider may only check your blood pressure each year if it stays at a normal level  · Elevated blood pressure is 120/79 to 129/79    This is sometimes called prehypertension  Your healthcare provider may suggest lifestyle changes to help lower your blood pressure to a normal level  He or she may then check it again in 3 to 6 months  · Stage 1 hypertension is 130/80  to 139/89   Your provider may recommend lifestyle changes, medication, and checks every 3 to 6 months until your blood pressure is controlled  · Stage 2 hypertension is 140/90 or higher   Your provider will recommend lifestyle changes and have you take 2 kinds of hypertension medicines  You will also need to have your blood pressure checked monthly until it is controlled  Manage chronic hypertension:   · Check your blood pressure at home  Avoid smoking, caffeine, and exercise at least 30 minutes before checking your blood pressure  Sit and rest for 5 minutes before you take your blood pressure  Extend your arm and support it on a flat surface  Your arm should be at the same level as your heart  Follow the directions that came with your blood pressure monitor  Check your blood pressure 2 times, 1 minute apart, before you take your medicine in the morning  Also check your blood pressure before your evening meal  Keep a record of your readings and bring it to your follow-up visits  Ask your healthcare provider what your blood pressure should be  · Manage any other health conditions you have  Health conditions such as diabetes can increase your risk for hypertension  Follow your healthcare provider's instructions and take all your medicines as directed  Talk to your healthcare provider about any new health conditions you have recently developed  · Ask about all medicines  Certain medicines can increase your blood pressure  Examples include oral birth control pills, decongestants, herbal supplements, and NSAIDs, such as ibuprofen  Your healthcare provider can tell you which medicines are safe for you to take   This includes prescription and over-the-counter medicines  Lifestyle changes you can make to lower your blood pressure: Your provider may want you to make more lifestyle changes if you are having trouble controlling your blood pressure  This may feel difficult over time, especially if you think you are making good changes but your pressure is still high  It might help to focus on one new change at a time  For example, try to add 1 more day of exercise, or exercise for an extra 10 minutes on 2 days  Small changes can make a big difference  Your healthcare provider can also refer you to specialists such as a dietitian who can help you make small changes  Your family members may be included in helping you learn to create lifestyle changes, such as the following:     · Limit sodium (salt) as directed  Too much sodium can affect your fluid balance  Check labels to find low-sodium or no-salt-added foods  Some low-sodium foods use potassium salts for flavor  Too much potassium can also cause health problems  Your healthcare provider will tell you how much sodium and potassium are safe for you to have in a day  He or she may recommend that you limit sodium to 2,300 mg a day  · Follow the meal plan recommended by your healthcare provider  A dietitian or your provider can give you more information on low-sodium plans or the DASH (Dietary Approaches to Stop Hypertension) eating plan  The DASH plan is low in sodium, processed sugar, unhealthy fats, and total fat  It is high in potassium, calcium, and fiber  These can be found in vegetables, fruit, and whole-grain foods  · Be physically active throughout the day  Physical activity, such as exercise, can help control your blood pressure and your weight  Be physically active for at least 30 minutes per day, on most days of the week  Include aerobic activity, such as walking or riding a bicycle  Also include strength training at least 2 times each week   Your healthcare providers can help you create a physical activity plan  · Decrease stress  This may help lower your blood pressure  Learn ways to relax, such as deep breathing or listening to music  · Limit alcohol as directed  Alcohol can increase your blood pressure  A drink of alcohol is 12 ounces of beer, 5 ounces of wine, or 1½ ounces of liquor  · Do not smoke  Nicotine and other chemicals in cigarettes and cigars can increase your blood pressure and also cause lung damage  Ask your healthcare provider for information if you currently smoke and need help to quit  E-cigarettes or smokeless tobacco still contain nicotine  Talk to your healthcare provider before you use these products  Follow up with your doctor or cardiologist as directed: You will need to return to have your blood pressure checked and to have other lab tests done  Write down your questions so you remember to ask them during your visits  © Copyright [employer] 2021 Information is for End User's use only and may not be sold, redistributed or otherwise used for commercial purposes  All illustrations and images included in CareNotes® are the copyrighted property of A D A M , Inc  or [facility] Jessenia Mohr   The above information is an  only  It is not intended as medical advice for individual conditions or treatments  Talk to your doctor, nurse or pharmacist before following any medical regimen to see if it is safe and effective for you  Weight Management   AMBULATORY CARE:   Why it is important to manage your weight:  Being overweight increases your risk of health conditions such as heart disease, high blood pressure, type 2 diabetes, and certain types of cancer  It can also increase your risk for osteoarthritis, sleep apnea, and other respiratory problems  Aim for a slow, steady weight loss  Even a small amount of weight loss can lower your risk of health problems    How to lose weight safely:  A safe and healthy way to lose weight is to eat fewer calories and get regular exercise  · You can lose up about 1 pound a week by decreasing the number of calories you eat by 500 calories each day  You can decrease calories by eating smaller portion sizes or by cutting out high-calorie foods  Read labels to find out how many calories are in the foods you eat  · You can also burn calories with exercise such as walking, swimming, or biking  You will be more likely to keep weight off if you make these changes part of your lifestyle  Exercise at least 30 minutes per day on most days of the week  You can also fit in more physical activity by taking the stairs instead of the elevator or parking farther away from stores  Ask your healthcare provider about the best exercise plan for you  Healthy meal plan for weight management:  A healthy meal plan includes a variety of foods, contains fewer calories, and helps you stay healthy  A healthy meal plan includes the following:     · Eat whole-grain foods more often  A healthy meal plan should contain fiber  Fiber is the part of grains, fruits, and vegetables that is not broken down by your body  Whole-grain foods are healthy and provide extra fiber in your diet  Some examples of whole-grain foods are whole-wheat breads and pastas, oatmeal, brown rice, and bulgur  · Eat a variety of vegetables every day  Include dark, leafy greens such as spinach, kale, [place] greens, and mustard greens  Eat yellow and orange vegetables such as carrots, sweet potatoes, and winter squash  · Eat a variety of fruits every day  Choose fresh or canned fruit (canned in its own juice or light syrup) instead of juice  Fruit juice has very little or no fiber  · Eat low-fat dairy foods  Drink fat-free (skim) milk or 1% milk  Eat fat-free yogurt and low-fat cottage cheese  Try low-fat cheeses such as mozzarella and other reduced-fat cheeses  · Choose meat and other protein foods that are low in fat    Choose beans or other legumes such as split peas or lentils  Choose fish, skinless poultry (chicken or turkey), or lean cuts of red meat (beef or pork)  Before you cook meat or poultry, cut off any visible fat  · Use less fat and oil  Try baking foods instead of frying them  Add less fat, such as margarine, sour cream, regular salad dressing and mayonnaise to foods  Eat fewer high-fat foods  Some examples of high-fat foods include french fries, doughnuts, ice cream, and cakes  · Eat fewer sweets  Limit foods and drinks that are high in sugar  This includes candy, cookies, regular soda, and sweetened drinks  Ways to decrease calories:   · Eat smaller portions  ? Use a small plate with smaller servings  ? Do not eat second helpings  ? When you eat at a restaurant, ask for a box and place half of your meal in the box before you eat  ? Share an entrée with someone else  · Replace high-calorie snacks with healthy, low-calorie snacks  ? Choose fresh fruit, vegetables, fat-free rice cakes, or air-popped popcorn instead of potato chips, nuts, or chocolate  ? Choose water or calorie-free drinks instead of soda or sweetened drinks  · Do not shop for groceries when you are hungry  You may be more likely to make unhealthy food choices  Take a grocery list of healthy foods and shop after you have eaten  · Eat regular meals  Do not skip meals  Skipping meals can lead to overeating later in the day  This can make it harder for you to lose weight  Eat a healthy snack in place of a meal if you do not have time to eat a regular meal  Talk with a dietitian to help you create a meal plan and schedule that is right for you  Other things to consider as you try to lose weight:   · Be aware of situations that may give you the urge to overeat, such as eating while watching television  Find ways to avoid these situations  For example, read a book, go for a walk, or do crafts      · Meet with a weight loss support group or friends who are also trying to lose weight  This may help you stay motivated to continue working on your weight loss goals  © Copyright [employer] 2021 Information is for End User's use only and may not be sold, redistributed or otherwise used for commercial purposes  All illustrations and images included in CareNotes® are the copyrighted property of [employer]  or Nilson Mohr   The above information is an  only  It is not intended as medical advice for individual conditions or treatments  Talk to your doctor, nurse or pharmacist before following any medical regimen to see if it is safe and effective for you  Low Fat Diet   AMBULATORY CARE:   A low-fat diet  is an eating plan that is low in total fat, unhealthy fat, and cholesterol  You may need to follow a low-fat diet if you have trouble digesting or absorbing fat  You may also need to follow this diet if you have high cholesterol  You can also lower your cholesterol by increasing the amount of fiber in your diet  Soluble fiber is a type of fiber that helps to decrease cholesterol levels  Different types of fat in food:   · Limit unhealthy fats  A diet that is high in cholesterol, saturated fat, and trans fat may cause unhealthy cholesterol levels  Unhealthy cholesterol levels increase your risk of heart disease  ? Cholesterol:  Limit intake of cholesterol to less than 200 mg per day  Cholesterol is found in meat, eggs, and dairy  ? Saturated fat:  Limit saturated fat to less than 7% of your total daily calories  Ask your dietitian how many calories you need each day  Saturated fat is found in butter, cheese, ice cream, whole milk, and palm oil  Saturated fat is also found in meat, such as beef, pork, chicken skin, and processed meats  Processed meats include sausage, hot dogs, and bologna  ? Trans fat:  Avoid trans fat as much as possible  Trans fat is used in fried and baked foods   Foods that say trans fat free on the label may still have up to 0 5 grams of trans fat per serving  · Include healthy fats  Replace foods that are high in saturated and trans fat with foods high in healthy fats  This may help to decrease high cholesterol levels  ? Monounsaturated fats: These are found in avocados, nuts, and vegetable oils, such as olive, canola, and sunflower oil  ? Polyunsaturated fats: These can be found in vegetable oils, such as soybean or corn oil  Omega-3 fats can help to decrease the risk of heart disease  Omega-3 fats are found in fish, such as salmon, herring, trout, and tuna  Omega-3 fats can also be found in plant foods, such as walnuts, flaxseed, soybeans, and canola oil  Foods to limit or avoid:   · Grains:      ? Snacks that are made with partially hydrogenated oils, such as chips, regular crackers, and butter-flavored popcorn    ? High-fat baked goods, such as biscuits, croissants, doughnuts, pies, cookies, and pastries    · Dairy:      ? Whole milk, 2% milk, and yogurt and ice cream made with whole milk    ? Half and half creamer, heavy cream, and whipping cream    ? Cheese, cream cheese, and sour cream    · Meats and proteins:      ? High-fat cuts of meat (T-bone steak, regular hamburger, and ribs)    ? Fried meat, poultry (turkey and chicken), and fish    ? Poultry (chicken and turkey) with skin    ? Cold cuts (salami or bologna), hot dogs, canales, and sausage    ? Whole eggs and egg yolks    · Vegetables and fruits with added fat:      ? Fried vegetables or vegetables in butter or high-fat sauces, such as cream or cheese sauces    ? Fried fruit or fruit served with butter or cream    · Fats:      ? Butter, stick margarine, and shortening    ? Coconut, palm oil, and palm kernel oil    Foods to include:   · Grains:      ? Whole-grain breads, cereals, pasta, and brown rice    ?  Low-fat crackers and pretzels    · Vegetables and fruits:      ? Fresh, frozen, or canned vegetables (no salt or low-sodium)    ? Fresh, frozen, dried, or canned fruit (canned in light syrup or fruit juice)    ? Avocado    · Low-fat dairy products:      ? Nonfat (skim) or 1% milk    ? Nonfat or low-fat cheese, yogurt, and cottage cheese    · Meats and proteins:      ? Chicken or turkey with no skin    ? Baked or broiled fish    ? Lean beef and pork (loin, round, extra lean hamburger)    ? Beans and peas, unsalted nuts, soy products    ? Egg whites and substitutes    ? Seeds and nuts    · Fats:      ? Unsaturated oil, such as canola, olive, peanut, soybean, or sunflower oil    ? Soft or liquid margarine and vegetable oil spread    ? Low-fat salad dressing    Other ways to decrease fat:   · Read food labels before you buy foods  Choose foods that have less than 30% of calories from fat  Choose low-fat or fat-free dairy products  Remember that fat free does not mean calorie free  These foods still contain calories, and too many calories can lead to weight gain  · Trim fat from meat and avoid fried food  Trim all visible fat from meat before you cook it  Remove the skin from poultry  Do not knight meat, fish, or poultry  Bake, roast, boil, or broil these foods instead  Avoid fried foods  Eat a baked potato instead of [place] fries  Steam vegetables instead of sautéing them in butter  · Add less fat to foods  Use imitation canales bits on salads and baked potatoes instead of regular canales bits  Use fat-free or low-fat salad dressings instead of regular dressings  Use low-fat or nonfat butter-flavored topping instead of regular butter or margarine on popcorn and other foods  Ways to decrease fat in recipes:  Replace high-fat ingredients with low-fat or nonfat ones  This may cause baked goods to be drier than usual  You may need to use nonfat cooking spray on pans to prevent food from sticking  You also may need to change the amount of other ingredients, such as water, in the recipe   Try the following:  · Use low-fat or light margarine instead of regular margarine or shortening  · Use lean ground turkey breast or chicken, or lean ground beef (less than 5% fat) instead of hamburger  · Add 1 teaspoon of canola oil to 8 ounces of skim milk instead of using cream or half and half  · Use grated zucchini, carrots, or apples in breads instead of coconut  · Use blenderized, low-fat cottage cheese, plain tofu, or low-fat ricotta cheese instead of cream cheese  · Use 1 egg white and 1 teaspoon of canola oil, or use ¼ cup (2 ounces) of fat-free egg substitute instead of a whole egg  · Replace half of the oil that is called for in a recipe with applesauce when you bake  Use 3 tablespoons of cocoa powder and 1 tablespoon of canola oil instead of a square of baking chocolate  How to increase fiber:  Eat enough high-fiber foods to get 20 to 30 grams of fiber every day  Slowly increase your fiber intake to avoid stomach cramps, gas, and other problems  · Eat 3 ounces of whole-grain foods each day  An ounce is about 1 slice of bread  Eat whole-grain breads, such as whole-wheat bread  Whole wheat, whole-wheat flour, or other whole grains should be listed as the first ingredient on the food label  Replace white flour with whole-grain flour or use half of each in recipes  Whole-grain flour is heavier than white flour, so you may have to add more yeast or baking powder  · Eat a high-fiber cereal for breakfast   Oatmeal is a good source of soluble fiber  Look for cereals that have bran or fiber in the name  Choose whole-grain products, such as brown rice, barley, and whole-wheat pasta  · Eat more beans, peas, and lentils  For example, add beans to soups or salads  Eat at least 5 cups of fruits and vegetables each day  Eat fruits and vegetables with the peel because the peel is high in fiber      © Copyright [employer] 2021 Information is for End User's use only and may not be sold, redistributed or otherwise used for commercial purposes  All illustrations and images included in CareNotes® are the copyrighted property of A D A M , Inc  or [facility] Jessenia Mohr   The above information is an  only  It is not intended as medical advice for individual conditions or treatments  Talk to your doctor, nurse or pharmacist before following any medical regimen to see if it is safe and effective for you  Heart Healthy Diet   AMBULATORY CARE:   A heart healthy diet  is an eating plan low in unhealthy fats and sodium (salt)  The plan is high in healthy fats and fiber  A heart healthy diet helps improve your cholesterol levels and lowers your risk for heart disease and stroke  A dietitian will teach you how to read and understand food labels  Heart healthy diet guidelines to follow:   · Choose foods that contain healthy fats  ? Unsaturated fats  include monounsaturated and polyunsaturated fats  Unsaturated fat is found in foods such as soybean, canola, olive, corn, and safflower oils  It is also found in soft tub margarine that is made with liquid vegetable oil  ? Omega-3 fat  is found in certain fish, such as salmon, tuna, and trout, and in walnuts and flaxseed  Eat fish high in omega-3 fats at least 2 times a week  · Get 20 to 30 grams of fiber each day  Fruits, vegetables, whole-grain foods, and legumes (cooked beans) are good sources of fiber  · Limit or do not have unhealthy fats  ? Cholesterol  is found in animal foods, such as eggs and lobster, and in dairy products made from whole milk  Limit cholesterol to less than 200 mg each day  ? Saturated fat  is found in meats, such as canales and hamburger  It is also found in chicken or turkey skin, whole milk, and butter  Limit saturated fat to less than 7% of your total daily calories  ? Trans fat  is found in packaged foods, such as potato chips and cookies  It is also in hard margarine, some fried foods, and shortening   Do not eat foods that contain trans fats  · Limit sodium as directed  You may be told to limit sodium to 2,000 to 2,300 mg each day  Choose low-sodium or no-salt-added foods  Add little or no salt to food you prepare  Use herbs and spices in place of salt  Include the following in your heart healthy plan:  Ask your dietitian or healthcare provider how many servings to have from each of the following food groups:  · Grains:      ? Whole-wheat breads, cereals, and pastas, and brown rice    ? Low-fat, low-sodium crackers and chips    · Vegetables:      ? Broccoli, green beans, green peas, and spinach    ? Collards, kale, and lima beans    ? Carrots, sweet potatoes, tomatoes, and peppers    ? Canned vegetables with no salt added    · Fruits:      ? Bananas, peaches, pears, and pineapple    ? Grapes, raisins, and dates    ? Oranges, tangerines, grapefruit, orange juice, and grapefruit juice    ? Apricots, mangoes, melons, and papaya    ? Raspberries and strawberries    ? Canned fruit with no added sugar    · Low-fat dairy:      ? Nonfat (skim) milk, 1% milk, and low-fat almond, cashew, or soy milks fortified with calcium    ? Low-fat cheese, regular or frozen yogurt, and cottage cheese    · Meats and proteins:      ? Lean cuts of beef and pork (loin, leg, round), skinless chicken and turkey    ? Legumes, soy products, egg whites, or nuts    Limit or do not include the following in your heart healthy plan:   · Unhealthy fats and oils:      ? Whole or 2% milk, cream cheese, sour cream, or cheese    ? High-fat cuts of beef (T-bone steaks, ribs), chicken or turkey with skin, and organ meats such as liver    ? Butter, stick margarine, shortening, and cooking oils such as coconut or palm oil    · Foods and liquids high in sodium:      ? Packaged foods, such as frozen dinners, cookies, macaroni and cheese, and cereals with more than 300 mg of sodium per serving    ?  Vegetables with added sodium, such as instant potatoes, vegetables with added sauces, or regular canned vegetables    ? Cured or smoked meats, such as hot dogs, canales, and sausage    ? High-sodium ketchup, barbecue sauce, salad dressing, pickles, olives, soy sauce, or miso    · Foods and liquids high in sugar:      ? Candy, cake, cookies, pies, or doughnuts    ? Soft drinks (soda), sports drinks, or sweetened tea    ? Canned or dry mixes for cakes, soups, sauces, or gravies    Other healthy heart guidelines:   · Do not smoke  Nicotine and other chemicals in cigarettes and cigars can cause lung and heart damage  Ask your healthcare provider for information if you currently smoke and need help to quit  E-cigarettes or smokeless tobacco still contain nicotine  Talk to your healthcare provider before you use these products  · Limit or do not drink alcohol as directed  Alcohol can damage your heart and raise your blood pressure  Your healthcare provider may give you specific daily and weekly limits  The general recommended limit is 1 drink a day for women 21 or older and for men 72 or older  Do not have more than 3 drinks in a day or 7 in a week  The recommended limit is 2 drinks a day for men 24to 59years of age  Do not have more than 4 drinks in a day or 14 in a week  A drink of alcohol is 12 ounces of beer, 5 ounces of wine, or 1½ ounces of liquor  · Exercise regularly  Exercise can help you maintain a healthy weight and improve your blood pressure and cholesterol levels  Regular exercise can also decrease your risk for heart problems  Ask your healthcare provider about the best exercise plan for you  Do not start an exercise program without asking your healthcare provider  Follow up with your doctor or cardiologist as directed:  Write down your questions so you remember to ask them during your visits  © Copyright [employer] 2021 Information is for End User's use only and may not be sold, redistributed or otherwise used for commercial purposes   All illustrations and images included in CareNotes® are the copyrighted property of A D A M , Inc  or Nilson Mohr   The above information is an  only  It is not intended as medical advice for individual conditions or treatments  Talk to your doctor, nurse or pharmacist before following any medical regimen to see if it is safe and effective for you

## 2022-02-23 ENCOUNTER — CONSULT (OUTPATIENT)
Dept: INTERNAL MEDICINE CLINIC | Facility: CLINIC | Age: 57
End: 2022-02-23
Payer: COMMERCIAL

## 2022-02-23 VITALS
TEMPERATURE: 97.1 F | OXYGEN SATURATION: 98 % | HEART RATE: 72 BPM | WEIGHT: 182.38 LBS | HEIGHT: 65 IN | BODY MASS INDEX: 30.39 KG/M2 | SYSTOLIC BLOOD PRESSURE: 120 MMHG | DIASTOLIC BLOOD PRESSURE: 80 MMHG

## 2022-02-23 DIAGNOSIS — I10 BENIGN ESSENTIAL HYPERTENSION: ICD-10-CM

## 2022-02-23 DIAGNOSIS — Z01.818 VISIT FOR PRE-OPERATIVE EXAMINATION: Primary | ICD-10-CM

## 2022-02-23 DIAGNOSIS — J45.20 MILD INTERMITTENT ASTHMA WITHOUT COMPLICATION: ICD-10-CM

## 2022-02-23 DIAGNOSIS — D84.9 IMMUNOSUPPRESSION (HCC): ICD-10-CM

## 2022-02-23 DIAGNOSIS — M79.644 CHRONIC PAIN OF RIGHT THUMB: ICD-10-CM

## 2022-02-23 DIAGNOSIS — G89.29 CHRONIC PAIN OF RIGHT THUMB: ICD-10-CM

## 2022-02-23 PROCEDURE — 3008F BODY MASS INDEX DOCD: CPT | Performed by: INTERNAL MEDICINE

## 2022-02-23 PROCEDURE — 1036F TOBACCO NON-USER: CPT | Performed by: INTERNAL MEDICINE

## 2022-02-23 PROCEDURE — 3074F SYST BP LT 130 MM HG: CPT | Performed by: INTERNAL MEDICINE

## 2022-02-23 PROCEDURE — 99244 OFF/OP CNSLTJ NEW/EST MOD 40: CPT | Performed by: INTERNAL MEDICINE

## 2022-02-23 PROCEDURE — 3079F DIAST BP 80-89 MM HG: CPT | Performed by: INTERNAL MEDICINE

## 2022-02-23 NOTE — PROGRESS NOTES
Assessment/Plan:  Problem List Items Addressed This Visit        Respiratory    Asthma       Cardiovascular and Mediastinum    Benign essential hypertension       Other    Immunosuppression ([facility] [address] )      Other Visit Diagnoses     Visit for pre-operative examination    -  Primary    Chronic pain of right thumb               Diagnoses and all orders for this visit:    Visit for pre-operative examination    Chronic pain of right thumb    Immunosuppression (HCC)    Benign essential hypertension    Mild intermittent asthma without complication        No problem-specific Assessment & Plan notes found for this encounter  A/P: PAT tests not requested  Pt and co-morbidities are stable  Pt is a Dillon's Class I and carries a cardiac risk of about 1%  Recommend holding any ASA, NSAID's, omega 3, and MVT one week prior to the procedure  On call to the OR, may take with a sip of water her xanax, cymbalta, sawyer, omeprazole, and metoprolol  Should take her steroid as well  Recommend pre treat with RAQUEL GRIGSBYI prior to the procedure  Aggressive post op secretion control and neb treatments if needed  Note pt is on chronic steroids and if has problems with BP, consider stress dose steroids for adrenal insufficiency  NO other recs at this time  Thanks and good luck  Subjective:      Patient ID: Angelica Zendejas is a 61 y o  female  WF presents at the request of Dr Nano Barlow for pre-op eval for upcoming right thumb sx  tentatively scheduled for 3/10/22  Since last visit, doing well and no recent illnesses  Remains active w/o difficulty and no falls  No travel history  Denies depression  No recent illnesses  No fever, chills, or sweats  No unexplained wt changes  Denies CP, SOB, or palpitations  No edema  No orthopnea or PND  No sz or syncope  No changes in bowel or bladder habits  PMH includes HTN, psoriatic arthritis, DJD, osteopenia, asthma, GERD, and RYNE  [place]  Past sx include tonsils, knee sx, appy, choly, foot sx, hysto, spinal fusion, and tubal and reports no problems with prior procedures or anesthesia  Denies smoking and social ETOH use  No history of DVT or PE  NO history of bleeding issues and is not on anti-coagulants  Denies dental plates  Denies C spine issues  No objections to getting blood products if deemed necessary  No PAT testing done  The following portions of the patient's history were reviewed and updated as appropriate:   She has a past medical history of Asthma, Generalized anxiety disorder, GERD (gastroesophageal reflux disease), Hemangioma of other sites, Hypertension, Psoriatic arthritis ([facility] [address] ), and Psoriatic arthritis ([facility] [address] )  ,  does not have any pertinent problems on file  ,   has a past surgical history that includes Appendectomy; Cholecystectomy; Foot surgery; Hysterectomy; Kidney surgery; Tonsillectomy; Tubal ligation; Knee surgery; and Spinal fusion  ,  family history includes Heart attack in her paternal grandfather; Hypertension in her maternal grandmother and paternal grandfather; Pancreatic cancer in her paternal grandfather  ,   reports that she has never smoked  She has never used smokeless tobacco  She reports current alcohol use  She reports that she does not use drugs  ,  is allergic to diclofenac-misoprostol, indomethacin, sulfamethoxazole-trimethoprim, and cetirizine     Current Outpatient Medications   Medication Sig Dispense Refill    albuterol (PROVENTIL HFA,VENTOLIN HFA) 90 mcg/act inhaler TAKE 2 PUFFS BY MOUTH EVERY 6 HOURS AS NEEDED FOR WHEEZE OR FOR SHORTNESS OF BREATH 18 g 5    ALPRAZolam (XANAX) 0 25 mg tablet Take 1 tablet (0 25 mg total) by mouth 3 (three) times a day as needed for anxiety 90 tablet 0    diazepam (VALIUM) 5 mg tablet Take 1 tablet (5 mg total) by mouth every 6 (six) hours as needed for anxiety TAKE EVERY 4-6 HOURS PRN PAIN 30 tablet 0    DULoxetine (CYMBALTA) 30 mg delayed release capsule Take 1 capsule (30 mg total) by mouth daily 90 capsule 1    gabapentin (NEURONTIN) 100 mg capsule Take 1 capsule (100 mg total) by mouth 3 (three) times a day 270 capsule 1    loratadine (CLARITIN) 10 mg tablet daily as needed        metoprolol succinate (TOPROL-XL) 25 mg 24 hr tablet Take 1 tablet (25 mg total) by mouth daily 90 tablet 1    omeprazole (PriLOSEC) 40 MG capsule Take 1 capsule (40 mg total) by mouth daily before breakfast 90 capsule 3    traZODone (DESYREL) 50 mg tablet Take 1 tablet (50 mg total) by mouth daily at bedtime 90 tablet 3    predniSONE 2 5 mg tablet Take 7 5 mg by mouth as needed   (Patient not taking: Reported on 2/23/2022 )       No current facility-administered medications for this visit  Review of Systems   Constitutional: Positive for activity change  Negative for chills, diaphoresis, fatigue and fever  HENT: Negative  Eyes: Negative for visual disturbance  Respiratory: Negative for cough, chest tightness, shortness of breath and wheezing  Cardiovascular: Negative for chest pain, palpitations and leg swelling  Gastrointestinal: Negative for abdominal pain, constipation, diarrhea, nausea and vomiting  Endocrine: Negative for cold intolerance and heat intolerance  Genitourinary: Negative for difficulty urinating, dysuria and frequency  Musculoskeletal: Positive for arthralgias and gait problem  Negative for joint swelling and myalgias  Neurological: Negative for dizziness, seizures, syncope, weakness, light-headedness and headaches  Psychiatric/Behavioral: Negative for confusion, dysphoric mood and sleep disturbance  The patient is not nervous/anxious  PHQ-2/9 Depression Screening          Objective:  Vitals:    02/23/22 0804   BP: 120/80   Pulse: 72   Temp: (!) 97 1 °F (36 2 °C)   SpO2: 98%   Weight: 82 7 kg (182 lb 6 oz)   Height: 5' 5" (1 651 m)     Body mass index is 30 35 kg/m²  Physical Exam  Vitals and nursing note reviewed  Constitutional:       General: She is not in acute distress  Appearance: Normal appearance  She is not ill-appearing  HENT:      Head: Normocephalic and atraumatic  Comments: No oropharyngeal restrictions  Mouth/Throat:      Mouth: Mucous membranes are moist    Eyes:      Extraocular Movements: Extraocular movements intact  Conjunctiva/sclera: Conjunctivae normal       Pupils: Pupils are equal, round, and reactive to light  Neck:      Vascular: No carotid bruit  Comments: No C spine restrictions  Cardiovascular:      Rate and Rhythm: Normal rate and regular rhythm  Heart sounds: Normal heart sounds  Pulmonary:      Effort: Pulmonary effort is normal  No respiratory distress  Breath sounds: Normal breath sounds  No wheezing or rales  Abdominal:      General: Bowel sounds are normal  There is no distension  Palpations: Abdomen is soft  Tenderness: There is no abdominal tenderness  Musculoskeletal:         General: Tenderness present  No swelling or deformity  Normal range of motion  Cervical back: Neck supple  Right lower leg: No edema  Left lower leg: No edema  Neurological:      General: No focal deficit present  Mental Status: She is alert and oriented to person, place, and time  Mental status is at baseline  Cranial Nerves: No cranial nerve deficit  Motor: No weakness  Gait: Gait normal       Deep Tendon Reflexes: Reflexes normal    Psychiatric:         Mood and Affect: Mood normal          Behavior: Behavior normal          Thought Content:  Thought content normal          Judgment: Judgment normal

## 2022-04-25 DIAGNOSIS — K21.9 GERD WITHOUT ESOPHAGITIS: ICD-10-CM

## 2022-04-25 RX ORDER — OMEPRAZOLE 40 MG/1
CAPSULE, DELAYED RELEASE ORAL
Qty: 90 CAPSULE | Refills: 3 | Status: SHIPPED | OUTPATIENT
Start: 2022-04-25 | End: 2022-06-15

## 2022-04-27 ENCOUNTER — APPOINTMENT (OUTPATIENT)
Dept: LAB | Facility: CLINIC | Age: 57
End: 2022-04-27
Payer: COMMERCIAL

## 2022-04-27 DIAGNOSIS — M17.0 PRIMARY OSTEOARTHRITIS OF BOTH KNEES: ICD-10-CM

## 2022-04-27 DIAGNOSIS — M18.0 OSTEOARTHRITIS OF CARPOMETACARPAL (CMC) JOINTS OF BOTH THUMBS, UNSPECIFIED OSTEOARTHRITIS TYPE: ICD-10-CM

## 2022-04-27 DIAGNOSIS — L40.50 PSORIATIC ARTHRITIS (HCC): ICD-10-CM

## 2022-04-27 LAB
ALBUMIN SERPL BCP-MCNC: 3.6 G/DL (ref 3.5–5)
ALP SERPL-CCNC: 91 U/L (ref 46–116)
ALT SERPL W P-5'-P-CCNC: 28 U/L (ref 12–78)
ANION GAP SERPL CALCULATED.3IONS-SCNC: 3 MMOL/L (ref 4–13)
AST SERPL W P-5'-P-CCNC: 24 U/L (ref 5–45)
BASOPHILS # BLD AUTO: 0.04 THOUSANDS/ΜL (ref 0–0.1)
BASOPHILS NFR BLD AUTO: 1 % (ref 0–1)
BILIRUB SERPL-MCNC: 0.42 MG/DL (ref 0.2–1)
BUN SERPL-MCNC: 8 MG/DL (ref 5–25)
CALCIUM SERPL-MCNC: 9.8 MG/DL (ref 8.3–10.1)
CHLORIDE SERPL-SCNC: 109 MMOL/L (ref 100–108)
CO2 SERPL-SCNC: 28 MMOL/L (ref 21–32)
CREAT SERPL-MCNC: 0.86 MG/DL (ref 0.6–1.3)
CRP SERPL QL: <3 MG/L
EOSINOPHIL # BLD AUTO: 0.31 THOUSAND/ΜL (ref 0–0.61)
EOSINOPHIL NFR BLD AUTO: 5 % (ref 0–6)
ERYTHROCYTE [DISTWIDTH] IN BLOOD BY AUTOMATED COUNT: 13.6 % (ref 11.6–15.1)
ERYTHROCYTE [SEDIMENTATION RATE] IN BLOOD: 21 MM/HOUR (ref 0–29)
GFR SERPL CREATININE-BSD FRML MDRD: 75 ML/MIN/1.73SQ M
GLUCOSE P FAST SERPL-MCNC: 95 MG/DL (ref 65–99)
HCT VFR BLD AUTO: 42 % (ref 34.8–46.1)
HGB BLD-MCNC: 13.3 G/DL (ref 11.5–15.4)
IMM GRANULOCYTES # BLD AUTO: 0.02 THOUSAND/UL (ref 0–0.2)
IMM GRANULOCYTES NFR BLD AUTO: 0 % (ref 0–2)
LYMPHOCYTES # BLD AUTO: 2.89 THOUSANDS/ΜL (ref 0.6–4.47)
LYMPHOCYTES NFR BLD AUTO: 43 % (ref 14–44)
MCH RBC QN AUTO: 28.4 PG (ref 26.8–34.3)
MCHC RBC AUTO-ENTMCNC: 31.7 G/DL (ref 31.4–37.4)
MCV RBC AUTO: 90 FL (ref 82–98)
MONOCYTES # BLD AUTO: 0.56 THOUSAND/ΜL (ref 0.17–1.22)
MONOCYTES NFR BLD AUTO: 8 % (ref 4–12)
NEUTROPHILS # BLD AUTO: 2.98 THOUSANDS/ΜL (ref 1.85–7.62)
NEUTS SEG NFR BLD AUTO: 43 % (ref 43–75)
NRBC BLD AUTO-RTO: 0 /100 WBCS
PLATELET # BLD AUTO: 422 THOUSANDS/UL (ref 149–390)
PMV BLD AUTO: 10.1 FL (ref 8.9–12.7)
POTASSIUM SERPL-SCNC: 4.4 MMOL/L (ref 3.5–5.3)
PROT SERPL-MCNC: 7.3 G/DL (ref 6.4–8.2)
RBC # BLD AUTO: 4.68 MILLION/UL (ref 3.81–5.12)
SODIUM SERPL-SCNC: 140 MMOL/L (ref 136–145)
WBC # BLD AUTO: 6.8 THOUSAND/UL (ref 4.31–10.16)

## 2022-04-27 PROCEDURE — 86140 C-REACTIVE PROTEIN: CPT

## 2022-04-27 PROCEDURE — 36415 COLL VENOUS BLD VENIPUNCTURE: CPT

## 2022-04-27 PROCEDURE — 85652 RBC SED RATE AUTOMATED: CPT

## 2022-04-27 PROCEDURE — 80053 COMPREHEN METABOLIC PANEL: CPT

## 2022-04-27 PROCEDURE — 85025 COMPLETE CBC W/AUTO DIFF WBC: CPT

## 2022-05-02 DIAGNOSIS — F41.1 GENERALIZED ANXIETY DISORDER: ICD-10-CM

## 2022-05-02 PROBLEM — M79.7 FIBROMYALGIA: Status: ACTIVE | Noted: 2022-05-02

## 2022-05-02 RX ORDER — DIAZEPAM 5 MG/1
5 TABLET ORAL EVERY 6 HOURS PRN
Qty: 30 TABLET | Refills: 0 | Status: SHIPPED | OUTPATIENT
Start: 2022-05-02

## 2022-06-12 PROBLEM — M18.11 ARTHRITIS OF CARPOMETACARPAL (CMC) JOINT OF RIGHT THUMB: Status: ACTIVE | Noted: 2022-03-10

## 2022-06-14 DIAGNOSIS — I10 HYPERTENSION, UNSPECIFIED TYPE: ICD-10-CM

## 2022-06-14 RX ORDER — METOPROLOL SUCCINATE 25 MG/1
25 TABLET, EXTENDED RELEASE ORAL DAILY
Qty: 90 TABLET | Refills: 1 | Status: SHIPPED | OUTPATIENT
Start: 2022-06-14

## 2022-06-15 DIAGNOSIS — K21.9 GERD WITHOUT ESOPHAGITIS: ICD-10-CM

## 2022-06-15 RX ORDER — OMEPRAZOLE 40 MG/1
CAPSULE, DELAYED RELEASE ORAL
Qty: 90 CAPSULE | Refills: 4 | Status: SHIPPED | OUTPATIENT
Start: 2022-06-15

## 2022-06-17 ENCOUNTER — OFFICE VISIT (OUTPATIENT)
Dept: INTERNAL MEDICINE CLINIC | Facility: CLINIC | Age: 57
End: 2022-06-17
Payer: COMMERCIAL

## 2022-06-17 VITALS
DIASTOLIC BLOOD PRESSURE: 70 MMHG | HEIGHT: 65 IN | BODY MASS INDEX: 31.06 KG/M2 | SYSTOLIC BLOOD PRESSURE: 110 MMHG | TEMPERATURE: 97.9 F | HEART RATE: 68 BPM | OXYGEN SATURATION: 98 % | WEIGHT: 186.4 LBS

## 2022-06-17 DIAGNOSIS — Z13.1 SCREENING FOR DIABETES MELLITUS (DM): ICD-10-CM

## 2022-06-17 DIAGNOSIS — F41.1 GENERALIZED ANXIETY DISORDER: ICD-10-CM

## 2022-06-17 DIAGNOSIS — R52 PAIN MANAGEMENT: ICD-10-CM

## 2022-06-17 DIAGNOSIS — E55.9 VITAMIN D DEFICIENCY: ICD-10-CM

## 2022-06-17 DIAGNOSIS — I10 BENIGN ESSENTIAL HYPERTENSION: Primary | ICD-10-CM

## 2022-06-17 DIAGNOSIS — J45.20 MILD INTERMITTENT ASTHMA WITHOUT COMPLICATION: ICD-10-CM

## 2022-06-17 DIAGNOSIS — L40.50 PSORIATIC ARTHRITIS (HCC): ICD-10-CM

## 2022-06-17 DIAGNOSIS — G89.4 CHRONIC PAIN DISORDER: ICD-10-CM

## 2022-06-17 DIAGNOSIS — M85.80 OSTEOPENIA, UNSPECIFIED LOCATION: ICD-10-CM

## 2022-06-17 DIAGNOSIS — Z12.31 ENCOUNTER FOR SCREENING MAMMOGRAM FOR MALIGNANT NEOPLASM OF BREAST: ICD-10-CM

## 2022-06-17 DIAGNOSIS — Z13.220 SCREENING FOR LIPID DISORDERS: ICD-10-CM

## 2022-06-17 DIAGNOSIS — K21.9 GERD WITHOUT ESOPHAGITIS: ICD-10-CM

## 2022-06-17 DIAGNOSIS — D84.9 IMMUNOSUPPRESSION (HCC): ICD-10-CM

## 2022-06-17 PROCEDURE — 3008F BODY MASS INDEX DOCD: CPT | Performed by: INTERNAL MEDICINE

## 2022-06-17 PROCEDURE — 3078F DIAST BP <80 MM HG: CPT | Performed by: INTERNAL MEDICINE

## 2022-06-17 PROCEDURE — 99214 OFFICE O/P EST MOD 30 MIN: CPT | Performed by: INTERNAL MEDICINE

## 2022-06-17 PROCEDURE — 3074F SYST BP LT 130 MM HG: CPT | Performed by: INTERNAL MEDICINE

## 2022-06-17 PROCEDURE — 1036F TOBACCO NON-USER: CPT | Performed by: INTERNAL MEDICINE

## 2022-06-17 RX ORDER — GABAPENTIN 300 MG/1
300 CAPSULE ORAL 3 TIMES DAILY
COMMUNITY
End: 2022-06-17 | Stop reason: SDUPTHER

## 2022-06-17 RX ORDER — GABAPENTIN 300 MG/1
300 CAPSULE ORAL 3 TIMES DAILY
Qty: 90 CAPSULE | Refills: 1 | Status: SHIPPED | OUTPATIENT
Start: 2022-06-17

## 2022-06-17 NOTE — PATIENT INSTRUCTIONS
Chronic Hypertension   AMBULATORY CARE:   Hypertension  is high blood pressure  Your blood pressure is the force of your blood moving against the walls of your arteries  Hypertension causes your blood pressure to get so high that your heart has to work much harder than normal  This can damage your heart  Even if you have hypertension for years, lifestyle changes, medicines, or both can help bring your blood pressure to normal   Call your local emergency number (911 in the [address] [facility]) or have someone call if:   You have chest pain  You have any of the following signs of a heart attack:      Squeezing, pressure, or pain in your chest    You may  also have any of the following:     Discomfort or pain in your back, neck, jaw, stomach, or arm    Shortness of breath    Nausea or vomiting    Lightheadedness or a sudden cold sweat    You become confused or have difficulty speaking  You suddenly feel lightheaded or have trouble breathing  Seek care immediately if:   You have a severe headache or vision loss  You have weakness in an arm or leg  Call your doctor or cardiologist if:   You feel faint, dizzy, confused, or drowsy  You have been taking your blood pressure medicine but your pressure is higher than your provider says it should be  You have questions or concerns about your condition or care  Treatment for chronic hypertension  may include medicine to lower your blood pressure and cholesterol levels  A low cholesterol level helps prevent heart disease and makes it easier to control your blood pressure  Heart disease can make your blood pressure harder to control  You may also need to make lifestyle changes  What you need to know about the stages of hypertension:       Normal blood pressure is 119/79 or lower   Your healthcare provider may only check your blood pressure each year if it stays at a normal level  Elevated blood pressure is 120/79 to 129/79   This is sometimes called prehypertension  Your healthcare provider may suggest lifestyle changes to help lower your blood pressure to a normal level  He or she may then check it again in 3 to 6 months  Stage 1 hypertension is 130/80  to 139/89   Your provider may recommend lifestyle changes, medication, and checks every 3 to 6 months until your blood pressure is controlled  Stage 2 hypertension is 140/90 or higher   Your provider will recommend lifestyle changes and have you take 2 kinds of hypertension medicines  You will also need to have your blood pressure checked monthly until it is controlled  Manage chronic hypertension:   Check your blood pressure at home  Avoid smoking, caffeine, and exercise at least 30 minutes before checking your blood pressure  Sit and rest for 5 minutes before you take your blood pressure  Extend your arm and support it on a flat surface  Your arm should be at the same level as your heart  Follow the directions that came with your blood pressure monitor  Check your blood pressure 2 times, 1 minute apart, before you take your medicine in the morning  Also check your blood pressure before your evening meal  Keep a record of your readings and bring it to your follow-up visits  Ask your healthcare provider what your blood pressure should be  Manage any other health conditions you have  Health conditions such as diabetes can increase your risk for hypertension  Follow your healthcare provider's instructions and take all your medicines as directed  Talk to your healthcare provider about any new health conditions you have recently developed  Ask about all medicines  Certain medicines can increase your blood pressure  Examples include oral birth control pills, decongestants, herbal supplements, and NSAIDs, such as ibuprofen  Your healthcare provider can tell you which medicines are safe for you to take  This includes prescription and over-the-counter medicines      Lifestyle changes you can make to lower your blood pressure: Your provider may want you to make more lifestyle changes if you are having trouble controlling your blood pressure  This may feel difficult over time, especially if you think you are making good changes but your pressure is still high  It might help to focus on one new change at a time  For example, try to add 1 more day of exercise, or exercise for an extra 10 minutes on 2 days  Small changes can make a big difference  Your healthcare provider can also refer you to specialists such as a dietitian who can help you make small changes  Your family members may be included in helping you learn to create lifestyle changes, such as the following:     Limit sodium (salt) as directed  Too much sodium can affect your fluid balance  Check labels to find low-sodium or no-salt-added foods  Some low-sodium foods use potassium salts for flavor  Too much potassium can also cause health problems  Your healthcare provider will tell you how much sodium and potassium are safe for you to have in a day  He or she may recommend that you limit sodium to 2,300 mg a day  Follow the meal plan recommended by your healthcare provider  A dietitian or your provider can give you more information on low-sodium plans or the DASH (Dietary Approaches to Stop Hypertension) eating plan  The DASH plan is low in sodium, processed sugar, unhealthy fats, and total fat  It is high in potassium, calcium, and fiber  These can be found in vegetables, fruit, and whole-grain foods  Be physically active throughout the day  Physical activity, such as exercise, can help control your blood pressure and your weight  Be physically active for at least 30 minutes per day, on most days of the week  Include aerobic activity, such as walking or riding a bicycle  Also include strength training at least 2 times each week  Your healthcare providers can help you create a physical activity plan  Decrease stress    This may help lower your blood pressure  Learn ways to relax, such as deep breathing or listening to music  Limit alcohol as directed  Alcohol can increase your blood pressure  A drink of alcohol is 12 ounces of beer, 5 ounces of wine, or 1½ ounces of liquor  Do not smoke  Nicotine and other chemicals in cigarettes and cigars can increase your blood pressure and also cause lung damage  Ask your healthcare provider for information if you currently smoke and need help to quit  E-cigarettes or smokeless tobacco still contain nicotine  Talk to your healthcare provider before you use these products  Follow up with your doctor or cardiologist as directed: You will need to return to have your blood pressure checked and to have other lab tests done  Write down your questions so you remember to ask them during your visits  © Copyright [employer] 2022 Information is for End User's use only and may not be sold, redistributed or otherwise used for commercial purposes  All illustrations and images included in CareNotes® are the copyrighted property of A D A M , Inc  or [facility] Jessenia Mohr   The above information is an  only  It is not intended as medical advice for individual conditions or treatments  Talk to your doctor, nurse or pharmacist before following any medical regimen to see if it is safe and effective for you

## 2022-06-17 NOTE — PROGRESS NOTES
Assessment/Plan:  Problem List Items Addressed This Visit        Digestive    GERD without esophagitis       Respiratory    Asthma       Cardiovascular and Mediastinum    Benign essential hypertension - Primary       Musculoskeletal and Integument    Psoriatic arthritis (HCC)    Osteopenia       Other    Vitamin D deficiency    Immunosuppression (HCC)    Generalized anxiety disorder    Chronic pain disorder      Other Visit Diagnoses     Pain management        Relevant Medications    gabapentin (NEURONTIN) 300 mg capsule    Encounter for screening mammogram for malignant neoplasm of breast        Relevant Orders    Mammo screening bilateral w 3d & cad    Screening for lipid disorders        Relevant Orders    Lipid Panel with Direct LDL reflex    Screening for diabetes mellitus (DM)        Relevant Orders    Hemoglobin A1C           Diagnoses and all orders for this visit:    Benign essential hypertension    Pain management  -     gabapentin (NEURONTIN) 300 mg capsule; Take 1 capsule (300 mg total) by mouth 3 (three) times a day    Mild intermittent asthma without complication    GERD without esophagitis    Psoriatic arthritis (HCC)    Osteopenia, unspecified location    Vitamin D deficiency    Immunosuppression ([address] )    Generalized anxiety disorder    Chronic pain disorder    Encounter for screening mammogram for malignant neoplasm of breast  -     Mammo screening bilateral w 3d & cad; Future    Screening for lipid disorders  -     Lipid Panel with Direct LDL reflex; Future    Screening for diabetes mellitus (DM)  -     Hemoglobin A1C; Future    Other orders  -     Discontinue: gabapentin (NEURONTIN) 300 mg capsule; Take 300 mg by mouth 3 (three) times a day      No problem-specific Assessment & Plan notes found for this encounter  A/P: Doing ok and will check labs  WIll order mammo  Keep f/u with rheum  Continue current treatment and RTC four months for routine       Subjective:      Patient ID: So Diez liz a 61 y o  female  WF RTC for f/u HTN, GERD, etc Doing well and no new issues  Remains active w/o difficulty and no falls  Hand sx went well  No reflux  Asthma and headaches are well controlled  CHronic pain and Psoriatric arthritic are controlled after adding sawyer, but Rheum feels she has fibromyalia now  Also, feels immunotherapy isn't going to work now due to no inflammation  Oly Mello RYNE is good  Due for labs and mammo  The following portions of the patient's history were reviewed and updated as appropriate:   She has a past medical history of Asthma, Generalized anxiety disorder, GERD (gastroesophageal reflux disease), Hemangioma of other sites, Hypertension, Psoriatic arthritis ([facility] [address] ), and Psoriatic arthritis ([facility] [address] )  ,  does not have any pertinent problems on file  ,   has a past surgical history that includes Appendectomy; Cholecystectomy; Foot surgery; Hysterectomy; Kidney surgery; Tonsillectomy; Tubal ligation; Knee surgery; Spinal fusion; and Hand surgery (Right, 03/10/2022)  ,  family history includes Heart attack in her paternal grandfather; Hypertension in her maternal grandmother and paternal grandfather; Pancreatic cancer in her paternal grandfather  ,   reports that she has never smoked  She has never used smokeless tobacco  She reports current alcohol use  She reports that she does not use drugs  ,  is allergic to diclofenac-misoprostol, indomethacin, sulfamethoxazole-trimethoprim, and cetirizine     Current Outpatient Medications   Medication Sig Dispense Refill    albuterol (PROVENTIL HFA,VENTOLIN HFA) 90 mcg/act inhaler TAKE 2 PUFFS BY MOUTH EVERY 6 HOURS AS NEEDED FOR WHEEZE OR FOR SHORTNESS OF BREATH 18 g 5    ALPRAZolam (XANAX) 0 25 mg tablet Take 1 tablet (0 25 mg total) by mouth 3 (three) times a day as needed for anxiety 90 tablet 0    diazepam (VALIUM) 5 mg tablet Take 1 tablet (5 mg total) by mouth every 6 (six) hours as needed for anxiety TAKE EVERY 4-6 HOURS PRN PAIN 30 tablet 0    gabapentin (NEURONTIN) 300 mg capsule Take 1 capsule (300 mg total) by mouth 3 (three) times a day 90 capsule 1    loratadine (CLARITIN) 10 mg tablet daily as needed        metoprolol succinate (TOPROL-XL) 25 mg 24 hr tablet TAKE 1 TABLET (25 MG TOTAL) BY MOUTH DAILY  90 tablet 1    omeprazole (PriLOSEC) 40 MG capsule TAKE 1 CAPSULE BY MOUTH EVERY DAY BEFORE BREAKFAST 90 capsule 4    traZODone (DESYREL) 50 mg tablet Take 1 tablet (50 mg total) by mouth daily at bedtime 90 tablet 3    DULoxetine (CYMBALTA) 30 mg delayed release capsule Take 1 capsule (30 mg total) by mouth daily (Patient not taking: Reported on 6/17/2022) 90 capsule 1    gabapentin (NEURONTIN) 100 mg capsule Take 1 capsule (100 mg total) by mouth 3 (three) times a day (Patient not taking: Reported on 6/17/2022) 270 capsule 1    predniSONE 2 5 mg tablet Take 7 5 mg by mouth as needed   (Patient not taking: No sig reported)       No current facility-administered medications for this visit  Review of Systems   Constitutional: Negative for activity change, chills, diaphoresis, fatigue and fever  HENT: Negative  Eyes: Negative for visual disturbance  Respiratory: Negative for cough, chest tightness, shortness of breath and wheezing  Cardiovascular: Negative for chest pain, palpitations and leg swelling  Gastrointestinal: Negative for abdominal pain, constipation, diarrhea, nausea and vomiting  Endocrine: Negative for cold intolerance and heat intolerance  Genitourinary: Negative for difficulty urinating, dysuria and frequency  Musculoskeletal: Positive for arthralgias  Negative for gait problem and myalgias  Neurological: Negative for dizziness, seizures, syncope, weakness, light-headedness and headaches  Psychiatric/Behavioral: Negative for confusion, dysphoric mood and sleep disturbance  The patient is not nervous/anxious          PHQ-2/9 Depression Screening          Objective:  Vitals:    06/17/22 1512   BP: 110/70 Pulse: 68   Temp: 97 9 °F (36 6 °C)   TempSrc: Tympanic   SpO2: 98%   Weight: 84 6 kg (186 lb 6 4 oz)   Height: 5' 5" (1 651 m)     Body mass index is 31 02 kg/m²  Physical Exam  Vitals and nursing note reviewed  Constitutional:       General: She is not in acute distress  Appearance: Normal appearance  She is not ill-appearing  HENT:      Head: Normocephalic and atraumatic  Mouth/Throat:      Mouth: Mucous membranes are moist    Eyes:      Extraocular Movements: Extraocular movements intact  Conjunctiva/sclera: Conjunctivae normal       Pupils: Pupils are equal, round, and reactive to light  Neck:      Vascular: No carotid bruit  Cardiovascular:      Rate and Rhythm: Normal rate and regular rhythm  Heart sounds: Normal heart sounds  Pulmonary:      Effort: Pulmonary effort is normal  No respiratory distress  Breath sounds: Normal breath sounds  No wheezing or rales  Abdominal:      General: Bowel sounds are normal  There is no distension  Palpations: Abdomen is soft  Tenderness: There is no abdominal tenderness  Musculoskeletal:      Cervical back: Neck supple  Right lower leg: No edema  Left lower leg: No edema  Neurological:      General: No focal deficit present  Mental Status: She is alert and oriented to person, place, and time  Mental status is at baseline  Psychiatric:         Mood and Affect: Mood normal          Behavior: Behavior normal          Thought Content:  Thought content normal          Judgment: Judgment normal  room air

## 2022-07-14 ENCOUNTER — VBI (OUTPATIENT)
Dept: ADMINISTRATIVE | Facility: OTHER | Age: 57
End: 2022-07-14

## 2022-08-07 DIAGNOSIS — G47.9 SLEEP DISORDER: ICD-10-CM

## 2022-08-07 RX ORDER — TRAZODONE HYDROCHLORIDE 50 MG/1
TABLET ORAL
Qty: 90 TABLET | Refills: 3 | Status: SHIPPED | OUTPATIENT
Start: 2022-08-07

## 2022-08-15 ENCOUNTER — OFFICE VISIT (OUTPATIENT)
Dept: INTERNAL MEDICINE CLINIC | Facility: CLINIC | Age: 57
End: 2022-08-15
Payer: COMMERCIAL

## 2022-08-15 VITALS
SYSTOLIC BLOOD PRESSURE: 130 MMHG | BODY MASS INDEX: 31.19 KG/M2 | HEART RATE: 75 BPM | OXYGEN SATURATION: 98 % | WEIGHT: 187.2 LBS | DIASTOLIC BLOOD PRESSURE: 86 MMHG | TEMPERATURE: 98.4 F | HEIGHT: 65 IN

## 2022-08-15 DIAGNOSIS — R26.9 GAIT ABNORMALITY: ICD-10-CM

## 2022-08-15 DIAGNOSIS — L40.50 PSORIATIC ARTHRITIS (HCC): ICD-10-CM

## 2022-08-15 DIAGNOSIS — M23.92 INTERNAL DERANGEMENT OF BOTH KNEES: ICD-10-CM

## 2022-08-15 DIAGNOSIS — M25.562 CHRONIC PAIN OF BOTH KNEES: ICD-10-CM

## 2022-08-15 DIAGNOSIS — Z02.89 ENCOUNTER FOR COMPLETION OF FORM WITH PATIENT: ICD-10-CM

## 2022-08-15 DIAGNOSIS — M25.561 CHRONIC PAIN OF BOTH KNEES: ICD-10-CM

## 2022-08-15 DIAGNOSIS — G89.29 CHRONIC PAIN OF BOTH KNEES: ICD-10-CM

## 2022-08-15 DIAGNOSIS — M23.91 INTERNAL DERANGEMENT OF BOTH KNEES: ICD-10-CM

## 2022-08-15 DIAGNOSIS — M25.562 ACUTE PAIN OF LEFT KNEE: Primary | ICD-10-CM

## 2022-08-15 DIAGNOSIS — Z91.81 AT HIGH RISK FOR FALLS: ICD-10-CM

## 2022-08-15 PROCEDURE — 3725F SCREEN DEPRESSION PERFORMED: CPT | Performed by: INTERNAL MEDICINE

## 2022-08-15 PROCEDURE — 3075F SYST BP GE 130 - 139MM HG: CPT | Performed by: INTERNAL MEDICINE

## 2022-08-15 PROCEDURE — 99213 OFFICE O/P EST LOW 20 MIN: CPT | Performed by: INTERNAL MEDICINE

## 2022-08-15 PROCEDURE — 3079F DIAST BP 80-89 MM HG: CPT | Performed by: INTERNAL MEDICINE

## 2022-08-15 RX ORDER — GABAPENTIN 300 MG/1
300 CAPSULE ORAL 3 TIMES DAILY
COMMUNITY

## 2022-08-15 NOTE — PROGRESS NOTES
Assessment/Plan:  Problem List Items Addressed This Visit        Musculoskeletal and Integument    Psoriatic arthritis ([facility] )    Relevant Orders    Ambulatory referral to Orthopedic Surgery      Other Visit Diagnoses     Acute pain of left knee    -  Primary    Relevant Orders    Ambulatory referral to Orthopedic Surgery    Chronic pain of both knees        Relevant Orders    Ambulatory referral to Orthopedic Surgery    Internal derangement of both knees        Relevant Medications    gabapentin (NEURONTIN) 300 mg capsule    Other Relevant Orders    Ambulatory referral to Orthopedic Surgery    Gait abnormality        Relevant Orders    Ambulatory referral to Orthopedic Surgery    At high risk for falls        Relevant Orders    Ambulatory referral to Orthopedic Surgery    Encounter for completion of form with patient               Diagnoses and all orders for this visit:    Acute pain of left knee  -     Ambulatory referral to Orthopedic Surgery; Future    Chronic pain of both knees  -     Ambulatory referral to Orthopedic Surgery; Future    Internal derangement of both knees  -     Ambulatory referral to Orthopedic Surgery; Future    Gait abnormality  -     Ambulatory referral to Orthopedic Surgery; Future    At high risk for falls  -     Ambulatory referral to Orthopedic Surgery; Future    Psoriatic arthritis ([facility] )  -     Ambulatory referral to Orthopedic Surgery; Future    Encounter for completion of form with patient    Other orders  -     gabapentin (NEURONTIN) 300 mg capsule; Take 300 mg by mouth 3 (three) times a day      No problem-specific Assessment & Plan notes found for this encounter  A/P: PE very impressive for DJD with internal derangement and instability  At risk for continue falls/near falls  Unable to use NSAID's  Will hold on repeating the MRI from 2018 and 2019 and refer to ortho  No s/s of DVT, but ? Bakers on the left  RTC as scheduled  Placard forms filled out       Subjective:      Patient ID: Jaun Gunter is a 61 y o  female  WF with chronic bilat knee pain and psoriatic arthritis,  presents due to increase left knee pain with knee buckling and several falls/near falls over the past several weeks  2019 MRI show some internal derangement that was treated surgically and 2018, right knee treated sx as well  Unable to use NSAID's due to prednisone  No swelling or redness  Difficulty walking and even sitting  The following portions of the patient's history were reviewed and updated as appropriate:   She has a past medical history of Asthma, Generalized anxiety disorder, GERD (gastroesophageal reflux disease), Hemangioma of other sites, Hypertension, Psoriatic arthritis ([facility] ), and Psoriatic arthritis ([facility] )  ,  does not have any pertinent problems on file  ,   has a past surgical history that includes Appendectomy; Cholecystectomy; Foot surgery; Hysterectomy; Tonsillectomy; Tubal ligation; Knee surgery; Spinal fusion; and Hand surgery (Right, 03/10/2022)  ,  family history includes COPD in her mother; Heart attack in her paternal grandfather; Heart disease in her mother; Hypertension in her maternal grandmother, mother, and paternal grandfather; Pancreatic cancer in her father and paternal grandfather  ,   reports that she has never smoked  She has never used smokeless tobacco  She reports current alcohol use  She reports that she does not use drugs  ,  is allergic to diclofenac-misoprostol, indomethacin, sulfamethoxazole-trimethoprim, and cetirizine     Current Outpatient Medications   Medication Sig Dispense Refill    albuterol (PROVENTIL HFA,VENTOLIN HFA) 90 mcg/act inhaler TAKE 2 PUFFS BY MOUTH EVERY 6 HOURS AS NEEDED FOR WHEEZE OR FOR SHORTNESS OF BREATH 18 g 5    ALPRAZolam (XANAX) 0 25 mg tablet Take 1 tablet (0 25 mg total) by mouth 3 (three) times a day as needed for anxiety 90 tablet 0    diazepam (VALIUM) 5 mg tablet Take 1 tablet (5 mg total) by mouth every 6 (six) hours as needed for anxiety TAKE EVERY 4-6 HOURS PRN PAIN 30 tablet 0    gabapentin (NEURONTIN) 300 mg capsule Take 300 mg by mouth 3 (three) times a day      loratadine (CLARITIN) 10 mg tablet daily as needed        metoprolol succinate (TOPROL-XL) 25 mg 24 hr tablet TAKE 1 TABLET (25 MG TOTAL) BY MOUTH DAILY  90 tablet 1    omeprazole (PriLOSEC) 40 MG capsule TAKE 1 CAPSULE BY MOUTH EVERY DAY BEFORE BREAKFAST 90 capsule 4    predniSONE 2 5 mg tablet Take 2 5 mg by mouth as needed      traZODone (DESYREL) 50 mg tablet TAKE 1 TABLET BY MOUTH DAILY AT BEDTIME 90 tablet 3    DULoxetine (CYMBALTA) 30 mg delayed release capsule Take 1 capsule (30 mg total) by mouth daily (Patient not taking: No sig reported) 90 capsule 1     No current facility-administered medications for this visit  Review of Systems   Constitutional: Positive for activity change  Negative for chills, diaphoresis, fatigue and fever  Respiratory: Negative for cough, chest tightness, shortness of breath and wheezing  Cardiovascular: Negative for chest pain, palpitations and leg swelling  Gastrointestinal: Negative for abdominal pain, constipation, diarrhea, nausea and vomiting  Genitourinary: Negative for difficulty urinating, dysuria and frequency  Musculoskeletal: Positive for arthralgias and gait problem  Negative for back pain, joint swelling and myalgias  Neurological: Negative for light-headedness and headaches  Psychiatric/Behavioral: Negative for confusion  The patient is not nervous/anxious  PHQ-2/9 Depression Screening    Little interest or pleasure in doing things: 0 - not at all  Feeling down, depressed, or hopeless: 0 - not at all  PHQ-2 Score: 0  PHQ-2 Interpretation: Negative depression screen        Objective:  Vitals:    08/15/22 1455   BP: 130/86   Pulse: 75   Temp: 98 4 °F (36 9 °C)   TempSrc: Tympanic   SpO2: 98%   Weight: 84 9 kg (187 lb 3 2 oz)   Height: 5' 5" (1 651 m)     Body mass index is 31 15 kg/m²  Physical Exam  Vitals and nursing note reviewed  Constitutional:       General: She is not in acute distress  Appearance: Normal appearance  She is not ill-appearing  HENT:      Head: Normocephalic and atraumatic  Mouth/Throat:      Mouth: Mucous membranes are moist    Eyes:      Extraocular Movements: Extraocular movements intact  Conjunctiva/sclera: Conjunctivae normal       Pupils: Pupils are equal, round, and reactive to light  Musculoskeletal:         General: Tenderness present  No swelling, deformity or signs of injury  Normal range of motion  Right lower leg: No edema  Left lower leg: No edema  Comments: Right knee any gross deformities, increase temp, erythema, or swelling  Small effusion,but no ballotment  Moderate crepitus with knee popping, locking, and instablility  Collaterals intact, but very laxed  Negative Drawer's  Negative Eva's  ROM wnl  Tenderness diffusely  Left knee any gross deformities, increase temp, erythema, or swelling  Moderate suprapatellar effusion, but no ballotment  Fullness in the proximal calf  Mild crepitus  Collaterals intact,but laxed  Negative Drawer's  Negative Eva's  ROM wnl  Tenderness diffusely and especially medial side and proximal calf         Neurological:      General: No focal deficit present  Mental Status: She is alert and oriented to person, place, and time  Mental status is at baseline  Psychiatric:         Mood and Affect: Mood normal          Behavior: Behavior normal          Thought Content:  Thought content normal          Judgment: Judgment normal

## 2022-09-28 ENCOUNTER — OFFICE VISIT (OUTPATIENT)
Dept: INTERNAL MEDICINE CLINIC | Facility: CLINIC | Age: 57
End: 2022-09-28
Payer: COMMERCIAL

## 2022-09-28 ENCOUNTER — APPOINTMENT (OUTPATIENT)
Dept: LAB | Facility: CLINIC | Age: 57
End: 2022-09-28
Payer: COMMERCIAL

## 2022-09-28 ENCOUNTER — APPOINTMENT (OUTPATIENT)
Dept: RADIOLOGY | Facility: CLINIC | Age: 57
End: 2022-09-28
Payer: COMMERCIAL

## 2022-09-28 VITALS
DIASTOLIC BLOOD PRESSURE: 76 MMHG | HEIGHT: 65 IN | TEMPERATURE: 97.8 F | BODY MASS INDEX: 30.49 KG/M2 | WEIGHT: 183 LBS | OXYGEN SATURATION: 98 % | HEART RATE: 58 BPM | SYSTOLIC BLOOD PRESSURE: 124 MMHG

## 2022-09-28 DIAGNOSIS — M54.42 ACUTE MIDLINE LOW BACK PAIN WITH LEFT-SIDED SCIATICA: ICD-10-CM

## 2022-09-28 DIAGNOSIS — Z13.220 SCREENING FOR LIPID DISORDERS: ICD-10-CM

## 2022-09-28 DIAGNOSIS — M48.07 SPINAL STENOSIS OF LUMBOSACRAL REGION: ICD-10-CM

## 2022-09-28 DIAGNOSIS — M54.42 ACUTE MIDLINE LOW BACK PAIN WITH LEFT-SIDED SCIATICA: Primary | ICD-10-CM

## 2022-09-28 DIAGNOSIS — Z13.1 SCREENING FOR DIABETES MELLITUS (DM): ICD-10-CM

## 2022-09-28 PROBLEM — M17.0 OSTEOARTHRITIS OF BOTH KNEES: Status: ACTIVE | Noted: 2022-08-29

## 2022-09-28 LAB
ANION GAP SERPL CALCULATED.3IONS-SCNC: 7 MMOL/L (ref 4–13)
BUN SERPL-MCNC: 16 MG/DL (ref 5–25)
CALCIUM SERPL-MCNC: 9.8 MG/DL (ref 8.3–10.1)
CHLORIDE SERPL-SCNC: 107 MMOL/L (ref 96–108)
CHOLEST SERPL-MCNC: 204 MG/DL
CO2 SERPL-SCNC: 27 MMOL/L (ref 21–32)
CREAT SERPL-MCNC: 0.91 MG/DL (ref 0.6–1.3)
GFR SERPL CREATININE-BSD FRML MDRD: 69 ML/MIN/1.73SQ M
GLUCOSE SERPL-MCNC: 85 MG/DL (ref 65–140)
HDLC SERPL-MCNC: 70 MG/DL
LDLC SERPL CALC-MCNC: 108 MG/DL (ref 0–100)
POTASSIUM SERPL-SCNC: 3.9 MMOL/L (ref 3.5–5.3)
SODIUM SERPL-SCNC: 141 MMOL/L (ref 135–147)
TRIGL SERPL-MCNC: 128 MG/DL

## 2022-09-28 PROCEDURE — 80048 BASIC METABOLIC PNL TOTAL CA: CPT

## 2022-09-28 PROCEDURE — 3078F DIAST BP <80 MM HG: CPT | Performed by: INTERNAL MEDICINE

## 2022-09-28 PROCEDURE — 73502 X-RAY EXAM HIP UNI 2-3 VIEWS: CPT

## 2022-09-28 PROCEDURE — 3074F SYST BP LT 130 MM HG: CPT | Performed by: INTERNAL MEDICINE

## 2022-09-28 PROCEDURE — 36415 COLL VENOUS BLD VENIPUNCTURE: CPT

## 2022-09-28 PROCEDURE — 83036 HEMOGLOBIN GLYCOSYLATED A1C: CPT

## 2022-09-28 PROCEDURE — 80061 LIPID PANEL: CPT

## 2022-09-28 PROCEDURE — 72110 X-RAY EXAM L-2 SPINE 4/>VWS: CPT

## 2022-09-28 PROCEDURE — 99213 OFFICE O/P EST LOW 20 MIN: CPT | Performed by: INTERNAL MEDICINE

## 2022-09-28 RX ORDER — METHOCARBAMOL 750 MG/1
750 TABLET, FILM COATED ORAL EVERY 6 HOURS PRN
Qty: 90 TABLET | Refills: 0 | Status: SHIPPED | OUTPATIENT
Start: 2022-09-28 | End: 2022-10-17

## 2022-09-28 NOTE — PROGRESS NOTES
Assessment/Plan:  Problem List Items Addressed This Visit    None     Visit Diagnoses     Acute midline low back pain with left-sided sciatica    -  Primary    Relevant Medications    methocarbamol (Robaxin-750) 750 mg tablet    Other Relevant Orders    Ambulatory referral to Physical Therapy    XR spine lumbar minimum 4 views non injury    XR hip/pelv 4+ vw left if performed    MRI lumbar spine w wo contrast    Spinal stenosis of lumbosacral region        Relevant Medications    methocarbamol (Robaxin-750) 750 mg tablet    Other Relevant Orders    Ambulatory referral to Physical Therapy    XR spine lumbar minimum 4 views non injury    XR hip/pelv 4+ vw left if performed    MRI lumbar spine w wo contrast           Diagnoses and all orders for this visit:    Acute midline low back pain with left-sided sciatica  -     Ambulatory referral to Physical Therapy; Future  -     methocarbamol (Robaxin-750) 750 mg tablet; Take 1 tablet (750 mg total) by mouth every 6 (six) hours as needed for muscle spasms  -     XR spine lumbar minimum 4 views non injury; Future  -     XR hip/pelv 4+ vw left if performed; Future  -     MRI lumbar spine w wo contrast; Future    Spinal stenosis of lumbosacral region  -     Ambulatory referral to Physical Therapy; Future  -     methocarbamol (Robaxin-750) 750 mg tablet; Take 1 tablet (750 mg total) by mouth every 6 (six) hours as needed for muscle spasms  -     XR spine lumbar minimum 4 views non injury; Future  -     XR hip/pelv 4+ vw left if performed; Future  -     MRI lumbar spine w wo contrast; Future      No problem-specific Assessment & Plan notes found for this encounter  A/P: Stable  Suspect LS is the source, but ?hip issues given her PA dx  Will check xrays and an MRI  Pt to contact her Rheum and prior back surgeon  Will continue the steroid wean and add muscle relaxant  Start PT  RTC two weeks for f/u  Subjective:      Patient ID: Chelly Levi is a 61 y o  female      WF with known severe LS spinal stenosis s/p fusion in the past and psoriatic arthritis, presents with worsening LBP with radiation into the left groin  NO trauma  Is on steroid wean that initially helped, but is no longer working  NO difficulty walking  Denies saddle anesthesia  Denies changes in bowel or bladder habits  Pain is rated constant 5/10 with flares higher          The following portions of the patient's history were reviewed and updated as appropriate:   She has a past medical history of Asthma, Generalized anxiety disorder, GERD (gastroesophageal reflux disease), Hemangioma of other sites, Hypertension, Psoriatic arthritis ([facility] ), and Psoriatic arthritis ([facility] )  ,  does not have any pertinent problems on file  ,   has a past surgical history that includes Appendectomy; Cholecystectomy; Foot surgery; Hysterectomy; Tonsillectomy; Tubal ligation; Knee surgery; Spinal fusion; and Hand surgery (Right, 03/10/2022)  ,  family history includes COPD in her mother; Heart attack in her paternal grandfather; Heart disease in her mother; Hypertension in her maternal grandmother, mother, and paternal grandfather; Pancreatic cancer in her father and paternal grandfather  ,   reports that she has never smoked  She has never used smokeless tobacco  She reports current alcohol use  She reports that she does not use drugs  ,  is allergic to diclofenac-misoprostol, indomethacin, sulfamethoxazole-trimethoprim, and cetirizine     Current Outpatient Medications   Medication Sig Dispense Refill    albuterol (PROVENTIL HFA,VENTOLIN HFA) 90 mcg/act inhaler TAKE 2 PUFFS BY MOUTH EVERY 6 HOURS AS NEEDED FOR WHEEZE OR FOR SHORTNESS OF BREATH 18 g 5    ALPRAZolam (XANAX) 0 25 mg tablet Take 1 tablet (0 25 mg total) by mouth 3 (three) times a day as needed for anxiety 90 tablet 0    diazepam (VALIUM) 5 mg tablet Take 1 tablet (5 mg total) by mouth every 6 (six) hours as needed for anxiety TAKE EVERY 4-6 HOURS PRN PAIN 30 tablet 0    gabapentin (NEURONTIN) 300 mg capsule Take 300 mg by mouth 3 (three) times a day      loratadine (CLARITIN) 10 mg tablet daily as needed        methocarbamol (Robaxin-750) 750 mg tablet Take 1 tablet (750 mg total) by mouth every 6 (six) hours as needed for muscle spasms 90 tablet 0    metoprolol succinate (TOPROL-XL) 25 mg 24 hr tablet TAKE 1 TABLET (25 MG TOTAL) BY MOUTH DAILY  90 tablet 1    omeprazole (PriLOSEC) 40 MG capsule TAKE 1 CAPSULE BY MOUTH EVERY DAY BEFORE BREAKFAST 90 capsule 4    predniSONE 2 5 mg tablet Take 2 5 mg by mouth as needed      traZODone (DESYREL) 50 mg tablet TAKE 1 TABLET BY MOUTH DAILY AT BEDTIME 90 tablet 3     No current facility-administered medications for this visit  Review of Systems   Constitutional: Positive for appetite change  Negative for activity change, chills, diaphoresis, fatigue and fever  Respiratory: Negative for cough, chest tightness, shortness of breath and wheezing  Cardiovascular: Negative for chest pain, palpitations and leg swelling  Gastrointestinal: Negative for abdominal pain, constipation, diarrhea, nausea and vomiting  Genitourinary: Negative for difficulty urinating, dysuria and frequency  Musculoskeletal: Positive for back pain and gait problem  Negative for arthralgias, joint swelling and myalgias  Neurological: Negative for weakness, light-headedness, numbness and headaches  Psychiatric/Behavioral: Negative for confusion  The patient is not nervous/anxious  PHQ-2/9 Depression Screening          Objective:  Vitals:    09/28/22 0944   BP: 124/76   Pulse: 58   Temp: 97 8 °F (36 6 °C)   TempSrc: Tympanic   SpO2: 98%   Weight: 83 kg (183 lb)   Height: 5' 5" (1 651 m)     Body mass index is 30 45 kg/m²  Physical Exam  Vitals and nursing note reviewed  Constitutional:       General: She is in acute distress  Appearance: Normal appearance  She is not ill-appearing  HENT:      Head: Normocephalic and atraumatic  Mouth/Throat:      Mouth: Mucous membranes are moist    Eyes:      Extraocular Movements: Extraocular movements intact  Conjunctiva/sclera: Conjunctivae normal       Pupils: Pupils are equal, round, and reactive to light  Musculoskeletal:         General: Tenderness present  No swelling or deformity  Comments: LS Spine w/o gross deformities, increase temp, erythema, swelling, or lesions  Tenderness left L2-S1 and left SI  ROM wnl except flexion decreased 75% and increase discomfort with right side bending  Theresa Akash Spasms NOne  LE strength 5/5 with tone/ROM WNL  DTR 2/4  Negative Straight leg raises  No gait abnormalities(toe/heel walking)  Neurological:      General: No focal deficit present  Mental Status: She is alert and oriented to person, place, and time  Mental status is at baseline  Cranial Nerves: No cranial nerve deficit  Motor: No weakness  Coordination: Coordination normal       Gait: Gait abnormal       Deep Tendon Reflexes: Reflexes normal    Psychiatric:         Mood and Affect: Mood normal          Behavior: Behavior normal          Thought Content:  Thought content normal          Judgment: Judgment normal

## 2022-09-29 LAB
EST. AVERAGE GLUCOSE BLD GHB EST-MCNC: 134 MG/DL
HBA1C MFR BLD: 6.3 %

## 2022-10-04 ENCOUNTER — EVALUATION (OUTPATIENT)
Dept: PHYSICAL THERAPY | Facility: CLINIC | Age: 57
End: 2022-10-04
Payer: COMMERCIAL

## 2022-10-04 DIAGNOSIS — M54.42 ACUTE BACK PAIN WITH SCIATICA, LEFT: Primary | ICD-10-CM

## 2022-10-04 DIAGNOSIS — M48.07 SPINAL STENOSIS OF LUMBOSACRAL REGION: ICD-10-CM

## 2022-10-04 DIAGNOSIS — M54.42 ACUTE MIDLINE LOW BACK PAIN WITH LEFT-SIDED SCIATICA: ICD-10-CM

## 2022-10-04 PROCEDURE — 97162 PT EVAL MOD COMPLEX 30 MIN: CPT | Performed by: PHYSICAL THERAPIST

## 2022-10-04 PROCEDURE — 97110 THERAPEUTIC EXERCISES: CPT | Performed by: PHYSICAL THERAPIST

## 2022-10-04 NOTE — PROGRESS NOTES
PT Evaluation     Today's date: 10/4/2022  Patient name: Lyndsay Mcfarlane  : 1965  MRN: [id number]  Referring provider: Maame Fontaine DO  Dx:   Encounter Diagnosis     ICD-10-CM    1  Acute back pain with sciatica, left  M54 42    2  Spinal stenosis of lumbosacral region  M48 07        Start Time: 0800  Stop Time: 0900  Total time in clinic (min): 60 minutes    Past Medical History:   Diagnosis Date    Asthma     Generalized anxiety disorder     GERD (gastroesophageal reflux disease)     Hemangioma of other sites     last assessed 16    Hypertension     [facility]     P[facility]iatic arthritis ([facility] )      Current Outpatient Medications on File Prior to Visit   Medication Sig Dispense Refill    albuterol (PROVENTIL HFA,VENTOLIN HFA) 90 mcg/act inhaler TAKE 2 PUFFS BY MOUTH EVERY 6 HOURS AS NEEDED FOR WHEEZE OR FOR SHORTNESS OF BREATH 18 g 5    ALPRAZolam (XANAX) 0 25 mg tablet Take 1 tablet (0 25 mg total) by mouth 3 (three) times a day as needed for anxiety 90 tablet 0    diazepam (VALIUM) 5 mg tablet Take 1 tablet (5 mg total) by mouth every 6 (six) hours as needed for anxiety TAKE EVERY 4-6 HOURS PRN PAIN 30 tablet 0    gabapentin (NEURONTIN) 300 mg capsule Take 300 mg by mouth 3 (three) times a day      loratadine (CLARITIN) 10 mg tablet daily as needed        methocarbamol (Robaxin-750) 750 mg tablet Take 1 tablet (750 mg total) by mouth every 6 (six) hours as needed for muscle spasms 90 tablet 0    metoprolol succinate (TOPROL-XL) 25 mg 24 hr tablet TAKE 1 TABLET (25 MG TOTAL) BY MOUTH DAILY  90 tablet 1    omeprazole (PriLOSEC) 40 MG capsule TAKE 1 CAPSULE BY MOUTH EVERY DAY BEFORE BREAKFAST 90 capsule 4    predniSONE 2 5 mg tablet Take 2 5 mg by mouth as needed      traZODone (DESYREL) 50 mg tablet TAKE 1 TABLET BY MOUTH DAILY AT BEDTIME 90 tablet 3     No current facility-administered medications on file prior to visit       Allergies   Allergen Reactions    Diclofenac-Misoprostol GI Intolerance     Reaction Date: 20Jul2011;     Indomethacin GI Intolerance     Reaction Date: 20Jul2011;    Pinky Hefty Hives     Reaction Date: 20Jul2011;     Cetirizine Rash     Reaction Date: 20Jul2011;         Assessment  Assessment details: Patient reported no radicular symptoms below the L thigh throughout the evaluation today  There is noted hypermobility to the L1-4 segments with prone PA assessment  There was also noted hypomobility of the lower thoracic spine with hinging noted at the L3-4 segments during prone multifidus strength assessment  There is moderate hamstring compensation noted during prone him extension more notable to the L LE  She tolerated therapy interventions well today with reports of stiffness to the lumbar spine post therapy session today  There was good activation of the lumbar multifidus but noted substitution with lumbar extension and rotation  Instructed patient on performance of exercises for home program with verbal understanding noted  Impairments: abnormal or restricted ROM, activity intolerance, impaired physical strength, lacks appropriate home exercise program and pain with function    Symptom irritability: moderateUnderstanding of Dx/Px/POC: good   Prognosis: good    Goals  STGs:  "Patient will be independent with hep by 2-3 visits  Decrease low back pain by 25% for improved tolerance with adls and home duties by 3-4 weeks  Improve Lumbar rom to wfl for improved tolerance with adls and home duties by 3-4 weeks  "  Improve L gluteal strength to equal that of the contralateral side for improved tolerance with standing activities and adls by 3-4 weeks  LTGs:  Improve FOTO score from 42 To 59 Indicating improved tolerance with activities involving the lumbar spine by discharge  Patient will demonstrate rom and strength to the lumbar spine wfl for improved tolerance with adls and home duties by discharge     Patient will be free of radicular symptoms by discharge  "      Plan  Plan details: Patient informed that from this point forward, to ensure adherence to the aforementioned plan of care, all or some of the treatment may be performed and carried out by a Physical Therapy Assistant (PTA) with supervision from a licensed Physical Therapist (PT) in accordance with [place] [address]  Patient will continue to benefit from skilled physical therapy to address the functional deficits that were identified during the evaluation today  We will continue to progress the therapy program to address these functional deficits and achieve the established goals  Patient would benefit from: skilled physical therapy  Planned modality interventions: cryotherapy and thermotherapy: hydrocollator packs  Planned therapy interventions: abdominal trunk stabilization, ADL retraining, body mechanics training, flexibility, functional ROM exercises, home exercise program, therapeutic exercise, therapeutic activities, stretching, strengthening, postural training, patient education, joint mobilization and manual therapy  Frequency: 2x week  Duration in weeks: 8  Plan of Care beginning date: 10/4/2022  Plan of Care expiration date: 11/29/2022  Treatment plan discussed with: patient        Subjective Evaluation    History of Present Illness  Mechanism of injury: Patient presents to out patient physical therapy with acute exacerbation of chronic LBP  She has a history of lumbar spinal fusion with recent flare up of symptoms  She was placed on a steroid which initially helped to control her symptoms but is now no longer helping  She reports that the pain she is currently experiencing is pain that she has not experienced before  She notes that the pain will travel from the L side of her back into the L groin and the L half of the pubis   She notes that bending increases her pain but her pain is present when she is sitting and laying down  Quality of life: good    Pain  Current pain ratin  At best pain ratin  At worst pain ratin  Location: Lumbar/L LE  Quality: radiating, burning, dull ache and throbbing  Relieving factors: medications and rest  Aggravating factors: lifting and standing (bending)    Social Support  Steps to enter house: yes  4  Stairs in house: yes   12  Lives in: multiple-level home  Lives with: spouse and adult children    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[employer])  Exercise history: Limited to at max 1/2 of a mile walking her dog  Treatments  Previous treatment: medication  Patient Goals  Patient goals for therapy: decreased pain and improved balance  Patient goal: Patient wishes to have less pain in her back and L hip/groin  Objective     Palpation   Left   Tenderness of the erector spinae  Neurological Testing     Sensation     Lumbar   Left   Intact: light touch    Right   Intact: light touch    Reflexes   Left   Patellar (L4): normal (2+)  Achilles (S1): normal (2+)  Clonus sign: negative    Right   Patellar (L4): normal (2+)  Achilles (S1): normal (2+)  Clonus sign: negative    Additional Neurological Details  1 beat clonus with quick stretch to the R ankle       Active Range of Motion     Lumbar   Flexion:  with pain Restriction level: minimal  Extension:  WFL  Left lateral flexion:  with pain Restriction level: moderate  Right lateral flexion:  Restriction level: minimal  Left rotation:  WFL  Right rotation:  [facility]    Joint Play     Hypermobile: L1, L2 and L3     Hypomobile: T8, T9, T10 and T11     Pain: L2, L3 and L4     Strength/Myotome Testing     Left Hip   Planes of Motion   Flexion: 3+  Extension: 4  Abduction: 4  Adduction: 4    Right Hip   Planes of Motion   Flexion: 4  Extension: 4  Abduction: 4  Adduction: 4    Left Knee   Flexion: 4  Extension: 4    Right Knee   Flexion: 4  Extension: 4    Left Ankle/Foot   Dorsiflexion: 4  Plantar flexion: 4    Right Ankle/Foot   Dorsiflexion: 4  Plantar flexion: 4    Tests     Lumbar     Left   Positive crossed SLR  Negative passive SLR  Right   Negative crossed SLR and passive SLR  Left Hip   Positive IBAN  Negative FADIR  Right Hip   Negative IBAN and FADIR                Precautions: osteopenia, Lumbar fusion surgery, psoriatic arthritis     Date 10/4       FOTO 42       Manuals        Seated thoracic extension with manual OP NV       Prone PA joint mobilizations to T9-12 NV                       Neuro Re-Ed        Standing hip extension with knee flexed NV                                                       Ther Ex        Clamshell 3" 15x       Bridge 15x       Seated ER isometric L 10" 2x5       Open book stretch NV                                       Ther Activity                        Gait Training                        Modalities

## 2022-10-05 ENCOUNTER — HOSPITAL ENCOUNTER (OUTPATIENT)
Dept: MRI IMAGING | Facility: HOSPITAL | Age: 57
Discharge: HOME | End: 2022-10-05
Attending: INTERNAL MEDICINE
Payer: COMMERCIAL

## 2022-10-05 ENCOUNTER — OFFICE VISIT (OUTPATIENT)
Dept: PHYSICAL THERAPY | Facility: CLINIC | Age: 57
End: 2022-10-05
Payer: COMMERCIAL

## 2022-10-05 DIAGNOSIS — M54.42 ACUTE BACK PAIN WITH SCIATICA, LEFT: Primary | ICD-10-CM

## 2022-10-05 DIAGNOSIS — M48.07 SPINAL STENOSIS OF LUMBOSACRAL REGION: ICD-10-CM

## 2022-10-05 DIAGNOSIS — M54.42 ACUTE MIDLINE LOW BACK PAIN WITH LEFT-SIDED SCIATICA: ICD-10-CM

## 2022-10-05 PROCEDURE — 72158 MRI LUMBAR SPINE W/O & W/DYE: CPT

## 2022-10-05 PROCEDURE — 97140 MANUAL THERAPY 1/> REGIONS: CPT

## 2022-10-05 PROCEDURE — 97110 THERAPEUTIC EXERCISES: CPT

## 2022-10-05 PROCEDURE — 97112 NEUROMUSCULAR REEDUCATION: CPT

## 2022-10-05 PROCEDURE — A9585 GADOBUTROL INJECTION: HCPCS | Performed by: INTERNAL MEDICINE

## 2022-10-05 PROCEDURE — G1004 CDSM NDSC: HCPCS

## 2022-10-05 RX ADMIN — GADOBUTROL 8 ML: 604.72 INJECTION INTRAVENOUS at 12:44

## 2022-10-05 NOTE — PROGRESS NOTES
Daily Note     Today's date: 10/5/2022  Patient name: Halley Ernst  : 1965  MRN: [id number]  Referring provider: Chika Farah DO  Dx:   Encounter Diagnosis     ICD-10-CM    1  Acute back pain with sciatica, left  M54 42        Start Time: 0900  Stop Time: 0945  Total time in clinic (min): 45 minutes    Subjective:Pt does report numbness without tingling; R lateral hip  Objective: See treatment diary below  Pt was provided with L4 theraband for HEP  PTA progressed program per PT POC; LE stretch today-gastroc, hamstring,k-c,opp k-c    Assessment: Tolerated treatment well  Patient exhibited good technique with therapeutic exercises   Pt was N/T free upon completion of the treatment, muscle ache only  Plan: Continue per plan of care        Precautions: osteopenia, Lumbar fusion surgery, psoriatic arthritis     Date 10/4 10/5      FOTO 42       Manuals        Seated thoracic extension with manual OP NV nv      Prone PA joint mobilizations to T9-12 NV nv      LE stretch  ds      Neuro Re-Ed        Standing hip extension with knee flexed NV 1/15                                                      Ther Ex        Clamshell 3" 15x 15x 3"      Bridge 15x 15x 5 " cTA      Seated ER isometric L 10" 2x5 10x 5"      Open book stretch NV nv                              Nu-step  5m L4      Ther Activity                Gait Training                Modalities

## 2022-10-10 ENCOUNTER — APPOINTMENT (OUTPATIENT)
Dept: PHYSICAL THERAPY | Facility: CLINIC | Age: 57
End: 2022-10-10

## 2022-10-11 RX ORDER — PREGABALIN 50 MG/1
50 CAPSULE ORAL 3 TIMES DAILY
COMMUNITY
Start: 2022-10-03 | End: 2022-11-02

## 2022-10-12 ENCOUNTER — OFFICE VISIT (OUTPATIENT)
Dept: INTERNAL MEDICINE CLINIC | Facility: CLINIC | Age: 57
End: 2022-10-12
Payer: COMMERCIAL

## 2022-10-12 VITALS
BODY MASS INDEX: 30.19 KG/M2 | HEART RATE: 83 BPM | OXYGEN SATURATION: 98 % | WEIGHT: 181.2 LBS | HEIGHT: 65 IN | RESPIRATION RATE: 12 BRPM | DIASTOLIC BLOOD PRESSURE: 66 MMHG | SYSTOLIC BLOOD PRESSURE: 108 MMHG | TEMPERATURE: 98.2 F

## 2022-10-12 DIAGNOSIS — M54.42 ACUTE MIDLINE LOW BACK PAIN WITH LEFT-SIDED SCIATICA: Primary | ICD-10-CM

## 2022-10-12 DIAGNOSIS — M48.061 FORAMINAL STENOSIS OF LUMBAR REGION: ICD-10-CM

## 2022-10-12 DIAGNOSIS — L40.50 PSORIATIC ARTHRITIS (HCC): ICD-10-CM

## 2022-10-12 DIAGNOSIS — Z23 ENCOUNTER FOR VACCINATION: ICD-10-CM

## 2022-10-12 DIAGNOSIS — M48.07 SPINAL STENOSIS OF LUMBOSACRAL REGION: ICD-10-CM

## 2022-10-12 PROCEDURE — 90471 IMMUNIZATION ADMIN: CPT | Performed by: INTERNAL MEDICINE

## 2022-10-12 PROCEDURE — 99213 OFFICE O/P EST LOW 20 MIN: CPT | Performed by: INTERNAL MEDICINE

## 2022-10-12 PROCEDURE — 90682 RIV4 VACC RECOMBINANT DNA IM: CPT | Performed by: INTERNAL MEDICINE

## 2022-10-12 NOTE — PROGRESS NOTES
Assessment/Plan:  Problem List Items Addressed This Visit        Musculoskeletal and Integument    Psoriatic arthritis ([facility] [address] )    Relevant Orders    Ambulatory referral to Orthopedic Surgery      Other Visit Diagnoses     Acute midline low back pain with left-sided sciatica    -  Primary    Relevant Orders    Ambulatory referral to Orthopedic Surgery    Spinal stenosis of lumbosacral region        Relevant Orders    Ambulatory referral to Orthopedic Surgery    Foraminal stenosis of lumbar region        Relevant Orders    Ambulatory referral to Orthopedic Surgery    Encounter for vaccination        Relevant Orders    influenza vaccine, quadrivalent, recombinant, PF, 0 5 mL (Completed)           Diagnoses and all orders for this visit:    Acute midline low back pain with left-sided sciatica  -     Ambulatory referral to Orthopedic Surgery; Future    Spinal stenosis of lumbosacral region  -     Ambulatory referral to Orthopedic Surgery; Future    Psoriatic arthritis ([facility] [address] )  -     Ambulatory referral to Orthopedic Surgery; Future    Foraminal stenosis of lumbar region  -     Ambulatory referral to Orthopedic Surgery; Future    Encounter for vaccination  -     influenza vaccine, quadrivalent, recombinant, PF, 0 5 mL    Other orders  -     pregabalin (LYRICA) 50 mg capsule; Take 50 mg by mouth Three times a day      No problem-specific Assessment & Plan notes found for this encounter  A/P: Stable, but no better  Discussed imaging  Pt's prior back surgeon has left  Will refer to another surgeon  Tolerating meds and back on NSAID now that steroids complete  Continue  With PT and meds  RTC as scheduled in two week for routine and f/u  Will up date her flu vaccine  Subjective:      Patient ID: Elizabeth Parker is a 61 y o  female  WF with prior lumbar issues and Psoriatric Arthritis, RTC for f/u acute on chronic LBP with left sciatica  Finishing a steroid wean and on prn muscle relaxants as well as attending PT   MRI with significnant foraminal stenosis with impingement on the left and right  Tolerating the meds and PT  Reports being slightly better with less pain    No saddle anesthesia  NO change in bowel or bladder habits  The following portions of the patient's history were reviewed and updated as appropriate:   She has a past medical history of Asthma, Generalized anxiety disorder, GERD (gastroesophageal reflux disease), Hemangioma of other sites, Hypertension, Psoriatic arthritis ([facility] [address] ), and Psoriatic arthritis ([facility] [address] )  ,  does not have any pertinent problems on file  ,   has a past surgical history that includes Appendectomy; Cholecystectomy; Foot surgery; Hysterectomy; Tonsillectomy; Tubal ligation; Knee surgery; Spinal fusion; and Hand surgery (Right, 03/10/2022)  ,  family history includes COPD in her mother; Heart attack in her paternal grandfather; Heart disease in her mother; Hypertension in her maternal grandmother, mother, and paternal grandfather; Pancreatic cancer in her father and paternal grandfather  ,   reports that she has never smoked  She has never used smokeless tobacco  She reports current alcohol use  She reports that she does not use drugs  ,  is allergic to diclofenac-misoprostol, indomethacin, sulfamethoxazole-trimethoprim, and cetirizine     Current Outpatient Medications   Medication Sig Dispense Refill   • albuterol (PROVENTIL HFA,VENTOLIN HFA) 90 mcg/act inhaler TAKE 2 PUFFS BY MOUTH EVERY 6 HOURS AS NEEDED FOR WHEEZE OR FOR SHORTNESS OF BREATH 18 g 5   • ALPRAZolam (XANAX) 0 25 mg tablet Take 1 tablet (0 25 mg total) by mouth 3 (three) times a day as needed for anxiety 90 tablet 0   • diazepam (VALIUM) 5 mg tablet Take 1 tablet (5 mg total) by mouth every 6 (six) hours as needed for anxiety TAKE EVERY 4-6 HOURS PRN PAIN 30 tablet 0   • loratadine (CLARITIN) 10 mg tablet daily as needed       • methocarbamol (Robaxin-750) 750 mg tablet Take 1 tablet (750 mg total) by mouth every 6 (six) hours as needed for muscle spasms 90 tablet 0   • metoprolol succinate (TOPROL-XL) 25 mg 24 hr tablet TAKE 1 TABLET (25 MG TOTAL) BY MOUTH DAILY  90 tablet 1   • omeprazole (PriLOSEC) 40 MG capsule TAKE 1 CAPSULE BY MOUTH EVERY DAY BEFORE BREAKFAST 90 capsule 4   • predniSONE 2 5 mg tablet Take 2 5 mg by mouth as needed     • pregabalin (LYRICA) 50 mg capsule Take 50 mg by mouth Three times a day     • traZODone (DESYREL) 50 mg tablet TAKE 1 TABLET BY MOUTH DAILY AT BEDTIME 90 tablet 3   • gabapentin (NEURONTIN) 300 mg capsule Take 300 mg by mouth 3 (three) times a day (Patient not taking: Reported on 10/12/2022)       No current facility-administered medications for this visit  Review of Systems   Constitutional: Positive for activity change  Negative for chills, diaphoresis, fatigue and fever  Respiratory: Negative for cough, chest tightness, shortness of breath and wheezing  Cardiovascular: Negative for chest pain, palpitations and leg swelling  Gastrointestinal: Negative for abdominal pain, constipation, diarrhea, nausea and vomiting  Genitourinary: Negative for difficulty urinating, dysuria and frequency  Musculoskeletal: Positive for back pain and gait problem  Negative for arthralgias and myalgias  Neurological: Positive for numbness  Negative for weakness, light-headedness and headaches  Psychiatric/Behavioral: Negative for confusion  The patient is not nervous/anxious  PHQ-2/9 Depression Screening          Objective:  Vitals:    10/12/22 0745   BP: 108/66   Pulse: 83   Resp: 12   Temp: 98 2 °F (36 8 °C)   SpO2: 98%   Weight: 82 2 kg (181 lb 3 2 oz)   Height: 5' 5" (1 651 m)     Body mass index is 30 15 kg/m²  Physical Exam  Vitals and nursing note reviewed  Constitutional:       General: She is not in acute distress  Appearance: Normal appearance  She is not ill-appearing  HENT:      Head: Normocephalic and atraumatic        Mouth/Throat:      Mouth: Mucous membranes are moist  Eyes:      Extraocular Movements: Extraocular movements intact  Conjunctiva/sclera: Conjunctivae normal       Pupils: Pupils are equal, round, and reactive to light  Musculoskeletal:         General: Tenderness present  No swelling or deformity  Right lower leg: No edema  Left lower leg: No edema  Comments: LS Spine w/o gross deformities, increase temp, erythema, swelling, or lesions  Tenderness midline and to the left of L2-L5  ROM wnl  Spasms none  LE strength 5/5 with tone/ROM WNL  DTR 2/4  No gait abnormalities(toe/heel walking)  Neurological:      General: No focal deficit present  Mental Status: She is alert and oriented to person, place, and time  Mental status is at baseline  Gait: Gait normal    Psychiatric:         Mood and Affect: Mood normal          Behavior: Behavior normal          Thought Content:  Thought content normal          Judgment: Judgment normal

## 2022-10-13 ENCOUNTER — OFFICE VISIT (OUTPATIENT)
Dept: PHYSICAL THERAPY | Facility: CLINIC | Age: 57
End: 2022-10-13
Payer: COMMERCIAL

## 2022-10-13 DIAGNOSIS — M54.42 ACUTE BACK PAIN WITH SCIATICA, LEFT: Primary | ICD-10-CM

## 2022-10-13 DIAGNOSIS — M54.42 ACUTE MIDLINE LOW BACK PAIN WITH LEFT-SIDED SCIATICA: ICD-10-CM

## 2022-10-13 DIAGNOSIS — M48.07 SPINAL STENOSIS OF LUMBOSACRAL REGION: ICD-10-CM

## 2022-10-13 PROCEDURE — 97140 MANUAL THERAPY 1/> REGIONS: CPT | Performed by: PHYSICAL THERAPIST

## 2022-10-13 PROCEDURE — 97110 THERAPEUTIC EXERCISES: CPT | Performed by: PHYSICAL THERAPIST

## 2022-10-13 NOTE — PROGRESS NOTES
Daily Note     Today's date: 10/13/2022  Patient name: Chelly Levi  : 1965  MRN: [id number]  Referring provider: Kalee Neves DO  Dx:   Encounter Diagnosis     ICD-10-CM    1  Acute back pain with sciatica, left  M54 42    2  Spinal stenosis of lumbosacral region  M48 07    3  Acute midline low back pain with left-sided sciatica  M54 42                   Subjective: ***      Objective: See treatment diary below      Assessment: Tolerated treatment {Tolerated treatment :5751632869}   Patient {assessment:8914605065}      Plan: {PLAN:3634180733}     Precautions: osteopenia, Lumbar fusion surgery, psoriatic arthritis     Date 10/4 10/5 10/13     FOTO 42       Manuals        Seated thoracic extension with manual OP NV nv      Prone PA joint mobilizations to T9-12 NV nv      LE stretch  ds      Neuro Re-Ed        Standing hip extension with knee flexed NV 1/15                                                      Ther Ex        Clamshell 3" 15x 15x 3"      Bridge 15x 15x 5 " cTA      Seated ER isometric L 10" 2x5 10x 5"      Open book stretch NV nv                              Nu-step  5m L4      Ther Activity                Gait Training                Modalities

## 2022-10-13 NOTE — PROGRESS NOTES
Daily Note     Today's date: 10/13/2022  Patient name: Nia Jack  : 1965  MRN: [id number]  Referring provider: Dick Brooks DO  Dx:   Encounter Diagnosis     ICD-10-CM    1  Acute back pain with sciatica, left  M54 42    2  Spinal stenosis of lumbosacral region  M48 07    3  Acute midline low back pain with left-sided sciatica  M54 42                   Subjective: Patient reports that she did have her MRI and the results showed degeneration at L3-4  She feels that her leg has been okay over the past few days  Overall she feels that symptoms are unchanged since starting therapy  Objective: See treatment diary below      Assessment: Tolerated treatment well  Patient demonstrated fatigue post treatment and would benefit from continued PT Patient noted mild tingling to the L gluteal region with initial piriformis stretching which did subside as the stretching progressed  She responded well to additional strengthening interventions today along with open book stretch for t-spine mobility  She continued to have limitations to the lower lumbar segments secondary to increased pain  Focused on the proximal lumbar spine and t-spine along with B/L hips with therapy interventions today  Plan: Continue per plan of care  Progress treatment as tolerated  Precautions: osteopenia, Lumbar fusion surgery, psoriatic arthritis     Date 10/4 10/5 10/13     FOTO 42       Manuals        Seated thoracic extension with manual OP NV nv 5 min     Prone PA joint mobilizations to T9-12 NV nv      LE stretch  ds L piriformis 30" 5x     Neuro Re-Ed        Standing hip extension with knee flexed NV 1/15 15x ea  Ther Ex        Clamshell 3" 15x 15x 3" 3" 15x     Bridge 15x 15x 5 " cTA W/ adductor 5" 20x     Seated ER isometric L 10" 2x5 10x 5"      Open book stretch NV nv 5" 10x ea        Leg press   35# 20x                     Nu-step  5m L4 L5 5 min     Ther Activity Gait Training                Modalities

## 2022-10-16 DIAGNOSIS — M48.07 SPINAL STENOSIS OF LUMBOSACRAL REGION: ICD-10-CM

## 2022-10-16 DIAGNOSIS — M54.42 ACUTE MIDLINE LOW BACK PAIN WITH LEFT-SIDED SCIATICA: ICD-10-CM

## 2022-10-17 ENCOUNTER — OFFICE VISIT (OUTPATIENT)
Dept: PHYSICAL THERAPY | Facility: CLINIC | Age: 57
End: 2022-10-17
Payer: COMMERCIAL

## 2022-10-17 DIAGNOSIS — M54.42 ACUTE BACK PAIN WITH SCIATICA, LEFT: ICD-10-CM

## 2022-10-17 DIAGNOSIS — M48.07 SPINAL STENOSIS OF LUMBOSACRAL REGION: Primary | ICD-10-CM

## 2022-10-17 PROCEDURE — 97140 MANUAL THERAPY 1/> REGIONS: CPT

## 2022-10-17 PROCEDURE — 97110 THERAPEUTIC EXERCISES: CPT

## 2022-10-17 RX ORDER — METHOCARBAMOL 750 MG/1
750 TABLET, FILM COATED ORAL EVERY 6 HOURS PRN
Qty: 90 TABLET | Refills: 0 | Status: SHIPPED | OUTPATIENT
Start: 2022-10-17

## 2022-10-17 NOTE — PROGRESS NOTES
Daily Note     Today's date: 10/17/2022  Patient name: Nany Yepez  : 1965  MRN: [id number]  Referring provider: Joey Lang DO  Dx:   Encounter Diagnosis     ICD-10-CM    1  Spinal stenosis of lumbosacral region  M48 07    2  Acute back pain with sciatica, left  M54 42        Start Time: 0750  Stop Time: 0840  Total time in clinic (min): 50 minutes    Subjective: I was moving this weekend and I have some soreness in my low back and my knee is sore  My back pain is in my left low back and into my left buttocks  It will go down to my mid hamstring at times  My back pain is around 3/10  Objective: See treatment diary below      Assessment: Tolerated treatment well  Patient would benefit from continued PT pt reports feeling some cramping into her right hamstring with bridges  Pt reports having some mild pain through out her exercises today  She had good tolerance with piriformis stretching today  We will try to increase her exercises next visit if able  Plan: Continue per plan of care  Precautions: osteopenia, Lumbar fusion surgery, psoriatic arthritis     Date 10/4 10/5 10/13 10/17    FOTO 42       Manuals        Seated thoracic extension with manual OP NV nv 5 min NV     Prone PA joint mobilizations to T9-12 NV nv      LE stretch  ds L piriformis 30" 5x L piriformis 30" 5 x      Neuro Re-Ed        Standing hip extension with knee flexed NV 1/15 15x ea  15 x  ea                                                    Ther Ex        Clamshell 3" 15x 15x 3" 3" 15x 3 " 15 x    Bridge 15x 15x 5 " cTA W/ adductor 5" 20x w / add 5 " 20 x     Seated ER isometric L 10" 2x5 10x 5"  10 x 5 "     Open book stretch NV nv 5" 10x ea    5" 10 x     Leg press   35# 20x 35 # 2 x 15                     Nu-step  5m L4 L5 5 min L 5  5 min     Ther Activity                Gait Training                Modalities

## 2022-10-20 ENCOUNTER — OFFICE VISIT (OUTPATIENT)
Dept: PHYSICAL THERAPY | Facility: CLINIC | Age: 57
End: 2022-10-20
Payer: COMMERCIAL

## 2022-10-20 DIAGNOSIS — M48.07 SPINAL STENOSIS OF LUMBOSACRAL REGION: Primary | ICD-10-CM

## 2022-10-20 DIAGNOSIS — M54.42 ACUTE BACK PAIN WITH SCIATICA, LEFT: ICD-10-CM

## 2022-10-20 DIAGNOSIS — M54.42 ACUTE MIDLINE LOW BACK PAIN WITH LEFT-SIDED SCIATICA: ICD-10-CM

## 2022-10-20 PROCEDURE — 97140 MANUAL THERAPY 1/> REGIONS: CPT

## 2022-10-20 PROCEDURE — 97112 NEUROMUSCULAR REEDUCATION: CPT

## 2022-10-20 PROCEDURE — 97110 THERAPEUTIC EXERCISES: CPT

## 2022-10-20 NOTE — PROGRESS NOTES
Daily Note     Today's date: 10/20/2022  Patient name: Mago Denton  : 1965  MRN: [id number]  Referring provider: Shravan Barr DO  Dx:   Encounter Diagnosis     ICD-10-CM    1  Spinal stenosis of lumbosacral region  M48 07    2  Acute back pain with sciatica, left  M54 42    3  Acute midline low back pain with left-sided sciatica  M54 42        Start Time: 0800  Stop Time: 0900  Total time in clinic (min): 60 minutes    Subjective: Pt states she is scheduled for her injection on 2022  Objective: See treatment diary below  FOTO 58      Assessment: Tolerated treatment well  Patient exhibited good technique with therapeutic exercises  Absent radicular sx after tx  Plan: Continue per plan of care  Precautions: osteopenia, Lumbar fusion surgery, psoriatic arthritis     Date 10/4 10/5 10/13 10/17 10/20   FOTO 42    Ds 58   Manuals        Seated thoracic extension with manual OP NV nv 5 min NV  np   Prone PA joint mobilizations to T9-12 NV nv      LE stretch  ds L piriformis 30" 5x L piriformis 30" 5 x   Ds+ gastroc, hs,opp k-c   Neuro Re-Ed        Standing hip extension with knee flexed NV 1/15 15x ea  15 x  ea 20x ea                   Ther Ex        Clamshell 3" 15x 15x 3" 3" 15x 3 " 15 x 20x 5"   Bridge c add 15x 15x 5 " cTA W/ adductor 5" 20x w / add 5 " 20 x  20x 5"   Seated ER isometric L 10" 2x5 10x 5"  10 x 5 "  L3 2/10   Open book stretch NV nv 5" 10x ea    5" 10 x  10x 5"   Leg press   35# 20x 35 # 2 x 15  35# 2/15                   Nu-step  5m L4 L5 5 min L 5  5 min  6m L5   Ther Activity                Gait Training                Modalities

## 2022-10-24 ENCOUNTER — OFFICE VISIT (OUTPATIENT)
Dept: PHYSICAL THERAPY | Facility: CLINIC | Age: 57
End: 2022-10-24
Payer: COMMERCIAL

## 2022-10-24 DIAGNOSIS — M54.42 ACUTE BACK PAIN WITH SCIATICA, LEFT: ICD-10-CM

## 2022-10-24 DIAGNOSIS — M48.07 SPINAL STENOSIS OF LUMBOSACRAL REGION: Primary | ICD-10-CM

## 2022-10-24 DIAGNOSIS — M54.42 ACUTE MIDLINE LOW BACK PAIN WITH LEFT-SIDED SCIATICA: ICD-10-CM

## 2022-10-24 PROCEDURE — 97140 MANUAL THERAPY 1/> REGIONS: CPT

## 2022-10-24 PROCEDURE — 97110 THERAPEUTIC EXERCISES: CPT

## 2022-10-24 PROCEDURE — 97112 NEUROMUSCULAR REEDUCATION: CPT

## 2022-10-24 NOTE — PROGRESS NOTES
Daily Note     Today's date: 10/24/2022  Patient name: Fernandez Adler  : 1965  MRN: [id number]  Referring provider: Joseline Suarez DO  Dx:   Encounter Diagnosis     ICD-10-CM    1  Spinal stenosis of lumbosacral region  M48 07    2  Acute back pain with sciatica, left  M54 42    3  Acute midline low back pain with left-sided sciatica  M54 42        Start Time: 0800  Stop Time: 0900  Total time in clinic (min): 60 minutes    Subjective:  Pt notes good jennifer with use of the lumbar support at home  She did report cramping in her R foot; plantar surface  Objective: See treatment diary below  PTA progressed program as able  Assessment: Tolerated treatment well  Patient exhibited good technique with therapeutic exercises      Plan: Continue per plan of care  Precautions: osteopenia, Lumbar fusion surgery, psoriatic arthritis     Date 10/24 10/5 10/13 10/17 10/20   FOTO     Ds 58   Manuals        Seated thoracic extension with manual OP np nv 5 min NV  np   Prone PA joint mobilizations to T9-12 np nv      LE stretch Ds+gastroc, hs,opp k-c ds L piriformis 30" 5x L piriformis 30" 5 x   Ds+ gastroc, hs,opp k-c   Neuro Re-Ed        Standing hip extension with knee flexed 1 5# 20x ea 1/15 15x ea  15 x  ea 20x ea 1#                   Ther Ex        Clamshell 20x 5" 15x 3" 3" 15x 3 " 15 x 20x 5"   Bridge c add 20x 5" 15x 5 " cTA W/ adductor 5" 20x w / add 5 " 20 x  20x 5"   Seated ER  L L3 2/15 10x 5"  10 x 5 "  L3 2/10   Open book stretch 10x 5" nv 5" 10x ea    5" 10 x  10x 5"   Leg press 40# 2/15  35# 20x 35 # 2 x 15  35# 2/15   CC walkout P1 5steps 5x ea       Standing row P1 2/15       Nu-step 8m L5 5m L4 L5 5 min L 5  5 min  6m L5   Ther Activity                Gait Training                Modalities

## 2022-10-26 ENCOUNTER — OFFICE VISIT (OUTPATIENT)
Dept: INTERNAL MEDICINE CLINIC | Facility: CLINIC | Age: 57
End: 2022-10-26
Payer: COMMERCIAL

## 2022-10-26 VITALS
SYSTOLIC BLOOD PRESSURE: 100 MMHG | HEIGHT: 65 IN | WEIGHT: 179.8 LBS | DIASTOLIC BLOOD PRESSURE: 64 MMHG | BODY MASS INDEX: 29.96 KG/M2 | OXYGEN SATURATION: 98 % | HEART RATE: 100 BPM | TEMPERATURE: 97.8 F | RESPIRATION RATE: 14 BRPM

## 2022-10-26 DIAGNOSIS — E55.9 VITAMIN D DEFICIENCY: ICD-10-CM

## 2022-10-26 DIAGNOSIS — Z23 ENCOUNTER FOR VACCINATION: ICD-10-CM

## 2022-10-26 DIAGNOSIS — F41.1 GENERALIZED ANXIETY DISORDER: ICD-10-CM

## 2022-10-26 DIAGNOSIS — D84.9 IMMUNOSUPPRESSION (HCC): ICD-10-CM

## 2022-10-26 DIAGNOSIS — K21.9 GERD WITHOUT ESOPHAGITIS: ICD-10-CM

## 2022-10-26 DIAGNOSIS — G43.B0 OPHTHALMOPLEGIC MIGRAINE, NOT INTRACTABLE: ICD-10-CM

## 2022-10-26 DIAGNOSIS — I10 BENIGN ESSENTIAL HYPERTENSION: Primary | ICD-10-CM

## 2022-10-26 DIAGNOSIS — J45.20 MILD INTERMITTENT ASTHMA WITHOUT COMPLICATION: ICD-10-CM

## 2022-10-26 DIAGNOSIS — L40.50 PSORIATIC ARTHRITIS (HCC): ICD-10-CM

## 2022-10-26 PROCEDURE — 99214 OFFICE O/P EST MOD 30 MIN: CPT | Performed by: INTERNAL MEDICINE

## 2022-10-26 NOTE — PATIENT INSTRUCTIONS
Chronic Hypertension   AMBULATORY CARE:   Hypertension  is high blood pressure  Your blood pressure is the force of your blood moving against the walls of your arteries  Hypertension causes your blood pressure to get so high that your heart has to work much harder than normal  This can damage your heart  Even if you have hypertension for years, lifestyle changes, medicines, or both can help bring your blood pressure to normal   Call your local emergency number (911 in the [address] [facility]) or have someone call if:   You have chest pain  You have any of the following signs of a heart attack:      Squeezing, pressure, or pain in your chest    You may  also have any of the following:     Discomfort or pain in your back, neck, jaw, stomach, or arm    Shortness of breath    Nausea or vomiting    Lightheadedness or a sudden cold sweat    You become confused or have difficulty speaking  You suddenly feel lightheaded or have trouble breathing  Seek care immediately if:   You have a severe headache or vision loss  You have weakness in an arm or leg  Call your doctor or cardiologist if:   You feel faint, dizzy, confused, or drowsy  You have been taking your blood pressure medicine but your pressure is higher than your provider says it should be  You have questions or concerns about your condition or care  Treatment for chronic hypertension  may include medicine to lower your blood pressure and cholesterol levels  A low cholesterol level helps prevent heart disease and makes it easier to control your blood pressure  Heart disease can make your blood pressure harder to control  You may also need to make lifestyle changes  What you need to know about the stages of hypertension:       Normal blood pressure is 119/79 or lower   Your healthcare provider may only check your blood pressure each year if it stays at a normal level  Elevated blood pressure is 120/79 to 129/79   This is sometimes called prehypertension  Your healthcare provider may suggest lifestyle changes to help lower your blood pressure to a normal level  He or she may then check it again in 3 to 6 months  Stage 1 hypertension is 130/80  to 139/89   Your provider may recommend lifestyle changes, medication, and checks every 3 to 6 months until your blood pressure is controlled  Stage 2 hypertension is 140/90 or higher   Your provider will recommend lifestyle changes and have you take 2 kinds of hypertension medicines  You will also need to have your blood pressure checked monthly until it is controlled  Manage chronic hypertension:   Check your blood pressure at home  Avoid smoking, caffeine, and exercise at least 30 minutes before checking your blood pressure  Sit and rest for 5 minutes before you take your blood pressure  Extend your arm and support it on a flat surface  Your arm should be at the same level as your heart  Follow the directions that came with your blood pressure monitor  Check your blood pressure 2 times, 1 minute apart, before you take your medicine in the morning  Also check your blood pressure before your evening meal  Keep a record of your readings and bring it to your follow-up visits  Ask your healthcare provider what your blood pressure should be  Manage any other health conditions you have  Health conditions such as diabetes can increase your risk for hypertension  Follow your healthcare provider's instructions and take all your medicines as directed  Talk to your healthcare provider about any new health conditions you have recently developed  Ask about all medicines  Certain medicines can increase your blood pressure  Examples include oral birth control pills, decongestants, herbal supplements, and NSAIDs, such as ibuprofen  Your healthcare provider can tell you which medicines are safe for you to take  This includes prescription and over-the-counter medicines      Lifestyle changes you can make to lower your blood pressure: Your provider may want you to make more lifestyle changes if you are having trouble controlling your blood pressure  This may feel difficult over time, especially if you think you are making good changes but your pressure is still high  It might help to focus on one new change at a time  For example, try to add 1 more day of exercise, or exercise for an extra 10 minutes on 2 days  Small changes can make a big difference  Your healthcare provider can also refer you to specialists such as a dietitian who can help you make small changes  Your family members may be included in helping you learn to create lifestyle changes, such as the following:     Limit sodium (salt) as directed  Too much sodium can affect your fluid balance  Check labels to find low-sodium or no-salt-added foods  Some low-sodium foods use potassium salts for flavor  Too much potassium can also cause health problems  Your healthcare provider will tell you how much sodium and potassium are safe for you to have in a day  He or she may recommend that you limit sodium to 2,300 mg a day  Follow the meal plan recommended by your healthcare provider  A dietitian or your provider can give you more information on low-sodium plans or the DASH (Dietary Approaches to Stop Hypertension) eating plan  The DASH plan is low in sodium, processed sugar, unhealthy fats, and total fat  It is high in potassium, calcium, and fiber  These can be found in vegetables, fruit, and whole-grain foods  Be physically active throughout the day  Physical activity, such as exercise, can help control your blood pressure and your weight  Be physically active for at least 30 minutes per day, on most days of the week  Include aerobic activity, such as walking or riding a bicycle  Also include strength training at least 2 times each week  Your healthcare providers can help you create a physical activity plan  Decrease stress    This may help lower your blood pressure  Learn ways to relax, such as deep breathing or listening to music  Limit alcohol as directed  Alcohol can increase your blood pressure  A drink of alcohol is 12 ounces of beer, 5 ounces of wine, or 1½ ounces of liquor  Do not smoke  Nicotine and other chemicals in cigarettes and cigars can increase your blood pressure and also cause lung damage  Ask your healthcare provider for information if you currently smoke and need help to quit  E-cigarettes or smokeless tobacco still contain nicotine  Talk to your healthcare provider before you use these products  Follow up with your doctor or cardiologist as directed: You will need to return to have your blood pressure checked and to have other lab tests done  Write down your questions so you remember to ask them during your visits  © Copyright [employer] 2022 Information is for End User's use only and may not be sold, redistributed or otherwise used for commercial purposes  All illustrations and images included in CareNotes® are the copyrighted property of A D A M [employer] Inc  or [facility] Jessenia Mohr   The above information is an  only  It is not intended as medical advice for individual conditions or treatments  Talk to your doctor, nurse or pharmacist before following any medical regimen to see if it is safe and effective for you

## 2022-10-26 NOTE — PROGRESS NOTES
Assessment/Plan:  Problem List Items Addressed This Visit        Digestive    GERD without esophagitis       Respiratory    Asthma       Cardiovascular and Mediastinum    Ophthalmoplegic migraine headache    Relevant Orders    TSH, 3rd generation with Free T4 reflex    Benign essential hypertension - Primary    Relevant Orders    Microalbumin / creatinine urine ratio       Musculoskeletal and Integument    Psoriatic arthritis (HCC)    Relevant Orders    [facility] All Prescribed Meds and Special Instructions    Amphetamines, Methamphetamines    Butalbital    Phenobarbital    Secobarbital    Temazepam    Alprazolam    Clonazepam    Diazepam    Lorazepam    Oxazepam    Gabapentin    Pregabalin    Cocaine    Heroin    Buprenorphine    Levorphanol    Meperidine    Naltrexone    Fentanyl    Methadone    Oxycodone    Oxymorphone    Tapentadol    THC    Tramadol    Codeine, Hydrocodone, Hydropmorphone, Morphine    Bath Salts    Ethyl Glucuronide/Ethyl Sulfate    Kratom    Spice    Methylphenidate    Phentermine    Validity Oxidant    Validity Creatinine    Validity pH    Validity Specific       Other    Vitamin D deficiency    Immunosuppression (HCC)    Generalized anxiety disorder    Relevant Orders    TSH, 3rd generation with Free T4 reflex    [facility] All Prescribed Meds and Special Instructions    Amphetamines, Methamphetamines    Butalbital    Phenobarbital    Secobarbital    Temazepam    Alprazolam    Clonazepam    Diazepam    Lorazepam    Oxazepam    Gabapentin    Pregabalin    Cocaine    Heroin    Buprenorphine    Levorphanol    Meperidine    Naltrexone    Fentanyl    Methadone    Oxycodone    Oxymorphone    Tapentadol    THC    Tramadol    Codeine, Hydrocodone, Hydropmorphone, Morphine    Bath Salts    Ethyl Glucuronide/Ethyl Sulfate    Kratom    Spice    Methylphenidate    Phentermine    Validity Oxidant    Validity Creatinine    Validity pH    Validity Specific      Other Visit Diagnoses     Encounter for vaccination               Diagnoses and all orders for this visit:    Benign essential hypertension  -     Microalbumin / creatinine urine ratio    Ophthalmoplegic migraine, not intractable  -     TSH, 3rd generation with Free T4 reflex; Future    Mild intermittent asthma without complication    GERD without esophagitis    Psoriatic arthritis (HCC)  -     [facility] Prescribed Meds and Special Instructions  -     Amphetamines, Methamphetamines  -     Butalbital  -     Phenobarbital  -     Secobarbital  -     Temazepam  -     Alprazolam  -     Clonazepam  -     Diazepam  -     Lorazepam  -     Oxazepam  -     Gabapentin  -     Pregabalin  -     Cocaine  -     Heroin  -     Buprenorphine  -     Levorphanol  -     Meperidine  -     Naltrexone  -     Fentanyl  -     Methadone  -     Oxycodone  -     Oxymorphone  -     Tapentadol  -     THC  -     Tramadol  -     Codeine, Hydrocodone, Hydropmorphone, Morphine  -     Bath Salts  -     Ethyl Glucuronide/Ethyl Sulfate  -     Kratom  -     Spice  -     Methylphenidate  -     Phentermine  -     Validity Oxidant  -     Validity Creatinine  -     Validity pH  -     Validity Specific    Vitamin D deficiency    Immunosuppression ([facility])    Generalized anxiety disorder  -     TSH, 3rd generation with Free T4 reflex;  Future  -     [facility] Prescribed Meds and Special Instructions  -     Amphetamines, Methamphetamines  -     Butalbital  -     Phenobarbital  -     Secobarbital  -     Temazepam  -     Alprazolam  -     Clonazepam  -     Diazepam  -     Lorazepam  -     Oxazepam  -     Gabapentin  -     Pregabalin  -     Cocaine  -     Heroin  -     Buprenorphine  -     Levorphanol  -     Meperidine  -     Naltrexone  -     Fentanyl  -     Methadone  -     Oxycodone  -     Oxymorphone  -     Tapentadol  -     THC  -     Tramadol  -     Codeine, Hydrocodone, Hydropmorphone, Morphine  -     Bath Salts  -     Ethyl Glucuronide/Ethyl Sulfate  -     Kratom  -     Spice  - Methylphenidate  -     Phentermine  -     Validity Oxidant  -     Validity Creatinine  -     Validity pH  -     Validity Specific    Encounter for vaccination        No problem-specific Assessment & Plan notes found for this encounter  A/P: Doing ok and will check labs  Appreciate specialist input  Discussed vaccines and defers all vaccines  Continue current treatment and RTC four months for routine  Keep f/u with specialists for epidural injections and rheum for continued management     Subjective:      Patient ID: Rod Herrera is a 61 y o  female  WF RTC for f/u HTN, GERD, etc  Doing ok and no new issues  Remains active w/ difficulty due to various joint and back i issues, but  no falls  Psoriatic arthritis manageable with pt continuing to work with Rheum and no longer on any biologicals    Asthma is well controlled with limited [place] use  RYNE is controlled  Due for labs and vaccines  The following portions of the patient's history were reviewed and updated as appropriate:   She has a past medical history of Asthma, Generalized anxiety disorder, GERD (gastroesophageal reflux disease), Hemangioma of other sites, Hypertension, Psoriatic arthritis ([facility] ), and Psoriatic arthritis ([facility] )  ,  does not have any pertinent problems on file  ,   has a past surgical history that includes Appendectomy; Cholecystectomy; Foot surgery; Hysterectomy; Tonsillectomy; Tubal ligation; Knee surgery; Spinal fusion; and Hand surgery (Right, 03/10/2022)  ,  family history includes COPD in her mother; Heart attack in her paternal grandfather; Heart disease in her mother; Hypertension in her maternal grandmother, mother, and paternal grandfather; Pancreatic cancer in her father and paternal grandfather  ,   reports that she has never smoked  She has never used smokeless tobacco  She reports current alcohol use  She reports that she does not use drugs  ,  is allergic to diclofenac-misoprostol, indomethacin, sulfamethoxazole-trimethoprim, and cetirizine     Current Outpatient Medications   Medication Sig Dispense Refill   • albuterol (PROVENTIL HFA,VENTOLIN HFA) 90 mcg/act inhaler TAKE 2 PUFFS BY MOUTH EVERY 6 HOURS AS NEEDED FOR WHEEZE OR FOR SHORTNESS OF BREATH 18 g 5   • ALPRAZolam (XANAX) 0 25 mg tablet Take 1 tablet (0 25 mg total) by mouth 3 (three) times a day as needed for anxiety 90 tablet 0   • diazepam (VALIUM) 5 mg tablet Take 1 tablet (5 mg total) by mouth every 6 (six) hours as needed for anxiety TAKE EVERY 4-6 HOURS PRN PAIN 30 tablet 0   • loratadine (CLARITIN) 10 mg tablet daily as needed       • methocarbamol (ROBAXIN) 750 mg tablet TAKE 1 TABLET (750 MG TOTAL) BY MOUTH EVERY 6 (SIX) HOURS AS NEEDED FOR MUSCLE SPASMS 90 tablet 0   • metoprolol succinate (TOPROL-XL) 25 mg 24 hr tablet TAKE 1 TABLET (25 MG TOTAL) BY MOUTH DAILY  90 tablet 1   • omeprazole (PriLOSEC) 40 MG capsule TAKE 1 CAPSULE BY MOUTH EVERY DAY BEFORE BREAKFAST 90 capsule 4   • predniSONE 2 5 mg tablet Take 2 5 mg by mouth as needed     • pregabalin (LYRICA) 50 mg capsule Take 50 mg by mouth Three times a day     • traZODone (DESYREL) 50 mg tablet TAKE 1 TABLET BY MOUTH DAILY AT BEDTIME 90 tablet 3   • gabapentin (NEURONTIN) 300 mg capsule Take 300 mg by mouth 3 (three) times a day (Patient not taking: Reported on 10/12/2022)       No current facility-administered medications for this visit  Review of Systems   Constitutional: Negative for activity change, chills, diaphoresis, fatigue and fever  HENT: Negative  Eyes: Negative for visual disturbance  Respiratory: Negative for cough, chest tightness, shortness of breath and wheezing  Cardiovascular: Negative for chest pain, palpitations and leg swelling  Gastrointestinal: Negative for abdominal pain, constipation, diarrhea, nausea and vomiting  Endocrine: Negative for cold intolerance and heat intolerance  Genitourinary: Negative for difficulty urinating, dysuria and frequency     Musculoskeletal: Positive for arthralgias, back pain and joint swelling  Negative for gait problem and myalgias  Neurological: Positive for numbness  Negative for dizziness, seizures, syncope, weakness, light-headedness and headaches  Psychiatric/Behavioral: Negative for confusion, dysphoric mood and sleep disturbance  The patient is not nervous/anxious  PHQ-2/9 Depression Screening          Objective:  Vitals:    10/26/22 0744   BP: 100/64   Pulse: 100   Resp: 14   Temp: 97 8 °F (36 6 °C)   SpO2: 98%   Weight: 81 6 kg (179 lb 12 8 oz)   Height: 5' 5" (1 651 m)     Body mass index is 29 92 kg/m²  Physical Exam  Vitals and nursing note reviewed  Constitutional:       General: She is not in acute distress  Appearance: Normal appearance  She is not ill-appearing  HENT:      Head: Normocephalic and atraumatic  Mouth/Throat:      Mouth: Mucous membranes are moist    Eyes:      Extraocular Movements: Extraocular movements intact  Conjunctiva/sclera: Conjunctivae normal       Pupils: Pupils are equal, round, and reactive to light  Neck:      Vascular: No carotid bruit  Cardiovascular:      Rate and Rhythm: Normal rate and regular rhythm  Heart sounds: Normal heart sounds  Pulmonary:      Effort: Pulmonary effort is normal  No respiratory distress  Breath sounds: Normal breath sounds  No wheezing, rhonchi or rales  Abdominal:      General: Bowel sounds are normal  There is no distension  Palpations: Abdomen is soft  Tenderness: There is no abdominal tenderness  Musculoskeletal:      Cervical back: Neck supple  Right lower leg: No edema  Left lower leg: No edema  Neurological:      General: No focal deficit present  Mental Status: She is alert and oriented to person, place, and time  Mental status is at baseline  Psychiatric:         Mood and Affect: Mood normal          Behavior: Behavior normal          Thought Content:  Thought content normal  Judgment: Judgment normal

## 2022-10-27 ENCOUNTER — OFFICE VISIT (OUTPATIENT)
Dept: PHYSICAL THERAPY | Facility: CLINIC | Age: 57
End: 2022-10-27
Payer: COMMERCIAL

## 2022-10-27 DIAGNOSIS — M54.42 ACUTE MIDLINE LOW BACK PAIN WITH LEFT-SIDED SCIATICA: ICD-10-CM

## 2022-10-27 DIAGNOSIS — M48.07 SPINAL STENOSIS OF LUMBOSACRAL REGION: Primary | ICD-10-CM

## 2022-10-27 DIAGNOSIS — M54.42 ACUTE BACK PAIN WITH SCIATICA, LEFT: ICD-10-CM

## 2022-10-27 PROCEDURE — 97140 MANUAL THERAPY 1/> REGIONS: CPT

## 2022-10-27 PROCEDURE — 97110 THERAPEUTIC EXERCISES: CPT

## 2022-10-27 PROCEDURE — 97112 NEUROMUSCULAR REEDUCATION: CPT

## 2022-10-27 NOTE — PROGRESS NOTES
Daily Note     Today's date: 10/27/2022  Patient name: Marifer Lambert  : 1965  MRN: [id number]  Referring provider: Kevin Singh DO  Dx:   Encounter Diagnosis     ICD-10-CM    1  Spinal stenosis of lumbosacral region  M48 07    2  Acute back pain with sciatica, left  M54 42    3  Acute midline low back pain with left-sided sciatica  M54 42        Start Time: 0800  Stop Time: 0900  Total time in clinic (min): 60 minutes    Subjective: Pt notes overall less back pain   Objective: See treatment diary below  PTA progressed program as able      Assessment: Tolerated treatment well  Patient exhibited good technique with therapeutic exercises      Plan: Continue per plan of care  Precautions: osteopenia, Lumbar fusion surgery, psoriatic arthritis     Date 10/24 10/27 10/13 10/17 10/20   FOTO     Ds 58   Manuals        Seated thoracic extension with manual OP np nv 5 min NV  np   Prone PA joint mobilizations to T9-12 np nv      LE stretch Ds+gastroc, hs,opp k-c ds L piriformis 30" 5x L piriformis 30" 5 x   Ds+ gastroc, hs,opp k-c   Neuro Re-Ed        Standing hip extension with knee flexed 1 5# 20x ea 1 5# 2/15 15x ea  15 x  ea 20x ea 1#                   Ther Ex        Clamshell 20x 5" 20x 5" 3" 15x 3 " 15 x 20x 5"   Bridge c add 20x 5" 20x 5 "  W/ adductor 5" 20x w / add 5 " 20 x  20x 5"   Seated ER  L L3 2/15 L3 2/15  10 x 5 "  L3 2/10   Open book stretch 10x 5"  5" 10x ea    5" 10 x  10x 5"   Leg press 40# 2/15 40# 2/15 35# 20x 35 # 2 x 15  35# 2/15   CC walkout P1 5steps 5x ea P1 5x 5steps c paf      Standing row P1 2/15 P2  2/15      Nu-step 8m L5 8m L5 L5 5 min L 5  5 min  6m L5   Ther Activity                Gait Training                Modalities

## 2022-10-31 ENCOUNTER — OFFICE VISIT (OUTPATIENT)
Dept: PHYSICAL THERAPY | Facility: CLINIC | Age: 57
End: 2022-10-31

## 2022-10-31 DIAGNOSIS — M54.42 ACUTE BACK PAIN WITH SCIATICA, LEFT: Primary | ICD-10-CM

## 2022-10-31 DIAGNOSIS — M48.07 SPINAL STENOSIS OF LUMBOSACRAL REGION: ICD-10-CM

## 2022-10-31 NOTE — PROGRESS NOTES
Daily Note     Today's date: 10/31/2022  Patient name: Christopher Morales  : 1965  MRN: [id number]  Referring provider: Ora King DO  Dx:   Encounter Diagnosis     ICD-10-CM    1  Acute back pain with sciatica, left  M54 42    2  Spinal stenosis of lumbosacral region  M48 07        Start Time: 8583  Stop Time: 0850  Total time in clinic (min): 55 minutes    Subjective: I am feeling pretty good now  I get some pain depending on what I do  I have some pain in my left buttocks  Objective: See treatment diary below      Assessment: Tolerated treatment well  Patient would benefit from continued PT  Pt reports feeling some tightness and twinges in her left hamstring with standing hip extension  Pt did tolerate hip extension without any increase in hip or leg pain  Pt reports having less pain and tightness post in her low back and hips  She feels the exercises do help   Pt did well with the increase in wt today with hip extension  Plan: Continue per plan of care        Precautions: osteopenia, Lumbar fusion surgery, psoriatic arthritis     Date 10/24 10/27 10/31 10/17 10/20   FOTO     Ds 58   Manuals        Seated thoracic extension with manual OP np nv NP NV  np   Prone PA joint mobilizations to T9-12 np nv      LE stretch Ds+gastroc, hs,opp k-c ds Gastroc, HS, opp knee to chest, piriformis  15 min  L piriformis 30" 5 x   Ds+ gastroc, hs,opp k-c   Neuro Re-Ed        Standing hip extension with knee flexed 1 5# 20x ea 1 5# 2/15 Hip machine  30 #   20 x each hip 15 x  ea 20x ea 1#                   Ther Ex        Clamshell 20x 5" 20x 5" 20 x 5 "  3 " 15 x 20x 5"   Bridge c add 20x 5" 20x 5 "  30xx 5 "  w / add 5 " 20 x  20x 5"   Seated ER  L L3 2/15 L3 2/15  10 x 5 "  L3 2/10   Open book stretch 10x 5"  5" 15 x  each 5" 10 x  10x 5"   Leg press 40# 2/15 40# 2/15 40 # 30 x  35 # 2 x 15  35# 2/15   CC walkout P1 5steps 5x ea P1 5x 5steps c paf P 2 5 x 5 steps PA/off     Standing row P1 2/15 P2  2/15 P2 30 x Nu-step 8m L5 8m L5 L5 8 min L 5  5 min  6m L5   Ther Activity                Gait Training                Modalities

## 2022-11-03 ENCOUNTER — OFFICE VISIT (OUTPATIENT)
Dept: PHYSICAL THERAPY | Facility: CLINIC | Age: 57
End: 2022-11-03

## 2022-11-03 DIAGNOSIS — M54.42 ACUTE BACK PAIN WITH SCIATICA, LEFT: Primary | ICD-10-CM

## 2022-11-03 DIAGNOSIS — M48.07 SPINAL STENOSIS OF LUMBOSACRAL REGION: ICD-10-CM

## 2022-11-03 DIAGNOSIS — M54.42 ACUTE MIDLINE LOW BACK PAIN WITH LEFT-SIDED SCIATICA: ICD-10-CM

## 2022-11-03 NOTE — PROGRESS NOTES
PT Re-Evaluation     Today's date: 11/3/2022  Patient name: Luis Manuel Olmos  : 1965  MRN: [id number]  Referring provider: Daisy Basilio DO  Dx:   Encounter Diagnosis     ICD-10-CM    1  Acute back pain with sciatica, left  M54 42    2  Spinal stenosis of lumbosacral region  M48 07    3  Acute midline low back pain with left-sided sciatica  M54 42        Start Time: 075  Stop Time: 09  Total time in clinic (min): 65 minutes    Past Medical History:   Diagnosis Date   • Asthma    • Generalized anxiety disorder    • GERD (gastroesophageal reflux disease)    • Hemangioma of other sites     last assessed 16   • Hypertension    • Psoriatic arthritis ([facility] [address] )    • [facility]      Current Outpatient Medications on File Prior to Visit   Medication Sig Dispense Refill   • albuterol (PROVENTIL HFA,VENTOLIN HFA) 90 mcg/act inhaler TAKE 2 PUFFS BY MOUTH EVERY 6 HOURS AS NEEDED FOR WHEEZE OR FOR SHORTNESS OF BREATH 18 g 5   • ALPRAZolam (XANAX) 0 25 mg tablet Take 1 tablet (0 25 mg total) by mouth 3 (three) times a day as needed for anxiety 90 tablet 0   • diazepam (VALIUM) 5 mg tablet Take 1 tablet (5 mg total) by mouth every 6 (six) hours as needed for anxiety TAKE EVERY 4-6 HOURS PRN PAIN 30 tablet 0   • gabapentin (NEURONTIN) 300 mg capsule Take 300 mg by mouth 3 (three) times a day (Patient not taking: Reported on 10/12/2022)     • loratadine (CLARITIN) 10 mg tablet daily as needed       • methocarbamol (ROBAXIN) 750 mg tablet TAKE 1 TABLET (750 MG TOTAL) BY MOUTH EVERY 6 (SIX) HOURS AS NEEDED FOR MUSCLE SPASMS 90 tablet 0   • metoprolol succinate (TOPROL-XL) 25 mg 24 hr tablet TAKE 1 TABLET (25 MG TOTAL) BY MOUTH DAILY   90 tablet 1   • omeprazole (PriLOSEC) 40 MG capsule TAKE 1 CAPSULE BY MOUTH EVERY DAY BEFORE BREAKFAST 90 capsule 4   • predniSONE 2 5 mg tablet Take 2 5 mg by mouth as needed     • pregabalin (LYRICA) 50 mg capsule Take 50 mg by mouth Three times a day     • traZODone (DESYREL) 50 mg tablet TAKE 1 TABLET BY MOUTH DAILY AT BEDTIME 90 tablet 3     No current facility-administered medications on file prior to visit  Allergies   Allergen Reactions   • Diclofenac-Misoprostol GI Intolerance     Reaction Date: 20Jul2011;    • Indomethacin GI Intolerance     Reaction Date: 20Jul2011;    • Sulfamethoxazole-Trimethoprim Hives     Reaction Date: 20Jul2011;    • Cetirizine Rash     Reaction Date: 20Jul2011;         Assessment  Assessment details: Patient is demonstrating improved LE and core strengthening during the assessment today  She has less neural irritation to the L LE during the assessment with a negative crossed SLR and negative IBAN tests today  There continues to be good response and tolerance for therapy progression which is focused on progression of core and LE strengthening along with improved lower thoracic mobility  She felt fatigue with therapy progression today but noted no increase in baseline pain levels throughout the therapy session  Impairments: abnormal or restricted ROM, activity intolerance, impaired physical strength, lacks appropriate home exercise program and pain with function    Symptom irritability: moderateUnderstanding of Dx/Px/POC: good   Prognosis: good    Goals  STGs:  "Patient will be independent with hep by 2-3 visits  - MET  Decrease low back pain by 25% for improved tolerance with adls and home duties by 3-4 weeks  - MET  Improve Lumbar rom to wfl for improved tolerance with adls and home duties by 3-4 weeks  - Progressing  Improve L gluteal strength to equal that of the contralateral side for improved tolerance with standing activities and adls by 3-4 weeks  - Progressing    LTGs:  Improve FOTO score from 42 To 59 Indicating improved tolerance with activities involving the lumbar spine by discharge  - progressing (54 11/3/2022)  Patient will demonstrate rom and strength to the lumbar spine wfl for improved tolerance with adls and home duties by discharge  - Progressing  Patient will be free of radicular symptoms by discharge  " - Not MET      Plan  Plan details: Patient informed that from this point forward, to ensure adherence to the aforementioned plan of care, all or some of the treatment may be performed and carried out by a Physical Therapy Assistant (PTA) with supervision from a licensed Physical Therapist (PT) in accordance with [address]  Patient will continue to benefit from skilled physical therapy to address the functional deficits that were identified during the evaluation today  We will continue to progress the therapy program to address these functional deficits and achieve the established goals  Patient would benefit from: skilled physical therapy  Planned modality interventions: cryotherapy and thermotherapy: hydrocollator packs  Planned therapy interventions: abdominal trunk stabilization, ADL retraining, body mechanics training, flexibility, functional ROM exercises, home exercise program, therapeutic exercise, therapeutic activities, stretching, strengthening, postural training, patient education, joint mobilization and manual therapy  Frequency: 2x week  Duration in weeks: 8  Plan of Care beginning date: 10/4/2022  Plan of Care expiration date: 11/29/2022  Treatment plan discussed with: patient        Subjective Evaluation    History of Present Illness  Mechanism of injury: Patient presents to out patient physical therapy with acute exacerbation of chronic LBP  She has a history of lumbar spinal fusion with recent flare up of symptoms  She was placed on a steroid which initially helped to control her symptoms but is now no longer helping  She reports that the pain she is currently experiencing is pain that she has not experienced before  She notes that the pain will travel from the L side of her back into the L groin and the L half of the pubis   She notes that bending increases her pain but her pain is present when she is sitting and laying down  Quality of life: good    Pain  Current pain ratin  At best pain ratin  At worst pain ratin  Location: Lumbar/L LE  Quality: radiating, burning, dull ache and throbbing  Relieving factors: medications, rest and support  Aggravating factors: standing (Washing dishes, sitting in a soft chair  )    Social Support  Steps to enter house: yes  4  Stairs in house: yes   12  Lives in: multiple-level home  Lives with: spouse and adult children    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[employer])  Exercise history: Limited to at max 1/2 of a mile walking her dog  Treatments  Previous treatment: medication  Patient Goals  Patient goals for therapy: decreased pain and improved balance  Patient goal: Patient wishes to have less pain in her back and L hip/groin  Objective     Palpation   Left   Tenderness of the erector spinae       Neurological Testing     Sensation     Lumbar   Left   Intact: light touch    Right   Intact: light touch    Reflexes   Left   Patellar (L4): normal (2+)  Achilles (S1): normal (2+)  Clonus sign: negative    Right   Patellar (L4): normal (2+)  Achilles (S1): normal (2+)  Clonus sign: negative    Active Range of Motion     Lumbar   Flexion:  Restriction level: minimal  Extension:  WFL  Left lateral flexion:  WFL  Right lateral flexion:  WFL  Left rotation:  WFL  Right rotation:  [facility]    Joint Play   Joints within functional limits: T8, T9, T10 and T11     Hypermobile: T12, L1, L2 and L3     Pain: L2, L3 and L4     Strength/Myotome Testing     Left Hip   Planes of Motion   Flexion: 4+  Extension: 4+  Abduction: 4  Adduction: 4+  External rotation: 4+  Internal rotation: 4-    Right Hip   Planes of Motion   Flexion: 4+  Extension: 4  Abduction: 4+  Adduction: 4+  External rotation: 4+  Internal rotation: 4+    Left Knee   Flexion: 4  Extension: 4    Right Knee   Flexion: 4  Extension: 4    Left Ankle/Foot Dorsiflexion: 4+  Plantar flexion: 4+    Right Ankle/Foot   Dorsiflexion: 4+  Plantar flexion: 4+    Tests     Lumbar     Left   Negative crossed SLR and passive SLR  Right   Negative crossed SLR and passive SLR  Left Hip   Negative IBAN and FADIR  Right Hip   Negative IBAN and FADIR  Precautions: osteopenia, Lumbar fusion surgery, psoriatic arthritis     Date 10/24 10/27 10/31 11/3 Reassess 10/20   FOTO    54 SC Ds 58   Manuals        Seated thoracic extension with manual OP np nv NP  np   Prone PA joint mobilizations to T9-12 np nv      LE stretch Ds+gastroc, hs,opp k-c ds Gastroc, HS, opp knee to chest, piriformis  15 min  Piriformis 30" 5x ea    Ds+ gastroc, hs,opp k-c   Neuro Re-Ed        Standing hip extension with knee flexed 1 5# 20x ea 1 5# 2/15 Hip machine  30 #   20 x each hip 30# 30x hip machine extension 20x ea 1#                   Ther Ex        Clamshell 20x 5" 20x 5" 20 x 5 "  L2 5" 15x 20x 5"   Bridge c add 20x 5" 20x 5 "  30xx 5 "  5" 30x 20x 5"   Seated ER  L L3 2/15 L3 2/15   L3 2/10   Open book stretch 10x 5"  5" 15 x  each HEP 10x 5"   Leg press 40# 2/15 40# 2/15 40 # 30 x  40# 30x 35# 2/15   CC walkout P1 5steps 5x ea P1 5x 5steps c paf P 2 5 x 5 steps PA/off P2 walkout with press 7x    Standing row P1 2/15 P2  2/15 P2 30 x  P2 30x    Nu-step 8m L5 8m L5 L5 8 min L5 8 min 6m L5   Ther Activity                Gait Training                Modalities

## 2022-11-04 ENCOUNTER — CLINICAL SUPPORT (OUTPATIENT)
Dept: INTERNAL MEDICINE CLINIC | Facility: CLINIC | Age: 57
End: 2022-11-04

## 2022-11-04 DIAGNOSIS — Z79.899 OTHER LONG TERM (CURRENT) DRUG THERAPY: Primary | ICD-10-CM

## 2022-11-07 ENCOUNTER — OFFICE VISIT (OUTPATIENT)
Dept: PHYSICAL THERAPY | Facility: CLINIC | Age: 57
End: 2022-11-07

## 2022-11-07 ENCOUNTER — VBI (OUTPATIENT)
Dept: ADMINISTRATIVE | Facility: OTHER | Age: 57
End: 2022-11-07

## 2022-11-07 DIAGNOSIS — M48.07 SPINAL STENOSIS OF LUMBOSACRAL REGION: ICD-10-CM

## 2022-11-07 DIAGNOSIS — M54.42 ACUTE MIDLINE LOW BACK PAIN WITH LEFT-SIDED SCIATICA: ICD-10-CM

## 2022-11-07 DIAGNOSIS — M54.42 ACUTE BACK PAIN WITH SCIATICA, LEFT: Primary | ICD-10-CM

## 2022-11-07 NOTE — PROGRESS NOTES
Daily Note     Today's date: 2022  Patient name: Fly Loving  : 1965  MRN: [id number]  Referring provider: Viviana Mckenna DO  Dx:   Encounter Diagnosis     ICD-10-CM    1  Acute back pain with sciatica, left  M54 42    2  Spinal stenosis of lumbosacral region  M48 07    3  Acute midline low back pain with left-sided sciatica  M54 42        Start Time: 0750  Stop Time: 0847  Total time in clinic (min): 57 minutes    Subjective: Patient reports that she was working on a tree skirt over the weekend and had some muscle spasms on the L side of her low back  She denies any radicular symptoms  Objective: See treatment diary below      Assessment: Tolerated treatment well  Patient demonstrated fatigue post treatment and exhibited good technique with therapeutic exercises patient continues to be challenged with progression of core strengthening interventions today  She noted fatigue with progression but offers no increase to baseline pain  Plan: Continue per plan of care  Progress treatment as tolerated  Precautions: osteopenia, Lumbar fusion surgery, psoriatic arthritis     Date 10/24 10/27 10/31 11/3 Reassess    FOTO    54 SC    Manuals        Seated thoracic extension with manual OP np nv NP     Prone PA joint mobilizations to T9-12 np nv      LE stretch Ds+gastroc, hs,opp k-c ds Gastroc, HS, opp knee to chest, piriformis  15 min  Piriformis 30" 5x ea  Piriformis 30" 5x ea   Neuro Re-Ed        Standing hip extension with knee flexed 1 5# 20x ea 1 5# 2/15 Hip machine  30 #   20 x each hip 30# 30x hip machine extension 30# 30x ea  Ext      Hooklying alt UE/LE     Yellow PB 3x10   PPT with March     20x   Bird dog     2x10   Ther Ex        Clamshell 20x 5" 20x 5" 20 x 5 "  L2 5" 15x L2 5" 15x   Bridge c add 20x 5" 20x 5 "  30xx 5 "  5" 30x 5" 25x   Seated ER  L L3 2/15 L3 2/15      Open book stretch 10x 5"  5" 15 x  each HEP    Leg press 40# 2/15 40# 2/15 40 # 30 x  40# 30x    CC walkout P1 5steps 5x ea P1 5x 5steps c paf P 2 5 x 5 steps PA/off P2 walkout with press 7x Pressout only P2 3" 20x   Standing row P1 2/15 P2  2/15 P2 30 x  P2 30x Tandem P3 20x ea      Nu-step 8m L5 8m L5 L5 8 min L5 8 min L3 8 min   Ther Activity                Gait Training                Modalities

## 2022-11-08 ENCOUNTER — VBI (OUTPATIENT)
Dept: ADMINISTRATIVE | Facility: OTHER | Age: 57
End: 2022-11-08

## 2022-11-08 DIAGNOSIS — M48.07 SPINAL STENOSIS OF LUMBOSACRAL REGION: ICD-10-CM

## 2022-11-08 DIAGNOSIS — M54.42 ACUTE MIDLINE LOW BACK PAIN WITH LEFT-SIDED SCIATICA: ICD-10-CM

## 2022-11-08 LAB
1OH-MIDAZOLAM UR-MCNC: NEGATIVE NG/ML
6-BETA NALTREXOL UR CFM-MCNC: NEGATIVE NG/ML
6MAM UR QL: NEGATIVE NG/ML
A-OH ALPRAZ UR-MCNC: NEGATIVE NG/ML
A-OH-TRIAZOLAM UR-MCNC: NEGATIVE NG/ML
AMPHETAMINES UR QL: NEGATIVE NG/ML
BARBITURATES UR QL: NEGATIVE NG/ML
BENZODIAZ UR QL: POSITIVE NG/ML
BUPRENORPHINE UR QL: NEGATIVE NG/ML
BUTYLONE: NEGATIVE NG/ML
BZE UR QL: NEGATIVE NG/ML
CREAT UR-MCNC: 124.2 MG/DL
DRUG SCREEN COMMENT UR-IMP: ABNORMAL
ETHANOL UR QL: NEGATIVE NG/ML
FENTANYL: NEGATIVE NG/ML
GABAPENTIN UR-MCNC: NEGATIVE NG/ML
LORAZEPAM UR-MCNC: NEGATIVE NG/ML
MDPV: NEGATIVE NG/ML
MEDICATION PRESCRIBED: NORMAL
MEPHEDRONE: NEGATIVE NG/ML
METHADONE UR QL: NEGATIVE NG/ML
METHYLONE: NEGATIVE NG/ML
NALTREXONE UR-MCNC: NEGATIVE NG/ML
NORDIAZEPAM UR-MCNC: NEGATIVE NG/ML
OPIATES UR QL: NEGATIVE NG/ML
OXAZEPAM UR-MCNC: 104 NG/ML
OXIDANTS UR QL: NEGATIVE MCG/ML
OXYCODONE UR QL: NEGATIVE NG/ML
PCP UR QL: NEGATIVE NG/ML
PH UR: 6.5 [PH] (ref 4.5–9)
SL AMB AMINOCLONAZEPAM: NEGATIVE NG/ML
SL AMB HYDROXYETHYLFLURAZEPAM: NEGATIVE NG/ML
SL AMB MEDMATCH AOH ALPRAZOLAM: ABNORMAL
SL AMB MEDMATCH OXAZEPAM: ABNORMAL
SL AMB MEDMATCH PREGABALIN: ABNORMAL
SL AMB PREGABALIN: NEGATIVE NG/ML
SL AMB RITALINIC ACID: NEGATIVE NG/ML
TEMAZEPAM UR-MCNC: NEGATIVE NG/ML
THC UR QL: NEGATIVE NG/ML

## 2022-11-08 RX ORDER — METHOCARBAMOL 750 MG/1
750 TABLET, FILM COATED ORAL EVERY 6 HOURS PRN
Qty: 90 TABLET | Refills: 0 | Status: SHIPPED | OUTPATIENT
Start: 2022-11-08

## 2022-11-10 ENCOUNTER — OFFICE VISIT (OUTPATIENT)
Dept: PHYSICAL THERAPY | Facility: CLINIC | Age: 57
End: 2022-11-10

## 2022-11-10 DIAGNOSIS — M48.07 SPINAL STENOSIS OF LUMBOSACRAL REGION: ICD-10-CM

## 2022-11-10 DIAGNOSIS — M54.42 ACUTE MIDLINE LOW BACK PAIN WITH LEFT-SIDED SCIATICA: ICD-10-CM

## 2022-11-10 DIAGNOSIS — M54.42 ACUTE BACK PAIN WITH SCIATICA, LEFT: Primary | ICD-10-CM

## 2022-11-10 NOTE — PROGRESS NOTES
Daily Note     Today's date: 11/10/2022  Patient name: Shirley South  : 1965  MRN: [id number]  Referring provider: Keeley Wright DO  Dx:   Encounter Diagnosis     ICD-10-CM    1  Acute back pain with sciatica, left  M54 42    2  Spinal stenosis of lumbosacral region  M48 07    3  Acute midline low back pain with left-sided sciatica  M54 42        Start Time: 0800  Stop Time: 0855  Total time in clinic (min): 55 minutes    Subjective:  Pt notes decreased LBP/ increased core strength  She denies N/T  Objective: See treatment diary below      Assessment: Tolerated treatment well  Patient exhibited good technique with therapeutic exercises      Plan: Continue per plan of care  Precautions: osteopenia, Lumbar fusion surgery, psoriatic arthritis     Date 11/10 10/27 10/31 11/3 Reassess    FOTO    54 SC    Manuals        Seated thoracic extension with manual OP np nv NP     Prone PA joint mobilizations to T9-12 np nv      LE stretch Ds+gastroc, hs,opp k-c ds Gastroc, HS, opp knee to chest, piriformis  15 min  Piriformis 30" 5x ea  Piriformis 30" 5x ea   Neuro Re-Ed        Standing hip ext c knee flex 30# 30x  1 5# 2/15 Hip machine  30 #   20 x each hip 30# 30x hip machine extension 30# 30x ea  Ext  Hooklying alt UE/LE Y PB 2/15    Yellow PB 3x10   PPT with     20x   Bird dog 2/10    2x10   Ther Ex        Clamshell 15x 5" pos 2 20x 5" 20 x 5 "  L2 5" 15x L2 5" 15x   Bridge c add 30x 5" 20x 5 "  30xx 5 "  5" 30x 5" 25x   Seated ER  L xxx L3 2/15      Leg press 50# 2/15 40# 2/15 40 # 30 x  40# 30x    CC walkout c press P2 20x ,3steps 5xea P1 5x 5steps c paf P 2 5 x 5 steps PA/off P2 walkout with press 7x Pressout only P2 3" 20x   Standing row P3 20x ea P2  2/15 P2 30 x  P2 30x Tandem P3 20x ea      Nu-step 10m L5 8m L5 L5 8 min L5 8 min L3 8 min   Ther Activity                Gait Training                Modalities

## 2022-11-14 ENCOUNTER — OFFICE VISIT (OUTPATIENT)
Dept: PHYSICAL THERAPY | Facility: CLINIC | Age: 57
End: 2022-11-14

## 2022-11-14 DIAGNOSIS — M48.07 SPINAL STENOSIS OF LUMBOSACRAL REGION: Primary | ICD-10-CM

## 2022-11-14 DIAGNOSIS — M54.42 ACUTE BACK PAIN WITH SCIATICA, LEFT: ICD-10-CM

## 2022-11-14 NOTE — PROGRESS NOTES
Daily Note     Today's date: 2022  Patient name: Ez Workman  : 1965  MRN: [id number]  Referring provider: Saba Nicole DO  Dx:   Encounter Diagnosis     ICD-10-CM    1  Spinal stenosis of lumbosacral region  M48 07    2  Acute back pain with sciatica, left  M54 42        Start Time: 0800  Stop Time: 0900  Total time in clinic (min): 60 minutes    Subjective: I am doing well  I was able to do some things outside over the weekend and had no problems with my sciatica  I was sore in my muscles however  Objective: See treatment diary below      Assessment: Tolerated treatment well  Patient would benefit from continued PT  Pt was able to use 45 # today for hip ext with no reports of pain  Pt shows good form when doing her mat and standing exercises  Pt reports some fatigue with the exercises, but, no pain was noted  Plan: Continue per plan of care  Precautions: osteopenia, Lumbar fusion surgery, psoriatic arthritis     Date 11/10 11/14 10/31 11/3 Reassess    FOTO    54 SC    Manuals        Seated thoracic extension with manual OP np nv NP     Prone PA joint mobilizations to T9-12 np nv      LE stretch Ds+gastroc, hs,opp k-c JF 15 min Gastroc, HS, opp knee to chest, piriformis  15 min  Piriformis 30" 5x ea  Piriformis 30" 5x ea   Neuro Re-Ed        Standing hip ext c knee flex 30# 30x   45 # 30 x Hip machine  30 #   20 x each hip 30# 30x hip machine extension 30# 30x ea  Ext      Hooklying alt UE/LE Y PB 2/15 Y PB 30 x    Yellow PB 3x10   PPT with     20x   Bird dog 2/10 2/ 20    2x10   Ther Ex        Clamshell 15x 5" pos 2 15 x 5 " pos 2  20 x 5 "  L2 5" 15x L2 5" 15x   Bridge c add 30x 5" 30 x 5 "  30xx 5 "  5" 30x 5" 25x   Seated ER  L xxx        Leg press 50# 2/15 50 # 2/15 40 # 30 x  40# 30x    CC walkout c press P2 20x ,3steps 5xea P2  3steps  5 x each P 2 5 x 5 steps PA/off P2 walkout with press 7x Pressout only P2 3" 20x   Standing row P3 20x ea P3 2/15 tandem P2 30 x  P2 30x Tandem P3 20x ea      Nu-step 10m L5 10 m L5 L5 8 min L5 8 min L3 8 min   Ther Activity                Gait Training                Modalities

## 2022-11-17 ENCOUNTER — APPOINTMENT (OUTPATIENT)
Dept: PHYSICAL THERAPY | Facility: CLINIC | Age: 57
End: 2022-11-17

## 2022-11-21 ENCOUNTER — OFFICE VISIT (OUTPATIENT)
Dept: PHYSICAL THERAPY | Facility: CLINIC | Age: 57
End: 2022-11-21

## 2022-11-21 DIAGNOSIS — M54.42 ACUTE MIDLINE LOW BACK PAIN WITH LEFT-SIDED SCIATICA: ICD-10-CM

## 2022-11-21 DIAGNOSIS — M54.42 ACUTE BACK PAIN WITH SCIATICA, LEFT: Primary | ICD-10-CM

## 2022-11-21 DIAGNOSIS — M48.07 SPINAL STENOSIS OF LUMBOSACRAL REGION: ICD-10-CM

## 2022-11-21 NOTE — PROGRESS NOTES
PT Discharge    Today's date: 2022  Patient name: Ely Selby  : 1965  MRN: [id number]  Referring provider: Madeline Buchanan DO  Dx:   Encounter Diagnosis     ICD-10-CM    1  Acute back pain with sciatica, left  M54 42       2  Spinal stenosis of lumbosacral region  M48 07       3  Acute midline low back pain with left-sided sciatica  M54 42                      Past Medical History:   Diagnosis Date   • Asthma    • Generalized anxiety disorder    • GERD (gastroesophageal reflux disease)    • Hemangioma of other sites     last assessed 16   • Hypertension    • Psoriatic arthritis ([facility] [address] )    • Psoriatic arthritis ([facility] [address] )      Current Outpatient Medications on File Prior to Visit   Medication Sig Dispense Refill   • albuterol (PROVENTIL HFA,VENTOLIN HFA) 90 mcg/act inhaler TAKE 2 PUFFS BY MOUTH EVERY 6 HOURS AS NEEDED FOR WHEEZE OR FOR SHORTNESS OF BREATH 18 g 5   • ALPRAZolam (XANAX) 0 25 mg tablet Take 1 tablet (0 25 mg total) by mouth 3 (three) times a day as needed for anxiety 90 tablet 0   • diazepam (VALIUM) 5 mg tablet Take 1 tablet (5 mg total) by mouth every 6 (six) hours as needed for anxiety TAKE EVERY 4-6 HOURS PRN PAIN 30 tablet 0   • gabapentin (NEURONTIN) 300 mg capsule Take 300 mg by mouth 3 (three) times a day (Patient not taking: Reported on 10/12/2022)     • loratadine (CLARITIN) 10 mg tablet daily as needed       • methocarbamol (ROBAXIN) 750 mg tablet TAKE 1 TABLET (750 MG TOTAL) BY MOUTH EVERY 6 (SIX) HOURS AS NEEDED FOR MUSCLE SPASMS 90 tablet 0   • metoprolol succinate (TOPROL-XL) 25 mg 24 hr tablet TAKE 1 TABLET (25 MG TOTAL) BY MOUTH DAILY   90 tablet 1   • omeprazole (PriLOSEC) 40 MG capsule TAKE 1 CAPSULE BY MOUTH EVERY DAY BEFORE BREAKFAST 90 capsule 4   • predniSONE 2 5 mg tablet Take 2 5 mg by mouth as needed     • pregabalin (LYRICA) 50 mg capsule Take 50 mg by mouth Three times a day     • traZODone (DESYREL) 50 mg tablet TAKE 1 TABLET BY MOUTH DAILY AT BEDTIME 90 tablet 3 No current facility-administered medications on file prior to visit  Allergies   Allergen Reactions   • Diclofenac-Misoprostol GI Intolerance     Reaction Date: 20Jul2011;    • Indomethacin GI Intolerance     Reaction Date: 20Jul2011;    • Sulfamethoxazole-Trimethoprim Hives     Reaction Date: 20Jul2011;    • Cetirizine Rash     Reaction Date: 20Jul2011;         Assessment  Assessment details: Patient is showing good improving to her function and lumbar mobility  She has reached a functional baseline regarding her low back and will continue with a home program at this time  We reviewed all of the exercises with the patient to be completed for her HEP to continue to maintain and progress her strength and mobility  Symptom irritability: lowUnderstanding of Dx/Px/POC: good   Prognosis: good    Goals  STGs:  "Patient will be independent with hep by 2-3 visits  - MET  Decrease low back pain by 25% for improved tolerance with adls and home duties by 3-4 weeks  - MET  Improve Lumbar rom to wfl for improved tolerance with adls and home duties by 3-4 weeks  - MET  Improve L gluteal strength to equal that of the contralateral side for improved tolerance with standing activities and adls by 3-4 weeks  - MET    LTGs:  Improve FOTO score from 42 To 59 Indicating improved tolerance with activities involving the lumbar spine by discharge  - MET   Patient will demonstrate rom and strength to the lumbar spine wfl for improved tolerance with adls and home duties by discharge  - MET  Patient will be free of radicular symptoms by discharge  " - MET      Plan  Plan details: Patient will D/C to a home program at this time  Treatment plan discussed with: patient        Subjective Evaluation    History of Present Illness  Mechanism of injury: Patient presents to out patient physical therapy with acute exacerbation of chronic LBP  She has a history of lumbar spinal fusion with recent flare up of symptoms   She was placed on a steroid which initially helped to control her symptoms but is now no longer helping  She reports that the pain she is currently experiencing is pain that she has not experienced before  She notes that the pain will travel from the L side of her back into the L groin and the L half of the pubis  She notes that bending increases her pain but her pain is present when she is sitting and laying down  Update 2022:  Patient reports that she will get a dull ache in the low back if she is standing too long  She feels that her radicular symptoms have resolved and that she has noticed an improvement since starting therapy  She feels that she is able to do most of her daily activities without much limitation from the low back  Quality of life: good    Pain  Current pain ratin  At best pain ratin  At worst pain ratin  Location: Lumbar/L LE  Quality: dull ache  Relieving factors: medications, rest and support  Aggravating factors: standing (Washing dishes, sitting in a soft chair  )    Social Support  Steps to enter house: yes  4  Stairs in house: yes   12  Lives in: multiple-level home  Lives with: spouse and adult children    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[employer])  Treatments  Previous treatment: medication  Patient Goals  Patient goals for therapy: decreased pain and improved balance  Patient goal: Patient wishes to have less pain in her back and L hip/groin  Objective     Palpation   Left   Tenderness of the erector spinae       Neurological Testing     Sensation     Lumbar   Left   Intact: light touch    Right   Intact: light touch    Reflexes   Left   Patellar (L4): normal (2+)  Achilles (S1): normal (2+)  Clonus sign: negative    Right   Patellar (L4): normal (2+)  Achilles (S1): normal (2+)  Clonus sign: negative    Active Range of Motion     Lumbar   Flexion:  Restriction level: minimal  Extension:  WFL  Left lateral flexion:  WFL  Right lateral flexion: WFL  Left rotation:  WFL  Right rotation:  [facility]    Joint Play   Joints within functional limits: T8, T9, T10 and T11     Hypermobile: T12, L1, L2 and L3     Pain: L2, L3 and L4     Strength/Myotome Testing     Left Hip   Planes of Motion   Flexion: 4+  Extension: 4+  Abduction: 4  Adduction: 4+  External rotation: 4+  Internal rotation: 4-    Right Hip   Planes of Motion   Flexion: 4+  Extension: 4  Abduction: 4+  Adduction: 4+  External rotation: 4+  Internal rotation: 4+    Left Knee   Flexion: 4  Extension: 4    Right Knee   Flexion: 4  Extension: 4    Left Ankle/Foot   Dorsiflexion: 4+  Plantar flexion: 4+    Right Ankle/Foot   Dorsiflexion: 4+  Plantar flexion: 4+    Tests     Lumbar     Left   Negative crossed SLR and passive SLR  Right   Negative crossed SLR and passive SLR  Left Hip   Negative IBAN and FADIR  Right Hip   Negative IBAN and FADIR  Precautions: osteopenia, Lumbar fusion surgery, psoriatic arthritis     Date 11/10 11/14 11/21 D/C 11/3 Reassess 11/7   FOTO   69 SC 54 SC    Manuals        Seated thoracic extension with manual OP np nv      Prone PA joint mobilizations to T9-12 np nv      LE stretch Ds+gastroc, hs,opp k-c JF 15 min 10 min Piriformis 30" 5x ea  Piriformis 30" 5x ea   Neuro Re-Ed        Standing hip ext c knee flex 30# 30x   45 # 30 x 45# 2x15 30# 30x hip machine extension 30# 30x ea  Ext  Hooklying alt UE/LE Y PB 2/15 Y PB 30 x  Y PB 3x10  Yellow PB 3x10   PPT with March 2/20 2 / 20    20x   Bird dog 2/10 2/ 20  20x  2x10   Ther Ex        Clamshell 15x 5" pos 2 15 x 5 " pos 2  10" 10x L2 5" 15x L2 5" 15x   Bridge c add 30x 5" 30 x 5 "   5" 30x 5" 25x   Seated ER  L xxx        Leg press 50# 2/15 50 # 2/15 55# 2x15 40# 30x    CC walkout c press P2 20x ,3steps 5xea P2  3steps  5 x each P2 3 steps 10x ea  P2 walkout with press 7x Pressout only P2 3" 20x   Standing row P3 20x ea P3 2/15 tandem P3 2x10 tandem P2 30x Tandem P3 20x ea      Nu-step 10m L5 10 m L5 L5 10 min L5 8 min L3 8 min   Ther Activity                Gait Training                Modalities

## 2022-12-06 DIAGNOSIS — I10 HYPERTENSION, UNSPECIFIED TYPE: ICD-10-CM

## 2022-12-06 RX ORDER — METOPROLOL SUCCINATE 25 MG/1
25 TABLET, EXTENDED RELEASE ORAL DAILY
Qty: 30 TABLET | Refills: 5 | Status: SHIPPED | OUTPATIENT
Start: 2022-12-06

## 2022-12-09 DIAGNOSIS — M48.07 SPINAL STENOSIS OF LUMBOSACRAL REGION: ICD-10-CM

## 2022-12-09 DIAGNOSIS — M54.42 ACUTE MIDLINE LOW BACK PAIN WITH LEFT-SIDED SCIATICA: ICD-10-CM

## 2022-12-09 RX ORDER — METHOCARBAMOL 750 MG/1
750 TABLET, FILM COATED ORAL EVERY 6 HOURS PRN
Qty: 90 TABLET | Refills: 0 | Status: SHIPPED | OUTPATIENT
Start: 2022-12-09

## 2022-12-31 DIAGNOSIS — I10 HYPERTENSION, UNSPECIFIED TYPE: ICD-10-CM

## 2022-12-31 RX ORDER — METOPROLOL SUCCINATE 25 MG/1
25 TABLET, EXTENDED RELEASE ORAL DAILY
Qty: 90 TABLET | Refills: 2 | Status: SHIPPED | OUTPATIENT
Start: 2022-12-31

## 2023-01-20 ENCOUNTER — APPOINTMENT (OUTPATIENT)
Dept: LAB | Facility: CLINIC | Age: 58
End: 2023-01-20

## 2023-01-20 DIAGNOSIS — L40.50 PSORIATIC ARTHRITIS (HCC): ICD-10-CM

## 2023-01-20 DIAGNOSIS — M79.7 FIBROMYALGIA: ICD-10-CM

## 2023-01-20 DIAGNOSIS — G43.B0 OPHTHALMOPLEGIC MIGRAINE, NOT INTRACTABLE: ICD-10-CM

## 2023-01-20 DIAGNOSIS — M17.0 BILATERAL PRIMARY OSTEOARTHRITIS OF KNEE: ICD-10-CM

## 2023-01-20 DIAGNOSIS — L40.9 PSORIASIS: ICD-10-CM

## 2023-01-20 DIAGNOSIS — M18.0 ARTHRITIS OF CARPOMETACARPAL (CMC) JOINT OF BOTH THUMBS: ICD-10-CM

## 2023-01-20 DIAGNOSIS — F41.1 GENERALIZED ANXIETY DISORDER: ICD-10-CM

## 2023-01-20 DIAGNOSIS — E55.9 VITAMIN D DEFICIENCY: ICD-10-CM

## 2023-01-20 LAB
25(OH)D3 SERPL-MCNC: 19.7 NG/ML (ref 30–100)
ALBUMIN SERPL BCP-MCNC: 3.7 G/DL (ref 3.5–5)
ALP SERPL-CCNC: 114 U/L (ref 46–116)
ALT SERPL W P-5'-P-CCNC: 25 U/L (ref 12–78)
ANION GAP SERPL CALCULATED.3IONS-SCNC: 1 MMOL/L (ref 4–13)
AST SERPL W P-5'-P-CCNC: 17 U/L (ref 5–45)
BASOPHILS # BLD AUTO: 0.05 THOUSANDS/ÂΜL (ref 0–0.1)
BASOPHILS NFR BLD AUTO: 1 % (ref 0–1)
BILIRUB SERPL-MCNC: 0.33 MG/DL (ref 0.2–1)
BUN SERPL-MCNC: 14 MG/DL (ref 5–25)
CALCIUM SERPL-MCNC: 10 MG/DL (ref 8.3–10.1)
CHLORIDE SERPL-SCNC: 109 MMOL/L (ref 96–108)
CO2 SERPL-SCNC: 29 MMOL/L (ref 21–32)
CREAT SERPL-MCNC: 0.95 MG/DL (ref 0.6–1.3)
CREAT UR-MCNC: 100 MG/DL
CRP SERPL QL: <3 MG/L
EOSINOPHIL # BLD AUTO: 0.33 THOUSAND/ÂΜL (ref 0–0.61)
EOSINOPHIL NFR BLD AUTO: 4 % (ref 0–6)
ERYTHROCYTE [DISTWIDTH] IN BLOOD BY AUTOMATED COUNT: 13.1 % (ref 11.6–15.1)
ERYTHROCYTE [SEDIMENTATION RATE] IN BLOOD: 10 MM/HOUR (ref 0–29)
GFR SERPL CREATININE-BSD FRML MDRD: 66 ML/MIN/1.73SQ M
GLUCOSE P FAST SERPL-MCNC: 93 MG/DL (ref 65–99)
HCT VFR BLD AUTO: 42.7 % (ref 34.8–46.1)
HGB BLD-MCNC: 13.4 G/DL (ref 11.5–15.4)
IMM GRANULOCYTES # BLD AUTO: 0.03 THOUSAND/UL (ref 0–0.2)
IMM GRANULOCYTES NFR BLD AUTO: 0 % (ref 0–2)
LYMPHOCYTES # BLD AUTO: 3.31 THOUSANDS/ÂΜL (ref 0.6–4.47)
LYMPHOCYTES NFR BLD AUTO: 37 % (ref 14–44)
MCH RBC QN AUTO: 28 PG (ref 26.8–34.3)
MCHC RBC AUTO-ENTMCNC: 31.4 G/DL (ref 31.4–37.4)
MCV RBC AUTO: 89 FL (ref 82–98)
MICROALBUMIN UR-MCNC: 8.7 MG/L (ref 0–20)
MICROALBUMIN/CREAT 24H UR: 9 MG/G CREATININE (ref 0–30)
MONOCYTES # BLD AUTO: 0.66 THOUSAND/ÂΜL (ref 0.17–1.22)
MONOCYTES NFR BLD AUTO: 7 % (ref 4–12)
NEUTROPHILS # BLD AUTO: 4.58 THOUSANDS/ÂΜL (ref 1.85–7.62)
NEUTS SEG NFR BLD AUTO: 51 % (ref 43–75)
NRBC BLD AUTO-RTO: 0 /100 WBCS
PLATELET # BLD AUTO: 441 THOUSANDS/UL (ref 149–390)
PMV BLD AUTO: 9.7 FL (ref 8.9–12.7)
POTASSIUM SERPL-SCNC: 4.6 MMOL/L (ref 3.5–5.3)
PROT SERPL-MCNC: 8 G/DL (ref 6.4–8.4)
RBC # BLD AUTO: 4.79 MILLION/UL (ref 3.81–5.12)
SODIUM SERPL-SCNC: 139 MMOL/L (ref 135–147)
TSH SERPL DL<=0.05 MIU/L-ACNC: 1.84 UIU/ML (ref 0.45–4.5)
WBC # BLD AUTO: 8.96 THOUSAND/UL (ref 4.31–10.16)

## 2023-01-23 DIAGNOSIS — E55.9 VITAMIN D DEFICIENCY: Primary | ICD-10-CM

## 2023-01-23 RX ORDER — ERGOCALCIFEROL 1.25 MG/1
50000 CAPSULE ORAL WEEKLY
Qty: 12 CAPSULE | Refills: 3 | Status: SHIPPED | OUTPATIENT
Start: 2023-01-23

## 2023-03-01 ENCOUNTER — OFFICE VISIT (OUTPATIENT)
Dept: INTERNAL MEDICINE CLINIC | Facility: CLINIC | Age: 58
End: 2023-03-01

## 2023-03-01 VITALS
DIASTOLIC BLOOD PRESSURE: 68 MMHG | BODY MASS INDEX: 31.16 KG/M2 | OXYGEN SATURATION: 98 % | TEMPERATURE: 96.3 F | SYSTOLIC BLOOD PRESSURE: 102 MMHG | WEIGHT: 187 LBS | HEART RATE: 63 BPM | HEIGHT: 65 IN

## 2023-03-01 DIAGNOSIS — D84.9 IMMUNOSUPPRESSION (HCC): ICD-10-CM

## 2023-03-01 DIAGNOSIS — Z13.220 SCREENING FOR LIPID DISORDERS: ICD-10-CM

## 2023-03-01 DIAGNOSIS — J45.20 MILD INTERMITTENT ASTHMA WITHOUT COMPLICATION: ICD-10-CM

## 2023-03-01 DIAGNOSIS — M79.7 FIBROMYALGIA: ICD-10-CM

## 2023-03-01 DIAGNOSIS — G89.4 CHRONIC PAIN DISORDER: ICD-10-CM

## 2023-03-01 DIAGNOSIS — L40.50 PSORIATIC ARTHRITIS (HCC): ICD-10-CM

## 2023-03-01 DIAGNOSIS — I10 BENIGN ESSENTIAL HYPERTENSION: Primary | ICD-10-CM

## 2023-03-01 DIAGNOSIS — M85.80 OSTEOPENIA, UNSPECIFIED LOCATION: ICD-10-CM

## 2023-03-01 DIAGNOSIS — F41.1 GENERALIZED ANXIETY DISORDER: ICD-10-CM

## 2023-03-01 DIAGNOSIS — M17.0 PRIMARY OSTEOARTHRITIS OF BOTH KNEES: ICD-10-CM

## 2023-03-01 DIAGNOSIS — Z23 ENCOUNTER FOR VACCINATION: ICD-10-CM

## 2023-03-01 DIAGNOSIS — E55.9 VITAMIN D DEFICIENCY: ICD-10-CM

## 2023-03-01 DIAGNOSIS — G43.B0 OPHTHALMOPLEGIC MIGRAINE, NOT INTRACTABLE: ICD-10-CM

## 2023-03-01 DIAGNOSIS — Z12.31 ENCOUNTER FOR SCREENING MAMMOGRAM FOR MALIGNANT NEOPLASM OF BREAST: ICD-10-CM

## 2023-03-01 DIAGNOSIS — K21.9 GERD WITHOUT ESOPHAGITIS: ICD-10-CM

## 2023-03-01 NOTE — PROGRESS NOTES
BMI Counseling: Body mass index is 31 12 kg/m²  The BMI is above normal  Nutrition recommendations include decreasing portion sizes and encouraging healthy choices of fruits and vegetables  Exercise recommendations include moderate physical activity 150 minutes/week  No pharmacotherapy was ordered  Rationale for BMI follow-up plan is due to patient being overweight or obese  Assessment/Plan:  Problem List Items Addressed This Visit        Digestive    GERD without esophagitis       Respiratory    Asthma       Cardiovascular and Mediastinum    Ophthalmoplegic migraine headache    Benign essential hypertension - Primary       Musculoskeletal and Integument    Psoriatic arthritis ([facility] )    Osteopenia    Osteoarthritis of both knees    Relevant Orders    Ambulatory referral to Orthopedic Surgery       Other    Vitamin D deficiency    Immunosuppression ([facility] )    Generalized anxiety disorder    Fibromyalgia    Chronic pain disorder    Relevant Orders    Ambulatory Referral to Pain Management   Other Visit Diagnoses     Encounter for screening mammogram for malignant neoplasm of breast        Screening for lipid disorders        Relevant Orders    LDL cholesterol, direct    Triglycerides    Encounter for vaccination               Diagnoses and all orders for this visit:    Benign essential hypertension    Ophthalmoplegic migraine, not intractable    Mild intermittent asthma without complication    GERD without esophagitis    Osteopenia, unspecified location    Psoriatic arthritis ([address] )    Chronic pain disorder  -     Ambulatory Referral to Pain Management; Future    Generalized anxiety disorder    Vitamin D deficiency    Immunosuppression ([address] )    Fibromyalgia    Encounter for screening mammogram for malignant neoplasm of breast    Screening for lipid disorders  -     LDL cholesterol, direct; Future  -     Triglycerides;  Future    Encounter for vaccination    Primary osteoarthritis of both knees  -     Ambulatory referral to Orthopedic Surgery; Future      No problem-specific Assessment & Plan notes found for this encounter  A/P: Doing ok and will check labs  Discussed BMI and will give info on diet and exercise  Discussed vaccines defers the pneumonia vaccine  Will send to pain management for the back and ortho for knee injections    Appreciate rheum input  Continue current treatment and RTC four months for routine  Subjective:      Patient ID: Ari Gallo is a 61 y o  female  WF RTC for f/u HTN, asthma, etc  Doing ok and no new issues  Remains active w/o difficulty and no falls  RAD is controlled with increase allergy med and  rescue MDI use since allergies worsened    No reflux  CHronic pain, PA, and fibromyalgia are manageable,but LBP and knee pain worsening  [place] RYNE is controlled  Due for labs, mammo, and vaccines  The following portions of the patient's history were reviewed and updated as appropriate:   She has a past medical history of Asthma, Generalized anxiety disorder, GERD (gastroesophageal reflux disease), Hemangioma of other sites, Hypertension, Psoriatic arthritis ([facility] [address] ), and Psoriatic arthritis ([facility] [address] )  ,  does not have any pertinent problems on file  ,   has a past surgical history that includes Appendectomy; Cholecystectomy; Foot surgery; Hysterectomy; Tonsillectomy; Tubal ligation; Knee surgery; Spinal fusion; and Hand surgery (Right, 03/10/2022)  ,  family history includes COPD in her mother; Heart attack in her paternal grandfather; Heart disease in her mother; Hypertension in her maternal grandmother, mother, and paternal grandfather; Pancreatic cancer in her father and paternal grandfather  ,   reports that she has never smoked  She has never used smokeless tobacco  She reports current alcohol use  She reports that she does not use drugs  ,  is allergic to diclofenac-misoprostol, indomethacin, sulfamethoxazole-trimethoprim, and cetirizine     Current Outpatient Medications   Medication Sig Dispense Refill   • albuterol (PROVENTIL HFA,VENTOLIN HFA) 90 mcg/act inhaler TAKE 2 PUFFS BY MOUTH EVERY 6 HOURS AS NEEDED FOR WHEEZE OR FOR SHORTNESS OF BREATH 18 g 5   • ALPRAZolam (XANAX) 0 25 mg tablet Take 1 tablet (0 25 mg total) by mouth 3 (three) times a day as needed for anxiety 90 tablet 0   • diazepam (VALIUM) 5 mg tablet Take 1 tablet (5 mg total) by mouth every 6 (six) hours as needed for anxiety TAKE EVERY 4-6 HOURS PRN PAIN 30 tablet 0   • ergocalciferol (VITAMIN D2) 50,000 units Take 1 capsule (50,000 Units total) by mouth once a week 12 capsule 3   • loratadine (CLARITIN) 10 mg tablet daily as needed       • methocarbamol (ROBAXIN) 750 mg tablet TAKE 1 TABLET (750 MG TOTAL) BY MOUTH EVERY 6 (SIX) HOURS AS NEEDED FOR MUSCLE SPASMS 90 tablet 0   • metoprolol succinate (TOPROL-XL) 25 mg 24 hr tablet TAKE 1 TABLET (25 MG TOTAL) BY MOUTH DAILY  90 tablet 2   • omeprazole (PriLOSEC) 40 MG capsule TAKE 1 CAPSULE BY MOUTH EVERY DAY BEFORE BREAKFAST 90 capsule 4   • predniSONE 2 5 mg tablet Take 2 5 mg by mouth as needed     • traZODone (DESYREL) 50 mg tablet TAKE 1 TABLET BY MOUTH DAILY AT BEDTIME 90 tablet 3   • pregabalin (LYRICA) 50 mg capsule Take 50 mg by mouth Three times a day       No current facility-administered medications for this visit  Review of Systems   Constitutional: Negative for activity change, chills, diaphoresis, fatigue and fever  HENT: Negative  Eyes: Negative for visual disturbance  Respiratory: Negative for cough, chest tightness, shortness of breath and wheezing  Cardiovascular: Negative for chest pain, palpitations and leg swelling  Gastrointestinal: Negative for abdominal pain, constipation, diarrhea, nausea and vomiting  Endocrine: Negative for cold intolerance and heat intolerance  Genitourinary: Negative for difficulty urinating, dysuria and frequency  Musculoskeletal: Negative for arthralgias, gait problem and myalgias     Neurological: Negative for dizziness, seizures, syncope, weakness, light-headedness and headaches  Psychiatric/Behavioral: Negative for confusion, dysphoric mood and sleep disturbance  The patient is not nervous/anxious  PHQ-2/9 Depression Screening    Little interest or pleasure in doing things: 0 - not at all  Feeling down, depressed, or hopeless: 0 - not at all        Objective:  Vitals:    03/01/23 0855   BP: 102/68   BP Location: Left arm   Patient Position: Sitting   Cuff Size: Standard   Pulse: 63   Temp: (!) 96 3 °F (35 7 °C)   SpO2: 98%   Weight: 84 8 kg (187 lb)   Height: 5' 5" (1 651 m)     Body mass index is 31 12 kg/m²  Physical Exam  Vitals and nursing note reviewed  Constitutional:       General: She is not in acute distress  Appearance: Normal appearance  She is not ill-appearing  HENT:      Head: Normocephalic and atraumatic  Mouth/Throat:      Mouth: Mucous membranes are moist    Eyes:      Extraocular Movements: Extraocular movements intact  Conjunctiva/sclera: Conjunctivae normal       Pupils: Pupils are equal, round, and reactive to light  Neck:      Vascular: No carotid bruit  Cardiovascular:      Rate and Rhythm: Normal rate and regular rhythm  Heart sounds: Normal heart sounds  No murmur heard  Pulmonary:      Effort: Pulmonary effort is normal  No respiratory distress  Breath sounds: Normal breath sounds  No wheezing, rhonchi or rales  Abdominal:      General: Bowel sounds are normal  There is no distension  Palpations: Abdomen is soft  Tenderness: There is no abdominal tenderness  Musculoskeletal:      Cervical back: Neck supple  Right lower leg: No edema  Left lower leg: No edema  Neurological:      General: No focal deficit present  Mental Status: She is alert and oriented to person, place, and time  Mental status is at baseline  Psychiatric:         Mood and Affect: Mood normal          Behavior: Behavior normal          Thought Content:  Thought content normal          Judgment: Judgment normal          BMI Counseling: Body mass index is 31 12 kg/m²  The BMI is above normal  Nutrition recommendations include reducing portion sizes, decreasing overall calorie intake, reducing intake of saturated fat and trans fat and reducing intake of cholesterol  Exercise recommendations include moderate aerobic physical activity for 150 minutes/week

## 2023-03-01 NOTE — PATIENT INSTRUCTIONS
Obesity   AMBULATORY CARE:   Obesity  means your body mass index (BMI) is greater than 30  Your healthcare provider will use your age, height, and weight to measure your BMI  The risks of obesity include  many health problems, including injuries or physical disability  • Diabetes (high blood sugar level)    • High blood pressure or high cholesterol    • Heart disease    • Stroke    • Gallbladder or liver disease    • Cancer of the colon, breast, prostate, liver, or kidney    • Sleep apnea    • Arthritis or gout    Screening  is done to check for health conditions before you have signs or symptoms  If you are 28to 79years old, your blood sugar level may be checked every 3 years for signs of prediabetes or diabetes  Your healthcare provider will check your blood pressure at each visit  High blood pressure can lead to a stroke or other problems  Your provider may check for signs of heart disease, cancer, or other health problems  Seek care immediately if:   • You have a severe headache, confusion, or difficulty speaking  • You have weakness on one side of your body  • You have chest pain, sweating, or shortness of breath  Call your doctor if:   • You have symptoms of gallbladder or liver disease, such as pain in your upper abdomen  • You have knee or hip pain and discomfort while walking  • You have symptoms of diabetes, such as intense hunger and thirst, and frequent urination  • You have symptoms of sleep apnea, such as snoring or daytime sleepiness  • You have questions or concerns about your condition or care  Treatment for obesity  focuses on helping you lose weight to improve your health  Even a small decrease in BMI can reduce the risk for many health problems  Your healthcare provider will help you set a weight-loss goal   • Lifestyle changes  are the first step in treating obesity  These include making healthy food choices and getting regular physical activity   Your healthcare provider may suggest a weight-loss program that involves coaching, education, and therapy  • Medicine  may help you lose weight when it is used with a healthy foods and physical activity  • Surgery  can help you lose weight if you have obesity along with other health problems  Several types of weight-loss surgery are available  Ask your healthcare provider for more information  Tips for safe weight loss:   • Set small, realistic goals  An example of a small goal is to walk for 20 minutes 5 days a week  Anther goal is to lose 5% of your body weight  • Ask for support  Tell friends, family members, and coworkers about your goals  Ask someone to lose weight with you  You may also want to join a weight-loss support group  • Identify foods or triggers that may cause you to overeat  Remove tempting high-calorie foods from your home and workplace  Place a bowl of fresh fruit on your kitchen counter  If stress causes you to eat, find other ways to cope with stress  A counselor or therapist may be able to help you  • Track your daily calories and activity  Write down what you eat and drink  Also write down how many minutes of physical activity you do each day  • Track your weekly weight  Weigh yourself in the morning, before you eat or drink anything but after you use the bathroom  Use the same scale, in the same place, and in similar clothing each time  Only weigh yourself 1 to 2 times each week, or as directed  You may become discouraged if you weigh yourself every day  Eating changes: You will need to eat 500 to 1,000 fewer calories each day than you currently eat to lose 1 to 2 pounds a week  The following changes will help you cut calories:  • Eat smaller portions  Use small plates, no larger than 9 inches in diameter  Fill your plate half full of fruits and vegetables  Measure your food using measuring cups until you know what a serving size looks like           • Eat 3 meals and 1 or 2 snacks each day  Plan your meals in advance  Jose Hutchinson and eat at home most of the time  Eat slowly  Do not skip meals  Skipping meals can lead to overeating later in the day  This can make it harder for you to lose weight  Talk with a dietitian to help you make a meal plan and schedule that is right for you  • Eat fruits and vegetables at every meal   They are low in calories and high in fiber, which makes you feel full  Do not add butter, margarine, or cream sauce to vegetables  Use herbs to season steamed vegetables  • Eat less fat and fewer fried foods  Eat more baked or grilled chicken and fish  These protein sources are lower in calories and fat than red meat  Limit fast food  Dress your salads with olive oil and vinegar instead of bottled dressing  • Limit the amount of sugar you eat  Do not drink sugary beverages  Limit alcohol  Activity changes:  Physical activity is good for your body in many ways  It helps you burn calories and build strong muscles  It decreases stress and depression, and improves your mood  It can also help you sleep better  Talk to your healthcare provider before you begin an exercise program   • Exercise for at least 30 minutes 5 days a week  Start slowly  Set aside time each day for physical activity that you enjoy and that is convenient for you  It is best to do both weight training and an activity that increases your heart rate, such as walking, bicycling, or swimming  • Find ways to be more active  Do yard work and housecleaning  Walk up the stairs instead of using elevators  Spend your leisure time going to events that require walking, such as outdoor festivals or fairs  This extra physical activity can help you lose weight and keep it off  Follow up with your doctor as directed: You may need to meet with a dietitian  Write down your questions so you remember to ask them during your visits    © Copyright [employer] 2022 Information is for End User's use only and may not be sold, redistributed or otherwise used for commercial purposes  The above information is an  only  It is not intended as medical advice for individual conditions or treatments  Talk to your doctor, nurse or pharmacist before following any medical regimen to see if it is safe and effective for you  Low Fat Diet   AMBULATORY CARE:   A low-fat diet  is an eating plan that is low in total fat, unhealthy fat, and cholesterol  You may need to follow a low-fat diet if you have trouble digesting or absorbing fat  You may also need to follow this diet if you have high cholesterol  You can also lower your cholesterol by increasing the amount of fiber in your diet  Soluble fiber is a type of fiber that helps to decrease cholesterol levels  Different types of fat in food:   • Limit unhealthy fats  A diet that is high in cholesterol, saturated fat, and trans fat may cause unhealthy cholesterol levels  Unhealthy cholesterol levels increase your risk of heart disease  ? Cholesterol:  Limit intake of cholesterol to less than 200 mg per day  Cholesterol is found in meat, eggs, and dairy  ? Saturated fat:  Limit saturated fat to less than 7% of your total daily calories  Ask your dietitian how many calories you need each day  Saturated fat is found in butter, cheese, ice cream, whole milk, and palm oil  Saturated fat is also found in meat, such as beef, pork, chicken skin, and processed meats  Processed meats include sausage, hot dogs, and bologna  ? Trans fat:  Avoid trans fat as much as possible  Trans fat is used in fried and baked foods  Foods that say trans fat free on the label may still have up to 0 5 grams of trans fat per serving  • Include healthy fats  Replace foods that are high in saturated and trans fat with foods high in healthy fats  This may help to decrease high cholesterol levels  ? Monounsaturated fats:   These are found in avocados, nuts, and vegetable oils, such as olive, canola, and sunflower oil  ? Polyunsaturated fats: These can be found in vegetable oils, such as soybean or corn oil  Omega-3 fats can help to decrease the risk of heart disease  Omega-3 fats are found in fish, such as salmon, herring, trout, and tuna  Omega-3 fats can also be found in plant foods, such as walnuts, flaxseed, soybeans, and canola oil  Foods to limit or avoid:   • Grains:      ? Snacks that are made with partially hydrogenated oils, such as chips, regular crackers, and butter-flavored popcorn    ? High-fat baked goods, such as biscuits, croissants, doughnuts, pies, cookies, and pastries    • Dairy:      ? Whole milk, 2% milk, and yogurt and ice cream made with whole milk    ? Half and half creamer, heavy cream, and whipping cream    ? Cheese, cream cheese, and sour cream    • Meats and proteins:      ? High-fat cuts of meat (T-bone steak, regular hamburger, and ribs)    ? Fried meat, poultry (turkey and chicken), and fish    ? Poultry (chicken and turkey) with skin    ? Cold cuts (salami or bologna), hot dogs, canales, and sausage    ? Whole eggs and egg yolks    • Vegetables and fruits with added fat:      ? Fried vegetables or vegetables in butter or high-fat sauces, such as cream or cheese sauces    ? Fried fruit or fruit served with butter or cream    • Fats:      ? Butter, stick margarine, and shortening    ? Coconut, palm oil, and palm kernel oil    Foods to include:       • Grains:      ? Whole-grain breads, cereals, pasta, and brown rice    ? Low-fat crackers and pretzels    • Vegetables and fruits:      ? Fresh, frozen, or canned vegetables (no salt or low-sodium)    ? Fresh, frozen, dried, or canned fruit (canned in light syrup or fruit juice)    ? Avocado    • Low-fat dairy products:      ? Nonfat (skim) or 1% milk    ? Nonfat or low-fat cheese, yogurt, and cottage cheese    • Meats and proteins:      ? Chicken or turkey with no skin    ?  Baked or broiled fish    ? Lean beef and pork (loin, round, extra lean hamburger)    ? Beans and peas, unsalted nuts, soy products    ? Egg whites and substitutes    ? Seeds and nuts    • Fats:      ? Unsaturated oil, such as canola, olive, peanut, soybean, or sunflower oil    ? Soft or liquid margarine and vegetable oil spread    ? Low-fat salad dressing  Other ways to decrease fat:   • Read food labels before you buy foods  Choose foods that have less than 30% of calories from fat  Choose low-fat or fat-free dairy products  Remember that fat free does not mean calorie free  These foods still contain calories, and too many calories can lead to weight gain  • Trim fat from meat and avoid fried food  Trim all visible fat from meat before you cook it  Remove the skin from poultry  Do not knight meat, fish, or poultry  Bake, roast, boil, or broil these foods instead  Avoid fried foods  Eat a baked potato instead of [place] fries  Steam vegetables instead of sautéing them in butter  • Add less fat to foods  Use imitation canales bits on salads and baked potatoes instead of regular canales bits  Use fat-free or low-fat salad dressings instead of regular dressings  Use low-fat or nonfat butter-flavored topping instead of regular butter or margarine on popcorn and other foods  Ways to decrease fat in recipes:  Replace high-fat ingredients with low-fat or nonfat ones  This may cause baked goods to be drier than usual  You may need to use nonfat cooking spray on pans to prevent food from sticking  You also may need to change the amount of other ingredients, such as water, in the recipe  Try the following:  • Use low-fat or light margarine instead of regular margarine or shortening  • Use lean ground turkey breast or chicken, or lean ground beef (less than 5% fat) instead of hamburger  • Add 1 teaspoon of canola oil to 8 ounces of skim milk instead of using cream or half and half       • Use grated zucchini, carrots, or apples in breads instead of coconut  • Use blenderized, low-fat cottage cheese, plain tofu, or low-fat ricotta cheese instead of cream cheese  • Use 1 egg white and 1 teaspoon of canola oil, or use ¼ cup (2 ounces) of fat-free egg substitute instead of a whole egg  • Replace half of the oil that is called for in a recipe with applesauce when you bake  Use 3 tablespoons of cocoa powder and 1 tablespoon of canola oil instead of a square of baking chocolate  How to increase fiber:  Eat enough high-fiber foods to get 20 to 30 grams of fiber every day  Slowly increase your fiber intake to avoid stomach cramps, gas, and other problems  • Eat 3 ounces of whole-grain foods each day  An ounce is about 1 slice of bread  Eat whole-grain breads, such as whole-wheat bread  Whole wheat, whole-wheat flour, or other whole grains should be listed as the first ingredient on the food label  Replace white flour with whole-grain flour or use half of each in recipes  Whole-grain flour is heavier than white flour, so you may have to add more yeast or baking powder  • Eat a high-fiber cereal for breakfast   Oatmeal is a good source of soluble fiber  Look for cereals that have bran or fiber in the name  Choose whole-grain products, such as brown rice, barley, and whole-wheat pasta  • Eat more beans, peas, and lentils  For example, add beans to soups or salads  Eat at least 5 cups of fruits and vegetables each day  Eat fruits and vegetables with the peel because the peel is high in fiber  © Copyright Carissa Nieves 2022 Information is for End User's use only and may not be sold, redistributed or otherwise used for commercial purposes  The above information is an  only  It is not intended as medical advice for individual conditions or treatments  Talk to your doctor, nurse or pharmacist before following any medical regimen to see if it is safe and effective for you      Heart Healthy Diet   AMBULATORY CARE:   A heart healthy diet  is an eating plan low in unhealthy fats and sodium (salt)  The plan is high in healthy fats and fiber  A heart healthy diet helps improve your cholesterol levels and lowers your risk for heart disease and stroke  A dietitian will teach you how to read and understand food labels  Heart healthy diet guidelines to follow:   • Choose foods that contain healthy fats:      ? Unsaturated fats  include monounsaturated and polyunsaturated fats  Unsaturated fat is found in foods such as soybean, canola, olive, corn, and safflower oils  It is also found in soft tub margarine that is made with liquid vegetable oil  ? Omega-3 fat  is found in certain fish, such as salmon, tuna, and trout, and in walnuts and flaxseed  Eat fish high in omega-3 fats at least 2 times a week  • Limit or do not have unhealthy fats:      ? Cholesterol  is found in animal foods, such as eggs and lobster, and in dairy products made from whole milk  Limit cholesterol to less than 200 mg each day  ? Saturated fat  is found in meats, such as canales and hamburger  It is also found in chicken or turkey skin, whole milk, and butter  Limit saturated fat to less than 7% of your total daily calories  ? Trans fat  is found in packaged foods, such as potato chips and cookies  It is also in hard margarine, some fried foods, and shortening  Do not eat foods that contain trans fats  • Get 20 to 30 grams of fiber each day  Fruits, vegetables, whole-grain foods, and legumes (cooked beans) are good sources of fiber  • Limit sodium as directed  You may be told to limit sodium, such as to 2,000 mg or less each day  Choose low-sodium or no-salt-added foods  Add little or no salt to food you prepare  Use herbs and spices in place of salt         Include the following in your heart healthy plan:  Ask your dietitian or healthcare provider how many servings to have each day from the following food groups:  • Grains: ? Whole-wheat breads, cereals, and pastas, and brown rice    ? Low-fat, low-sodium crackers and chips    • Vegetables:      ? Broccoli, green beans, green peas, and spinach    ? Collards, kale, and lima beans    ? Carrots, sweet potatoes, tomatoes, and peppers    ? Canned vegetables with no salt added    • Fruits:      ? Bananas, peaches, pears, and pineapple    ? Grapes, raisins, and dates    ? Oranges, tangerines, grapefruit, orange juice, and grapefruit juice    ? Apricots, mangoes, melons, and papaya    ? Raspberries and strawberries    ? Canned fruit with no added sugar    • Low-fat dairy:      ? Nonfat (skim) milk, 1% milk, and low-fat almond, cashew, or soy milks fortified with calcium    ? Low-fat cheese, regular or frozen yogurt, and cottage cheese    • Meats and proteins:      ? Lean cuts of beef and pork (loin, leg, round), skinless chicken and turkey    ? Legumes, soy products, egg whites, or nuts    Limit or do not include the following in your heart healthy plan:   • Foods and liquids that contain unhealthy fats and oils:      ? Whole or 2% milk, cream cheese, sour cream, or cheese    ? High-fat cuts of beef (T-bone steaks, ribs), chicken or turkey with skin, and organ meats such as liver    ? Butter, stick margarine, shortening, and cooking oils such as coconut or palm oil    • Foods and liquids high in sodium:      ? Packaged foods, such as frozen dinners, cookies, macaroni and cheese, and cereals with more than 300 mg of sodium per serving    ? Vegetables with added sodium, such as instant potatoes, vegetables with added sauces, or regular canned vegetables    ? Cured or smoked meats, such as hot dogs, canales, and sausage    ? High-sodium ketchup, barbecue sauce, salad dressing, pickles, olives, soy sauce, or miso    • Foods and liquids high in sugar:      ? Candy, cake, cookies, pies, or doughnuts    ? Soft drinks (soda), sports drinks, or sweetened tea    ?  Canned or dry mixes for cakes, soups, sauces, or gravies    Other healthy heart guidelines:   • Do not smoke  Nicotine and other chemicals in cigarettes and cigars can cause lung and heart damage  Ask your healthcare provider for information if you currently smoke and need help to quit  E-cigarettes or smokeless tobacco still contain nicotine  Talk to your provider before you use these products  • Limit or do not drink alcohol as directed  Alcohol can damage your heart and raise your blood pressure  Your healthcare provider may give you specific daily and weekly limits  The general recommended limit is 1 drink a day for women 21 or older and for men 72 or older  Do not have more than 3 drinks within 24 hours or 7 within a week  The recommended limit is 2 drinks a day for men 24to 59years of age  Do not have more than 4 drinks within 24 hours or 14 within a week  A drink of alcohol is 12 ounces of beer, 5 ounces of wine, or 1½ ounces of liquor  • Maintain a healthy weight  Extra body weight makes your heart work harder  Ask your provider what a healthy weight is for you  He or she can help you create a safe weight loss plan, if needed  • Exercise regularly  Exercise can help you maintain a healthy weight and improve your blood pressure and cholesterol levels  Regular exercise can also decrease your risk for heart problems  Ask your provider about the best exercise plan for you  Do not start an exercise program without asking your provider  Follow up with your doctor or cardiologist as directed:  Write down your questions so you remember to ask them during your visits  © Copyright Jareth Most 2022 Information is for End User's use only and may not be sold, redistributed or otherwise used for commercial purposes  The above information is an  only  It is not intended as medical advice for individual conditions or treatments   Talk to your doctor, nurse or pharmacist before following any medical regimen to see if it is safe and effective for you

## 2023-03-11 DIAGNOSIS — E55.9 VITAMIN D DEFICIENCY: ICD-10-CM

## 2023-03-11 RX ORDER — ERGOCALCIFEROL 1.25 MG/1
CAPSULE ORAL
Qty: 12 CAPSULE | Refills: 4 | Status: SHIPPED | OUTPATIENT
Start: 2023-03-11

## 2023-03-13 ENCOUNTER — CONSULT (OUTPATIENT)
Dept: PAIN MEDICINE | Facility: CLINIC | Age: 58
End: 2023-03-13

## 2023-03-13 VITALS
SYSTOLIC BLOOD PRESSURE: 121 MMHG | DIASTOLIC BLOOD PRESSURE: 80 MMHG | HEIGHT: 65 IN | WEIGHT: 188 LBS | HEART RATE: 69 BPM | BODY MASS INDEX: 31.32 KG/M2

## 2023-03-13 DIAGNOSIS — L40.50 PSORIATIC ARTHRITIS (HCC): ICD-10-CM

## 2023-03-13 DIAGNOSIS — Z98.1 S/P LUMBAR FUSION: ICD-10-CM

## 2023-03-13 DIAGNOSIS — M54.16 LUMBAR RADICULITIS: Primary | ICD-10-CM

## 2023-03-13 NOTE — PROGRESS NOTES
Assessment:  1  Lumbar radiculitis    2  Psoriatic arthritis ([facility] [address] )    3  S/P lumbar fusion      Portions of the record may have been created with voice recognition software  Occasional wrong word or "sound a like" substitutions may have occurred due to the inherent limitations of voice recognition software  Read the chart carefully and recognize, using context, where substitutions have occurred  Contact me with any questions  Plan:  71-year-old female with history of L4-5 fusion (2020), bilateral knee meniscal repair, psoriatic arthritis, anxiety referred by Dr Burna Kocher, here for initial evaluation of chronic low back and BLE pain in L5 distribution of about 6 months duration without inciting event  She notes chronic quad weakness with history of knee issues, denies new or progressive weakness, saddle anesthesia, bowel/bladder incontinence  Recent lumbar spine MRI reviewed and shows multilevel degenerative changes with left greater than right neuroforaminal narrowing at L3-4, moderate foraminal narrowing at L5-S1  She has completed a course of physical therapy with some improvement of symptoms  She is currently taking Lyrica and Robaxin as prescribed by other provider with some relief  Symptoms likely due to lumbar radiculitis  1   Follow-up for interlaminar lumbar epidural steroid injection L5-S1   2   Continue home exercise program   3   Follow-up in 1 month after injection  Complete risks and benefits including bleeding, infection, tissue reaction, nerve injury and allergic reaction were discussed  The approach was demonstrated using models and literature was provided  Verbal and written consent was obtained  History of Present Illness: The patient is a 61 y o  female who presents for consultation in regards to Buttocks Pain and Leg Pain  Symptoms have been present for ~ 6 months  Symptoms began without any precipitating injury or trauma   Pain is reported to be 3 on the numeric rating scale   Symptoms are felt intermittently and worst in the no typical pattern  Symptoms are characterized as sharp, dull/aching and throbbing  Notes some chronic weakness in bilateral quads but denies new or progressive weakness, saddle anesthesia, bowel/bladder incontinence  Aggravating factors include lying down, bending and exercise  Relieving factors include standing and walking  No change in symptoms with sitting, relaxation, coughing/sneezing and bowel movements  Treatments that have been somewhat helpful include physical therapy  He was prescribed Lyrica 50 mg 3 times daily, however she takes this 1-2 times a day depending on symptoms and notes it provides some relief, denies side effects  She also takes Robaxin-750 milligrams as needed with some relief, denies side effects  Review of Systems:    Review of Systems   Constitutional: Negative for unexpected weight change  HENT: Negative for hearing loss  Eyes: Negative for visual disturbance  Respiratory: Negative for shortness of breath  Cardiovascular: Negative for leg swelling  Gastrointestinal: Negative for constipation  Endocrine: Negative for polyuria  Genitourinary: Negative for difficulty urinating  Musculoskeletal: Positive for joint swelling and myalgias  Negative for gait problem  Joint pain   Skin: Negative for rash  Neurological: Negative for weakness and headaches  Psychiatric/Behavioral: Negative for decreased concentration  All other systems reviewed and are negative          Past Medical History:   Diagnosis Date   • Asthma    • Generalized anxiety disorder    • GERD (gastroesophageal reflux disease)    • Hemangioma of other sites     last assessed 2/29/16   • Hypertension    • Psoriatic arthritis ([facility] [address] )    • Psoriatic arthritis [facility])        Past Surgical History:   Procedure Laterality Date   • APPENDECTOMY     • CHOLECYSTECTOMY     • FOOT SURGERY     • HAND SURGERY Right 03/10/2022    thumb   • HYSTERECTOMY     • KNEE SURGERY     • SPINAL FUSION     • TONSILLECTOMY     • TUBAL LIGATION         Family History   Problem Relation Age of Onset   • Heart disease Mother    • Hypertension Mother    • COPD Mother    • Pancreatic cancer Father    • Hypertension Maternal Grandmother    • Heart attack Paternal Grandfather    • Hypertension Paternal Grandfather    • Pancreatic cancer Paternal Grandfather        Social History     Occupational History   • Occupation: Retired   Tobacco Use   • Smoking status: Never   • Smokeless tobacco: Never   Vaping Use   • Vaping Use: Never used   Substance and Sexual Activity   • Alcohol use: Yes     Comment: social   • Drug use: No   • Sexual activity: Yes     Partners: Male         Current Outpatient Medications:   •  albuterol (PROVENTIL HFA,VENTOLIN HFA) 90 mcg/act inhaler, TAKE 2 PUFFS BY MOUTH EVERY 6 HOURS AS NEEDED FOR WHEEZE OR FOR SHORTNESS OF BREATH, Disp: 18 g, Rfl: 5  •  ALPRAZolam (XANAX) 0 25 mg tablet, Take 1 tablet (0 25 mg total) by mouth 3 (three) times a day as needed for anxiety, Disp: 90 tablet, Rfl: 0  •  diazepam (VALIUM) 5 mg tablet, Take 1 tablet (5 mg total) by mouth every 6 (six) hours as needed for anxiety TAKE EVERY 4-6 HOURS PRN PAIN, Disp: 30 tablet, Rfl: 0  •  ergocalciferol (VITAMIN D2) 50,000 units, TAKE 1 CAPSULE BY MOUTH ONE TIME PER WEEK, Disp: 12 capsule, Rfl: 4  •  loratadine (CLARITIN) 10 mg tablet, daily as needed  , Disp: , Rfl:   •  methocarbamol (ROBAXIN) 750 mg tablet, TAKE 1 TABLET (750 MG TOTAL) BY MOUTH EVERY 6 (SIX) HOURS AS NEEDED FOR MUSCLE SPASMS, Disp: 90 tablet, Rfl: 0  •  metoprolol succinate (TOPROL-XL) 25 mg 24 hr tablet, TAKE 1 TABLET (25 MG TOTAL) BY MOUTH DAILY  , Disp: 90 tablet, Rfl: 2  •  omeprazole (PriLOSEC) 40 MG capsule, TAKE 1 CAPSULE BY MOUTH EVERY DAY BEFORE BREAKFAST, Disp: 90 capsule, Rfl: 4  •  pregabalin (LYRICA) 50 mg capsule, Take 50 mg by mouth Three times a day, Disp: , Rfl:   •  traZODone (DESYREL) 50 mg tablet, TAKE 1 TABLET BY MOUTH DAILY AT BEDTIME, Disp: 90 tablet, Rfl: 3  •  predniSONE 2 5 mg tablet, Take 2 5 mg by mouth as needed (Patient not taking: Reported on 3/13/2023), Disp: , Rfl:     Allergies   Allergen Reactions   • Diclofenac-Misoprostol GI Intolerance     Reaction Date: 20Jul2011;    • Indomethacin GI Intolerance     Reaction Date: 20Jul2011;    • Sulfamethoxazole-Trimethoprim Hives     Reaction Date: 20Jul2011;    • Cetirizine Rash     Reaction Date: 20Jul2011;        Physical Exam:    /80   Pulse 69   Ht 5' 5" (1 651 m)   Wt 85 3 kg (188 lb)   BMI 31 28 kg/m²     Constitutional: normal, well developed, well nourished, alert, in no distress and non-toxic and no overt pain behavior  Eyes: anicteric  HEENT: grossly intact  Neck: supple, symmetric, trachea midline and no masses   Pulmonary:even and unlabored  Cardiovascular:No edema or pitting edema present  Skin:Normal without rashes or lesions and well hydrated  Psychiatric:Mood and affect appropriate  Neurologic:Cranial Nerves II-XII grossly intact  Musculoskeletal:normal gait, some pain to palpation over lower lumbar paraspinals b/l, no pain to palpation over b/l GTB, limited lumbar ROM, concordant symptoms with forward bending, grossly intact strength and sensation to light touch over BLE    Imaging    MRI LUMBAR SPINE WITH AND WITHOUT CONTRAST     INDICATION: [id number]: Lumbago with sciatica, left side  M48 [id number]: Spinal stenosis, lumbosacral region      COMPARISON:  8/15/2019     TECHNIQUE:  Sagittal T1, sagittal T2, sagittal inversion recovery, axial T1 and axial T2, coronal T2  Sagittal and axial T1 postcontrast      IV Contrast:  8 mL of Gadobutrol injection (SINGLE-DOSE)      IMAGE QUALITY:  Diagnostic     FINDINGS:     VERTEBRAL BODIES:  There are 5 lumbar type vertebral bodies  There is multilevel retrolisthesis of L2-L3, L3-L4 and L5-S1  There is mild anterolisthesis of L4-L5    Patient is status post lumbar spinal fusion at L4-L5        SACRUM:  Normal signal within the sacrum  No evidence of insufficiency or stress fracture      DISTAL CORD AND CONUS:  Normal size and signal within the distal cord and conus  Conus medullaris terminates at L1      PARASPINAL SOFT TISSUES:  Paraspinal soft tissues are unremarkable      LOWER THORACIC DISC SPACES:  Normal disc height and signal   No disc herniation, canal stenosis or foraminal narrowing      LUMBAR DISC SPACES:     L1-L2:  Facet arthrosis  No significant canal stenosis or foraminal narrowing      L2-L3:  Disc space degeneration and narrowing  Bulging annulus, asymmetric to the right  Facet arthrosis  Mild mass effect on the thecal sac  No significant foraminal narrowing      L3-L4:  Disc space degeneration and narrowing  Bulging annulus  Facet arthrosis with ligament flavum thickening  Mild mass effect on the thecal sac  Marginal osteophytosis  Moderate to severe left foraminal narrowing with impingement the exiting   nerve root  Moderate right foraminal narrowing      L4-L5:  Uncovering of the disc space  Canal is decompressed by laminectomy  There is no significant foraminal narrowing      L5-S1:  There is a bulging annulus  There is facet arthrosis  There is marginal osteophytosis  There is moderate foraminal narrowing with contact of the exiting nerve roots      POSTCONTRAST IMAGING:  There is no abnormal enhancement within the canal      IMPRESSION:     Postoperative and multilevel degenerative changes of the lumbar spine, as described above      Multifactorial disease results in moderate to severe left foraminal narrowing at the L3-L4 transitional level with impingement of the exiting left L3 nerve root  Moderate right foraminal narrowing is also present at this level

## 2023-03-20 DIAGNOSIS — M54.42 ACUTE MIDLINE LOW BACK PAIN WITH LEFT-SIDED SCIATICA: ICD-10-CM

## 2023-03-20 DIAGNOSIS — M48.07 SPINAL STENOSIS OF LUMBOSACRAL REGION: ICD-10-CM

## 2023-03-20 RX ORDER — METHOCARBAMOL 750 MG/1
750 TABLET, FILM COATED ORAL EVERY 6 HOURS PRN
Qty: 90 TABLET | Refills: 0 | Status: SHIPPED | OUTPATIENT
Start: 2023-03-20 | End: 2023-04-01

## 2023-03-29 ENCOUNTER — HOSPITAL ENCOUNTER (OUTPATIENT)
Dept: RADIOLOGY | Facility: HOSPITAL | Age: 58
Discharge: HOME | End: 2023-03-29
Attending: STUDENT IN AN ORGANIZED HEALTH CARE EDUCATION/TRAINING PROGRAM

## 2023-03-29 VITALS
TEMPERATURE: 96.8 F | RESPIRATION RATE: 20 BRPM | OXYGEN SATURATION: 100 % | SYSTOLIC BLOOD PRESSURE: 138 MMHG | HEART RATE: 74 BPM | DIASTOLIC BLOOD PRESSURE: 82 MMHG

## 2023-03-29 DIAGNOSIS — M54.16 LUMBAR RADICULITIS: ICD-10-CM

## 2023-03-29 RX ORDER — LIDOCAINE HYDROCHLORIDE 10 MG/ML
3 INJECTION, SOLUTION EPIDURAL; INFILTRATION; INTRACAUDAL; PERINEURAL ONCE
Status: COMPLETED | OUTPATIENT
Start: 2023-03-29 | End: 2023-03-29

## 2023-03-29 RX ORDER — 0.9 % SODIUM CHLORIDE 0.9 %
2 VIAL (ML) INJECTION ONCE
Status: COMPLETED | OUTPATIENT
Start: 2023-03-29 | End: 2023-03-29

## 2023-03-29 RX ORDER — PAPAVERINE HCL 150 MG
5 CAPSULE, EXTENDED RELEASE ORAL ONCE
Status: COMPLETED | OUTPATIENT
Start: 2023-03-29 | End: 2023-03-29

## 2023-03-29 RX ADMIN — IOHEXOL 0.5 ML: 300 INJECTION, SOLUTION INTRAVENOUS at 08:55

## 2023-03-29 RX ADMIN — LIDOCAINE HYDROCHLORIDE 3 ML: 10 INJECTION, SOLUTION EPIDURAL; INFILTRATION; INTRACAUDAL; PERINEURAL at 08:54

## 2023-03-29 RX ADMIN — Medication 2 ML: at 08:54

## 2023-03-29 RX ADMIN — Medication 5 MG: at 08:56

## 2023-03-29 NOTE — DISCHARGE INSTRUCTIONS
Epidural Steroid Injection   WHAT YOU NEED TO KNOW:   An epidural steroid injection (ANDREEA) is a procedure to inject steroid medicine into the epidural space  The epidural space is between your spinal cord and vertebrae  Steroids reduce inflammation and fluid buildup in your spine that may be causing pain  You may be given pain medicine along with the steroids  ACTIVITY  Do not drive or operate machinery today  No strenuous activity today - bending, lifting, etc   You may resume normal activites starting tomorrow - start slowly and as tolerated  You may shower today, but no tub baths or hot tubs  You may have numbness for several hours from the local anesthetic  Please use caution and common sense, especially with weight-bearing activities  CARE OF THE INJECTION SITE  If you have soreness or pain, apply ice to the area today (20 minutes on/20 minutes off)  Starting tomorrow, you may use warm, moist heat or ice if needed  You may have an increase or change in your discomfort for 36-48 hours after your treatment  Apply ice and continue with any pain medication you have been prescribed  Notify the [facility] if you have any of the following: redness, drainage, swelling, headache, stiff neck or fever above 100°F     SPECIAL INSTRUCTIONS  Our office will contact you in approximately 7 days for a progress report  MEDICATIONS  Continue to take all routine medications  Our office may have instructed you to hold some medications  As no general anesthesia was used in today's procedure, you should not experience any side effects related to anesthesia  If you are diabetic, the steroids used in today's injection may temporarily increase your blood sugar levels after the first few days after your injection  Please keep a close eye on your sugars and alert the doctor who manages your diabetes if your sugars are significantly high from your baseline or you are symptomatic       If you have a problem specifically related to your procedure, please call our office at [phone]  Problems not related to your procedure should be directed to your primary care physician

## 2023-03-29 NOTE — H&P
History of Present Illness: The patient is a 61 y o  female who presents with complaints of low back pain  Past Medical History:   Diagnosis Date   • Asthma    • Generalized anxiety disorder    • GERD (gastroesophageal reflux disease)    • Hemangioma of other sites     last assessed 2/29/16   • Hypertension    • Psoriatic arthritis ([facility] [address] )    • Psoriatic arthritis ([facility] [address] )        Past Surgical History:   Procedure Laterality Date   • APPENDECTOMY     • CHOLECYSTECTOMY     • FOOT SURGERY     • HAND SURGERY Right 03/10/2022    thumb   • HYSTERECTOMY     • KNEE SURGERY     • SPINAL FUSION     • TONSILLECTOMY     • TUBAL LIGATION           Current Outpatient Medications:   •  albuterol (PROVENTIL HFA,VENTOLIN HFA) 90 mcg/act inhaler, TAKE 2 PUFFS BY MOUTH EVERY 6 HOURS AS NEEDED FOR WHEEZE OR FOR SHORTNESS OF BREATH, Disp: 18 g, Rfl: 5  •  ALPRAZolam (XANAX) 0 25 mg tablet, Take 1 tablet (0 25 mg total) by mouth 3 (three) times a day as needed for anxiety, Disp: 90 tablet, Rfl: 0  •  diazepam (VALIUM) 5 mg tablet, Take 1 tablet (5 mg total) by mouth every 6 (six) hours as needed for anxiety TAKE EVERY 4-6 HOURS PRN PAIN, Disp: 30 tablet, Rfl: 0  •  ergocalciferol (VITAMIN D2) 50,000 units, TAKE 1 CAPSULE BY MOUTH ONE TIME PER WEEK, Disp: 12 capsule, Rfl: 4  •  loratadine (CLARITIN) 10 mg tablet, daily as needed  , Disp: , Rfl:   •  methocarbamol (ROBAXIN) 750 mg tablet, Take 1 tablet (750 mg total) by mouth every 6 (six) hours as needed for muscle spasms, Disp: 90 tablet, Rfl: 0  •  metoprolol succinate (TOPROL-XL) 25 mg 24 hr tablet, TAKE 1 TABLET (25 MG TOTAL) BY MOUTH DAILY  , Disp: 90 tablet, Rfl: 2  •  omeprazole (PriLOSEC) 40 MG capsule, TAKE 1 CAPSULE BY MOUTH EVERY DAY BEFORE BREAKFAST, Disp: 90 capsule, Rfl: 4  •  predniSONE 2 5 mg tablet, Take 2 5 mg by mouth as needed (Patient not taking: Reported on 3/13/2023), Disp: , Rfl:   •  pregabalin (LYRICA) 50 mg capsule, Take 50 mg by mouth Three times a day, Disp: , Rfl:   •  traZODone (DESYREL) 50 mg tablet, TAKE 1 TABLET BY MOUTH DAILY AT BEDTIME, Disp: 90 tablet, Rfl: 3  No current facility-administered medications for this encounter  Allergies   Allergen Reactions   • Diclofenac-Misoprostol GI Intolerance     Reaction Date: 20Jul2011;    • Indomethacin GI Intolerance     Reaction Date: 20Jul2011;    • Sulfamethoxazole-Trimethoprim Hives     Reaction Date: 20Jul2011;    • Cetirizine Rash     Reaction Date: 20Jul2011;        Physical Exam:   Vitals:    03/29/23 0858   BP: 138/82   Pulse: 74   Resp: 20   Temp:    SpO2: 100%     General: Awake, Alert, Oriented x 3, Mood and affect appropriate  Respiratory: Respirations even and unlabored  Cardiovascular: Peripheral pulses intact; no edema  Musculoskeletal Exam: normal gait    ASA Score: 2    Patient/Chart Verification  Patient ID Verified: Verbal  Consents Confirmed: Procedural, To be obtained in the Pre-Procedure area  H&P( within 30 days) Verified: Yes  Allergies Reviewed: Yes  Anticoag/NSAID held?: NA  Currently on antibiotics?: NA  Pregnancy denied?: NA    Assessment:   1   Lumbar radiculitis        Plan: interlaminar lumbar epidural steroid injection L5-S1

## 2023-03-31 DIAGNOSIS — M54.42 ACUTE MIDLINE LOW BACK PAIN WITH LEFT-SIDED SCIATICA: ICD-10-CM

## 2023-03-31 DIAGNOSIS — M48.07 SPINAL STENOSIS OF LUMBOSACRAL REGION: ICD-10-CM

## 2023-04-01 RX ORDER — METHOCARBAMOL 750 MG/1
750 TABLET, FILM COATED ORAL EVERY 6 HOURS PRN
Qty: 90 TABLET | Refills: 0 | Status: SHIPPED | OUTPATIENT
Start: 2023-04-01

## 2023-04-05 ENCOUNTER — TELEPHONE (OUTPATIENT)
Dept: PAIN MEDICINE | Facility: CLINIC | Age: 58
End: 2023-04-05

## 2023-05-01 ENCOUNTER — OFFICE VISIT (OUTPATIENT)
Dept: PAIN MEDICINE | Facility: CLINIC | Age: 58
End: 2023-05-01

## 2023-05-01 VITALS
SYSTOLIC BLOOD PRESSURE: 112 MMHG | HEIGHT: 65 IN | BODY MASS INDEX: 31.42 KG/M2 | HEART RATE: 76 BPM | WEIGHT: 188.6 LBS | DIASTOLIC BLOOD PRESSURE: 73 MMHG

## 2023-05-01 DIAGNOSIS — Z98.1 S/P LUMBAR FUSION: ICD-10-CM

## 2023-05-01 DIAGNOSIS — L40.50 PSORIATIC ARTHRITIS (HCC): ICD-10-CM

## 2023-05-01 DIAGNOSIS — M54.16 LUMBAR RADICULITIS: Primary | ICD-10-CM

## 2023-05-01 NOTE — PATIENT INSTRUCTIONS
Take lyrica 50 mg three times a day for managing symptoms  We discussed repeating the epidural steroid injection in the future as an option to help your symptoms    Continue home exercise program

## 2023-05-01 NOTE — PROGRESS NOTES
"Assessment:  1  Lumbar radiculitis    2  Psoriatic arthritis ([facility] [address] )    3  S/P lumbar fusion      Portions of the record may have been created with voice recognition software  Occasional wrong word or \"sound a like\" substitutions may have occurred due to the inherent limitations of voice recognition software  Read the chart carefully and recognize, using context, where substitutions have occurred  Contact me with any questions  Plan:  59-year-old female with history of L4-5 fusion (2020), psoriatic arthritis here for follow-up of chronic low back and BLE pain in L5 distribution  Patient notes about 4 days of 60 to 70% improvement from interlaminar lumbar epidural steroid injection L5-S1 on 3/29/2023  She feels that symptoms have returned to baseline since then  She recently took a few days of p o  steroids per rheumatology which helped temporarily  She has been prescribed Lyrica 50 mg 3 times daily from other provider but she has been taking twice daily without significant relief  She denies new or progressive weakness, saddle anesthesia, bowel/bladder incontinence  1   Discussed repeating lumbar epidural steroid injection as an option to manage symptoms  Patient defers at this time  2   Encouraged to take Lyrica as prescribed, 50 mg 3 times daily for further management of symptoms  3   Recommend continuing home exercise program   4   Follow-up in 1 month or as needed  History of Present Illness: The patient is a 61 y o  female who presents for a follow up office visit in regards to Back Pain, Leg Pain, and Hip Pain  Patient notes a few days of 60 to 70% relief after LESI on 3/29/2023  Notes symptoms have mostly returned to baseline since  Denies new issues today  Also reports ongoing bilateral knee pain for which she is scheduled to undergo corticosteroid injections without provider      Current pain medications includes: Robaxin 750 mg prn, lyrica 50 mg tid (taking bid) from other " provider  The patient reports that this regimen is providing minimal pain relief  The patient is reporting no side effects from this pain medication regimen  I have personally reviewed and/or updated the patient's past medical history, past surgical history, family history, social history, current medications, allergies, and vital signs today  Review of Systems  Review of Systems   Constitutional: Negative for unexpected weight change  HENT: Negative for hearing loss  Eyes: Negative for visual disturbance  Respiratory: Negative for shortness of breath  Cardiovascular: Negative for leg swelling  Gastrointestinal: Negative for constipation  Endocrine: Negative for polyuria  Genitourinary: Negative for difficulty urinating  Musculoskeletal: Positive for gait problem  Negative for joint swelling and myalgias  Decreased range of motion  Joint stiffness  Pain in extremity- upper legs   Skin: Negative for rash  Neurological: Negative for weakness and headaches  Psychiatric/Behavioral: Negative for decreased concentration  All other systems reviewed and are negative          Past Medical History:   Diagnosis Date    Asthma     Generalized anxiety disorder     GERD (gastroesophageal reflux disease)     Hemangioma of other sites     last assessed 2/29/16    Hypertension     [facility] [address]     Psoriatic arthritis ([facility] [address] )        Past Surgical History:   Procedure Laterality Date    APPENDECTOMY      CHOLECYSTECTOMY      FOOT SURGERY      HAND SURGERY Right 03/10/2022    thumb    HYSTERECTOMY      KNEE SURGERY      SPINAL FUSION      TONSILLECTOMY      TUBAL LIGATION         Family History   Problem Relation Age of Onset    Heart disease Mother     Hypertension Mother     COPD Mother     Pancreatic cancer Father     Hypertension Maternal Grandmother     Heart attack Paternal Grandfather     Hypertension Paternal Grandfather     Pancreatic cancer Paternal Grandfather        Social History     Occupational History    Occupation: Retired   Tobacco Use    Smoking status: Never    Smokeless tobacco: Never   Vaping Use    Vaping Use: Never used   Substance and Sexual Activity    Alcohol use: Yes     Comment: social    Drug use: No    Sexual activity: Yes     Partners: Male         Current Outpatient Medications:     albuterol (PROVENTIL HFA,VENTOLIN HFA) 90 mcg/act inhaler, TAKE 2 PUFFS BY MOUTH EVERY 6 HOURS AS NEEDED FOR WHEEZE OR FOR SHORTNESS OF BREATH, Disp: 18 g, Rfl: 5    ALPRAZolam (XANAX) 0 25 mg tablet, Take 1 tablet (0 25 mg total) by mouth 3 (three) times a day as needed for anxiety, Disp: 90 tablet, Rfl: 0    diazepam (VALIUM) 5 mg tablet, Take 1 tablet (5 mg total) by mouth every 6 (six) hours as needed for anxiety TAKE EVERY 4-6 HOURS PRN PAIN, Disp: 30 tablet, Rfl: 0    ergocalciferol (VITAMIN D2) 50,000 units, TAKE 1 CAPSULE BY MOUTH ONE TIME PER WEEK, Disp: 12 capsule, Rfl: 3    loratadine (CLARITIN) 10 mg tablet, daily as needed  , Disp: , Rfl:     methocarbamol (ROBAXIN) 750 mg tablet, TAKE 1 TABLET (750 MG TOTAL) BY MOUTH EVERY 6 (SIX) HOURS AS NEEDED FOR MUSCLE SPASMS, Disp: 90 tablet, Rfl: 0    metoprolol succinate (TOPROL-XL) 25 mg 24 hr tablet, TAKE 1 TABLET (25 MG TOTAL) BY MOUTH DAILY  , Disp: 90 tablet, Rfl: 2    omeprazole (PriLOSEC) 40 MG capsule, TAKE 1 CAPSULE BY MOUTH EVERY DAY BEFORE BREAKFAST, Disp: 90 capsule, Rfl: 4    pregabalin (LYRICA) 50 mg capsule, Take 50 mg by mouth Three times a day, Disp: , Rfl:     traZODone (DESYREL) 50 mg tablet, TAKE 1 TABLET BY MOUTH DAILY AT BEDTIME, Disp: 90 tablet, Rfl: 3    predniSONE 2 5 mg tablet, Take 2 5 mg by mouth as needed (Patient not taking: Reported on 3/13/2023), Disp: , Rfl:     Allergies   Allergen Reactions    Diclofenac-Misoprostol GI Intolerance     Reaction Date: 20Jul2011;     Indomethacin GI Intolerance     Reaction Date: 20Jul2011;     "Sulfamethoxazole-Trimethoprim Hives     Reaction Date: 20Jul2011;     Cetirizine Rash     Reaction Date: 20Jul2011;        Physical Exam:    /73   Pulse 76   Ht 5' 5\" (1 651 m)   Wt 85 5 kg (188 lb 9 6 oz)   BMI 31 38 kg/m²     Constitutional:normal, well developed, well nourished, alert, in no distress and non-toxic and no overt pain behavior  Eyes:anicteric  HEENT:grossly intact  Neck:supple, symmetric, trachea midline and no masses   Pulmonary:even and unlabored  Cardiovascular:No edema or pitting edema present  Skin:Normal without rashes or lesions and well hydrated  Psychiatric:Mood and affect appropriate  Neurologic:Cranial Nerves II-XII grossly intact  Musculoskeletal:normal gait    Imaging    No new relevant imaging available for review    "

## 2023-05-05 DIAGNOSIS — M48.07 SPINAL STENOSIS OF LUMBOSACRAL REGION: ICD-10-CM

## 2023-05-05 DIAGNOSIS — M54.42 ACUTE MIDLINE LOW BACK PAIN WITH LEFT-SIDED SCIATICA: ICD-10-CM

## 2023-05-06 RX ORDER — METHOCARBAMOL 750 MG/1
750 TABLET, FILM COATED ORAL EVERY 6 HOURS PRN
Qty: 90 TABLET | Refills: 0 | Status: SHIPPED | OUTPATIENT
Start: 2023-05-06

## 2023-05-18 DIAGNOSIS — Z12.31 ENCOUNTER FOR SCREENING MAMMOGRAM FOR MALIGNANT NEOPLASM OF BREAST: Primary | ICD-10-CM

## 2023-05-30 DIAGNOSIS — M54.42 ACUTE MIDLINE LOW BACK PAIN WITH LEFT-SIDED SCIATICA: ICD-10-CM

## 2023-05-30 DIAGNOSIS — M48.07 SPINAL STENOSIS OF LUMBOSACRAL REGION: ICD-10-CM

## 2023-05-31 RX ORDER — METHOCARBAMOL 750 MG/1
750 TABLET, FILM COATED ORAL EVERY 6 HOURS PRN
Qty: 90 TABLET | Refills: 0 | Status: SHIPPED | OUTPATIENT
Start: 2023-05-31

## 2023-06-20 DIAGNOSIS — M54.42 ACUTE MIDLINE LOW BACK PAIN WITH LEFT-SIDED SCIATICA: ICD-10-CM

## 2023-06-20 DIAGNOSIS — M48.07 SPINAL STENOSIS OF LUMBOSACRAL REGION: ICD-10-CM

## 2023-06-20 RX ORDER — METHOCARBAMOL 750 MG/1
750 TABLET, FILM COATED ORAL EVERY 6 HOURS PRN
Qty: 90 TABLET | Refills: 0 | Status: SHIPPED | OUTPATIENT
Start: 2023-06-20

## 2023-06-21 DIAGNOSIS — K21.9 GERD WITHOUT ESOPHAGITIS: ICD-10-CM

## 2023-06-21 RX ORDER — OMEPRAZOLE 40 MG/1
CAPSULE, DELAYED RELEASE ORAL
Qty: 30 CAPSULE | Refills: 14 | Status: SHIPPED | OUTPATIENT
Start: 2023-06-21

## 2023-06-29 ENCOUNTER — APPOINTMENT (OUTPATIENT)
Dept: LAB | Facility: CLINIC | Age: 58
End: 2023-06-29
Payer: COMMERCIAL

## 2023-06-29 ENCOUNTER — OFFICE VISIT (OUTPATIENT)
Dept: PAIN MEDICINE | Facility: CLINIC | Age: 58
End: 2023-06-29
Payer: COMMERCIAL

## 2023-06-29 VITALS
HEART RATE: 69 BPM | WEIGHT: 182.6 LBS | SYSTOLIC BLOOD PRESSURE: 107 MMHG | DIASTOLIC BLOOD PRESSURE: 72 MMHG | BODY MASS INDEX: 30.42 KG/M2 | HEIGHT: 65 IN

## 2023-06-29 DIAGNOSIS — E55.9 VITAMIN D DEFICIENCY: ICD-10-CM

## 2023-06-29 DIAGNOSIS — M46.1 SACROILIITIS (HCC): ICD-10-CM

## 2023-06-29 DIAGNOSIS — Z13.220 SCREENING FOR LIPID DISORDERS: ICD-10-CM

## 2023-06-29 DIAGNOSIS — G89.29 CHRONIC BILATERAL LOW BACK PAIN WITHOUT SCIATICA: ICD-10-CM

## 2023-06-29 DIAGNOSIS — Z98.1 HISTORY OF LUMBAR FUSION: ICD-10-CM

## 2023-06-29 DIAGNOSIS — M54.50 CHRONIC BILATERAL LOW BACK PAIN WITHOUT SCIATICA: ICD-10-CM

## 2023-06-29 DIAGNOSIS — L40.50 PSORIATIC ARTHRITIS (HCC): ICD-10-CM

## 2023-06-29 DIAGNOSIS — M47.816 LUMBAR SPONDYLOSIS: ICD-10-CM

## 2023-06-29 DIAGNOSIS — M54.16 LUMBAR RADICULITIS: ICD-10-CM

## 2023-06-29 DIAGNOSIS — M18.0 ARTHRITIS OF CARPOMETACARPAL (CMC) JOINTS OF BOTH THUMBS: ICD-10-CM

## 2023-06-29 DIAGNOSIS — G89.4 CHRONIC PAIN DISORDER: Primary | ICD-10-CM

## 2023-06-29 DIAGNOSIS — M79.7 FIBROMYALGIA: ICD-10-CM

## 2023-06-29 DIAGNOSIS — L40.9 PSORIASIS: ICD-10-CM

## 2023-06-29 DIAGNOSIS — M17.0 BILATERAL PRIMARY OSTEOARTHRITIS OF KNEE: ICD-10-CM

## 2023-06-29 LAB
ALBUMIN SERPL BCP-MCNC: 3.7 G/DL (ref 3.5–5)
ALP SERPL-CCNC: 99 U/L (ref 46–116)
ALT SERPL W P-5'-P-CCNC: 22 U/L (ref 12–78)
ANION GAP SERPL CALCULATED.3IONS-SCNC: 2 MMOL/L
AST SERPL W P-5'-P-CCNC: 20 U/L (ref 5–45)
BASOPHILS # BLD AUTO: 0.04 THOUSANDS/ÂΜL (ref 0–0.1)
BASOPHILS NFR BLD AUTO: 1 % (ref 0–1)
BILIRUB SERPL-MCNC: 0.4 MG/DL (ref 0.2–1)
BUN SERPL-MCNC: 13 MG/DL (ref 5–25)
CALCIUM SERPL-MCNC: 9.5 MG/DL (ref 8.3–10.1)
CHLORIDE SERPL-SCNC: 111 MMOL/L (ref 96–108)
CO2 SERPL-SCNC: 27 MMOL/L (ref 21–32)
CREAT SERPL-MCNC: 1 MG/DL (ref 0.6–1.3)
CRP SERPL QL: <3 MG/L
EOSINOPHIL # BLD AUTO: 0.26 THOUSAND/ÂΜL (ref 0–0.61)
EOSINOPHIL NFR BLD AUTO: 4 % (ref 0–6)
ERYTHROCYTE [DISTWIDTH] IN BLOOD BY AUTOMATED COUNT: 13.9 % (ref 11.6–15.1)
ERYTHROCYTE [SEDIMENTATION RATE] IN BLOOD: 16 MM/HOUR (ref 0–29)
GFR SERPL CREATININE-BSD FRML MDRD: 62 ML/MIN/1.73SQ M
GLUCOSE P FAST SERPL-MCNC: 95 MG/DL (ref 65–99)
HCT VFR BLD AUTO: 42.2 % (ref 34.8–46.1)
HGB BLD-MCNC: 13.4 G/DL (ref 11.5–15.4)
IMM GRANULOCYTES # BLD AUTO: 0.01 THOUSAND/UL (ref 0–0.2)
IMM GRANULOCYTES NFR BLD AUTO: 0 % (ref 0–2)
LDLC SERPL DIRECT ASSAY-MCNC: 118 MG/DL (ref 0–100)
LYMPHOCYTES # BLD AUTO: 2.79 THOUSANDS/ÂΜL (ref 0.6–4.47)
LYMPHOCYTES NFR BLD AUTO: 41 % (ref 14–44)
MCH RBC QN AUTO: 27.7 PG (ref 26.8–34.3)
MCHC RBC AUTO-ENTMCNC: 31.8 G/DL (ref 31.4–37.4)
MCV RBC AUTO: 87 FL (ref 82–98)
MONOCYTES # BLD AUTO: 0.57 THOUSAND/ÂΜL (ref 0.17–1.22)
MONOCYTES NFR BLD AUTO: 8 % (ref 4–12)
NEUTROPHILS # BLD AUTO: 3.22 THOUSANDS/ÂΜL (ref 1.85–7.62)
NEUTS SEG NFR BLD AUTO: 46 % (ref 43–75)
NRBC BLD AUTO-RTO: 0 /100 WBCS
PLATELET # BLD AUTO: 415 THOUSANDS/UL (ref 149–390)
PMV BLD AUTO: 9.9 FL (ref 8.9–12.7)
POTASSIUM SERPL-SCNC: 4.4 MMOL/L (ref 3.5–5.3)
PROT SERPL-MCNC: 7.4 G/DL (ref 6.4–8.4)
RBC # BLD AUTO: 4.84 MILLION/UL (ref 3.81–5.12)
SODIUM SERPL-SCNC: 140 MMOL/L (ref 135–147)
TRIGL SERPL-MCNC: 79 MG/DL
WBC # BLD AUTO: 6.89 THOUSAND/UL (ref 4.31–10.16)

## 2023-06-29 PROCEDURE — 85652 RBC SED RATE AUTOMATED: CPT

## 2023-06-29 PROCEDURE — 83721 ASSAY OF BLOOD LIPOPROTEIN: CPT

## 2023-06-29 PROCEDURE — 84478 ASSAY OF TRIGLYCERIDES: CPT

## 2023-06-29 PROCEDURE — 85025 COMPLETE CBC W/AUTO DIFF WBC: CPT

## 2023-06-29 PROCEDURE — 86140 C-REACTIVE PROTEIN: CPT

## 2023-06-29 PROCEDURE — 80053 COMPREHEN METABOLIC PANEL: CPT

## 2023-06-29 PROCEDURE — 36415 COLL VENOUS BLD VENIPUNCTURE: CPT

## 2023-06-29 RX ORDER — ERGOCALCIFEROL 1.25 MG/1
CAPSULE ORAL
Qty: 12 CAPSULE | Refills: 5 | Status: SHIPPED | OUTPATIENT
Start: 2023-06-29

## 2023-06-29 NOTE — PATIENT INSTRUCTIONS
Methocarbamol (By mouth)   Methocarbamol (meth-oh-PATTY-ba-mol)  Treats muscle pain and spasms  Brand Name(s): Robaxin   There may be other brand names for this medicine  When This Medicine Should Not Be Used: This medicine is not right for everyone  Do not use it if you had an allergic reaction to methocarbamol  How to Use This Medicine:   Tablet  Take your medicine as directed  Your dose may need to be changed several times to find what works best for you  Missed dose: Take a dose as soon as you remember  If it is almost time for your next dose, wait until then and take a regular dose  Do not take extra medicine to make up for a missed dose  Store the medicine in a closed container at room temperature, away from heat, moisture, and direct light  Drugs and Foods to Avoid:   Ask your doctor or pharmacist before using any other medicine, including over-the-counter medicines, vitamins, and herbal products  Some medicines can affect how methocarbamol works  Tell your doctor if you are using pyridostigmine  Do not drink alcohol while you are using this medicine  Tell your doctor if you use anything else that makes you sleepy  Some examples are allergy medicine, narcotic pain medicine, and alcohol  Warnings While Using This Medicine:   Tell your doctor if you are pregnant or breastfeeding, or if you have kidney disease, liver disease, or myasthenia gravis  This medicine may make you dizzy or drowsy  Do not drive or do anything that could be dangerous until you know how this medicine affects you  Tell any doctor or dentist who treats you that you are using this medicine  This medicine may affect certain medical test results  Your doctor will check your progress and the effects of this medicine at regular visits  Keep all appointments  Keep all medicine out of the reach of children  Never share your medicine with anyone    Possible Side Effects While Using This Medicine:   Call your doctor right away if you notice any of these side effects: Allergic reaction: Itching or hives, swelling in your face or hands, swelling or tingling in your mouth or throat, chest tightness, trouble breathing  Blurred vision, redness, pain, or swelling of the eyes  Dark urine or pale stools, nausea, vomiting, loss of appetite, stomach pain, yellow skin or eyes  Fever  Lightheadedness, dizziness, fainting  Seizures  Slow heartbeat  Unusual bleeding, bruising, or weakness  If you notice these less serious side effects, talk with your doctor:   Confusion, trouble sleeping  Headache  Warmth or redness in your face, neck, arms, or upper chest  If you notice other side effects that you think are caused by this medicine, tell your doctor  Call your doctor for medical advice about side effects  You may report side effects to FDA at 2-819-FDA-4878  © Copyright Nydia Arellano 2022 Information is for End User's use only and may not be sold, redistributed or otherwise used for commercial purposes  The above information is an  only  It is not intended as medical advice for individual conditions or treatments  Talk to your doctor, nurse or pharmacist before following any medical regimen to see if it is safe and effective for you

## 2023-06-29 NOTE — PROGRESS NOTES
Assessment:  1  Chronic pain disorder    2  Chronic bilateral low back pain without sciatica    3  History of lumbar fusion    4  Sacroiliitis ([address] )    5  Lumbar radiculitis        Plan:  While the patient was in the office today, I did have a thorough conversation regarding their chronic pain syndrome, medication management, and treatment plan options  Is being seen for a follow-up visit  She continues with low back pain that can radiate to bilateral buttocks and hips  On exam, she demonstrates findings consistent with sacroiliitis  Today, we discussed possibility of sacroiliac joint injections for diagnostic/therapeutic purposes  I provided her with a brochure to take home and read over  She will call back and schedule SI joint injections if she is interested in pursuing this option  Continue over-the-counter Motrin if needed  Continue Robaxin 750 mg as needed for spasms as prescribed by PCP  History of Present Illness: The patient is a 61 y o  female who presents for a follow up office visit in regards to Hip Pain  The patient’s current symptoms include complaints of low back pain radiating to bilateral buttocks and hips  Current pain level is a 3-4/10  Quality pain is described as sharp, shooting  Current pain medications includes: Over-the-counter Motrin as needed, Robaxin 750 mg as needed for spasms        I have personally reviewed and/or updated the patient's past medical history, past surgical history, family history, social history, current medications, allergies, and vital signs today  Review of Systems  Review of Systems   Eyes: Negative for visual disturbance  Respiratory: Negative for shortness of breath and wheezing  Cardiovascular: Negative for chest pain and palpitations  Gastrointestinal: Negative for constipation and nausea  Endocrine: Negative for polydipsia  Musculoskeletal: Positive for gait problem and joint swelling  Negative for myalgias  Joint stiffness  Decreased range of motion  Pain in extremity-left posterior tibial tendonitis, hands, knees   Skin: Negative for rash  Neurological: Negative for dizziness and headaches  Psychiatric/Behavioral: Negative for decreased concentration           Past Medical History:   Diagnosis Date   • Asthma    • Generalized anxiety disorder    • GERD (gastroesophageal reflux disease)    • Hemangioma of other sites     last assessed 2/29/16   • Hypertension    • P[address]iatic arthritis [address] [address] )    • Psoriatic arthritis [address] [address] )        Past Surgical History:   Procedure Laterality Date   • APPENDECTOMY     • CHOLECYSTECTOMY     • FOOT SURGERY     • HAND SURGERY Right 03/10/2022    thumb   • HYSTERECTOMY     • KNEE SURGERY     • SPINAL FUSION     • TONSILLECTOMY     • TUBAL LIGATION         Family History   Problem Relation Age of Onset   • Heart disease Mother    • Hypertension Mother    • COPD Mother    • Pancreatic cancer Father    • Hypertension Maternal Grandmother    • Heart attack Paternal Grandfather    • Hypertension Paternal Grandfather    • Pancreatic cancer Paternal Grandfather        Social History     Occupational History   • Occupation: Retired   Tobacco Use   • Smoking status: Never   • Smokeless tobacco: Never   Vaping Use   • Vaping Use: Never used   Substance and Sexual Activity   • Alcohol use: Yes     Comment: social   • Drug use: No   • Sexual activity: Yes     Partners: Male         Current Outpatient Medications:   •  albuterol (PROVENTIL HFA,VENTOLIN HFA) 90 mcg/act inhaler, TAKE 2 PUFFS BY MOUTH EVERY 6 HOURS AS NEEDED FOR WHEEZE OR FOR SHORTNESS OF BREATH, Disp: 18 g, Rfl: 5  •  ALPRAZolam (XANAX) 0 25 mg tablet, Take 1 tablet (0 25 mg total) by mouth 3 (three) times a day as needed for anxiety, Disp: 90 tablet, Rfl: 0  •  diazepam (VALIUM) 5 mg tablet, Take 1 tablet (5 mg total) by mouth every 6 (six) hours as needed for anxiety TAKE EVERY 4-6 HOURS PRN PAIN, Disp: 30 tablet, Rfl: 0  • "ergocalciferol (VITAMIN D2) 50,000 units, TAKE 1 CAPSULE BY MOUTH ONE TIME PER WEEK, Disp: 12 capsule, Rfl: 4  •  loratadine (CLARITIN) 10 mg tablet, daily as needed  , Disp: , Rfl:   •  methocarbamol (ROBAXIN) 750 mg tablet, TAKE 1 TABLET (750 MG TOTAL) BY MOUTH EVERY 6 (SIX) HOURS AS NEEDED FOR MUSCLE SPASMS, Disp: 90 tablet, Rfl: 0  •  metoprolol succinate (TOPROL-XL) 25 mg 24 hr tablet, TAKE 1 TABLET (25 MG TOTAL) BY MOUTH DAILY  , Disp: 90 tablet, Rfl: 2  •  omeprazole (PriLOSEC) 40 MG capsule, TAKE 1 CAPSULE BY MOUTH EVERY DAY BEFORE BREAKFAST, Disp: 30 capsule, Rfl: 14  •  traZODone (DESYREL) 50 mg tablet, TAKE 1 TABLET BY MOUTH DAILY AT BEDTIME, Disp: 90 tablet, Rfl: 3  •  predniSONE 2 5 mg tablet, Take 2 5 mg by mouth as needed (Patient not taking: Reported on 3/13/2023), Disp: , Rfl:     Allergies   Allergen Reactions   • Diclofenac-Misoprostol GI Intolerance     Reaction Date: 20Jul2011;    • Indomethacin GI Intolerance     Reaction Date: 20Jul2011;    • Sulfamethoxazole-Trimethoprim Hives     Reaction Date: 20Jul2011;    • Cetirizine Rash     Reaction Date: 20Jul2011;        Physical Exam:    /72   Pulse 69   Ht 5' 5\" (1 651 m)   Wt 82 8 kg (182 lb 9 6 oz)   BMI 30 39 kg/m²     Constitutional:normal, well developed, well nourished, alert, in no distress and non-toxic and no overt pain behavior  Eyes:anicteric  HEENT:grossly intact  Neck:supple, symmetric, trachea midline and no masses   Pulmonary:even and unlabored  Cardiovascular:No edema or pitting edema present  Skin:Normal without rashes or lesions and well hydrated  Psychiatric:Mood and affect appropriate  Neurologic:Cranial Nerves II-XII grossly intact  Musculoskeletal:Tenderness over bilateral SI joints  Compression test, distraction test, Gaenslen's tests are positive bilaterally  Imaging  No orders to display       No orders of the defined types were placed in this encounter        "

## 2023-07-07 ENCOUNTER — OFFICE VISIT (OUTPATIENT)
Dept: INTERNAL MEDICINE CLINIC | Facility: CLINIC | Age: 58
End: 2023-07-07
Payer: COMMERCIAL

## 2023-07-07 VITALS
SYSTOLIC BLOOD PRESSURE: 118 MMHG | HEART RATE: 69 BPM | TEMPERATURE: 97.2 F | OXYGEN SATURATION: 99 % | WEIGHT: 182.2 LBS | BODY MASS INDEX: 30.32 KG/M2 | DIASTOLIC BLOOD PRESSURE: 76 MMHG

## 2023-07-07 DIAGNOSIS — R73.03 PREDIABETES: ICD-10-CM

## 2023-07-07 DIAGNOSIS — I10 BENIGN ESSENTIAL HYPERTENSION: Primary | ICD-10-CM

## 2023-07-07 DIAGNOSIS — L40.50 PSORIATIC ARTHRITIS (HCC): ICD-10-CM

## 2023-07-07 DIAGNOSIS — Z12.31 ENCOUNTER FOR SCREENING MAMMOGRAM FOR MALIGNANT NEOPLASM OF BREAST: ICD-10-CM

## 2023-07-07 DIAGNOSIS — G43.B0 OPHTHALMOPLEGIC MIGRAINE, NOT INTRACTABLE: ICD-10-CM

## 2023-07-07 DIAGNOSIS — J45.20 MILD INTERMITTENT ASTHMA WITHOUT COMPLICATION: ICD-10-CM

## 2023-07-07 DIAGNOSIS — E55.9 VITAMIN D DEFICIENCY: ICD-10-CM

## 2023-07-07 DIAGNOSIS — F41.1 GENERALIZED ANXIETY DISORDER: ICD-10-CM

## 2023-07-07 DIAGNOSIS — M85.80 OSTEOPENIA, UNSPECIFIED LOCATION: ICD-10-CM

## 2023-07-07 DIAGNOSIS — K21.9 GERD WITHOUT ESOPHAGITIS: ICD-10-CM

## 2023-07-07 DIAGNOSIS — D84.9 IMMUNOSUPPRESSION (HCC): ICD-10-CM

## 2023-07-07 LAB — SL AMB POCT HEMOGLOBIN AIC: 6.2 (ref ?–6.5)

## 2023-07-07 PROCEDURE — 83036 HEMOGLOBIN GLYCOSYLATED A1C: CPT | Performed by: INTERNAL MEDICINE

## 2023-07-07 PROCEDURE — 99214 OFFICE O/P EST MOD 30 MIN: CPT | Performed by: INTERNAL MEDICINE

## 2023-07-07 NOTE — PROGRESS NOTES
Assessment/Plan:  Problem List Items Addressed This Visit        Digestive    GERD without esophagitis       Respiratory    Asthma       Cardiovascular and Mediastinum    Ophthalmoplegic migraine headache    Benign essential hypertension - Primary       Musculoskeletal and Integument    Psoriatic arthritis ([address])    Osteopenia       Other    Vitamin D deficiency    Prediabetes    Relevant Orders    POCT hemoglobin A1c (Completed)    Immunosuppression ([address])    Generalized anxiety disorder   Other Visit Diagnoses     Encounter for screening mammogram for malignant neoplasm of breast        Relevant Orders    Mammo screening bilateral w 3d & cad           Diagnoses and all orders for this visit:    Benign essential hypertension    Ophthalmoplegic migraine, not intractable    Mild intermittent asthma without complication    GERD without esophagitis    Psoriatic arthritis ([address])    Osteopenia, unspecified location    Vitamin D deficiency    Immunosuppression ([address])    Generalized anxiety disorder    Encounter for screening mammogram for malignant neoplasm of breast  -     Mammo screening bilateral w 3d & cad; Future    Prediabetes  -     POCT hemoglobin A1c        No problem-specific Assessment & Plan notes found for this encounter. A/P: Doing ok and discussed recent labs. Will order mammo. In office HgA1c was better at 6.2. Appreciate specialists input. Continue current treatment and RTC four months for routine. Subjective:      Patient ID: Nery Henriquez is a 61 y.o. female. WF RTC for f/u HTN, Asthma, etc. Doing ok and no new issues. Remains active w/o difficulty and no falls. Remains as active as her DJD/RA will allow. NO falls. Asthma is controlled with limited rescue MDI use. RYNE/MDD are controlled. No reflux. Chronic pain and migraines are manageable. Recent labs ok.  Due for mammo      The following portions of the patient's history were reviewed and updated as appropriate:   She has a past medical history of Asthma, Generalized anxiety disorder, GERD (gastroesophageal reflux disease), Hemangioma of other sites, Hypertension, Psoriatic arthritis ([address]), and Psoriatic arthritis ([address]). ,  does not have any pertinent problems on file. ,   has a past surgical history that includes Appendectomy; Cholecystectomy; Foot surgery; Hysterectomy; Tonsillectomy; Tubal ligation; Knee surgery; Spinal fusion; and Hand surgery (Right, 03/10/2022). ,  family history includes COPD in her mother; Heart attack in her paternal grandfather; Heart disease in her mother; Hypertension in her maternal grandmother, mother, and paternal grandfather; Pancreatic cancer in her father and paternal grandfather. ,   reports that she has never smoked. She has never used smokeless tobacco. She reports current alcohol use. She reports that she does not use drugs. ,  is allergic to diclofenac-misoprostol, indomethacin, sulfamethoxazole-trimethoprim, and cetirizine. .  Current Outpatient Medications   Medication Sig Dispense Refill   • albuterol (PROVENTIL HFA,VENTOLIN HFA) 90 mcg/act inhaler TAKE 2 PUFFS BY MOUTH EVERY 6 HOURS AS NEEDED FOR WHEEZE OR FOR SHORTNESS OF BREATH 18 g 5   • ALPRAZolam (XANAX) 0.25 mg tablet Take 1 tablet (0.25 mg total) by mouth 3 (three) times a day as needed for anxiety 90 tablet 0   • diazepam (VALIUM) 5 mg tablet Take 1 tablet (5 mg total) by mouth every 6 (six) hours as needed for anxiety TAKE EVERY 4-6 HOURS PRN PAIN 30 tablet 0   • ergocalciferol (VITAMIN D2) 50,000 units TAKE 1 CAPSULE BY MOUTH ONE TIME PER WEEK 12 capsule 5   • loratadine (CLARITIN) 10 mg tablet daily as needed       • methocarbamol (ROBAXIN) 750 mg tablet TAKE 1 TABLET (750 MG TOTAL) BY MOUTH EVERY 6 (SIX) HOURS AS NEEDED FOR MUSCLE SPASMS 90 tablet 0   • metoprolol succinate (TOPROL-XL) 25 mg 24 hr tablet TAKE 1 TABLET (25 MG TOTAL) BY MOUTH DAILY.  90 tablet 2   • omeprazole (PriLOSEC) 40 MG capsule TAKE 1 CAPSULE BY MOUTH EVERY DAY BEFORE BREAKFAST 30 capsule 14   • traZODone (DESYREL) 50 mg tablet TAKE 1 TABLET BY MOUTH DAILY AT BEDTIME 90 tablet 3   • predniSONE 2.5 mg tablet Take 2.5 mg by mouth as needed (Patient not taking: Reported on 3/13/2023)       No current facility-administered medications for this visit. Review of Systems   Constitutional: Negative for activity change, chills, diaphoresis, fatigue and fever. HENT: Negative. Eyes: Negative for visual disturbance. Respiratory: Negative for cough, chest tightness, shortness of breath and wheezing. Cardiovascular: Negative for chest pain, palpitations and leg swelling. Gastrointestinal: Negative for abdominal pain, constipation, diarrhea, nausea and vomiting. Endocrine: Negative for cold intolerance and heat intolerance. Genitourinary: Negative for difficulty urinating, dysuria and frequency. Musculoskeletal: Positive for arthralgias. Negative for gait problem, joint swelling and myalgias. Neurological: Negative for dizziness, seizures, syncope, weakness, light-headedness and headaches. Psychiatric/Behavioral: Negative for confusion and dysphoric mood. The patient is not nervous/anxious. PHQ-2/9 Depression Screening    Little interest or pleasure in doing things: 0 - not at all  Feeling down, depressed, or hopeless: 0 - not at all  PHQ-2 Score: 0  PHQ-2 Interpretation: Negative depression screen        Objective:  Vitals:    07/07/23 0724   BP: 118/76   BP Location: Left arm   Patient Position: Sitting   Cuff Size: Standard   Pulse: 69   Temp: (!) 97.2 °F (36.2 °C)   SpO2: 99%   Weight: 82.6 kg (182 lb 3.2 oz)     Body mass index is 30.32 kg/m². Physical Exam  Vitals and nursing note reviewed. Constitutional:       General: She is not in acute distress. Appearance: Normal appearance. She is not ill-appearing. HENT:      Head: Normocephalic and atraumatic.       Mouth/Throat:      Mouth: Mucous membranes are moist.   Eyes:      Extraocular Movements: Extraocular movements intact. Conjunctiva/sclera: Conjunctivae normal.      Pupils: Pupils are equal, round, and reactive to light. Neck:      Vascular: No carotid bruit. Cardiovascular:      Rate and Rhythm: Normal rate and regular rhythm. Heart sounds: Normal heart sounds. No murmur heard. Pulmonary:      Effort: Pulmonary effort is normal. No respiratory distress. Breath sounds: Normal breath sounds. No wheezing, rhonchi or rales. Abdominal:      General: Bowel sounds are normal. There is no distension. Palpations: Abdomen is soft. Tenderness: There is no abdominal tenderness. Musculoskeletal:      Cervical back: Neck supple. Right lower leg: No edema. Left lower leg: No edema. Neurological:      General: No focal deficit present. Mental Status: She is alert and oriented to person, place, and time. Mental status is at baseline. Psychiatric:         Mood and Affect: Mood normal.         Behavior: Behavior normal.         Thought Content:  Thought content normal.         Judgment: Judgment normal.

## 2023-07-07 NOTE — PATIENT INSTRUCTIONS
Prediabetes   AMBULATORY CARE:   Prediabetes  is a blood glucose (sugar) level that is higher than normal. It is not high enough to be considered diabetes. Prediabetes increases your risk for type 2 diabetes and heart disease. The risk is highest if you have high blood pressure or high cholesterol. The following increase your risk for prediabetes:   Being overweight or obese, with a body mass index (BMI) of 25 or higher (23 or higher if you are  American)    Lack of physical activity    Older age    Family history of diabetes (parent or sibling)    A history of heart disease, gestational diabetes, or polycystic ovary syndrome (PCOS)    High blood pressure or cholesterol levels    Being [place] American, , , [place] American, or     In children, having a mother with diabetes or gestational diabetes mellitus (GDM) during the pregnancy    Signs and symptoms:  Prediabetes may not cause any symptoms. Call your doctor if:   You have more hunger or thirst than usual.    You are urinating more often than usual.    You are always exhausted. You have blurred vision. You have questions or concerns about your condition or care. Prevent or delay type 2 diabetes:  Healthy choices work best to delay or prevent type 2 diabetes. You may be given the following guidelines from your healthcare provider:  Get regular physical activity. Adults should get at least 150 minutes (2.5 hours) of moderate physical activity every week. Spread the amount of activity over at least 3 days a week. Do not skip more than 2 days in a row. Children should get at least 60 minutes of moderate physical activity on most days of the week. Examples of moderate physical activity include brisk walking, running, and swimming. Do not sit for longer than 30 minutes at a time. Work with your healthcare provider to create a plan for physical activity. Lose weight if you are overweight.   A weight loss of 7% of your body weight can help. Your healthcare provider can tell you what weight is healthy for you. He or she can help you create a weight loss plan. Eat healthy foods. Eat a variety of fruits and vegetables. Eat whole-grain foods more often. Choose dairy foods, meat, and other protein foods that are low in fat. Eat fewer sweets, such as candy, cookies, regular soda, and sweetened drinks. You can also decrease calories by eating smaller portion sizes. Work with your healthcare provider or dietitian to develop a meal plan that is right for you. Take medicine as directed. Your healthcare provider may give diabetes medicine if you are at high risk for diabetes. You may also need medicines for high blood pressure and high cholesterol. Follow up with your healthcare provider as directed. You will need to return every year to get tested for diabetes. Do not smoke. Do not use e-cigarettes or smokeless tobacco in place of cigarettes or to help you quit. They still contain nicotine. Ask your healthcare provider for information if you currently smoke and need help quitting. Start with small goals and work your way up. You may need to start with 3 days of physical activity. You can add a day after 3 weeks or so of activity. Make a goal of losing 5 pounds at first. Talk with healthcare providers about making a plan that is right for you. Follow up with your doctor as directed: If you do have prediabetes, you will need to return every year to get tested for diabetes. Write down your questions so you remember to ask them during your visits. © Copyright [employer] 2022 Information is for End User's use only and may not be sold, redistributed or otherwise used for commercial purposes. The above information is an  only. It is not intended as medical advice for individual conditions or treatments.  Talk to your doctor, nurse or pharmacist before following any medical regimen to see if it is safe and effective for you.

## 2023-07-15 DIAGNOSIS — K21.9 GERD WITHOUT ESOPHAGITIS: ICD-10-CM

## 2023-07-15 RX ORDER — OMEPRAZOLE 40 MG/1
CAPSULE, DELAYED RELEASE ORAL
Qty: 90 CAPSULE | Refills: 5 | Status: SHIPPED | OUTPATIENT
Start: 2023-07-15

## 2023-07-16 DIAGNOSIS — M54.42 ACUTE MIDLINE LOW BACK PAIN WITH LEFT-SIDED SCIATICA: ICD-10-CM

## 2023-07-16 DIAGNOSIS — M48.07 SPINAL STENOSIS OF LUMBOSACRAL REGION: ICD-10-CM

## 2023-07-17 RX ORDER — METHOCARBAMOL 750 MG/1
750 TABLET, FILM COATED ORAL EVERY 6 HOURS PRN
Qty: 90 TABLET | Refills: 0 | Status: SHIPPED | OUTPATIENT
Start: 2023-07-17

## 2023-07-20 DIAGNOSIS — E55.9 VITAMIN D DEFICIENCY: ICD-10-CM

## 2023-07-20 RX ORDER — ERGOCALCIFEROL 1.25 MG/1
CAPSULE ORAL
Qty: 12 CAPSULE | Refills: 6 | Status: SHIPPED | OUTPATIENT
Start: 2023-07-20

## 2023-07-26 DIAGNOSIS — Z12.31 ENCOUNTER FOR SCREENING MAMMOGRAM FOR MALIGNANT NEOPLASM OF BREAST: ICD-10-CM

## 2023-07-26 DIAGNOSIS — G47.9 SLEEP DISORDER: ICD-10-CM

## 2023-07-26 RX ORDER — TRAZODONE HYDROCHLORIDE 50 MG/1
50 TABLET ORAL
Qty: 30 TABLET | Refills: 11 | Status: SHIPPED | OUTPATIENT
Start: 2023-07-26

## 2023-08-04 ENCOUNTER — VBI (OUTPATIENT)
Dept: ADMINISTRATIVE | Facility: OTHER | Age: 58
End: 2023-08-04

## 2023-08-13 DIAGNOSIS — E55.9 VITAMIN D DEFICIENCY: ICD-10-CM

## 2023-08-13 RX ORDER — ERGOCALCIFEROL 1.25 MG/1
CAPSULE ORAL
Qty: 12 CAPSULE | Refills: 7 | Status: SHIPPED | OUTPATIENT
Start: 2023-08-13

## 2023-08-19 DIAGNOSIS — G47.9 SLEEP DISORDER: ICD-10-CM

## 2023-08-19 RX ORDER — TRAZODONE HYDROCHLORIDE 50 MG/1
50 TABLET ORAL
Qty: 90 TABLET | Refills: 4 | Status: SHIPPED | OUTPATIENT
Start: 2023-08-19

## 2023-08-24 DIAGNOSIS — R39.9 UTI SYMPTOMS: Primary | ICD-10-CM

## 2023-08-24 RX ORDER — CIPROFLOXACIN 500 MG/1
500 TABLET, FILM COATED ORAL EVERY 12 HOURS SCHEDULED
Qty: 10 TABLET | Refills: 0 | Status: SHIPPED | OUTPATIENT
Start: 2023-08-24 | End: 2023-08-29

## 2023-08-24 RX ORDER — PHENAZOPYRIDINE HYDROCHLORIDE 200 MG/1
200 TABLET, FILM COATED ORAL
Qty: 6 TABLET | Refills: 0 | Status: SHIPPED | OUTPATIENT
Start: 2023-08-24

## 2023-09-05 DIAGNOSIS — E55.9 VITAMIN D DEFICIENCY: ICD-10-CM

## 2023-09-05 RX ORDER — ERGOCALCIFEROL 1.25 MG/1
CAPSULE ORAL
Qty: 12 CAPSULE | Refills: 8 | Status: SHIPPED | OUTPATIENT
Start: 2023-09-05

## 2023-09-11 DIAGNOSIS — I10 HYPERTENSION, UNSPECIFIED TYPE: ICD-10-CM

## 2023-09-11 RX ORDER — METOPROLOL SUCCINATE 25 MG/1
25 TABLET, EXTENDED RELEASE ORAL DAILY
Qty: 30 TABLET | Refills: 8 | Status: SHIPPED | OUTPATIENT
Start: 2023-09-11

## 2023-10-06 DIAGNOSIS — I10 HYPERTENSION, UNSPECIFIED TYPE: ICD-10-CM

## 2023-10-06 RX ORDER — METOPROLOL SUCCINATE 25 MG/1
25 TABLET, EXTENDED RELEASE ORAL DAILY
Qty: 90 TABLET | Refills: 3 | Status: SHIPPED | OUTPATIENT
Start: 2023-10-06

## 2023-10-30 DIAGNOSIS — J32.9 SINOBRONCHITIS: Primary | ICD-10-CM

## 2023-10-30 DIAGNOSIS — J40 SINOBRONCHITIS: Primary | ICD-10-CM

## 2023-10-30 RX ORDER — LEVOFLOXACIN 500 MG/1
500 TABLET, FILM COATED ORAL EVERY 24 HOURS
Qty: 10 TABLET | Refills: 0 | Status: SHIPPED | OUTPATIENT
Start: 2023-10-30 | End: 2023-11-09

## 2023-10-30 RX ORDER — FLUTICASONE PROPIONATE 50 MCG
1 SPRAY, SUSPENSION (ML) NASAL DAILY
Qty: 16 G | Refills: 0 | Status: SHIPPED | OUTPATIENT
Start: 2023-10-30

## 2023-10-30 RX ORDER — GUAIFENESIN 600 MG/1
600 TABLET, EXTENDED RELEASE ORAL EVERY 12 HOURS SCHEDULED
Qty: 20 TABLET | Refills: 0 | Status: SHIPPED | OUTPATIENT
Start: 2023-10-30

## 2023-11-08 ENCOUNTER — OFFICE VISIT (OUTPATIENT)
Dept: INTERNAL MEDICINE CLINIC | Facility: CLINIC | Age: 58
End: 2023-11-08
Payer: COMMERCIAL

## 2023-11-08 VITALS
BODY MASS INDEX: 29.92 KG/M2 | HEART RATE: 73 BPM | DIASTOLIC BLOOD PRESSURE: 62 MMHG | OXYGEN SATURATION: 98 % | WEIGHT: 179.6 LBS | SYSTOLIC BLOOD PRESSURE: 108 MMHG | HEIGHT: 65 IN | TEMPERATURE: 97.2 F

## 2023-11-08 DIAGNOSIS — F41.1 GENERALIZED ANXIETY DISORDER: ICD-10-CM

## 2023-11-08 DIAGNOSIS — J45.20 MILD INTERMITTENT ASTHMA WITHOUT COMPLICATION: ICD-10-CM

## 2023-11-08 DIAGNOSIS — Z23 ENCOUNTER FOR VACCINATION: ICD-10-CM

## 2023-11-08 DIAGNOSIS — Z12.11 SCREEN FOR COLON CANCER: ICD-10-CM

## 2023-11-08 DIAGNOSIS — M85.80 OSTEOPENIA, UNSPECIFIED LOCATION: ICD-10-CM

## 2023-11-08 DIAGNOSIS — I10 BENIGN ESSENTIAL HYPERTENSION: ICD-10-CM

## 2023-11-08 DIAGNOSIS — K21.9 GERD WITHOUT ESOPHAGITIS: ICD-10-CM

## 2023-11-08 DIAGNOSIS — R73.03 PREDIABETES: ICD-10-CM

## 2023-11-08 DIAGNOSIS — E55.9 VITAMIN D DEFICIENCY: ICD-10-CM

## 2023-11-08 DIAGNOSIS — Z00.00 WELL ADULT EXAM: Primary | ICD-10-CM

## 2023-11-08 DIAGNOSIS — L40.50 PSORIATIC ARTHRITIS (HCC): ICD-10-CM

## 2023-11-08 DIAGNOSIS — D84.9 IMMUNOSUPPRESSION (HCC): ICD-10-CM

## 2023-11-08 PROCEDURE — 99396 PREV VISIT EST AGE 40-64: CPT | Performed by: INTERNAL MEDICINE

## 2023-11-08 NOTE — PATIENT INSTRUCTIONS
Chronic Hypertension   AMBULATORY CARE:   Hypertension is considered chronic  when it continues for 3 months or longer. Hypertension that continues causes your heart to work much harder than normal, which may lead to heart damage. Even if you have hypertension for years, lifestyle changes, medicines, or both may help lower your blood pressure. Call your local emergency number [phone] in the [address]) or have someone call if:   You have chest pain. You have any of the following signs of a heart attack:      Squeezing, pressure, or pain in your chest    You may  also have any of the following:     Discomfort or pain in your back, neck, jaw, stomach, or arm    Shortness of breath    Nausea or vomiting    Lightheadedness or a sudden cold sweat    You become confused or have difficulty speaking. You suddenly feel lightheaded or have trouble breathing. Seek care immediately if:   You have a severe headache or vision loss. You have weakness in an arm or leg. Call your doctor or cardiologist if:   You feel faint, dizzy, confused, or drowsy. You have been taking your blood pressure medicine but your pressure is higher than your provider says it should be. You have questions or concerns about your condition or care. Treatment for chronic hypertension  may include medicine to lower your blood pressure and cholesterol levels. A low cholesterol level helps prevent heart disease and makes it easier to control your blood pressure. Heart disease can make your blood pressure harder to control. You may also need to make lifestyle changes. What you need to know about the stages of hypertension:  Your healthcare provider will give you a blood pressure goal based on your age, health, and risk for cardiovascular disease. The following are general guidelines on the stages of hypertension:  Normal blood pressure is 119/79 or lower .  Your provider may only check your blood pressure each year if it stays at a normal level.    Elevated blood pressure is 120/79 to 129/79 . This is sometimes called prehypertension. Your provider may suggest lifestyle changes to help lower your blood pressure to a normal level. He or she may then check it again in 3 to 6 months. Stage 1 hypertension is 130/80  to 139/89 . Your provider may recommend lifestyle changes, medication, and checks every 3 to 6 months until your blood pressure is controlled. Stage 2 hypertension is 140/90 or higher . Your provider will recommend lifestyle changes and have you take 2 kinds of hypertension medicines. You will also need to have your blood pressure checked monthly until it is controlled. Manage chronic hypertension:   Check your blood pressure at home. Do not smoke, have caffeine, or exercise for at least 30 minutes before you check your blood pressure. Sit and rest for 5 minutes before you check your blood pressure. Extend your arm and support it on a flat surface. Your arm should be at the same level as your heart. Follow the directions that came with your blood pressure monitor. Check your blood pressure 2 times, 1 minute apart, before you take your medicine in the morning. Also check your blood pressure before your evening meal. Keep a record of your readings and bring it to your follow-up visits. Your healthcare provider may use the readings to make changes to your treatment plan. Manage any other health conditions you have. Health conditions such as diabetes can increase your risk for hypertension. Follow your provider's instructions and take all your medicines as directed. Talk to your provider about any new health conditions you have recently developed. Ask about all medicines. Certain medicines can increase your blood pressure. Examples include oral birth control pills, decongestants, herbal supplements, and NSAIDs, such as ibuprofen. Your provider can tell you which medicines are safe for you to take.  This includes prescription and over-the-counter medicines. Lifestyle changes you can make to lower your blood pressure: Your provider may want you to make more lifestyle changes if you are having trouble controlling your blood pressure. This may feel difficult over time, especially if you think you are making good changes but your pressure is still high. It might help to focus on one new change at a time. For example, try to add 1 more day of exercise, or exercise for an extra 10 minutes on 2 days. Small changes can make a big difference. Your healthcare provider can also refer you to specialists such as a dietitian who can help you make small changes. Your family members may be included in helping you learn to create lifestyle changes, such as the following:     Limit sodium (salt) as directed. Too much sodium can affect your fluid balance. Check labels to find low-sodium or no-salt-added foods. Some low-sodium foods use potassium salts for flavor. Too much potassium can also cause health problems. Your provider will tell you how much sodium and potassium are safe for you to have in a day. He or she may recommend that you limit sodium to 2,300 mg a day. Follow the meal plan recommended by your provider. A dietitian or your provider can give you more information on low-sodium plans or the DASH (Dietary Approaches to Stop Hypertension) eating plan. The DASH plan is low in sodium, processed sugar, unhealthy fats, and total fat. It is high in potassium, calcium, and fiber. These can be found in vegetables, fruit, and whole-grain foods. Be physically active throughout the day. Physical activity, such as exercise, can help control your blood pressure and your weight. Be physically active for at least 30 minutes per day, on most days of the week. Include aerobic activity, such as walking or riding a bicycle. Also include strength training at least 2 times each week.  Your provider can help you create a physical activity plan. Decrease stress. This may help lower your blood pressure. Learn ways to relax, such as deep breathing or listening to music. Limit alcohol as directed. Alcohol can increase your blood pressure. A drink of alcohol is 12 ounces of beer, 5 ounces of wine, or 1½ ounces of liquor. Your provider can help you set daily and weekly drink limits. He or she may recommend no alcohol if your blood pressure stays higher than goal even with medicine or other measures. Ask your provider for information if you need help to quit. Do not smoke. Nicotine and other chemicals in cigarettes and cigars can increase your blood pressure and also cause lung damage. Ask your provider for information if you currently smoke and need help to quit. E-cigarettes or smokeless tobacco still contain nicotine. Talk to your provider before you use these products. Follow up with your doctor or cardiologist as directed: You will need to return to have your blood pressure checked and to have other lab tests done. Write down your questions so you remember to ask them during your visits. © Copyright Julia Kumar 2023 Information is for End User's use only and may not be sold, redistributed or otherwise used for commercial purposes. The above information is an  only. It is not intended as medical advice for individual conditions or treatments. Talk to your doctor, nurse or pharmacist before following any medical regimen to see if it is safe and effective for you.

## 2023-11-08 NOTE — PROGRESS NOTES
Assessment/Plan:  Problem List Items Addressed This Visit        Digestive    GERD without esophagitis       Respiratory    Asthma       Cardiovascular and Mediastinum    Benign essential hypertension       Musculoskeletal and Integument    Psoriatic arthritis ([address])    Osteopenia       Other    Vitamin D deficiency    Relevant Orders    Vitamin D 25 hydroxy    Prediabetes    Immunosuppression ([address])    Generalized anxiety disorder   Other Visit Diagnoses     Well adult exam    -  Primary    Encounter for vaccination        Screen for colon cancer        Relevant Orders    Cologuard           Diagnoses and all orders for this visit:    Well adult exam    Encounter for vaccination    Benign essential hypertension    Mild intermittent asthma without complication    GERD without esophagitis    Osteopenia, unspecified location    Psoriatic arthritis ([address])    Generalized anxiety disorder    Immunosuppression ([address])    Prediabetes    Vitamin D deficiency  -     Vitamin D 25 hydroxy; Future    Screen for colon cancer  -     Cologuard        No problem-specific Assessment & Plan notes found for this encounter. A/P: Doing well and co-morbidities are stable. Labs will be ordered. Order CRC. Discussed vaccines already had her flu and defers pneumonia. . No mental or physical restrictions. No evidence of communicable diseases. Continue current treatment and RTC one year for annual and four months for routine. Subjective:      Patient ID: Carlitos Bro is a 60 y.o. female. WF presents for a well exam. Doing ok and no c/o's. Getting over a URI. Asthma controlled with limited rescue MDI use, prior to the recent URI. No reflux. Chronic pain, migraines, and psoriasis are manageable. RYNE is controlled. Remains active w/ some difficulty, due to arthritis, but no falls. Denies depresion. No suicidal or violent ideations. Working full time. No recent travel. No fever, chills, or sweats. No unexplained wt change.  Denies CP, SOB, palpitations, edema, orthopnea, or PND. No sz or syncope. No changes in bowel or bladder habits. No trouble swallowing. Appetite and sleep are good. Sees both  a DDS and an eye doctor. No change in PMH, PSH, ALL, OH, SH, or Fhx. Currently due for labs, CRC, and vaccines. .              The following portions of the patient's history were reviewed and updated as appropriate:   She has a past medical history of Asthma, Generalized anxiety disorder, GERD (gastroesophageal reflux disease), Hemangioma of other sites, Hypertension, Psoriatic arthritis ([address]), and Psoriatic arthritis ([address]). ,  does not have any pertinent problems on file. ,   has a past surgical history that includes Appendectomy; Cholecystectomy; Foot surgery; Hysterectomy; Tonsillectomy; Tubal ligation; Knee surgery; Spinal fusion; and Hand surgery (Right, 03/10/2022). ,  family history includes COPD in her mother; Heart attack in her paternal grandfather; Heart disease in her mother; Hypertension in her maternal grandmother, mother, and paternal grandfather; Pancreatic cancer in her father and paternal grandfather. ,   reports that she has never smoked. She has never used smokeless tobacco. She reports current alcohol use. She reports that she does not use drugs. ,  is allergic to diclofenac-misoprostol, indomethacin, sulfamethoxazole-trimethoprim, and cetirizine. .  Current Outpatient Medications   Medication Sig Dispense Refill   • albuterol (PROVENTIL HFA,VENTOLIN HFA) 90 mcg/act inhaler TAKE 2 PUFFS BY MOUTH EVERY 6 HOURS AS NEEDED FOR WHEEZE OR FOR SHORTNESS OF BREATH 18 g 5   • ALPRAZolam (XANAX) 0.25 mg tablet Take 1 tablet (0.25 mg total) by mouth 3 (three) times a day as needed for anxiety 90 tablet 0   • diazepam (VALIUM) 5 mg tablet Take 1 tablet (5 mg total) by mouth every 6 (six) hours as needed for anxiety TAKE EVERY 4-6 HOURS PRN PAIN 30 tablet 0   • ergocalciferol (VITAMIN D2) 50,000 units TAKE 1 CAPSULE BY MOUTH ONE TIME PER WEEK 12 capsule 8 • fluticasone (FLONASE) 50 mcg/act nasal spray 1 spray into each nostril daily 16 g 0   • guaiFENesin (Mucinex) 600 mg 12 hr tablet Take 1 tablet (600 mg total) by mouth every 12 (twelve) hours 20 tablet 0   • levofloxacin (LEVAQUIN) 500 mg tablet Take 1 tablet (500 mg total) by mouth every 24 hours for 10 days 10 tablet 0   • loratadine (CLARITIN) 10 mg tablet daily as needed       • methocarbamol (ROBAXIN) 750 mg tablet TAKE 1 TABLET (750 MG TOTAL) BY MOUTH EVERY 6 (SIX) HOURS AS NEEDED FOR MUSCLE SPASMS 90 tablet 0   • metoprolol succinate (TOPROL-XL) 25 mg 24 hr tablet TAKE 1 TABLET (25 MG TOTAL) BY MOUTH DAILY. 90 tablet 3   • omeprazole (PriLOSEC) 40 MG capsule TAKE 1 CAPSULE BY MOUTH EVERY DAY BEFORE BREAKFAST 90 capsule 5   • traZODone (DESYREL) 50 mg tablet TAKE 1 TABLET BY MOUTH EVERYDAY AT BEDTIME 90 tablet 4   • phenazopyridine (PYRIDIUM) 200 mg tablet Take 1 tablet (200 mg total) by mouth 3 (three) times a day with meals (Patient not taking: Reported on 11/8/2023) 6 tablet 0   • predniSONE 2.5 mg tablet Take 2.5 mg by mouth as needed (Patient not taking: Reported on 3/13/2023)       No current facility-administered medications for this visit. Review of Systems   Constitutional:  Negative for activity change, chills, diaphoresis, fatigue and fever. HENT: Negative. Eyes:  Negative for visual disturbance. Respiratory:  Negative for cough, chest tightness, shortness of breath and wheezing. Cardiovascular:  Negative for chest pain, palpitations and leg swelling. Gastrointestinal:  Negative for abdominal pain, constipation, diarrhea, nausea and vomiting. Endocrine: Negative for cold intolerance and heat intolerance. Genitourinary:  Negative for difficulty urinating, dysuria and frequency. Musculoskeletal:  Negative for arthralgias, gait problem and myalgias. Neurological:  Negative for dizziness, seizures, syncope, weakness, light-headedness and headaches. Psychiatric/Behavioral:  Negative for confusion, dysphoric mood and sleep disturbance. The patient is not nervous/anxious. PHQ-2/9 Depression Screening          Objective:  Vitals:    11/08/23 0724   BP: 108/62   Pulse: 73   Temp: (!) 97.2 °F (36.2 °C)   SpO2: 98%   Weight: 81.5 kg (179 lb 9.6 oz)   Height: 5' 5" (1.651 m)     Body mass index is 29.89 kg/m². Physical Exam  Vitals and nursing note reviewed. Constitutional:       General: She is not in acute distress. Appearance: Normal appearance. She is not ill-appearing. HENT:      Head: Normocephalic and atraumatic. Mouth/Throat:      Mouth: Mucous membranes are moist.   Eyes:      Extraocular Movements: Extraocular movements intact. Conjunctiva/sclera: Conjunctivae normal.      Pupils: Pupils are equal, round, and reactive to light. Neck:      Vascular: No carotid bruit. Cardiovascular:      Rate and Rhythm: Normal rate and regular rhythm. Heart sounds: Normal heart sounds. No murmur heard. Pulmonary:      Effort: Pulmonary effort is normal. No respiratory distress. Breath sounds: Normal breath sounds. No wheezing, rhonchi or rales. Abdominal:      General: Bowel sounds are normal. There is no distension. Palpations: Abdomen is soft. Tenderness: There is no abdominal tenderness. Musculoskeletal:      Cervical back: Neck supple. Right lower leg: No edema. Left lower leg: No edema. Neurological:      General: No focal deficit present. Mental Status: She is alert and oriented to person, place, and time. Mental status is at baseline. Psychiatric:         Mood and Affect: Mood normal.         Behavior: Behavior normal.         Thought Content:  Thought content normal.         Judgment: Judgment normal.

## 2023-11-27 DIAGNOSIS — J32.9 SINOBRONCHITIS: Primary | ICD-10-CM

## 2023-11-27 DIAGNOSIS — J40 SINOBRONCHITIS: Primary | ICD-10-CM

## 2023-11-27 RX ORDER — CODEINE PHOSPHATE/GUAIFENESIN 10-100MG/5
5 LIQUID (ML) ORAL 3 TIMES DAILY PRN
Qty: 120 ML | Refills: 0 | Status: SHIPPED | OUTPATIENT
Start: 2023-11-27

## 2023-11-27 RX ORDER — LEVOFLOXACIN 500 MG/1
500 TABLET, FILM COATED ORAL EVERY 24 HOURS
Qty: 7 TABLET | Refills: 0 | Status: SHIPPED | OUTPATIENT
Start: 2023-11-27 | End: 2023-12-04

## 2023-12-11 LAB — COLOGUARD RESULT REPORTABLE: NEGATIVE

## 2023-12-21 DIAGNOSIS — J40 SINOBRONCHITIS: ICD-10-CM

## 2023-12-21 DIAGNOSIS — J32.9 SINOBRONCHITIS: ICD-10-CM

## 2023-12-21 RX ORDER — FLUTICASONE PROPIONATE 50 MCG
SPRAY, SUSPENSION (ML) NASAL
Qty: 24 ML | Refills: 1 | Status: SHIPPED | OUTPATIENT
Start: 2023-12-21

## 2024-02-18 DIAGNOSIS — J40 SINOBRONCHITIS: ICD-10-CM

## 2024-02-18 DIAGNOSIS — J32.9 SINOBRONCHITIS: ICD-10-CM

## 2024-02-19 RX ORDER — FLUTICASONE PROPIONATE 50 MCG
SPRAY, SUSPENSION (ML) NASAL
Qty: 24 ML | Refills: 3 | Status: SHIPPED | OUTPATIENT
Start: 2024-02-19

## 2024-03-01 ENCOUNTER — APPOINTMENT (OUTPATIENT)
Dept: LAB | Facility: CLINIC | Age: 59
End: 2024-03-01
Payer: COMMERCIAL

## 2024-03-01 DIAGNOSIS — M47.816 LUMBAR SPONDYLOSIS: ICD-10-CM

## 2024-03-01 DIAGNOSIS — M79.7 FIBROMYALGIA: ICD-10-CM

## 2024-03-01 DIAGNOSIS — E55.9 VITAMIN D DEFICIENCY: ICD-10-CM

## 2024-03-01 DIAGNOSIS — L40.9 PSORIASIS: ICD-10-CM

## 2024-03-01 DIAGNOSIS — M17.0 BILATERAL PRIMARY OSTEOARTHRITIS OF KNEE: ICD-10-CM

## 2024-03-01 LAB
25(OH)D3 SERPL-MCNC: 17.8 NG/ML (ref 30–100)
ALBUMIN SERPL BCP-MCNC: 4.2 G/DL (ref 3.5–5)
ALP SERPL-CCNC: 106 U/L (ref 34–104)
ALT SERPL W P-5'-P-CCNC: 15 U/L (ref 7–52)
ANION GAP SERPL CALCULATED.3IONS-SCNC: 6 MMOL/L
AST SERPL W P-5'-P-CCNC: 18 U/L (ref 13–39)
BASOPHILS # BLD AUTO: 0.06 THOUSANDS/ÂΜL (ref 0–0.1)
BASOPHILS NFR BLD AUTO: 1 % (ref 0–1)
BILIRUB SERPL-MCNC: 0.38 MG/DL (ref 0.2–1)
BUN SERPL-MCNC: 10 MG/DL (ref 5–25)
CALCIUM SERPL-MCNC: 9.4 MG/DL (ref 8.4–10.2)
CHLORIDE SERPL-SCNC: 106 MMOL/L (ref 96–108)
CO2 SERPL-SCNC: 30 MMOL/L (ref 21–32)
CREAT SERPL-MCNC: 0.79 MG/DL (ref 0.6–1.3)
CRP SERPL QL: 1.2 MG/L
EOSINOPHIL # BLD AUTO: 0.36 THOUSAND/ÂΜL (ref 0–0.61)
EOSINOPHIL NFR BLD AUTO: 5 % (ref 0–6)
ERYTHROCYTE [DISTWIDTH] IN BLOOD BY AUTOMATED COUNT: 13.4 % (ref 11.6–15.1)
GFR SERPL CREATININE-BSD FRML MDRD: 82 ML/MIN/1.73SQ M
GLUCOSE P FAST SERPL-MCNC: 93 MG/DL (ref 65–99)
HCT VFR BLD AUTO: 40.7 % (ref 34.8–46.1)
HGB BLD-MCNC: 12.9 G/DL (ref 11.5–15.4)
IMM GRANULOCYTES # BLD AUTO: 0.02 THOUSAND/UL (ref 0–0.2)
IMM GRANULOCYTES NFR BLD AUTO: 0 % (ref 0–2)
LYMPHOCYTES # BLD AUTO: 2.55 THOUSANDS/ÂΜL (ref 0.6–4.47)
LYMPHOCYTES NFR BLD AUTO: 33 % (ref 14–44)
MCH RBC QN AUTO: 27.3 PG (ref 26.8–34.3)
MCHC RBC AUTO-ENTMCNC: 31.7 G/DL (ref 31.4–37.4)
MCV RBC AUTO: 86 FL (ref 82–98)
MONOCYTES # BLD AUTO: 0.54 THOUSAND/ÂΜL (ref 0.17–1.22)
MONOCYTES NFR BLD AUTO: 7 % (ref 4–12)
NEUTROPHILS # BLD AUTO: 4.32 THOUSANDS/ÂΜL (ref 1.85–7.62)
NEUTS SEG NFR BLD AUTO: 54 % (ref 43–75)
NRBC BLD AUTO-RTO: 0 /100 WBCS
PLATELET # BLD AUTO: 439 THOUSANDS/UL (ref 149–390)
PMV BLD AUTO: 9.6 FL (ref 8.9–12.7)
POTASSIUM SERPL-SCNC: 4.4 MMOL/L (ref 3.5–5.3)
PROT SERPL-MCNC: 7.2 G/DL (ref 6.4–8.4)
RBC # BLD AUTO: 4.73 MILLION/UL (ref 3.81–5.12)
SODIUM SERPL-SCNC: 142 MMOL/L (ref 135–147)
WBC # BLD AUTO: 7.85 THOUSAND/UL (ref 4.31–10.16)

## 2024-03-01 PROCEDURE — 86140 C-REACTIVE PROTEIN: CPT

## 2024-03-01 PROCEDURE — 85025 COMPLETE CBC W/AUTO DIFF WBC: CPT

## 2024-03-01 PROCEDURE — 86480 TB TEST CELL IMMUN MEASURE: CPT

## 2024-03-01 PROCEDURE — 36415 COLL VENOUS BLD VENIPUNCTURE: CPT

## 2024-03-01 PROCEDURE — 80053 COMPREHEN METABOLIC PANEL: CPT

## 2024-03-01 PROCEDURE — 82306 VITAMIN D 25 HYDROXY: CPT

## 2024-03-02 LAB
GAMMA INTERFERON BACKGROUND BLD IA-ACNC: 0.06 IU/ML
M TB IFN-G BLD-IMP: NEGATIVE
M TB IFN-G CD4+ BCKGRND COR BLD-ACNC: 0.01 IU/ML
M TB IFN-G CD4+ BCKGRND COR BLD-ACNC: 0.05 IU/ML
MITOGEN IGNF BCKGRD COR BLD-ACNC: 9.94 IU/ML

## 2024-03-12 ENCOUNTER — OFFICE VISIT (OUTPATIENT)
Dept: INTERNAL MEDICINE CLINIC | Facility: CLINIC | Age: 59
End: 2024-03-12
Payer: COMMERCIAL

## 2024-03-12 VITALS
SYSTOLIC BLOOD PRESSURE: 126 MMHG | OXYGEN SATURATION: 98 % | WEIGHT: 183.8 LBS | HEIGHT: 65 IN | BODY MASS INDEX: 30.62 KG/M2 | DIASTOLIC BLOOD PRESSURE: 84 MMHG | TEMPERATURE: 97.1 F | HEART RATE: 73 BPM

## 2024-03-12 DIAGNOSIS — D84.9 IMMUNOSUPPRESSION (HCC): ICD-10-CM

## 2024-03-12 DIAGNOSIS — Z23 ENCOUNTER FOR IMMUNIZATION: ICD-10-CM

## 2024-03-12 DIAGNOSIS — Z13.220 SCREENING FOR LIPID DISORDERS: ICD-10-CM

## 2024-03-12 DIAGNOSIS — G89.4 CHRONIC PAIN DISORDER: ICD-10-CM

## 2024-03-12 DIAGNOSIS — M85.80 OSTEOPENIA, UNSPECIFIED LOCATION: ICD-10-CM

## 2024-03-12 DIAGNOSIS — E55.9 VITAMIN D DEFICIENCY: ICD-10-CM

## 2024-03-12 DIAGNOSIS — K21.9 GERD WITHOUT ESOPHAGITIS: ICD-10-CM

## 2024-03-12 DIAGNOSIS — J45.20 MILD INTERMITTENT ASTHMA WITHOUT COMPLICATION: ICD-10-CM

## 2024-03-12 DIAGNOSIS — G43.B0 OPHTHALMOPLEGIC MIGRAINE, NOT INTRACTABLE: ICD-10-CM

## 2024-03-12 DIAGNOSIS — I10 BENIGN ESSENTIAL HYPERTENSION: Primary | ICD-10-CM

## 2024-03-12 DIAGNOSIS — L40.50 PSORIATIC ARTHRITIS (HCC): ICD-10-CM

## 2024-03-12 DIAGNOSIS — F41.1 GENERALIZED ANXIETY DISORDER: ICD-10-CM

## 2024-03-12 PROCEDURE — 99214 OFFICE O/P EST MOD 30 MIN: CPT | Performed by: INTERNAL MEDICINE

## 2024-03-12 RX ORDER — ERGOCALCIFEROL 1.25 MG/1
50000 CAPSULE ORAL WEEKLY
Qty: 12 CAPSULE | Refills: 1 | Status: SHIPPED | OUTPATIENT
Start: 2024-03-12

## 2024-03-12 NOTE — PROGRESS NOTES
Assessment/Plan:  Problem List Items Addressed This Visit          Cardiovascular and Mediastinum    Benign essential hypertension - Primary    Relevant Orders    Albumin / creatinine urine ratio       Respiratory    Asthma       Digestive    GERD without esophagitis       Nervous and Auditory    Ophthalmoplegic migraine headache       Musculoskeletal and Integument    Psoriatic arthritis (HCC)    Osteopenia       Other    Vitamin D deficiency    Immunosuppression (HCC)    Generalized anxiety disorder    Relevant Orders    TSH, 3rd generation    [facility] Prescribed Meds and Special Instructions    Amphetamines, Methamphetamines    Butalbital    Phenobarbital    Secobarbital    Alprazolam    Clonazepam    Diazepam    Lorazepam    Gabapentin    Pregabalin    Cocaine    Heroin    Buprenorphine    Levorphanol    Meperidine    Naltrexone    Fentanyl    Methadone    Oxycodone    Tapentadol    THC    Tramadol    Codeine, Hydrocodone, Hydropmorphone, Morphine    Bath Salts    Ethyl Glucuronide/Ethyl Sulfate    Kratom    Spice    Methylphenidate    Phentermine    Validity Oxidant    Validity Creatinine    Validity pH    Validity Specific    Xylazine Definitive Test    Chronic pain disorder     Other Visit Diagnoses       Encounter for immunization        Relevant Orders    Zoster Vaccine Recombinant IM    Screening for lipid disorders        Relevant Orders    Lipid Panel with Direct LDL reflex             Diagnoses and all orders for this visit:    Benign essential hypertension  -     Albumin / creatinine urine ratio; Future    Encounter for immunization  -     Zoster Vaccine Recombinant IM    Mild intermittent asthma without complication    GERD without esophagitis    Ophthalmoplegic migraine, not intractable    Psoriatic arthritis (HCC)    Osteopenia, unspecified location    Chronic pain disorder    Generalized anxiety disorder  -     TSH, 3rd generation; Future  -     [facility] Prescribed Meds and Special  Instructions  -     Amphetamines, Methamphetamines  -     Butalbital  -     Phenobarbital  -     Secobarbital  -     Alprazolam  -     Clonazepam  -     Diazepam  -     Lorazepam  -     Gabapentin  -     Pregabalin  -     Cocaine  -     Heroin  -     Buprenorphine  -     Levorphanol  -     Meperidine  -     Naltrexone  -     Fentanyl  -     Methadone  -     Oxycodone  -     Tapentadol  -     THC  -     Tramadol  -     Codeine, Hydrocodone, Hydropmorphone, Morphine  -     Bath Salts  -     Ethyl Glucuronide/Ethyl Sulfate  -     Kratom  -     Spice  -     Methylphenidate  -     Phentermine  -     Validity Oxidant  -     Validity Creatinine  -     Validity pH  -     Validity Specific  -     Xylazine Definitive Test    Vitamin D deficiency    Screening for lipid disorders  -     Lipid Panel with Direct LDL reflex; Future    Immunosuppression (HCC)        No problem-specific Assessment & Plan notes found for this encounter.    A/P: Doing ok and will check labs. Will check UDT. Discussed TKR and highly recommend them. Appreciate rheum/ortho input. Continue current treatment and RTC four months for routine.     Subjective:      Patient ID: Carolann Foy is a 58 y.o. female.    WF RTC for f/u HTN, GERD, etc. Doing ok and no new issues. Seeing rheum/ortho and getting knee injections and TKR are recommended.  Remains active as much as her Psoriatic Arthritis will allow. Two  falls due to knees buckling. . RYNE is controlled. No reflux. Chronic pain and migraines are manageable. Due for labs.         The following portions of the patient's history were reviewed and updated as appropriate:   She has a past medical history of Allergic, Anxiety, Arthritis, Asthma, Generalized anxiety disorder, GERD (gastroesophageal reflux disease), Hemangioma of other sites, Hypertension, Psoriatic arthritis (HCC), and Psoriatic arthritis (HCC).,  does not have any pertinent problems on file.,   has a past surgical history that includes  Appendectomy; Cholecystectomy; Foot surgery; Hysterectomy; Tonsillectomy; Tubal ligation; Knee surgery; Spinal fusion; Hand surgery (Right, 03/10/2022);  section ( & ); and Joint replacement ().,  family history includes Arthritis in her mother; COPD in her mother; Cancer in her father and paternal grandmother; Heart attack in her paternal grandfather; Heart disease in her mother; Hypertension in her maternal grandmother, mother, and paternal grandfather; Pancreatic cancer in her father and paternal grandfather.,   reports that she has never smoked. She has never used smokeless tobacco. She reports current alcohol use. She reports that she does not use drugs.,  is allergic to diclofenac-misoprostol, indomethacin, sulfamethoxazole-trimethoprim, and cetirizine..  Current Outpatient Medications   Medication Sig Dispense Refill    albuterol (PROVENTIL HFA,VENTOLIN HFA) 90 mcg/act inhaler TAKE 2 PUFFS BY MOUTH EVERY 6 HOURS AS NEEDED FOR WHEEZE OR FOR SHORTNESS OF BREATH 18 g 5    ALPRAZolam (XANAX) 0.25 mg tablet Take 1 tablet (0.25 mg total) by mouth 3 (three) times a day as needed for anxiety 90 tablet 0    diazepam (VALIUM) 5 mg tablet Take 1 tablet (5 mg total) by mouth every 6 (six) hours as needed for anxiety TAKE EVERY 4-6 HOURS PRN PAIN 30 tablet 0    ergocalciferol (VITAMIN D2) 50,000 units TAKE 1 CAPSULE BY MOUTH ONE TIME PER WEEK 12 capsule 8    fluticasone (FLONASE) 50 mcg/act nasal spray SPRAY 1 SPRAY INTO EACH NOSTRIL EVERY DAY 24 mL 3    loratadine (CLARITIN) 10 mg tablet daily as needed        methocarbamol (ROBAXIN) 750 mg tablet TAKE 1 TABLET (750 MG TOTAL) BY MOUTH EVERY 6 (SIX) HOURS AS NEEDED FOR MUSCLE SPASMS 90 tablet 0    metoprolol succinate (TOPROL-XL) 25 mg 24 hr tablet TAKE 1 TABLET (25 MG TOTAL) BY MOUTH DAILY. 90 tablet 3    omeprazole (PriLOSEC) 40 MG capsule TAKE 1 CAPSULE BY MOUTH EVERY DAY BEFORE BREAKFAST 90 capsule 5    predniSONE 2.5 mg tablet Take 2.5 mg by mouth  "as needed      traZODone (DESYREL) 50 mg tablet TAKE 1 TABLET BY MOUTH EVERYDAY AT BEDTIME 90 tablet 4     No current facility-administered medications for this visit.       Review of Systems   Constitutional:  Negative for activity change, chills, diaphoresis, fatigue and fever.   HENT: Negative.     Eyes:  Negative for visual disturbance.   Respiratory:  Negative for cough, chest tightness, shortness of breath and wheezing.    Cardiovascular:  Negative for chest pain, palpitations and leg swelling.   Gastrointestinal:  Negative for abdominal pain, constipation, diarrhea, nausea and vomiting.   Endocrine: Negative for cold intolerance and heat intolerance.   Genitourinary:  Negative for difficulty urinating, dysuria and frequency.   Musculoskeletal:  Positive for arthralgias and myalgias. Negative for gait problem and joint swelling.   Neurological:  Negative for dizziness, seizures, syncope, weakness, light-headedness and headaches.   Psychiatric/Behavioral:  Negative for confusion, dysphoric mood and sleep disturbance. The patient is not nervous/anxious.        PHQ-2/9 Depression Screening    Little interest or pleasure in doing things: 0 - not at all  Feeling down, depressed, or hopeless: 0 - not at all  PHQ-2 Score: 0  PHQ-2 Interpretation: Negative depression screen        Objective:  Vitals:    03/12/24 0723   BP: 126/84   Pulse: 73   Temp: (!) 97.1 °F (36.2 °C)   SpO2: 98%   Weight: 83.4 kg (183 lb 12.8 oz)   Height: 5' 5\" (1.651 m)     Body mass index is 30.59 kg/m².     Physical Exam  Vitals and nursing note reviewed.   Constitutional:       General: She is not in acute distress.     Appearance: Normal appearance. She is not ill-appearing.   HENT:      Head: Normocephalic and atraumatic.      Mouth/Throat:      Mouth: Mucous membranes are moist.   Eyes:      Extraocular Movements: Extraocular movements intact.      Conjunctiva/sclera: Conjunctivae normal.      Pupils: Pupils are equal, round, and reactive " to light.   Neck:      Vascular: No carotid bruit.   Cardiovascular:      Rate and Rhythm: Normal rate and regular rhythm.      Heart sounds: Normal heart sounds. No murmur heard.  Pulmonary:      Effort: Pulmonary effort is normal. No respiratory distress.      Breath sounds: Normal breath sounds. No wheezing, rhonchi or rales.   Abdominal:      General: Bowel sounds are normal. There is no distension.      Palpations: Abdomen is soft.      Tenderness: There is no abdominal tenderness.   Musculoskeletal:      Cervical back: Neck supple.      Right lower leg: No edema.      Left lower leg: No edema.   Neurological:      General: No focal deficit present.      Mental Status: She is alert and oriented to person, place, and time. Mental status is at baseline.   Psychiatric:         Mood and Affect: Mood normal.         Behavior: Behavior normal.         Thought Content: Thought content normal.         Judgment: Judgment normal.

## 2024-03-14 LAB
4-HYDROXY XYLAZINE: NEGATIVE NG/ML
6MAM UR QL CFM: NEGATIVE NG/ML
7AMINOCLONAZEPAM UR QL CFM: NEGATIVE NG/ML
A-OH ALPRAZ UR QL CFM: ABNORMAL NG/ML
ACCEPTABLE CREAT UR QL: NORMAL MG/DL
ACCEPTIBLE SP GR UR QL: NORMAL
AMPHET UR QL CFM: NEGATIVE NG/ML
BUPRENORPHINE UR QL CFM: NEGATIVE NG/ML
BUTALBITAL UR QL CFM: NEGATIVE NG/ML
BZE UR QL CFM: NEGATIVE NG/ML
CODEINE UR QL CFM: ABNORMAL NG/ML
EDDP UR QL CFM: NEGATIVE NG/ML
ETHYL GLUCURONIDE UR QL CFM: NEGATIVE NG/ML
ETHYL SULFATE UR QL SCN: NEGATIVE NG/ML
EUTYLONE UR QL: NEGATIVE NG/ML
FENTANYL UR QL CFM: NEGATIVE NG/ML
GLIADIN IGG SER IA-ACNC: NEGATIVE NG/ML
HYDROCODONE UR QL CFM: ABNORMAL NG/ML
HYDROMORPHONE UR QL CFM: ABNORMAL NG/ML
LORAZEPAM UR QL CFM: NEGATIVE NG/ML
ME-PHENIDATE UR QL CFM: NEGATIVE NG/ML
MEPERIDINE UR QL CFM: NEGATIVE NG/ML
METHADONE UR QL CFM: NEGATIVE NG/ML
METHAMPHET UR QL CFM: NEGATIVE NG/ML
MORPHINE UR QL CFM: ABNORMAL NG/ML
NALTREXONE UR QL CFM: NEGATIVE NG/ML
NITRITE UR QL: NORMAL UG/ML
NORBUPRENORPHINE UR QL CFM: NEGATIVE NG/ML
NORDIAZEPAM UR QL CFM: ABNORMAL NG/ML
NORFENTANYL UR QL CFM: NEGATIVE NG/ML
NORHYDROCODONE UR QL CFM: ABNORMAL NG/ML
NORMEPERIDINE UR QL CFM: NEGATIVE NG/ML
NOROXYCODONE UR QL CFM: NEGATIVE NG/ML
OXAZEPAM UR QL CFM: ABNORMAL NG/ML
OXYCODONE UR QL CFM: NEGATIVE NG/ML
OXYMORPHONE UR QL CFM: NEGATIVE NG/ML
PARA-FLUOROFENTANYL QUANTIFICATION: NORMAL NG/ML
PHENOBARB UR QL CFM: NEGATIVE NG/ML
RESULT ALL_PRESCRIBED MEDS AND SPECIAL INSTRUCTIONS: NORMAL
SECOBARBITAL UR QL CFM: NEGATIVE NG/ML
SL AMB 4-ANPP QUANTIFICATION: NORMAL NG/ML
SL AMB 5F-ADB-M7 METABOLITE QUANTIFICATION: NEGATIVE NG/ML
SL AMB 7-OH-MITRAGYNINE (KRATOM ALKALOID) QUANTIFICATION: NEGATIVE NG/ML
SL AMB AB-FUBINACA-M3 METABOLITE QUANTIFICATION: NEGATIVE NG/ML
SL AMB ACETYL FENTANYL QUANTIFICATION: NORMAL NG/ML
SL AMB ACETYL NORFENTANYL QUANTIFICATION: NORMAL NG/ML
SL AMB ACRYL FENTANYL QUANTIFICATION: NORMAL NG/ML
SL AMB CARFENTANIL QUANTIFICATION: NORMAL NG/ML
SL AMB CTHC (MARIJUANA METABOLITE) QUANTIFICATION: NEGATIVE NG/ML
SL AMB DEXTRORPHAN (DEXTROMETHORPHAN METABOLITE) QUANT: NEGATIVE NG/ML
SL AMB GABAPENTIN QUANTIFICATION: NEGATIVE
SL AMB JWH018 METABOLITE QUANTIFICATION: NEGATIVE NG/ML
SL AMB JWH073 METABOLITE QUANTIFICATION: NEGATIVE NG/ML
SL AMB MDMB-FUBINACA-M1 METABOLITE QUANTIFICATION: NEGATIVE NG/ML
SL AMB METHYLONE QUANTIFICATION: NEGATIVE NG/ML
SL AMB N-DESMETHYL-TRAMADOL QUANTIFICATION: NEGATIVE NG/ML
SL AMB PHENTERMINE QUANTIFICATION: NEGATIVE NG/ML
SL AMB PREGABALIN QUANTIFICATION: NEGATIVE
SL AMB RCS4 METABOLITE QUANTIFICATION: NEGATIVE NG/ML
SL AMB RITALINIC ACID QUANTIFICATION: NEGATIVE NG/ML
SMOOTH MUSCLE AB TITR SER IF: NEGATIVE NG/ML
SPECIMEN DRAWN SERPL: NEGATIVE NG/ML
SPECIMEN PH ACCEPTABLE UR: NORMAL
TAPENTADOL UR QL CFM: NEGATIVE NG/ML
TEMAZEPAM UR QL CFM: ABNORMAL NG/ML
TRAMADOL UR QL CFM: NEGATIVE NG/ML
URATE/CREAT 24H UR: NEGATIVE NG/ML
XYLAZINE QUANTIFICATION: NEGATIVE NG/ML

## 2024-04-14 DIAGNOSIS — J40 SINOBRONCHITIS: ICD-10-CM

## 2024-04-14 DIAGNOSIS — J32.9 SINOBRONCHITIS: ICD-10-CM

## 2024-04-15 RX ORDER — FLUTICASONE PROPIONATE 50 MCG
SPRAY, SUSPENSION (ML) NASAL
Qty: 24 ML | Refills: 1 | Status: SHIPPED | OUTPATIENT
Start: 2024-04-15

## 2024-06-03 ENCOUNTER — EVALUATION (OUTPATIENT)
Dept: PHYSICAL THERAPY | Facility: CLINIC | Age: 59
End: 2024-06-03
Payer: COMMERCIAL

## 2024-06-03 DIAGNOSIS — G89.29 CHRONIC PAIN OF RIGHT KNEE: ICD-10-CM

## 2024-06-03 DIAGNOSIS — M25.561 CHRONIC PAIN OF RIGHT KNEE: ICD-10-CM

## 2024-06-03 DIAGNOSIS — M17.11 PRIMARY OSTEOARTHRITIS OF RIGHT KNEE: Primary | ICD-10-CM

## 2024-06-03 PROCEDURE — 97161 PT EVAL LOW COMPLEX 20 MIN: CPT | Performed by: PHYSICAL THERAPIST

## 2024-06-03 PROCEDURE — 97535 SELF CARE MNGMENT TRAINING: CPT | Performed by: PHYSICAL THERAPIST

## 2024-06-03 PROCEDURE — 97110 THERAPEUTIC EXERCISES: CPT | Performed by: PHYSICAL THERAPIST

## 2024-06-03 NOTE — LETTER
Carolyn 3, 2024    Robin Unger MD  [address] [facility] [address]    Patient: Carolann Foy   YOB: 1965   Date of Visit: 6/3/2024     Encounter Diagnosis     ICD-10-CM    1. Primary osteoarthritis of right knee  M17.11       2. Chronic pain of right knee  M25.561     G89.29           Dear Dr. Unger:    Thank you for your recent referral of Carolann Foy. Please review the attached evaluation summary from Carolann's recent visit.     Please verify that you agree with the plan of care by signing the attached order.     If you have any questions or concerns, please do not hesitate to call.     I sincerely appreciate the opportunity to share in the care of one of your patients and hope to have another opportunity to work with you in the near future.       Sincerely,    Víctor Cespedes, PT      Referring Provider:      I certify that I have read the below Plan of Care and certify the need for these services furnished under this plan of treatment while under my care.                    Robin Unger MD  [address] [place] [id number]  Via Fax: [phone]          PT Evaluation     Today's date: 6/3/2024  Patient name: Carolann Foy  : 1965  MRN: [id number]  Referring provider: Robin Unger MD  Dx:   Encounter Diagnosis     ICD-10-CM    1. Primary osteoarthritis of right knee  M17.11       2. Chronic pain of right knee  M25.561     G89.29           Start Time: 0800  Stop Time: 0845  Total time in clinic (min): 45 minutes      Goals  STGs:  1. Patient will be independent with hep by 1-2 visits.   2. Patient will be prepared for the upcoming surgical procedure including home preparation, knowledge of the surgical procedure, and post operative exercise/mobility instructions by 1-2 visits.     LTGs:  To be established post operatively.     Plan  Patient would benefit from: skilled physical therapy    Planned therapy interventions: ADL training, balance/weight bearing  training, functional ROM exercises, home exercise program, manual therapy, strengthening, stretching, therapeutic activities and therapeutic exercise    Frequency: 1x week  Duration in weeks: 2  Plan of Care beginning date: 6/3/2024  Plan of Care expiration date: 2024  Treatment plan discussed with: patient  Plan details: Patient informed that from this point forward, to ensure adherence to the aforementioned plan of care, all or some of the treatment may be performed and carried out by a Physical Therapy Assistant (PTA) with supervision from a licensed Physical Therapist (PT) in accordance with [facility] Physical Therapy Practice Act.  Patient will continue to benefit from skilled physical therapy to address the functional deficits that were identified during the evaluation today. We will continue to progress the therapy program to address these functional deficits and achieve the established goals.         Subjective Evaluation    History of Present Illness  Mechanism of injury: Patient presents to out patient physical therapy with chief c/o chronic L knee pain. Patient is here for a pre operative evaluation for an upcoming R TKA scheduled for 2024. Patient reports a history of chronic B/L knee pain with L>R. She is having more difficulties with her R knee as it tends to give out on her often throughout her day and she has fallen twice in the past 6 months. She feels that getting out of a vehicle, negotiating stairs, and her daily activities will cause pain in her knee and will be difficult for her to complete due to the knee giving out and being painful.           Recurrent probem    Quality of life: good    Patient Goals  Patient goal: Patient wishes to be educated on her upcoming procedure.  Pain  Current pain ratin  At best pain ratin  At worst pain ratin  Location: R knee  Quality: dull ache and discomfort        Objective     Active Range of Motion   Left Knee   Flexion: 128  degrees   Extension: 7 degrees   Extensor la degrees     Right Knee   Flexion: 132 degrees   Extension: 4 degrees   Extensor la degrees     Passive Range of Motion   Left Knee   Extension: 7 degrees     Right Knee   Extension: 4 degrees     Strength/Myotome Testing     Left Hip   Planes of Motion   Flexion: 4  Extension: 4  Abduction: 4-  Adduction: 4+    Right Hip   Planes of Motion   Flexion: 4  Extension: 4  Abduction: 4-  Adduction: 4+    Left Knee   Flexion: 4  Extension: 4    Right Knee   Flexion: 4  Extension: 4-  Quadriceps contraction: good             Precautions: Hx of Lumbar fusion sx      Date 6/3 IE       FOTO NA       Manuals                                        Neuro Re-Ed                                                                Ther Ex        Ankle pumps 10x       QS/TC 10x       SLR 10x       HS 10x       LAQ 10x       Standing hip abduction 10x       Self care/Home management training 10 min               Ther Activity                        Gait Training                        Modalities

## 2024-06-03 NOTE — PROGRESS NOTES
PT Evaluation     Today's date: 6/3/2024  Patient name: Carolann Foy  : 1965  MRN: [id number]  Referring provider: Robin Unger MD  Dx:   Encounter Diagnosis     ICD-10-CM    1. Primary osteoarthritis of right knee  M17.11       2. Chronic pain of right knee  M25.561     G89.29           Start Time: 0800  Stop Time: 0845  Total time in clinic (min): 45 minutes      Goals  STGs:  1. Patient will be independent with hep by 1-2 visits.   2. Patient will be prepared for the upcoming surgical procedure including home preparation, knowledge of the surgical procedure, and post operative exercise/mobility instructions by 1-2 visits.     LTGs:  To be established post operatively.     Plan  Patient would benefit from: skilled physical therapy    Planned therapy interventions: ADL training, balance/weight bearing training, functional ROM exercises, home exercise program, manual therapy, strengthening, stretching, therapeutic activities and therapeutic exercise    Frequency: 1x week  Duration in weeks: 2  Plan of Care beginning date: 6/3/2024  Plan of Care expiration date: 2024  Treatment plan discussed with: patient  Plan details: Patient informed that from this point forward, to ensure adherence to the aforementioned plan of care, all or some of the treatment may be performed and carried out by a Physical Therapy Assistant (PTA) with supervision from a licensed Physical Therapist (PT) in accordance with [facility] Physical Therapy Practice Act.  Patient will continue to benefit from skilled physical therapy to address the functional deficits that were identified during the evaluation today. We will continue to progress the therapy program to address these functional deficits and achieve the established goals.         Subjective Evaluation    History of Present Illness  Mechanism of injury: Patient presents to out patient physical therapy with chief c/o chronic L knee pain. Patient is here for a pre  operative evaluation for an upcoming R TKA scheduled for 2024. Patient reports a history of chronic B/L knee pain with L>R. She is having more difficulties with her R knee as it tends to give out on her often throughout her day and she has fallen twice in the past 6 months. She feels that getting out of a vehicle, negotiating stairs, and her daily activities will cause pain in her knee and will be difficult for her to complete due to the knee giving out and being painful.           Recurrent probem    Quality of life: good    Patient Goals  Patient goal: Patient wishes to be educated on her upcoming procedure.  Pain  Current pain ratin  At best pain ratin  At worst pain ratin  Location: R knee  Quality: dull ache and discomfort        Objective     Active Range of Motion   Left Knee   Flexion: 128 degrees   Extension: 7 degrees   Extensor la degrees     Right Knee   Flexion: 132 degrees   Extension: 4 degrees   Extensor la degrees     Passive Range of Motion   Left Knee   Extension: 7 degrees     Right Knee   Extension: 4 degrees     Strength/Myotome Testing     Left Hip   Planes of Motion   Flexion: 4  Extension: 4  Abduction: 4-  Adduction: 4+    Right Hip   Planes of Motion   Flexion: 4  Extension: 4  Abduction: 4-  Adduction: 4+    Left Knee   Flexion: 4  Extension: 4    Right Knee   Flexion: 4  Extension: 4-  Quadriceps contraction: good             Precautions: Hx of Lumbar fusion sx      Date 6/3 IE       FOTO NA       Manuals                                        Neuro Re-Ed                                                                Ther Ex        Ankle pumps 10x       QS/TC 10x       SLR 10x       HS 10x       LAQ 10x       Standing hip abduction 10x       Self care/Home management training 10 min               Ther Activity                        Gait Training                        Modalities

## 2024-06-06 DIAGNOSIS — J32.9 SINOBRONCHITIS: ICD-10-CM

## 2024-06-06 DIAGNOSIS — J40 SINOBRONCHITIS: ICD-10-CM

## 2024-06-06 RX ORDER — FLUTICASONE PROPIONATE 50 MCG
SPRAY, SUSPENSION (ML) NASAL
Qty: 24 ML | Refills: 2 | Status: SHIPPED | OUTPATIENT
Start: 2024-06-06

## 2024-06-10 ENCOUNTER — OFFICE VISIT (OUTPATIENT)
Dept: PHYSICAL THERAPY | Facility: CLINIC | Age: 59
End: 2024-06-10
Payer: COMMERCIAL

## 2024-06-10 DIAGNOSIS — M17.11 PRIMARY OSTEOARTHRITIS OF RIGHT KNEE: Primary | ICD-10-CM

## 2024-06-10 DIAGNOSIS — G89.29 CHRONIC PAIN OF RIGHT KNEE: ICD-10-CM

## 2024-06-10 DIAGNOSIS — M25.561 CHRONIC PAIN OF RIGHT KNEE: ICD-10-CM

## 2024-06-10 PROCEDURE — 97110 THERAPEUTIC EXERCISES: CPT

## 2024-06-10 PROCEDURE — 97140 MANUAL THERAPY 1/> REGIONS: CPT

## 2024-06-10 NOTE — PROGRESS NOTES
"Daily Note     Today's date: 6/10/2024  Patient name: Carolann Foy  : 1965  MRN: [id number]  Referring provider: Robin Unger MD  Dx:   Encounter Diagnosis     ICD-10-CM    1. Primary osteoarthritis of right knee  M17.11       2. Chronic pain of right knee  M25.561     G89.29           Start Time: 0800  Stop Time: 0845  Total time in clinic (min): 45 minutes    Subjective: Pt notes min difficulty with her walking except on a steep decline.      Objective: See treatment diary below      Assessment: Tolerated treatment well. Patient exhibited good technique with therapeutic exercises. PTA added hip exercises/LE stretch ; include same in HEP.      Plan: Continue per plan of care. Sx scheduled for 2024.     Precautions: Hx of Lumbar fusion sx      Date 6/3 IE 6/10      FOTO NA       Manuals        LE stretches  Gastroc, hs,opp k-c              Neuro Re-Ed                                        Ther Ex        Ankle pumps 10x  toe raise 20x      QS/TC 10x 20x 3\"      SLR 10x 15x 3\"      HS 10x 15x       LAQ 10x       Standing hip abduction/ extension 10x 15x 5\" + ext      Self care/Home management training 10 min       Bridge c add  20x 3\"      Clamshell  10x 10\"      Nu-step  8m L5      Ther Activity                        Gait Training                        Modalities                               "

## 2024-06-21 DIAGNOSIS — K21.9 GERD WITHOUT ESOPHAGITIS: ICD-10-CM

## 2024-06-21 RX ORDER — OMEPRAZOLE 40 MG/1
CAPSULE, DELAYED RELEASE ORAL
Qty: 90 CAPSULE | Refills: 1 | Status: SHIPPED | OUTPATIENT
Start: 2024-06-21

## 2024-07-01 DIAGNOSIS — M54.42 ACUTE MIDLINE LOW BACK PAIN WITH LEFT-SIDED SCIATICA: ICD-10-CM

## 2024-07-01 DIAGNOSIS — M48.07 SPINAL STENOSIS OF LUMBOSACRAL REGION: ICD-10-CM

## 2024-07-02 RX ORDER — METHOCARBAMOL 750 MG/1
750 TABLET, FILM COATED ORAL EVERY 6 HOURS PRN
Qty: 90 TABLET | Refills: 0 | Status: SHIPPED | OUTPATIENT
Start: 2024-07-02

## 2024-07-05 ENCOUNTER — APPOINTMENT (OUTPATIENT)
Dept: LAB | Facility: CLINIC | Age: 59
End: 2024-07-05
Payer: COMMERCIAL

## 2024-07-05 ENCOUNTER — OFFICE VISIT (OUTPATIENT)
Dept: INTERNAL MEDICINE CLINIC | Facility: CLINIC | Age: 59
End: 2024-07-05
Payer: COMMERCIAL

## 2024-07-05 VITALS
BODY MASS INDEX: 31.36 KG/M2 | HEART RATE: 67 BPM | OXYGEN SATURATION: 98 % | HEIGHT: 65 IN | WEIGHT: 188.25 LBS | TEMPERATURE: 96.9 F | DIASTOLIC BLOOD PRESSURE: 80 MMHG | SYSTOLIC BLOOD PRESSURE: 112 MMHG

## 2024-07-05 DIAGNOSIS — Z13.220 SCREENING FOR LIPID DISORDERS: ICD-10-CM

## 2024-07-05 DIAGNOSIS — J40 SINOBRONCHITIS: Primary | ICD-10-CM

## 2024-07-05 DIAGNOSIS — Z12.11 SCREENING FOR COLON CANCER: ICD-10-CM

## 2024-07-05 DIAGNOSIS — J45.31 MILD PERSISTENT ASTHMA WITH ACUTE EXACERBATION: ICD-10-CM

## 2024-07-05 DIAGNOSIS — Z12.31 ENCOUNTER FOR SCREENING MAMMOGRAM FOR BREAST CANCER: ICD-10-CM

## 2024-07-05 DIAGNOSIS — F41.1 GENERALIZED ANXIETY DISORDER: ICD-10-CM

## 2024-07-05 DIAGNOSIS — J32.9 SINOBRONCHITIS: Primary | ICD-10-CM

## 2024-07-05 DIAGNOSIS — I10 BENIGN ESSENTIAL HYPERTENSION: ICD-10-CM

## 2024-07-05 LAB
CHOLEST SERPL-MCNC: 166 MG/DL
CREAT UR-MCNC: 167.9 MG/DL
HDLC SERPL-MCNC: 48 MG/DL
LDLC SERPL CALC-MCNC: 103 MG/DL (ref 0–100)
MICROALBUMIN UR-MCNC: 13.3 MG/L
MICROALBUMIN/CREAT 24H UR: 8 MG/G CREATININE (ref 0–30)
TRIGL SERPL-MCNC: 75 MG/DL
TSH SERPL DL<=0.05 MIU/L-ACNC: 2.94 UIU/ML (ref 0.45–4.5)

## 2024-07-05 PROCEDURE — 99213 OFFICE O/P EST LOW 20 MIN: CPT | Performed by: INTERNAL MEDICINE

## 2024-07-05 PROCEDURE — 84443 ASSAY THYROID STIM HORMONE: CPT

## 2024-07-05 PROCEDURE — 80061 LIPID PANEL: CPT

## 2024-07-05 PROCEDURE — 36415 COLL VENOUS BLD VENIPUNCTURE: CPT

## 2024-07-05 PROCEDURE — 94640 AIRWAY INHALATION TREATMENT: CPT | Performed by: INTERNAL MEDICINE

## 2024-07-05 PROCEDURE — 82043 UR ALBUMIN QUANTITATIVE: CPT

## 2024-07-05 PROCEDURE — 82570 ASSAY OF URINE CREATININE: CPT

## 2024-07-05 RX ORDER — CLARITHROMYCIN 500 MG/1
500 TABLET, COATED ORAL EVERY 12 HOURS SCHEDULED
Qty: 14 TABLET | Refills: 0 | Status: SHIPPED | OUTPATIENT
Start: 2024-07-05 | End: 2024-07-12

## 2024-07-05 RX ORDER — PREDNISONE 10 MG/1
TABLET ORAL
Qty: 42 TABLET | Refills: 0 | Status: SHIPPED | OUTPATIENT
Start: 2024-07-05

## 2024-07-05 RX ORDER — FLUTICASONE PROPIONATE 50 MCG
1 SPRAY, SUSPENSION (ML) NASAL DAILY
Qty: 16 G | Refills: 0 | Status: SHIPPED | OUTPATIENT
Start: 2024-07-05

## 2024-07-05 RX ORDER — CODEINE PHOSPHATE/GUAIFENESIN 10-100MG/5
5 LIQUID (ML) ORAL 3 TIMES DAILY PRN
Qty: 120 ML | Refills: 0 | Status: SHIPPED | OUTPATIENT
Start: 2024-07-05

## 2024-07-05 RX ORDER — ANTISEPTIC SURGICAL SCRUB 0.04 MG/ML
SOLUTION TOPICAL
COMMUNITY

## 2024-07-05 RX ORDER — GUAIFENESIN 600 MG/1
600 TABLET, EXTENDED RELEASE ORAL EVERY 12 HOURS SCHEDULED
Qty: 20 TABLET | Refills: 0 | Status: SHIPPED | OUTPATIENT
Start: 2024-07-05

## 2024-07-05 NOTE — PROGRESS NOTES
Ambulatory Visit  Name: Carolann Foy      : 1965      MRN: [id number]  Encounter Provider: Christo Brady DO  Encounter Date: 2024   Encounter department: [facility]    Assessment & Plan   1. Sinobronchitis  -     Mini neb  -     clarithromycin (BIAXIN) 500 mg tablet; Take 1 tablet (500 mg total) by mouth every 12 (twelve) hours for 7 days  -     guaiFENesin (Mucinex) 600 mg 12 hr tablet; Take 1 tablet (600 mg total) by mouth every 12 (twelve) hours  -     fluticasone (FLONASE) 50 mcg/act nasal spray; 1 spray into each nostril daily  -     guaifenesin-codeine (GUAIFENESIN AC) 100-10 MG/5ML liquid; Take 5 mL by mouth 3 (three) times a day as needed for cough  -     predniSONE 10 mg tablet; 60mg po q day x2, then 50mg x 2, then 40mg x 2, then 30mg x2, then 20mg x 2, and then 10mg x 2, then d/c.  2. Encounter for screening mammogram for breast cancer  3. Screening for colon cancer  -     clarithromycin (BIAXIN) 500 mg tablet; Take 1 tablet (500 mg total) by mouth every 12 (twelve) hours for 7 days  -     guaiFENesin (Mucinex) 600 mg 12 hr tablet; Take 1 tablet (600 mg total) by mouth every 12 (twelve) hours  -     fluticasone (FLONASE) 50 mcg/act nasal spray; 1 spray into each nostril daily  -     guaifenesin-codeine (GUAIFENESIN AC) 100-10 MG/5ML liquid; Take 5 mL by mouth 3 (three) times a day as needed for cough  -     predniSONE 10 mg tablet; 60mg po q day x2, then 50mg x 2, then 40mg x 2, then 30mg x2, then 20mg x 2, and then 10mg x 2, then d/c.  4. Mild persistent asthma with acute exacerbation  -     Mini neb  -     clarithromycin (BIAXIN) 500 mg tablet; Take 1 tablet (500 mg total) by mouth every 12 (twelve) hours for 7 days  -     guaiFENesin (Mucinex) 600 mg 12 hr tablet; Take 1 tablet (600 mg total) by mouth every 12 (twelve) hours  -     fluticasone (FLONASE) 50 mcg/act nasal spray; 1 spray into each nostril daily  -     guaifenesin-codeine (GUAIFENESIN AC) 100-10 MG/5ML  liquid; Take 5 mL by mouth 3 (three) times a day as needed for cough  -     predniSONE 10 mg tablet; 60mg po q day x2, then 50mg x 2, then 40mg x 2, then 30mg x2, then 20mg x 2, and then 10mg x 2, then d/c.  A/P: Stable. Rest and increase po fluids. Warm salt water gargles. OTC PRN motrin/tylenol. May boost immunity with vit c, vit d, and zinc. Will start  abx, mucinex, INS, and cough med. Start a steroid wean. Hold  need for imaging. Continue current treatment and RTC as scheduled.       History of Present Illness     Non smoking asthmatic WF, presents for several week history of URI s/s. Vaccinated against flu and covid. No travel, but mother is also ill.  Reports non fever or chills, but slight  headaches with nasal congestion with PND and intermittent sore throat. Non productive cough with some SOB  and wheezing.       Cough  This is a new problem. The current episode started in the past 7 days. The problem has been unchanged. The problem occurs every few minutes. The cough is Productive of sputum. Associated symptoms include nasal congestion, postnasal drip, a sore throat, shortness of breath and wheezing. Pertinent negatives include no chest pain, chills, ear congestion, ear pain, fever, headaches, heartburn, hemoptysis, myalgias, rash, rhinorrhea, sweats or weight loss. Nothing aggravates the symptoms.       Review of Systems   Constitutional:  Positive for activity change. Negative for chills, diaphoresis, fatigue, fever and weight loss.   HENT:  Positive for congestion, postnasal drip and sore throat. Negative for ear discharge, ear pain, facial swelling, rhinorrhea, sinus pressure and sinus pain.    Respiratory:  Positive for cough, chest tightness, shortness of breath and wheezing. Negative for hemoptysis.    Cardiovascular:  Negative for chest pain, palpitations and leg swelling.   Gastrointestinal:  Negative for abdominal pain, constipation, diarrhea, heartburn, nausea and vomiting.   Genitourinary:   "Negative for difficulty urinating, dysuria and frequency.   Musculoskeletal:  Negative for arthralgias, gait problem and myalgias.   Skin:  Negative for rash.   Neurological:  Negative for light-headedness and headaches.   Psychiatric/Behavioral:  Negative for confusion. The patient is not nervous/anxious.        Objective     /80 (BP Location: Left arm, Patient Position: Sitting)   Pulse 67   Temp (!) 96.9 °F (36.1 °C) (Tympanic)   Ht 5' 5\" (1.651 m)   Wt 85.4 kg (188 lb 4 oz)   SpO2 98%   BMI 31.33 kg/m²     Physical Exam  Vitals and nursing note reviewed.   Constitutional:       General: She is not in acute distress.     Appearance: Normal appearance. She is not ill-appearing.   HENT:      Head: Normocephalic and atraumatic.      Comments: Sinus tenderness with turbinates red and inflamed. Cobblestoning noted.      Right Ear: Tympanic membrane, ear canal and external ear normal. There is no impacted cerumen.      Left Ear: Tympanic membrane, ear canal and external ear normal. There is no impacted cerumen.      Nose: Congestion and rhinorrhea present.      Mouth/Throat:      Mouth: Mucous membranes are moist.      Pharynx: Posterior oropharyngeal erythema present. No oropharyngeal exudate.   Eyes:      Extraocular Movements: Extraocular movements intact.      Conjunctiva/sclera: Conjunctivae normal.      Pupils: Pupils are equal, round, and reactive to light.   Cardiovascular:      Rate and Rhythm: Normal rate and regular rhythm.      Heart sounds: Normal heart sounds. No murmur heard.  Pulmonary:      Effort: Pulmonary effort is normal. No respiratory distress.      Breath sounds: Normal breath sounds. No wheezing, rhonchi or rales.   Abdominal:      General: Bowel sounds are normal. There is no distension.      Palpations: Abdomen is soft.      Tenderness: There is no abdominal tenderness.   Musculoskeletal:      Cervical back: Neck supple. No tenderness.   Lymphadenopathy:      Cervical: No cervical " "adenopathy.   Neurological:      General: No focal deficit present.      Mental Status: She is alert and oriented to person, place, and time. Mental status is at baseline.   Psychiatric:         Mood and Affect: Mood normal.         Behavior: Behavior normal.         Thought Content: Thought content normal.         Judgment: Judgment normal.       Administrative Statements         Mini neb    Performed by: hCristo Brady DO  Authorized by: Christo Brady DO  Universal Protocol:  Consent: Verbal consent obtained.  Risks and benefits: risks, benefits and alternatives were discussed  Consent given by: patient  Time out: Immediately prior to procedure a \"time out\" was called to verify the correct patient, procedure, equipment, support staff and site/side marked as required.  Patient understanding: patient states understanding of the procedure being performed  Required items: required blood products, implants, devices, and special equipment available  Patient identity confirmed: verbally with patient    Number of treatments:  1  Treatment 1:   Pre-Procedure     Symptoms:  Shortness of breath, cough and difficulty breathing    Lung Sounds:  See H and P    HR:  See VSS    Medication Administered:  Duoneb - Albuterol 2.5 mg/Atrovent 0.5 mg  Post-Procedure     Symptoms:  Wheezing and cough    Lung sounds:  Improved BS and some wheezing.       Scheduled Medication Review:  Pt's scheduled medication use was reviewed by myself/staff via the PDMP website. Pt's use has been found to be appropriate w/o any concerns for misuse by the patient. Pt's current conditions require continued scheduled medication use at this time. Future review for continued appropriate medication use and misuse will continue.   "

## 2024-07-16 ENCOUNTER — OFFICE VISIT (OUTPATIENT)
Dept: INTERNAL MEDICINE CLINIC | Facility: CLINIC | Age: 59
End: 2024-07-16
Payer: COMMERCIAL

## 2024-07-16 VITALS
SYSTOLIC BLOOD PRESSURE: 104 MMHG | TEMPERATURE: 97.1 F | OXYGEN SATURATION: 95 % | BODY MASS INDEX: 30.16 KG/M2 | DIASTOLIC BLOOD PRESSURE: 72 MMHG | HEIGHT: 65 IN | WEIGHT: 181 LBS | HEART RATE: 62 BPM

## 2024-07-16 DIAGNOSIS — M17.0 PRIMARY OSTEOARTHRITIS OF BOTH KNEES: ICD-10-CM

## 2024-07-16 DIAGNOSIS — Z01.818 VISIT FOR PRE-OPERATIVE EXAMINATION: Primary | ICD-10-CM

## 2024-07-16 DIAGNOSIS — J45.20 MILD INTERMITTENT ASTHMA WITHOUT COMPLICATION: ICD-10-CM

## 2024-07-16 DIAGNOSIS — R73.03 PREDIABETES: ICD-10-CM

## 2024-07-16 DIAGNOSIS — Z12.31 SCREENING MAMMOGRAM FOR BREAST CANCER: ICD-10-CM

## 2024-07-16 DIAGNOSIS — L40.50 PSORIATIC ARTHRITIS (HCC): ICD-10-CM

## 2024-07-16 DIAGNOSIS — I10 BENIGN ESSENTIAL HYPERTENSION: ICD-10-CM

## 2024-07-16 DIAGNOSIS — Z12.11 SCREENING FOR COLON CANCER: ICD-10-CM

## 2024-07-16 PROCEDURE — 93000 ELECTROCARDIOGRAM COMPLETE: CPT | Performed by: INTERNAL MEDICINE

## 2024-07-16 PROCEDURE — 99214 OFFICE O/P EST MOD 30 MIN: CPT | Performed by: INTERNAL MEDICINE

## 2024-07-16 NOTE — PROGRESS NOTES
Ambulatory Visit  Name: Carolann Foy      : 1965      MRN: [id number]  Encounter Provider: Christo Brady DO  Encounter Date: 2024   Encounter department: [facility]    Assessment & Plan   1. Visit for pre-operative examination  -     POCT ECG  2. Primary osteoarthritis of both knees  3. Psoriatic arthritis (HCC)  4. Benign essential hypertension  5. Mild intermittent asthma without complication  6. Prediabetes  7. Screening mammogram for breast cancer  8. Screening for colon cancer     A/P: PAT tests reviewed and c/o labs, urine, EKG, and CXR. Labs and CXR acceptable. EKG wnl. Urine with leuko, but no nitrates and pt was recently treated for URI with abx and steroids. . Pt and co-morbidities are stable. Pt is a Dillon's Class I and carries a cardiac risk of about 1%. Recommend holding any ASA, NSAID's, omega 3, and MVT one week prior to the procedure, unless surgeon wants any vitamins or minerals as preop meds and also ASA if they wish. . On the day of sx, may take with a sip of water, her metoprolol, omeprazole, and if needed, her D[place]. May resume the rest of her meds once taking po. Aggressive pulmonary secretion control and bronchoconstriction control due to h/o asthma.  Aggressive DVT prophylaxis advised.  Recommend taking ATC OTC tylenol 48 hours prior to sx for pain management assistance. Note pt has been on steroids in the past and to watch for any adrenal crisis/suppression, but is not on the long term. . Also, healing may be compromised. No other recs at this time.  Thanks and good luck.       History of Present Illness     WF presents at the request of Dr. Forrester for pre-op eval for upcoming right TKR  tentatively scheduled for 24. Since last visit, doing well and no recent illnesses, except for a URI that was treated.. Remains active w/o difficulty and no falls. No travel history. Denies depression. No recent illnesses. No fever, chills, or sweats. No unexplained wt  "changes. Denies CP, SOB, or palpitations. No edema. No orthopnea or PND. No sz or syncope. No changes in bowel or bladder habits. PMH includes HTN, Asthma, prediabetes, allergies, GERD, migraines, Psoriatic arthritis, DJD, RYNE, fibromyalgia, vit d def, and immunosuppression. . Past sx include tonsils, tubal, , hysterectomy, choly, appy, foot, hand, and joint replacement and reports no problems with prior procedures or anesthesia. Denies smoking and social ETOH use. No history of DVT or PE. NO history of bleeding issues and is not on anti-coagulants. Denies dental plates. Denies C spine issues. No objections to getting blood products if deemed necessary. Had PAT testing done.            Review of Systems   Constitutional:  Positive for activity change. Negative for chills, diaphoresis, fatigue and fever.   HENT: Negative.     Eyes:  Negative for visual disturbance.   Respiratory:  Negative for cough, chest tightness, shortness of breath and wheezing.    Cardiovascular:  Negative for chest pain, palpitations and leg swelling.   Gastrointestinal:  Negative for abdominal pain, constipation, diarrhea, nausea and vomiting.   Endocrine: Negative for cold intolerance and heat intolerance.   Genitourinary:  Negative for difficulty urinating, dysuria and frequency.   Musculoskeletal:  Positive for arthralgias and gait problem. Negative for myalgias.   Neurological:  Negative for dizziness, seizures, syncope, weakness, light-headedness and headaches.   Psychiatric/Behavioral:  Negative for confusion, dysphoric mood and sleep disturbance. The patient is not nervous/anxious.        Objective     /72   Pulse 62   Temp (!) 97.1 °F (36.2 °C)   Ht 5' 5\" (1.651 m)   Wt 82.1 kg (181 lb)   SpO2 95%   BMI 30.12 kg/m²     Physical Exam  Vitals and nursing note reviewed.   Constitutional:       General: She is not in acute distress.     Appearance: Normal appearance. She is not ill-appearing.   HENT:      Head: " Normocephalic and atraumatic.      Comments: No oropharyngeal obstructions.      Mouth/Throat:      Mouth: Mucous membranes are moist.   Eyes:      Extraocular Movements: Extraocular movements intact.      Conjunctiva/sclera: Conjunctivae normal.      Pupils: Pupils are equal, round, and reactive to light.   Neck:      Vascular: No carotid bruit.      Comments: No C spine restriction.  Cardiovascular:      Rate and Rhythm: Normal rate and regular rhythm.      Heart sounds: Normal heart sounds. No murmur heard.  Pulmonary:      Effort: Pulmonary effort is normal. No respiratory distress.      Breath sounds: Normal breath sounds. No wheezing, rhonchi or rales.   Abdominal:      General: Bowel sounds are normal. There is no distension.      Palpations: Abdomen is soft.      Tenderness: There is no abdominal tenderness.   Musculoskeletal:         General: Tenderness present. No swelling. Normal range of motion.      Cervical back: Normal range of motion and neck supple. No rigidity or tenderness.      Right lower leg: No edema.      Left lower leg: No edema.   Lymphadenopathy:      Cervical: No cervical adenopathy.   Neurological:      General: No focal deficit present.      Mental Status: She is alert and oriented to person, place, and time. Mental status is at baseline.      Cranial Nerves: No cranial nerve deficit.      Motor: No weakness.      Coordination: Coordination normal.      Gait: Gait normal.      Deep Tendon Reflexes: Reflexes normal.   Psychiatric:         Mood and Affect: Mood normal.         Behavior: Behavior normal.         Thought Content: Thought content normal.         Judgment: Judgment normal.       Administrative Statements

## 2024-07-25 DIAGNOSIS — M54.42 ACUTE MIDLINE LOW BACK PAIN WITH LEFT-SIDED SCIATICA: ICD-10-CM

## 2024-07-25 DIAGNOSIS — M48.07 SPINAL STENOSIS OF LUMBOSACRAL REGION: ICD-10-CM

## 2024-07-25 DIAGNOSIS — E55.9 VITAMIN D DEFICIENCY: ICD-10-CM

## 2024-07-25 RX ORDER — METHOCARBAMOL 750 MG/1
750 TABLET, FILM COATED ORAL EVERY 6 HOURS PRN
Qty: 90 TABLET | Refills: 0 | Status: SHIPPED | OUTPATIENT
Start: 2024-07-25

## 2024-07-25 RX ORDER — ERGOCALCIFEROL 1.25 MG/1
CAPSULE ORAL
Qty: 12 CAPSULE | Refills: 1 | Status: SHIPPED | OUTPATIENT
Start: 2024-07-25

## 2024-07-26 ENCOUNTER — OFFICE VISIT (OUTPATIENT)
Dept: PHYSICAL THERAPY | Facility: CLINIC | Age: 59
End: 2024-07-26
Payer: COMMERCIAL

## 2024-07-26 DIAGNOSIS — Z96.651 STATUS POST RIGHT KNEE REPLACEMENT: Primary | ICD-10-CM

## 2024-07-26 DIAGNOSIS — M25.561 ACUTE PAIN OF RIGHT KNEE: ICD-10-CM

## 2024-07-26 PROCEDURE — 97110 THERAPEUTIC EXERCISES: CPT | Performed by: PHYSICAL THERAPIST

## 2024-07-26 PROCEDURE — 97164 PT RE-EVAL EST PLAN CARE: CPT | Performed by: PHYSICAL THERAPIST

## 2024-07-26 NOTE — PROGRESS NOTES
PT Re-Evaluation     Today's date: 2024  Patient name: Carolann Foy  : 1965  MRN: [id number]  Referring provider: Robin Unger MD  Dx:   Encounter Diagnosis     ICD-10-CM    1. Status post right knee replacement  Z96.651       2. Acute pain of right knee  M25.561           Start Time: 0930  Stop Time: 1015  Total time in clinic (min): 45 minutes    Assessment  Impairments: abnormal gait, abnormal or restricted ROM, activity intolerance, impaired balance, impaired physical strength, lacks appropriate home exercise program and pain with function  Symptom irritability: moderate    Assessment details: Patient was educated on proper icing and signs of infection and DVT were discussed with the patient. Transfers were also review and patient demonstrated good transfer technique with sit to stand, stand to sit, and getting into the car. Patient had 71 degrees of active flexion and 76 degrees of passive. Patient also had -10 degrees of extension and -8 passively. Patient stated she had more pain with knee flexion as compared to extension as well. Patient did well with exercises done today and was able to perform 3 SLR with assistance. Patient demonstrated good quad contraction as well. Patient did walk with a heel to toe gait pattern and demonstrated decreased gait speed and stride length.   Understanding of Dx/Px/POC: good     Prognosis: good    Goals  STG:  Patient will be independent with HEP within 2-3 visits.  Patient will increase knee flexion ROM from 71 to 90 within 2-3 weeks to allow her to get into her car with less difficulty.  Patient will be able to ambulate for 15 minutes without pain exceeding a 4/10 within 3-4 weeks to allow her to watch her grandchildren.     LTG:  Patient will increase FOTO score from 14 to 52 by discharge to indicated increased tolerance to functional activities.   Patient will be able to ambulate a community distance by discharge to allow her to return to walking her dog  daily.  Patient will increase knee flexion ROM from 71 to 120 to allow her step into her shower with less difficulty.  Patient will increase knee extensor strength to a 4+ by discharge to allow her to climb stairs.       Plan  Patient would benefit from: skilled physical therapy    Planned therapy interventions: manual therapy, balance, balance/weight bearing training, ADL retraining, neuromuscular re-education, strengthening, stretching, therapeutic activities, therapeutic exercise, flexibility, functional ROM exercises, gait training and home exercise program    Frequency: 2x week  Duration in weeks: 8  Plan of Care beginning date: 7/26/2024  Plan of Care expiration date: 9/20/2024  Treatment plan discussed with: patient  Plan details: The plan is to work on building strength of the knee musculature and work to restore motion of the knee. Strengthening, ROM exercises, manual therapy, and balance exercises will be utilized to address functional deficits and return the patient to PLOF. Patient informed that from this point forward, to ensure adherence to the aforementioned plan of care, all or some of the treatment may be performed and carried out by a Physical Therapy Assistant (PTA) with supervision from a licensed Physical Therapist (PT) in accordance with [facility] Physical Therapy Practice Act.           Subjective Evaluation    History of Present Illness  Date of surgery: 7/24/2024  Mechanism of injury: surgery  Mechanism of injury: Patient presents to physical therapy s/p R TKA performed on 7/24/2024. Patient stated she has been getting pain on the anterior aspect of the knee notably by around the incision site and on the medial aspect of the knee. Patient stated she has not been able to sleep well and has been sleeping about 45 minutes a night. She stated she feels she cannot get comfortable in the bed or recliner. She has been icing frequently and stated the medication has been helping. She has  "been doing home exercises 5x a day since being discharged from the hospital as well. She stated she has been doing well with walking and stairs. She stated she has been trying to walk once an hour as well. She stated she has been having the most trouble with getting into the car and out of her recliner.   Quality of life: good    Patient Goals  Patient goals for therapy: decreased edema, decreased pain, improved balance, increased motion, increased strength, independence with ADLs/IADLs and return to sport/leisure activities  Patient goal: Patient stated she would like to be able to walk 0.75 miles to walk her dog and return to her normal daily activities.  Pain  Current pain ratin  At best pain ratin  At worst pain ratin  Location: R knee  Quality: dull ache, burning and throbbing  Relieving factors: ice, medications and rest    Social Support  Steps to enter house: yes  4  Stairs in house: yes   Lives in: multiple-level home  Lives with: spouse          Objective     Observations     Right Knee   Positive for edema and incision.     Additional Observation Details  Patient's had an incision over the anterior aspect of her knee with steri-strips. No drainage was seen and some bruising was present on the upper medial thigh. Patient had some edema as well.     Active Range of Motion   Left Knee   Normal active range of motion    Right Knee   Flexion: 71 degrees   Extension: -10 degrees     Passive Range of Motion   Left Knee   Normal passive range of motion    Right Knee   Flexion: 76 degrees   Extension: -8 degrees     Strength/Myotome Testing     Right Knee   Quadriceps contraction: good    Tests     Right Ankle/Foot   Negative for Homans' sign.              Precautions: Hx Lumbar Fusion      Date        FOTO 14 SC       Manuals        Gastroc stretch 4x 15\" AC                               Neuro Re-Ed        QS 5\" 15x       TC 5\" 15x       Glute sets 20x                                       Ther Ex " "       Ankle pumps 20x       Heel slides 5\" 15x                                                       Ther Activity                        Gait Training                        Modalities        CP 10m                     "

## 2024-07-30 ENCOUNTER — OFFICE VISIT (OUTPATIENT)
Dept: PHYSICAL THERAPY | Facility: CLINIC | Age: 59
End: 2024-07-30
Payer: COMMERCIAL

## 2024-07-30 DIAGNOSIS — M25.561 ACUTE PAIN OF RIGHT KNEE: ICD-10-CM

## 2024-07-30 DIAGNOSIS — Z96.651 STATUS POST RIGHT KNEE REPLACEMENT: Primary | ICD-10-CM

## 2024-07-30 PROCEDURE — 97110 THERAPEUTIC EXERCISES: CPT | Performed by: PHYSICAL THERAPIST

## 2024-07-30 PROCEDURE — 97112 NEUROMUSCULAR REEDUCATION: CPT | Performed by: PHYSICAL THERAPIST

## 2024-07-30 NOTE — PROGRESS NOTES
"Daily Note     Today's date: 2024  Patient name: Carolann Foy  : 1965  MRN: [id number]  Referring provider: Robin Unger MD  Dx:   Encounter Diagnosis     ICD-10-CM    1. Status post right knee replacement  Z96.651       2. Acute pain of right knee  M25.561           Start Time: 1325  Stop Time: 1415  Total time in clinic (min): 50 minutes    Subjective: Patient reports that she has been getting around a little easier at home. She continues to use the walker as needed for assistance around her home and when going outside of her home. She notes that she has tried to walk around outside of her home.       Objective: See treatment diary below      Assessment: Tolerated treatment well. Patient demonstrated fatigue post treatment, exhibited good technique with therapeutic exercises, and would benefit from continued PT Patient continues with difficulties with R quad strength. She reports a burning sensation under her R patella during assisted SAQ exercises today. She did well with progression of standing interventions and continues to show improvements with R knee rom compared to her last session.       Plan: Continue per plan of care.  Progress treatment as tolerated.       Precautions: Hx Lumbar Fusion      Date       FOTO 14 SC       Manuals        Gastroc stretch 4x 15\" AC 30\" 4x                              Neuro Re-Ed        QS 5\" 15x 5\" 20x      TC 5\" 15x 5\" 20x      Glute sets 20x                                       Ther Ex        Ankle pumps 20x HR 20x      Heel slides 5\" 15x 5\" 15x      SHC  20x      Standing marches  20x      Standing hip abduction  20x      SAQ  Aarom 15x                      Ther Activity        Step ups  4\" 15x              Gait Training                        Modalities        CP 10m 10 min                    "

## 2024-08-02 ENCOUNTER — OFFICE VISIT (OUTPATIENT)
Dept: PHYSICAL THERAPY | Facility: CLINIC | Age: 59
End: 2024-08-02
Payer: COMMERCIAL

## 2024-08-02 DIAGNOSIS — M25.561 ACUTE PAIN OF RIGHT KNEE: ICD-10-CM

## 2024-08-02 DIAGNOSIS — Z96.651 STATUS POST RIGHT KNEE REPLACEMENT: Primary | ICD-10-CM

## 2024-08-02 PROCEDURE — 97112 NEUROMUSCULAR REEDUCATION: CPT

## 2024-08-02 PROCEDURE — 97110 THERAPEUTIC EXERCISES: CPT

## 2024-08-02 NOTE — PROGRESS NOTES
"Daily Note     Today's date: 2024  Patient name: Carolann Foy  : 1965  MRN: [id number]  Referring provider: Robin Unger MD  Dx:   Encounter Diagnosis     ICD-10-CM    1. Status post right knee replacement  Z96.651       2. Acute pain of right knee  M25.561           Start Time: 0800  Stop Time: 0850  Total time in clinic (min): 50 minutes    Subjective: The patient states that she had to take her mother to the ER on Wednesday and she ended up sitting in a chair for 7 hours. She states that her knee felt very swollen and stiff after sitting. She states that her knee pain rated 6-7/10 after sitting. She states that her pain is much better this morning and rates her pain at 1-2/10.       Objective: See treatment diary below      Assessment: Tolerated treatment well. Added tandem stance with good tolerance and no loss of balance. The patient was able to complete all exercises without increase in pain. The patient did not some tightness in anterior knee while performing heel slides and standing knee flexion. Patient exhibited good technique with therapeutic exercises and would benefit from continued PT      Plan: Continue per plan of care.      Precautions: Hx Lumbar Fusion      Date      FOTO 14 SC       Manuals        Gastroc stretch 4x 15\" AC 30\" 4x 30\" 4x                             Neuro Re-Ed        QS 5\" 15x 5\" 20x 5\" 20x     TC 5\" 15x 5\" 20x 5\" 20x     Glute sets 20x  20x     Tandem stance   20\" x3 ea                             Ther Ex        Ankle pumps 20x HR 20x HR 20x     Heel slides 5\" 15x 5\" 15x 5\" 15x     SHC  20x 20x     Standing marches  20x 20x     Standing hip abduction  20x 20x     SAQ  Aarom 15x AAROM 15x     NuStep   L3 5'             Ther Activity        Step ups  4\" 15x 6\" 15x             Gait Training                        Modalities        CP 10m 10 min 10 min                     "

## 2024-08-03 DIAGNOSIS — J32.9 SINOBRONCHITIS: ICD-10-CM

## 2024-08-03 DIAGNOSIS — J40 SINOBRONCHITIS: ICD-10-CM

## 2024-08-04 RX ORDER — FLUTICASONE PROPIONATE 50 MCG
SPRAY, SUSPENSION (ML) NASAL
Qty: 24 ML | Refills: 2 | Status: SHIPPED | OUTPATIENT
Start: 2024-08-04

## 2024-08-06 ENCOUNTER — OFFICE VISIT (OUTPATIENT)
Dept: PHYSICAL THERAPY | Facility: CLINIC | Age: 59
End: 2024-08-06
Payer: COMMERCIAL

## 2024-08-06 DIAGNOSIS — Z96.651 STATUS POST RIGHT KNEE REPLACEMENT: Primary | ICD-10-CM

## 2024-08-06 DIAGNOSIS — M25.561 ACUTE PAIN OF RIGHT KNEE: ICD-10-CM

## 2024-08-06 PROCEDURE — 97110 THERAPEUTIC EXERCISES: CPT | Performed by: PHYSICAL THERAPIST

## 2024-08-06 PROCEDURE — 97112 NEUROMUSCULAR REEDUCATION: CPT | Performed by: PHYSICAL THERAPIST

## 2024-08-06 NOTE — PROGRESS NOTES
"Daily Note     Today's date: 2024  Patient name: Carolann Foy  : 1965  MRN: [id number]  Referring provider: Robin Unger MD  Dx:   Encounter Diagnosis     ICD-10-CM    1. Status post right knee replacement  Z96.651       2. Acute pain of right knee  M25.561           Start Time: 0757  Stop Time: 0846  Total time in clinic (min): 49 minutes    Subjective: Patient reports that she is noticing that the burning under her patella is starting to decrease. She continues with greatest amount of pain at night when trying to sleep. She feels that the leg is still swollen but she is getting around pretty well without the use of an assistive device at home but uses a cane when going out.       Objective: See treatment diary below      Assessment: Tolerated treatment well. Patient demonstrated fatigue post treatment, exhibited good technique with therapeutic exercises, and would benefit from continued PT Patient is requiring less assistance with leg lift today. There was better tolerance for seated LAQ than SAQ today as she continues to have some weakness through her terminal knee extension. There was good tolerance for progression of therapy interventions today.       Plan: Continue per plan of care.  Progress treatment as tolerated.       Precautions: Hx Lumbar Fusion      Date      FOTO 14 SC       Manuals        Gastroc stretch 4x 15\" AC 30\" 4x 30\" 4x 30\" 4x                             Neuro Re-Ed        QS 5\" 15x 5\" 20x 5\" 20x 5\" 20x    TC 5\" 15x 5\" 20x 5\" 20x 5\" 20x    Glute sets 20x  20x     Tandem stance   20\" x3 ea                             Ther Ex        Ankle pumps 20x HR 20x HR 20x HR 20x    Heel slides 5\" 15x 5\" 15x 5\" 15x     SHC  20x 20x 20x    Standing marches  20x 20x 20x    Standing hip abduction  20x 20x 20x    SAQ  Aarom 15x AAROM 15x     NuStep   L3 5' L5 5 min    SLR    Aarom 10x     Ther Activity        Step ups  4\" 15x 6\" 15x 6\" 20x    Mini squats    2x10    Gait Training   "                      Modalities        CP 10m 10 min 10 min 10 min

## 2024-08-09 ENCOUNTER — OFFICE VISIT (OUTPATIENT)
Dept: PHYSICAL THERAPY | Facility: CLINIC | Age: 59
End: 2024-08-09
Payer: COMMERCIAL

## 2024-08-09 DIAGNOSIS — Z96.651 STATUS POST RIGHT KNEE REPLACEMENT: ICD-10-CM

## 2024-08-09 DIAGNOSIS — M25.561 ACUTE PAIN OF RIGHT KNEE: Primary | ICD-10-CM

## 2024-08-09 PROCEDURE — 97112 NEUROMUSCULAR REEDUCATION: CPT

## 2024-08-09 PROCEDURE — 97530 THERAPEUTIC ACTIVITIES: CPT

## 2024-08-09 PROCEDURE — 97110 THERAPEUTIC EXERCISES: CPT

## 2024-08-09 NOTE — PROGRESS NOTES
"Daily Note     Today's date: 2024  Patient name: Carolann Foy  : 1965  MRN: [id number]  Referring provider: Robin Unger MD  Dx:   Encounter Diagnosis     ICD-10-CM    1. Acute pain of right knee  M25.561       2. Status post right knee replacement  Z96.651           Start Time: 0755  Stop Time: 0845  Total time in clinic (min): 50 minutes    Subjective:  I am feeling pretty good. I took my last pain med last week. I am icing at home . I do use the cane to get around.       Objective: See treatment diary below      Assessment: Tolerated treatment well. Patient would benefit from continued PT   we added 2# today for some standing exercises today., she was able to do a seated LAQ today with some fatigue noted.  She had some discoloration over her inner right upper thigh. She continues to wear the compression stockings today .  Pt was able to do a SLR today with very little assistance.  She was able to do SAQ on her own.   We will continue to work on her motion and strength. She is showing improvement with her gait as well.  We ended with ice today .       Plan: Continue per plan of care.      Precautions: Hx Lumbar Fusion      Date    FOTO 14 SC       Manuals        Gastroc stretch 4x 15\" AC 30\" 4x 30\" 4x 30\" 4x  30\" 4 x                            Neuro Re-Ed        QS 5\" 15x 5\" 20x 5\" 20x 5\" 20x 5\" 20 X   TC 5\" 15x 5\" 20x 5\" 20x 5\" 20x 5\" 20 X   Glute sets 20x  20x     Tandem stance   20\" x3 ea  20 X 3    Tandem walk     Foam 5 laps                   Ther Ex        Ankle pumps 20x HR 20x HR 20x HR 20x Hr  20 X   Heel slides 5\" 15x 5\" 15x 5\" 15x     SHC  20x 20x 20x 20 X 2 #    Standing marches  20x 20x 20x 20 X   Standing hip abduction  20x 20x 20x 20 X2#   SAQ/LAQ  Aarom 15x AAROM 15x  20x   NuStep   L3 5' L5 5 min L 5 8 MIN   SLR    Aarom 10x  10 X    Ther Activity        Step ups  4\" 15x 6\" 15x 6\" 20x 6\" 20 X   Mini squats    2x10 20 X   Gait Training                      "   Modalities        CP 10m 10 min 10 min 10 min 10 MIN

## 2024-08-13 ENCOUNTER — OFFICE VISIT (OUTPATIENT)
Dept: PHYSICAL THERAPY | Facility: CLINIC | Age: 59
End: 2024-08-13
Payer: COMMERCIAL

## 2024-08-13 DIAGNOSIS — Z96.651 STATUS POST RIGHT KNEE REPLACEMENT: ICD-10-CM

## 2024-08-13 DIAGNOSIS — M25.561 ACUTE PAIN OF RIGHT KNEE: Primary | ICD-10-CM

## 2024-08-13 PROCEDURE — 97530 THERAPEUTIC ACTIVITIES: CPT

## 2024-08-13 PROCEDURE — 97110 THERAPEUTIC EXERCISES: CPT

## 2024-08-13 PROCEDURE — 97112 NEUROMUSCULAR REEDUCATION: CPT

## 2024-08-13 NOTE — PROGRESS NOTES
"Daily Note     Today's date: 2024  Patient name: Carolann Foy  : 1965  MRN: [id number]  Referring provider: Robin Unger MD  Dx:   Encounter Diagnosis     ICD-10-CM    1. Acute pain of right knee  M25.561       2. Status post right knee replacement  Z96.651           Start Time: 0755  Stop Time: 0845  Total time in clinic (min): 50 minutes    Subjective: Pt expresses satisfaction with her progress so far. She does note some persisting swelling/discomfort.  MD F/U 2024.      Objective: See treatment diary below      Assessment: Tolerated treatment well. Patient exhibited good technique with therapeutic exercises. PTA added expanded program per PT POC; added SLR c hang for home.      Plan: Continue per plan of care.      Precautions: Hx Lumbar Fusion      Date    FOTO Ds  59       Manuals        Gastroc stretch ds 30\" 4x 30\" 4x 30\" 4x  30\" 4 x    PROM R knee Ds +hs opp k-c               Neuro Re-Ed        QS 20x 5\" 5\" 20x 5\" 20x 5\" 20x 5\" 20 X   TC 20x 5\" 5\" 20x 5\" 20x 5\" 20x 5\" 20 X   Tandem stance- foam 2x 15\"  20\" x3 ea  20 X 3    Tandem walk-foam F/B 5 laps    Foam 5 laps   Side to side-foam 5 laps               Ther Ex        Heel Raise 20x HR 20x HR 20x HR 20x Hr  20 X   SHC 2# 20x 20x 20x 20x 20 X 2 #    Standing marches 20x  20x 20x 20x 20 X   Standing hip abduction/ ext + ext 20x ea 2#  20x 20x 20x 20 X2#   SAQ/LAQ 20x 5\" Aarom 15x AAROM 15x  20x   NuStep 8m L5  L3 5' L5 5 min L 5 8 MIN   SLR 15x    Aarom 10x  10 X    Standing gastroc stretch 5x 15\"       Ther Activity        Step ups 6\" 20x 4\" 15x 6\" 15x 6\" 20x 6\" 20 X   Mini squats 20x    2x10 20 X   Gait Training                        Modalities        CP 10m 10 min 10 min 10 min 10 MIN                         "

## 2024-08-16 ENCOUNTER — APPOINTMENT (OUTPATIENT)
Dept: PHYSICAL THERAPY | Facility: CLINIC | Age: 59
End: 2024-08-16
Payer: COMMERCIAL

## 2024-08-20 ENCOUNTER — EVALUATION (OUTPATIENT)
Dept: PHYSICAL THERAPY | Facility: CLINIC | Age: 59
End: 2024-08-20
Payer: COMMERCIAL

## 2024-08-20 DIAGNOSIS — Z96.651 STATUS POST RIGHT KNEE REPLACEMENT: ICD-10-CM

## 2024-08-20 DIAGNOSIS — M25.561 ACUTE PAIN OF RIGHT KNEE: Primary | ICD-10-CM

## 2024-08-20 PROCEDURE — 97530 THERAPEUTIC ACTIVITIES: CPT | Performed by: PHYSICAL THERAPIST

## 2024-08-20 PROCEDURE — 97110 THERAPEUTIC EXERCISES: CPT | Performed by: PHYSICAL THERAPIST

## 2024-08-20 NOTE — PROGRESS NOTES
PT Re-Evaluation     Today's date: 2024  Patient name: Carolann Foy  : 1965  MRN: [id number]  Referring provider: Robin Unger MD  Dx:   Encounter Diagnosis     ICD-10-CM    1. Acute pain of right knee  M25.561       2. Status post right knee replacement  Z96.651           Start Time: 0758  Stop Time: 0843  Total time in clinic (min): 45 minutes    Assessment  Impairments: abnormal gait, abnormal or restricted ROM, activity intolerance, impaired balance, impaired physical strength, lacks appropriate home exercise program and pain with function  Symptom irritability: moderate    Assessment details: Patient has attended 9 physical therapy visits to date. She is demonstrating improved rom and strength to the R LE when compared to the initial evaluation. She continues to have greatest deficit with R knee extension strength. There was difficulties noted during performance of step down intervention secondary to R quad strength deficit. This deficit was addressed with progression of strengthening interventions which were also reviewed for her HEP.  Understanding of Dx/Px/POC: good     Prognosis: good    Goals  STG:  Patient will be independent with HEP within 2-3 visits. - MET  Patient will increase knee flexion ROM from 71 to 90 within 2-3 weeks to allow her to get into her car with less difficulty. - MET  Patient will be able to ambulate for 15 minutes without pain exceeding a 4/10 within 3-4 weeks to allow her to watch her grandchildren. - MET    LTG:  Patient will increase FOTO score from 14 to 52 by discharge to indicated increased tolerance to functional activities. - MET  Patient will be able to ambulate a community distance by discharge to allow her to return to walking her dog daily.- progressing  Patient will increase knee flexion ROM from 71 to 120 to allow her step into her shower with less difficulty.- MET  Patient will increase knee extensor strength to a 4+ by discharge to allow her to climb  stairs. - Progressing      Plan  Patient would benefit from: skilled physical therapy    Planned therapy interventions: manual therapy, balance, balance/weight bearing training, ADL retraining, neuromuscular re-education, strengthening, stretching, therapeutic activities, therapeutic exercise, flexibility, functional ROM exercises, gait training and home exercise program    Frequency: 1-2x week  Duration in weeks: 2  Plan of Care beginning date: 8/20/2024  Plan of Care expiration date: 9/3/2024  Treatment plan discussed with: patient  Plan details: The plan is to work on building strength of the knee musculature and work to restore motion of the knee. Strengthening, ROM exercises, manual therapy, and balance exercises will be utilized to address functional deficits and return the patient to PLOF. Patient informed that from this point forward, to ensure adherence to the aforementioned plan of care, all or some of the treatment may be performed and carried out by a Physical Therapy Assistant (PTA) with supervision from a licensed Physical Therapist (PT) in accordance with [facility] Physical Therapy Practice Act.    Plan on D/C at the conclusion of her next session.            Subjective Evaluation    History of Present Illness  Date of surgery: 7/24/2024  Mechanism of injury: surgery  Mechanism of injury: Patient presents to physical therapy s/p R TKA performed on 7/24/2024. Patient stated she has been getting pain on the anterior aspect of the knee notably by around the incision site and on the medial aspect of the knee. Patient stated she has not been able to sleep well and has been sleeping about 45 minutes a night. She stated she feels she cannot get comfortable in the bed or recliner. She has been icing frequently and stated the medication has been helping. She has been doing home exercises 5x a day since being discharged from the hospital as well. She stated she has been doing well with walking and  stairs. She stated she has been trying to walk once an hour as well. She stated she has been having the most trouble with getting into the car and out of her recliner.     Update 2024:  Patient reports that she saw the doctor yesterday and he is pleased with her progress thus far. She reports that her endurance is slowly improving but that she still has some weakness in her quad. She is only negotiating stairs with a step to gait pattern. She does find that when she is walking on uneven ground such as the grass this is getting easier. She has no difficulties getting into or out of the bath.   Quality of life: good    Patient Goals  Patient goals for therapy: decreased edema, decreased pain, improved balance, increased motion, increased strength, independence with ADLs/IADLs and return to sport/leisure activities  Patient goal: Patient stated she would like to be able to walk 0.75 miles to walk her dog and return to her normal daily activities.  Pain  Current pain ratin  At best pain ratin  At worst pain rating: 3  Location: R knee  Quality: dull ache, throbbing and discomfort  Relieving factors: ice, medications and rest    Social Support  Steps to enter house: yes  4  Stairs in house: yes   Lives in: multiple-level home  Lives with: spouse        Objective     Observations     Right Knee   Negative for edema and incision.     Active Range of Motion   Left Knee   Normal active range of motion    Right Knee   Flexion: 121 degrees   Extension: -3 degrees     Passive Range of Motion   Left Knee   Normal passive range of motion    Right Knee   Flexion: 129 degrees   Extension: -3 degrees     Strength/Myotome Testing     Right Knee   Flexion: 4+  Extension: 3+  Quadriceps contraction: good    Tests     Right Ankle/Foot   Negative for Homans' sign.       Flowsheet Rows      Flowsheet Row Most Recent Value   PT/OT G-Codes    Current Score 14   Projected Score 52               Precautions: Hx Lumbar  "Fusion      Date 8/13 8/20 Reassess 8/2 8/6 8/9   FOTO Ds  59 66 SC      Manuals        Gastroc stretch ds  30\" 4x 30\" 4x  30\" 4 x    PROM R knee Ds +hs opp k-c               Neuro Re-Ed        QS 20x 5\"  5\" 20x 5\" 20x 5\" 20 X   TC 20x 5\"  5\" 20x 5\" 20x 5\" 20 X   SLS - foam 2x 15\" 20\" 5x 20\" x3 ea  20 X 3    Tandem walk-foam F/B 5 laps    Foam 5 laps   Side to side-foam 5 laps               Ther Ex        Heel Raise 20x  HR 20x HR 20x Hr  20 X   SHC 2# 20x  20x 20x 20 X 2 #    Standing marches 20x   20x 20x 20 X   Standing hip abduction/ ext + ext 20x ea 2#   20x 20x 20 X2#   SAQ/LAQ 20x 5\" 2# 5\" 3x10 AAROM 15x  20x   Bike for strengthening and mobility 8m L5 L1 5 min L3 5' L5 5 min L 5 8 MIN   SLR 15x    Aarom 10x  10 X    Standing gastroc stretch 5x 15\"       Ther Activity        Step down  4\" 15x      Step ups 6\" 20x 6\" 20x 6\" 15x 6\" 20x 6\" 20 X   Mini squats 20x  2x15  2x10 20 X   Gait Training                        Modalities        CP 10m  10 min 10 min 10 MIN                 "

## 2024-08-20 NOTE — LETTER
2024    Robin Unger MD  [address] [facility] [address]    Patient: Carolann Foy   YOB: 1965   Date of Visit: 2024     Encounter Diagnosis     ICD-10-CM    1. Acute pain of right knee  M25.561       2. Status post right knee replacement  Z96.651           Dear Dr. Unger:    Thank you for your recent referral of Carolann Foy. Please review the attached evaluation summary from Carolann's recent visit.     Please verify that you agree with the plan of care by signing the attached order.     If you have any questions or concerns, please do not hesitate to call.     I sincerely appreciate the opportunity to share in the care of one of your patients and hope to have another opportunity to work with you in the near future.       Sincerely,    Víctor Cespedes, PT      Referring Provider:      I certify that I have read the below Plan of Care and certify the need for these services furnished under this plan of treatment while under my care.                    Robin Unger MD  [address] [facility] [address]  Via Fax: [phone]          PT Re-Evaluation     Today's date: 2024  Patient name: Carolann Foy  : 1965  MRN: [id number]  Referring provider: Robin Unger MD  Dx:   Encounter Diagnosis     ICD-10-CM    1. Acute pain of right knee  M25.561       2. Status post right knee replacement  Z96.651           Start Time: 0758  Stop Time: 0843  Total time in clinic (min): 45 minutes    Assessment  Impairments: abnormal gait, abnormal or restricted ROM, activity intolerance, impaired balance, impaired physical strength, lacks appropriate home exercise program and pain with function  Symptom irritability: moderate    Assessment details: Patient has attended 9 physical therapy visits to date. She is demonstrating improved rom and strength to the R LE when compared to the initial evaluation. She continues to have greatest deficit with R knee extension  strength. There was difficulties noted during performance of step down intervention secondary to R quad strength deficit. This deficit was addressed with progression of strengthening interventions which were also reviewed for her HEP.  Understanding of Dx/Px/POC: good     Prognosis: good    Goals  STG:  Patient will be independent with HEP within 2-3 visits. - MET  Patient will increase knee flexion ROM from 71 to 90 within 2-3 weeks to allow her to get into her car with less difficulty. - MET  Patient will be able to ambulate for 15 minutes without pain exceeding a 4/10 within 3-4 weeks to allow her to watch her grandchildren. - MET    LTG:  Patient will increase FOTO score from 14 to 52 by discharge to indicated increased tolerance to functional activities. - MET  Patient will be able to ambulate a community distance by discharge to allow her to return to walking her dog daily.- progressing  Patient will increase knee flexion ROM from 71 to 120 to allow her step into her shower with less difficulty.- MET  Patient will increase knee extensor strength to a 4+ by discharge to allow her to climb stairs. - Progressing      Plan  Patient would benefit from: skilled physical therapy    Planned therapy interventions: manual therapy, balance, balance/weight bearing training, ADL retraining, neuromuscular re-education, strengthening, stretching, therapeutic activities, therapeutic exercise, flexibility, functional ROM exercises, gait training and home exercise program    Frequency: 1-2x week  Duration in weeks: 2  Plan of Care beginning date: 8/20/2024  Plan of Care expiration date: 9/3/2024  Treatment plan discussed with: patient  Plan details: The plan is to work on building strength of the knee musculature and work to restore motion of the knee. Strengthening, ROM exercises, manual therapy, and balance exercises will be utilized to address functional deficits and return the patient to PLOF. Patient informed that from  this point forward, to ensure adherence to the aforementioned plan of care, all or some of the treatment may be performed and carried out by a Physical Therapy Assistant (PTA) with supervision from a licensed Physical Therapist (PT) in accordance with [facility] Physical Therapy Practice Act.    Plan on D/C at the conclusion of her next session.            Subjective Evaluation    History of Present Illness  Date of surgery: 7/24/2024  Mechanism of injury: surgery  Mechanism of injury: Patient presents to physical therapy s/p R TKA performed on 7/24/2024. Patient stated she has been getting pain on the anterior aspect of the knee notably by around the incision site and on the medial aspect of the knee. Patient stated she has not been able to sleep well and has been sleeping about 45 minutes a night. She stated she feels she cannot get comfortable in the bed or recliner. She has been icing frequently and stated the medication has been helping. She has been doing home exercises 5x a day since being discharged from the hospital as well. She stated she has been doing well with walking and stairs. She stated she has been trying to walk once an hour as well. She stated she has been having the most trouble with getting into the car and out of her recliner.     Update 8/20/2024:  Patient reports that she saw the doctor yesterday and he is pleased with her progress thus far. She reports that her endurance is slowly improving but that she still has some weakness in her quad. She is only negotiating stairs with a step to gait pattern. She does find that when she is walking on uneven ground such as the grass this is getting easier. She has no difficulties getting into or out of the bath.   Quality of life: good    Patient Goals  Patient goals for therapy: decreased edema, decreased pain, improved balance, increased motion, increased strength, independence with ADLs/IADLs and return to sport/leisure activities  Patient  "goal: Patient stated she would like to be able to walk 0.75 miles to walk her dog and return to her normal daily activities.  Pain  Current pain ratin  At best pain ratin  At worst pain rating: 3  Location: R knee  Quality: dull ache, throbbing and discomfort  Relieving factors: ice, medications and rest    Social Support  Steps to enter house: yes  4  Stairs in house: yes   Lives in: multiple-level home  Lives with: spouse        Objective     Observations     Right Knee   Negative for edema and incision.     Active Range of Motion   Left Knee   Normal active range of motion    Right Knee   Flexion: 121 degrees   Extension: -3 degrees     Passive Range of Motion   Left Knee   Normal passive range of motion    Right Knee   Flexion: 129 degrees   Extension: -3 degrees     Strength/Myotome Testing     Right Knee   Flexion: 4+  Extension: 3+  Quadriceps contraction: good    Tests     Right Ankle/Foot   Negative for Homans' sign.       Flowsheet Rows      Flowsheet Row Most Recent Value   PT/OT G-Codes    Current Score 14   Projected Score 52               Precautions: Hx Lumbar Fusion      Date  Reassess    FOTO Ds  59 66 SC      Manuals        Gastroc stretch ds  30\" 4x 30\" 4x  30\" 4 x    PROM R knee Ds +hs opp k-c               Neuro Re-Ed        QS 20x 5\"  5\" 20x 5\" 20x 5\" 20 X   TC 20x 5\"  5\" 20x 5\" 20x 5\" 20 X   SLS - foam 2x 15\" 20\" 5x 20\" x3 ea  20 X 3    Tandem walk-foam F/B 5 laps    Foam 5 laps   Side to side-foam 5 laps               Ther Ex        Heel Raise 20x  HR 20x HR 20x Hr  20 X   SHC 2# 20x  20x 20x 20 X 2 #    Standing marches 20x   20x 20x 20 X   Standing hip abduction/ ext + ext 20x ea 2#   20x 20x 20 X2#   SAQ/LAQ 20x 5\" 2# 5\" 3x10 AAROM 15x  20x   Bike for strengthening and mobility 8m L5 L1 5 min L3 5' L5 5 min L 5 8 MIN   SLR 15x    Aarom 10x  10 X    Standing gastroc stretch 5x 15\"       Ther Activity        Step down  4\" 15x      Step ups 6\" 20x 6\" 20x 6\" 15x 6\" " "20x 6\" 20 X   Mini squats 20x  2x15  2x10 20 X   Gait Training                        Modalities        CP 10m  10 min 10 min 10 MIN                                  "

## 2024-08-21 DIAGNOSIS — G47.9 SLEEP DISORDER: ICD-10-CM

## 2024-08-21 RX ORDER — TRAZODONE HYDROCHLORIDE 50 MG/1
50 TABLET, FILM COATED ORAL
Qty: 30 TABLET | Refills: 5 | Status: SHIPPED | OUTPATIENT
Start: 2024-08-21

## 2024-08-23 ENCOUNTER — OFFICE VISIT (OUTPATIENT)
Dept: PHYSICAL THERAPY | Facility: CLINIC | Age: 59
End: 2024-08-23
Payer: COMMERCIAL

## 2024-08-23 DIAGNOSIS — M25.561 ACUTE PAIN OF RIGHT KNEE: Primary | ICD-10-CM

## 2024-08-23 DIAGNOSIS — Z96.651 STATUS POST RIGHT KNEE REPLACEMENT: ICD-10-CM

## 2024-08-23 PROCEDURE — 97530 THERAPEUTIC ACTIVITIES: CPT

## 2024-08-23 PROCEDURE — 97110 THERAPEUTIC EXERCISES: CPT

## 2024-08-23 PROCEDURE — 97112 NEUROMUSCULAR REEDUCATION: CPT

## 2024-08-23 NOTE — PROGRESS NOTES
"Daily Note     Today's date: 2024  Patient name: Carolann Foy  : 1965  MRN: [id number]  Referring provider: Robin Unger MD  Dx:   Encounter Diagnosis     ICD-10-CM    1. Acute pain of right knee  M25.561       2. Status post right knee replacement  Z96.651           Start Time: 0800  Stop Time: 0845  Total time in clinic (min): 45 minutes    Subjective:  Pt comments on increase quad,hamstring,muscle soreness after changes in her program last visit. She denies R knee pain increase.      Objective: See treatment diary below      Assessment: Tolerated treatment well. Patient exhibited good technique with therapeutic exercises. Clamshells included today; she will perform same /bridges c adduction in her HEP. Good jennifer to increased step down height today.      Plan:  Discharge per PT POC.     Precautions: Hx Lumbar Fusion      Date  Reassess    FOTO Ds  59 66 SC      Manuals        Gastroc stretch ds  Ds+ hs opp k-c, quad 30\" 4x  30\" 4 x    PROM R knee Ds +hs opp k-c  ds             Neuro Re-Ed        QS 20x 5\"   5\" 20x 5\" 20 X   TC 20x 5\"   5\" 20x 5\" 20 X   SLS - foam 2x 15\" 20\" 5x 20\" x3 ea  20 X 3    Tandem walk-foam F/B 5 laps  5 laps  Foam 5 laps   Side to side-foam 5 laps  5 laps     Clamshell   10x 10\"     Ther Ex        Heel Raise 20x  2# 20X  HR 20x Hr  20 X   SHC 2# 20x  2# 20x 20x 20 X 2 #    Standing marches 20x   2# 20x 20x 20 X   Standing hip abduction/ ext + ext 20x ea 2#   2# 20x 20x 20 X2#   SAQ/LAQ 20x 5\" 2# 5\" 3x10 2# 2/15  LAQ  20x   Bike for strengthening and mobility 8m L5 L1 5 min NS 5m L5  Bike 1mile L5 5 min L 5 8 MIN   SLR 15x   2# 15x  Aarom 10x  10 X    Standing gastroc stretch 5x 15\"  5x 15\"     Ther Activity        Step down  4\" 15x 6\" 15x      Step ups 6\" 20x 6\" 20x 6\" 20x  6\" 20x 6\" 20 X   Mini squats 20x  2x15 2/15 2x10 20 X   Gait Training                        Modalities        CP 10m   10 min 10 MIN                 "

## 2024-08-26 NOTE — PROGRESS NOTES
Patient has reached a functional baseline with therapy interventions at this time and will continue with a self guided HEP.

## 2024-08-27 ENCOUNTER — VBI (OUTPATIENT)
Dept: ADMINISTRATIVE | Facility: OTHER | Age: 59
End: 2024-08-27

## 2024-08-27 NOTE — TELEPHONE ENCOUNTER
08/27/24 2:49 PM     Chart reviewed for Pap Smear (HPV) aka Cervical Cancer Screening was/were not submitted to the patient's insurance.     Ilene Moran MA   [facility]

## 2024-09-26 DIAGNOSIS — J40 SINOBRONCHITIS: ICD-10-CM

## 2024-09-26 DIAGNOSIS — J32.9 SINOBRONCHITIS: ICD-10-CM

## 2024-09-26 RX ORDER — FLUTICASONE PROPIONATE 50 MCG
SPRAY, SUSPENSION (ML) NASAL
Qty: 48 G | Refills: 1 | Status: SHIPPED | OUTPATIENT
Start: 2024-09-26

## 2024-09-27 DIAGNOSIS — I10 HYPERTENSION, UNSPECIFIED TYPE: ICD-10-CM

## 2024-09-27 RX ORDER — METOPROLOL SUCCINATE 25 MG/1
25 TABLET, EXTENDED RELEASE ORAL DAILY
Qty: 90 TABLET | Refills: 3 | Status: SHIPPED | OUTPATIENT
Start: 2024-09-27

## 2024-11-06 ENCOUNTER — APPOINTMENT (OUTPATIENT)
Dept: LAB | Facility: CLINIC | Age: 59
End: 2024-11-06
Payer: COMMERCIAL

## 2024-11-06 DIAGNOSIS — R82.998 LEUKOCYTES IN URINE: ICD-10-CM

## 2024-11-06 DIAGNOSIS — M47.816 LUMBAR SPONDYLOSIS: ICD-10-CM

## 2024-11-06 DIAGNOSIS — L40.9 PSORIASIS: ICD-10-CM

## 2024-11-06 DIAGNOSIS — G25.81 RESTLESS LEGS: ICD-10-CM

## 2024-11-06 DIAGNOSIS — L40.50 PSORIATIC ARTHROPATHY (HCC): ICD-10-CM

## 2024-11-06 DIAGNOSIS — M79.7 SCAPULOHUMERAL FIBROSITIS: ICD-10-CM

## 2024-11-06 DIAGNOSIS — M17.0 PRIMARY OSTEOARTHRITIS OF BOTH KNEES: ICD-10-CM

## 2024-11-06 LAB
ALBUMIN SERPL BCG-MCNC: 4.2 G/DL (ref 3.5–5)
ALP SERPL-CCNC: 83 U/L (ref 34–104)
ALT SERPL W P-5'-P-CCNC: 14 U/L (ref 7–52)
ANION GAP SERPL CALCULATED.3IONS-SCNC: 10 MMOL/L (ref 4–13)
AST SERPL W P-5'-P-CCNC: 18 U/L (ref 13–39)
BACTERIA UR QL AUTO: ABNORMAL /HPF
BASOPHILS # BLD AUTO: 0.07 THOUSANDS/ÂΜL (ref 0–0.1)
BASOPHILS NFR BLD AUTO: 1 % (ref 0–1)
BILIRUB SERPL-MCNC: 0.32 MG/DL (ref 0.2–1)
BILIRUB UR QL STRIP: NEGATIVE
BUN SERPL-MCNC: 15 MG/DL (ref 5–25)
C3 SERPL-MCNC: 155 MG/DL (ref 87–200)
C4 SERPL-MCNC: 49 MG/DL (ref 19–52)
CALCIUM SERPL-MCNC: 9.5 MG/DL (ref 8.4–10.2)
CHLORIDE SERPL-SCNC: 105 MMOL/L (ref 96–108)
CLARITY UR: CLEAR
CO2 SERPL-SCNC: 25 MMOL/L (ref 21–32)
COLOR UR: ABNORMAL
CREAT SERPL-MCNC: 0.86 MG/DL (ref 0.6–1.3)
CREAT UR-MCNC: 163.5 MG/DL
CRP SERPL QL: <1 MG/L
EOSINOPHIL # BLD AUTO: 0.26 THOUSAND/ÂΜL (ref 0–0.61)
EOSINOPHIL NFR BLD AUTO: 3 % (ref 0–6)
ERYTHROCYTE [DISTWIDTH] IN BLOOD BY AUTOMATED COUNT: 13.2 % (ref 11.6–15.1)
ERYTHROCYTE [SEDIMENTATION RATE] IN BLOOD: 27 MM/HOUR (ref 0–29)
GFR SERPL CREATININE-BSD FRML MDRD: 73 ML/MIN/1.73SQ M
GLUCOSE P FAST SERPL-MCNC: 87 MG/DL (ref 65–99)
GLUCOSE UR STRIP-MCNC: NEGATIVE MG/DL
HCT VFR BLD AUTO: 39.1 % (ref 34.8–46.1)
HGB BLD-MCNC: 12.3 G/DL (ref 11.5–15.4)
HGB UR QL STRIP.AUTO: NEGATIVE
IMM GRANULOCYTES # BLD AUTO: 0.03 THOUSAND/UL (ref 0–0.2)
IMM GRANULOCYTES NFR BLD AUTO: 0 % (ref 0–2)
KETONES UR STRIP-MCNC: NEGATIVE MG/DL
LEUKOCYTE ESTERASE UR QL STRIP: ABNORMAL
LYMPHOCYTES # BLD AUTO: 3.89 THOUSANDS/ÂΜL (ref 0.6–4.47)
LYMPHOCYTES NFR BLD AUTO: 46 % (ref 14–44)
MCH RBC QN AUTO: 27.5 PG (ref 26.8–34.3)
MCHC RBC AUTO-ENTMCNC: 31.5 G/DL (ref 31.4–37.4)
MCV RBC AUTO: 88 FL (ref 82–98)
MONOCYTES # BLD AUTO: 0.6 THOUSAND/ÂΜL (ref 0.17–1.22)
MONOCYTES NFR BLD AUTO: 7 % (ref 4–12)
MUCOUS THREADS UR QL AUTO: ABNORMAL
NEUTROPHILS # BLD AUTO: 3.7 THOUSANDS/ÂΜL (ref 1.85–7.62)
NEUTS SEG NFR BLD AUTO: 43 % (ref 43–75)
NITRITE UR QL STRIP: NEGATIVE
NON-SQ EPI CELLS URNS QL MICRO: ABNORMAL /HPF
NRBC BLD AUTO-RTO: 0 /100 WBCS
PH UR STRIP.AUTO: 6 [PH]
PLATELET # BLD AUTO: 432 THOUSANDS/UL (ref 149–390)
PMV BLD AUTO: 9.7 FL (ref 8.9–12.7)
POTASSIUM SERPL-SCNC: 4.5 MMOL/L (ref 3.5–5.3)
PROT SERPL-MCNC: 7.1 G/DL (ref 6.4–8.4)
PROT UR STRIP-MCNC: ABNORMAL MG/DL
PROT UR-MCNC: 12.7 MG/DL
PROT/CREAT UR: 0.1 MG/G{CREAT} (ref 0–0.1)
RBC # BLD AUTO: 4.47 MILLION/UL (ref 3.81–5.12)
RBC #/AREA URNS AUTO: ABNORMAL /HPF
SODIUM SERPL-SCNC: 140 MMOL/L (ref 135–147)
SP GR UR STRIP.AUTO: 1.02 (ref 1–1.03)
TRANS CELLS #/AREA URNS HPF: PRESENT /[HPF]
UROBILINOGEN UR STRIP-ACNC: <2 MG/DL
WBC # BLD AUTO: 8.55 THOUSAND/UL (ref 4.31–10.16)
WBC #/AREA URNS AUTO: ABNORMAL /HPF

## 2024-11-06 PROCEDURE — 81001 URINALYSIS AUTO W/SCOPE: CPT

## 2024-11-06 PROCEDURE — 36415 COLL VENOUS BLD VENIPUNCTURE: CPT

## 2024-11-06 PROCEDURE — 85652 RBC SED RATE AUTOMATED: CPT

## 2024-11-06 PROCEDURE — 84156 ASSAY OF PROTEIN URINE: CPT

## 2024-11-06 PROCEDURE — 82570 ASSAY OF URINE CREATININE: CPT

## 2024-11-06 PROCEDURE — 86160 COMPLEMENT ANTIGEN: CPT

## 2024-11-06 PROCEDURE — 86140 C-REACTIVE PROTEIN: CPT

## 2024-11-06 PROCEDURE — 80053 COMPREHEN METABOLIC PANEL: CPT

## 2024-11-06 PROCEDURE — 85025 COMPLETE CBC W/AUTO DIFF WBC: CPT

## 2024-11-07 ENCOUNTER — TELEPHONE (OUTPATIENT)
Dept: INTERNAL MEDICINE CLINIC | Facility: CLINIC | Age: 59
End: 2024-11-07

## 2024-11-07 DIAGNOSIS — R82.998 LEUKOCYTES IN URINE: Primary | ICD-10-CM

## 2024-11-07 NOTE — TELEPHONE ENCOUNTER
----- Message from Christo Brady DO sent at 11/7/2024  8:31 AM EST -----  Call the lab. Had a UA yesterday. See if they can send it for c/s.

## 2024-11-07 NOTE — TELEPHONE ENCOUNTER
Spoke with the lab and they are going to try and run this urine. I did place orders in there as well

## 2024-11-07 NOTE — TELEPHONE ENCOUNTER
Lab reached out and states that they don't have enough urine to do this. Did you want me to call pt to get another sample?

## 2024-11-08 ENCOUNTER — APPOINTMENT (OUTPATIENT)
Dept: LAB | Facility: CLINIC | Age: 59
End: 2024-11-08
Payer: COMMERCIAL

## 2024-11-08 PROCEDURE — 87086 URINE CULTURE/COLONY COUNT: CPT

## 2024-11-09 LAB — BACTERIA UR CULT: NORMAL

## 2024-11-19 ENCOUNTER — OFFICE VISIT (OUTPATIENT)
Dept: INTERNAL MEDICINE CLINIC | Facility: CLINIC | Age: 59
End: 2024-11-19
Payer: COMMERCIAL

## 2024-11-19 VITALS
HEART RATE: 63 BPM | HEIGHT: 65 IN | OXYGEN SATURATION: 98 % | DIASTOLIC BLOOD PRESSURE: 76 MMHG | WEIGHT: 182.2 LBS | TEMPERATURE: 97.6 F | SYSTOLIC BLOOD PRESSURE: 110 MMHG | BODY MASS INDEX: 30.35 KG/M2

## 2024-11-19 DIAGNOSIS — K21.9 GERD WITHOUT ESOPHAGITIS: ICD-10-CM

## 2024-11-19 DIAGNOSIS — M85.80 OSTEOPENIA, UNSPECIFIED LOCATION: ICD-10-CM

## 2024-11-19 DIAGNOSIS — G43.B0 OPHTHALMOPLEGIC MIGRAINE, NOT INTRACTABLE: ICD-10-CM

## 2024-11-19 DIAGNOSIS — I10 BENIGN ESSENTIAL HYPERTENSION: ICD-10-CM

## 2024-11-19 DIAGNOSIS — E55.9 VITAMIN D DEFICIENCY: ICD-10-CM

## 2024-11-19 DIAGNOSIS — Z23 ENCOUNTER FOR IMMUNIZATION: ICD-10-CM

## 2024-11-19 DIAGNOSIS — F41.1 GENERALIZED ANXIETY DISORDER: ICD-10-CM

## 2024-11-19 DIAGNOSIS — Z12.11 SCREENING FOR COLON CANCER: ICD-10-CM

## 2024-11-19 DIAGNOSIS — G47.9 SLEEP DISORDER: ICD-10-CM

## 2024-11-19 DIAGNOSIS — Z13.1 SCREENING FOR DIABETES MELLITUS (DM): ICD-10-CM

## 2024-11-19 DIAGNOSIS — Z12.31 ENCOUNTER FOR SCREENING MAMMOGRAM FOR BREAST CANCER: ICD-10-CM

## 2024-11-19 DIAGNOSIS — R73.03 PREDIABETES: ICD-10-CM

## 2024-11-19 DIAGNOSIS — Z00.00 WELL ADULT EXAM: Primary | ICD-10-CM

## 2024-11-19 DIAGNOSIS — G89.4 CHRONIC PAIN DISORDER: ICD-10-CM

## 2024-11-19 DIAGNOSIS — J45.20 MILD INTERMITTENT ASTHMA WITHOUT COMPLICATION: ICD-10-CM

## 2024-11-19 DIAGNOSIS — R05.3 CHRONIC COUGH: ICD-10-CM

## 2024-11-19 DIAGNOSIS — L40.50 PSORIATIC ARTHRITIS (HCC): ICD-10-CM

## 2024-11-19 PROCEDURE — 99396 PREV VISIT EST AGE 40-64: CPT | Performed by: INTERNAL MEDICINE

## 2024-11-19 PROCEDURE — 90677 PCV20 VACCINE IM: CPT

## 2024-11-19 PROCEDURE — 90471 IMMUNIZATION ADMIN: CPT

## 2024-11-19 RX ORDER — CEPHALEXIN 500 MG/1
CAPSULE ORAL
COMMUNITY
Start: 2024-08-19

## 2024-11-19 RX ORDER — MONTELUKAST SODIUM 10 MG/1
10 TABLET ORAL
Qty: 90 TABLET | Refills: 3 | Status: SHIPPED | OUTPATIENT
Start: 2024-11-19

## 2024-11-19 NOTE — ASSESSMENT & PLAN NOTE
Orders:    fluticasone-umeclidinium-vilanterol 100-62.5-25 mcg/actuation inhaler; Inhale 1 puff daily Rinse mouth after use.    montelukast (SINGULAIR) 10 mg tablet; Take 1 tablet (10 mg total) by mouth daily at bedtime

## 2024-11-19 NOTE — PATIENT INSTRUCTIONS
"Patient Education     High blood pressure in adults   The Basics   Written by the doctors and editors at [facility]   What is high blood pressure? -- High blood pressure is a condition that puts you at risk for heart attack, stroke, and kidney disease. It does not usually cause symptoms. But it can be serious.  When your doctor or nurse tells you your blood pressure, they say 2 numbers. For instance, your doctor or nurse might say that your blood pressure is \"130 over 80.\" The top number is the pressure inside your arteries when your heart is garry. The bottom number is the pressure inside your arteries when your heart is relaxed.  \"Elevated blood pressure\" is a term doctors or nurses use as a warning. People with elevated blood pressure do not yet have high blood pressure. But their blood pressure is not as low as it should be for good health.  Many experts define high, elevated, and normal blood pressure as follows:   High - Top number of 130 or above and/or bottom number of 80 or above.   Elevated - Top number between 120 and 129 and bottom number of 79 or below.   Normal - Top number of 119 or below and bottom number of 79 or below.  This information is also in the table (table 1).  How can I lower my blood pressure? -- If your doctor or nurse prescribed blood pressure medicine, the most important thing you can do is to take it. If it causes side effects, do not just stop taking it. Instead, talk to your doctor or nurse about the problems it causes. They might be able to lower your dose or switch you to another medicine. If cost is a problem, mention that, too. They might be able to put you on a less expensive medicine. Taking your blood pressure medicine can keep you from having a heart attack or stroke, and it can save your life!  Can I do anything on my own? -- You have a lot of control over your blood pressure. To lower it:   Lose weight (if you are overweight).   Choose a diet low in fat and rich in " "fruits, vegetables, and low-fat dairy products.   Eat less salt.   Do something active for at least 30 minutes a day on most days of the week.   Drink less alcohol (if you drink more than 2 alcoholic drinks per day).  It's also a good idea to get a home blood pressure meter. People who check their own blood pressure at home do better at keeping it low and can sometimes even reduce the amount of medicine they take.  All topics are updated as new evidence becomes available and our peer review process is complete.  This topic retrieved from [employer] on: Feb 26, 2024.  Topic 09490 Version 23.0  Release: 32.2.4 - C32.56  © 2024 UpToDate, Inc. and/or its affiliates. All rights reserved.  table 1: Definition of normal and high blood pressure  Level  Top number  Bottom number    High 130 or above 80 or above   Elevated 120 to 129 79 or below   Normal 119 or below 79 or below   These definitions are from the American College of Cardiology/American Heart Association. Other expert groups might use slightly different definitions.  \"Elevated blood pressure\" is a term doctor or nurses use as a warning. It means you do not yet have high blood pressure, but your blood pressure is not as low as it should be for good health.  Graphic 59683 Version 6.0  Consumer Information Use and Disclaimer   Disclaimer: This generalized information is a limited summary of diagnosis, treatment, and/or medication information. It is not meant to be comprehensive and should be used as a tool to help the user understand and/or assess potential diagnostic and treatment options. It does NOT include all information about conditions, treatments, medications, side effects, or risks that may apply to a specific patient. It is not intended to be medical advice or a substitute for the medical advice, diagnosis, or treatment of a health care provider based on the health care provider's examination and assessment of a patient's specific and unique circumstances. " Patients must speak with a health care provider for complete information about their health, medical questions, and treatment options, including any risks or benefits regarding use of medications. This information does not endorse any treatments or medications as safe, effective, or approved for treating a specific patient. UpToDate, Inc. and its affiliates disclaim any warranty or liability relating to this information or the use thereof.The use of this information is governed by the Terms of Use, available at https://www.w[employer]Identity Enginesuwer.com/en/know/clinical-effectiveness-terms. 2024© UpToDate, Inc. and its affiliates and/or licensors. All rights reserved.  Copyright   © 2024 UpToDate, Inc. and/or its affiliates. All rights reserved.

## 2024-11-19 NOTE — PROGRESS NOTES
Name: Carolann Foy      : 1965      MRN: [id number]  Encounter Provider: Christo Brady DO  Encounter Date: 2024   Encounter department: [facility]  :  Assessment & Plan  Encounter for screening mammogram for breast cancer    Orders:    Mammo screening bilateral w 3d and cad; Future    Encounter for immunization    Orders:    Pneumococcal Conjugate Vaccine 20-valent (Pcv20)    Screening for colon cancer    Orders:    Cologuard    Well adult exam         Benign essential hypertension         GERD without esophagitis         Mild intermittent asthma without complication    Orders:    fluticasone-umeclidinium-vilanterol 100-62.5-25 mcg/actuation inhaler; Inhale 1 puff daily Rinse mouth after use.    montelukast (SINGULAIR) 10 mg tablet; Take 1 tablet (10 mg total) by mouth daily at bedtime    Ophthalmoplegic migraine, not intractable         Osteopenia, unspecified location         Psoriatic arthritis (HCC)         Generalized anxiety disorder         Chronic pain disorder         Sleep disorder         Prediabetes         Vitamin D deficiency    Orders:    Vitamin D 25 hydroxy; Future    Chronic cough    Orders:    fluticasone-umeclidinium-vilanterol 100-62.5-25 mcg/actuation inhaler; Inhale 1 puff daily Rinse mouth after use.    montelukast (SINGULAIR) 10 mg tablet; Take 1 tablet (10 mg total) by mouth daily at bedtime    Screening for diabetes mellitus (DM)    Orders:    Hemoglobin A1C; Future      A/P: Doing ok except for the cough. ?asthma, allergies. No GERD. Defers  CXR and add maintenance MDI and singulair. . Discussed vaccines and will give pneumonia. Order CRC and mammo. Check labs. Continue current treatment otherwise and RTC three weeks for f/u labs and cough.      History of Present Illness     WF presents for a well exam. Doing ok and no c/o's except for a chronic cough for several months. No fever or chills. No bronchospasm, decreased hearing, GERD, etc. No new meds.  "H/o asthma on D[place] only and metoprolol. NO ACEI use.  Some PND.  Remains active w/o difficulty and no falls. Denies depression. No suicidal or violent ideations. Working full time. . No recent travel. No fever, chills, or sweats. No unexplained wt change. Denies CP, SOB, palpitations, edema, orthopnea, or PND. No sz or syncope. No changes in bowel or bladder habits. No trouble swallowing. Appetite and sleep are good. Sees a DDS and an eye doctor. No change in PMH, PSH, ALL, OH, SH, or Fhx except for right TKA. Currently due for labs, mammo, CRC, and vaccines. .            Review of Systems   Constitutional:  Negative for activity change, chills, diaphoresis, fatigue and fever.   HENT: Negative.     Eyes:  Negative for visual disturbance.   Respiratory:  Positive for cough. Negative for chest tightness, shortness of breath and wheezing.    Cardiovascular:  Negative for chest pain, palpitations and leg swelling.   Gastrointestinal:  Negative for abdominal pain, constipation, diarrhea, nausea and vomiting.   Endocrine: Negative for cold intolerance and heat intolerance.   Genitourinary:  Negative for difficulty urinating, dysuria and frequency.   Musculoskeletal:  Negative for arthralgias, gait problem and myalgias.   Neurological:  Negative for dizziness, seizures, syncope, weakness, light-headedness and headaches.   Psychiatric/Behavioral:  Negative for confusion, dysphoric mood and sleep disturbance. The patient is not nervous/anxious.           Objective   /76   Pulse 63   Temp 97.6 °F (36.4 °C)   Ht 5' 5\" (1.651 m)   Wt 82.6 kg (182 lb 3.2 oz)   SpO2 98%   BMI 30.32 kg/m²      Physical Exam  Vitals and nursing note reviewed.   Constitutional:       General: She is not in acute distress.     Appearance: Normal appearance. She is not ill-appearing.   HENT:      Head: Normocephalic and atraumatic.      Right Ear: Tympanic membrane, ear canal and external ear normal. There is no impacted cerumen.      Left " Ear: Tympanic membrane, ear canal and external ear normal. There is no impacted cerumen.      Mouth/Throat:      Mouth: Mucous membranes are moist.   Eyes:      Extraocular Movements: Extraocular movements intact.      Conjunctiva/sclera: Conjunctivae normal.      Pupils: Pupils are equal, round, and reactive to light.   Neck:      Vascular: No carotid bruit.   Cardiovascular:      Rate and Rhythm: Normal rate and regular rhythm.      Heart sounds: Normal heart sounds. No murmur heard.  Pulmonary:      Effort: Pulmonary effort is normal. No respiratory distress.      Breath sounds: Normal breath sounds. No wheezing, rhonchi or rales.   Abdominal:      General: Bowel sounds are normal. There is no distension.      Palpations: Abdomen is soft.      Tenderness: There is no abdominal tenderness.   Musculoskeletal:      Cervical back: Neck supple.      Right lower leg: No edema.      Left lower leg: No edema.   Neurological:      General: No focal deficit present.      Mental Status: She is alert and oriented to person, place, and time. Mental status is at baseline.   Psychiatric:         Mood and Affect: Mood normal.         Behavior: Behavior normal.         Thought Content: Thought content normal.         Judgment: Judgment normal.

## 2024-11-21 DIAGNOSIS — R05.3 CHRONIC COUGH: Primary | ICD-10-CM

## 2024-11-21 RX ORDER — BUDESONIDE AND FORMOTEROL FUMARATE DIHYDRATE 80; 4.5 UG/1; UG/1
2 AEROSOL RESPIRATORY (INHALATION) 2 TIMES DAILY
Qty: 10.2 G | Refills: 3 | Status: SHIPPED | OUTPATIENT
Start: 2024-11-21 | End: 2024-11-22

## 2024-11-22 DIAGNOSIS — R05.3 CHRONIC COUGH: Primary | ICD-10-CM

## 2024-11-22 RX ORDER — FLUTICASONE PROPIONATE AND SALMETEROL 250; 50 UG/1; UG/1
1 POWDER RESPIRATORY (INHALATION) 2 TIMES DAILY
Qty: 180 BLISTER | Refills: 0 | Status: SHIPPED | OUTPATIENT
Start: 2024-11-22 | End: 2025-02-20

## 2024-12-17 DIAGNOSIS — K21.9 GERD WITHOUT ESOPHAGITIS: ICD-10-CM

## 2024-12-18 RX ORDER — OMEPRAZOLE 40 MG/1
40 CAPSULE, DELAYED RELEASE ORAL
Qty: 90 CAPSULE | Refills: 1 | Status: SHIPPED | OUTPATIENT
Start: 2024-12-18

## 2024-12-19 ENCOUNTER — OFFICE VISIT (OUTPATIENT)
Dept: INTERNAL MEDICINE CLINIC | Facility: CLINIC | Age: 59
End: 2024-12-19
Payer: COMMERCIAL

## 2024-12-19 VITALS
SYSTOLIC BLOOD PRESSURE: 118 MMHG | HEIGHT: 65 IN | OXYGEN SATURATION: 97 % | BODY MASS INDEX: 30.01 KG/M2 | HEART RATE: 67 BPM | DIASTOLIC BLOOD PRESSURE: 78 MMHG | TEMPERATURE: 96.9 F | WEIGHT: 180.13 LBS

## 2024-12-19 DIAGNOSIS — J30.9 ALLERGIC RHINITIS, UNSPECIFIED SEASONALITY, UNSPECIFIED TRIGGER: ICD-10-CM

## 2024-12-19 DIAGNOSIS — J45.20 MILD INTERMITTENT ASTHMA WITHOUT COMPLICATION: ICD-10-CM

## 2024-12-19 DIAGNOSIS — Z23 ENCOUNTER FOR IMMUNIZATION: Primary | ICD-10-CM

## 2024-12-19 PROCEDURE — 99213 OFFICE O/P EST LOW 20 MIN: CPT | Performed by: INTERNAL MEDICINE

## 2024-12-19 NOTE — PROGRESS NOTES
"Name: Carolann Foy      : 1965      MRN: [id number]  Encounter Provider: Christo Brady DO  Encounter Date: 2024   Encounter department: [facility]  :  Assessment & Plan  Encounter for immunization    Orders:    Zoster Vaccine Recombinant IM    Mild intermittent asthma without complication         Allergic rhinitis, unspecified seasonality, unspecified trigger           A/P: Doing much better. Will continue new meds. Continue current treatment otherwise. RTC three months for routine.      History of Present Illness     WF RTC for f/u uncontrolled asthma and allergies after starting Singulair and adding maintenance inhaler. Reports tolerating the meds and is doing better. No new issues.       Review of Systems   Constitutional:  Negative for activity change, chills, diaphoresis, fatigue and fever.   Respiratory:  Negative for cough, chest tightness, shortness of breath and wheezing.    Cardiovascular:  Negative for chest pain, palpitations and leg swelling.   Gastrointestinal:  Negative for abdominal pain, constipation, diarrhea, nausea and vomiting.   Genitourinary:  Negative for difficulty urinating, dysuria and frequency.   Musculoskeletal:  Negative for arthralgias, gait problem and myalgias.   Neurological:  Negative for dizziness, seizures, syncope, weakness, light-headedness and headaches.   Psychiatric/Behavioral:  Negative for confusion, dysphoric mood and sleep disturbance. The patient is not nervous/anxious.        Objective   /78 (BP Location: Left arm, Patient Position: Sitting)   Pulse 67   Temp (!) 96.9 °F (36.1 °C) (Tympanic)   Ht 5' 5\" (1.651 m)   Wt 81.7 kg (180 lb 2 oz)   SpO2 97%   BMI 29.97 kg/m²      Physical Exam  Constitutional:       General: She is not in acute distress.     Appearance: Normal appearance. She is not ill-appearing.   HENT:      Head: Normocephalic and atraumatic.      Mouth/Throat:      Mouth: Mucous membranes are moist.   Eyes: "      Extraocular Movements: Extraocular movements intact.      Conjunctiva/sclera: Conjunctivae normal.      Pupils: Pupils are equal, round, and reactive to light.   Cardiovascular:      Rate and Rhythm: Normal rate and regular rhythm.      Heart sounds: Normal heart sounds. No murmur heard.  Pulmonary:      Effort: Pulmonary effort is normal. No respiratory distress.      Breath sounds: Normal breath sounds. No wheezing, rhonchi or rales.   Neurological:      General: No focal deficit present.      Mental Status: She is alert and oriented to person, place, and time. Mental status is at baseline.   Psychiatric:         Mood and Affect: Mood normal.         Behavior: Behavior normal.         Thought Content: Thought content normal.         Judgment: Judgment normal.

## 2025-01-17 DIAGNOSIS — E55.9 VITAMIN D DEFICIENCY: ICD-10-CM

## 2025-01-17 RX ORDER — ERGOCALCIFEROL 1.25 MG/1
CAPSULE, LIQUID FILLED ORAL
Qty: 12 CAPSULE | Refills: 1 | Status: SHIPPED | OUTPATIENT
Start: 2025-01-17

## 2025-02-06 DIAGNOSIS — G47.9 SLEEP DISORDER: ICD-10-CM

## 2025-02-06 RX ORDER — TRAZODONE HYDROCHLORIDE 50 MG/1
50 TABLET, FILM COATED ORAL
Qty: 30 TABLET | Refills: 5 | Status: SHIPPED | OUTPATIENT
Start: 2025-02-06

## 2025-02-17 DIAGNOSIS — R05.3 CHRONIC COUGH: ICD-10-CM

## 2025-02-18 RX ORDER — FLUTICASONE PROPIONATE AND SALMETEROL 250; 50 UG/1; UG/1
POWDER RESPIRATORY (INHALATION)
Qty: 60 BLISTER | Refills: 0 | Status: SHIPPED | OUTPATIENT
Start: 2025-02-18

## 2025-03-10 ENCOUNTER — RESULTS FOLLOW-UP (OUTPATIENT)
Dept: INTERNAL MEDICINE CLINIC | Facility: CLINIC | Age: 60
End: 2025-03-10

## 2025-03-10 ENCOUNTER — APPOINTMENT (OUTPATIENT)
Dept: LAB | Facility: CLINIC | Age: 60
End: 2025-03-10
Payer: COMMERCIAL

## 2025-03-10 DIAGNOSIS — E55.9 VITAMIN D DEFICIENCY: ICD-10-CM

## 2025-03-10 DIAGNOSIS — Z13.1 SCREENING FOR DIABETES MELLITUS (DM): ICD-10-CM

## 2025-03-10 LAB
25(OH)D3 SERPL-MCNC: 54.8 NG/ML (ref 30–100)
EST. AVERAGE GLUCOSE BLD GHB EST-MCNC: 131 MG/DL
HBA1C MFR BLD: 6.2 %

## 2025-03-10 PROCEDURE — 82306 VITAMIN D 25 HYDROXY: CPT

## 2025-03-10 PROCEDURE — 36415 COLL VENOUS BLD VENIPUNCTURE: CPT

## 2025-03-10 PROCEDURE — 83036 HEMOGLOBIN GLYCOSYLATED A1C: CPT

## 2025-03-19 ENCOUNTER — OFFICE VISIT (OUTPATIENT)
Dept: INTERNAL MEDICINE CLINIC | Facility: CLINIC | Age: 60
End: 2025-03-19
Payer: COMMERCIAL

## 2025-03-19 VITALS
DIASTOLIC BLOOD PRESSURE: 78 MMHG | HEIGHT: 65 IN | OXYGEN SATURATION: 99 % | WEIGHT: 184.8 LBS | SYSTOLIC BLOOD PRESSURE: 126 MMHG | HEART RATE: 68 BPM | BODY MASS INDEX: 30.79 KG/M2 | TEMPERATURE: 97.7 F

## 2025-03-19 DIAGNOSIS — F41.1 GENERALIZED ANXIETY DISORDER: ICD-10-CM

## 2025-03-19 DIAGNOSIS — L40.50 PSORIATIC ARTHRITIS (HCC): ICD-10-CM

## 2025-03-19 DIAGNOSIS — K21.9 GERD WITHOUT ESOPHAGITIS: ICD-10-CM

## 2025-03-19 DIAGNOSIS — G89.4 CHRONIC PAIN DISORDER: ICD-10-CM

## 2025-03-19 DIAGNOSIS — Z13.220 SCREENING FOR HYPERLIPIDEMIA: ICD-10-CM

## 2025-03-19 DIAGNOSIS — R73.03 PREDIABETES: ICD-10-CM

## 2025-03-19 DIAGNOSIS — D84.9 IMMUNOSUPPRESSION (HCC): ICD-10-CM

## 2025-03-19 DIAGNOSIS — M85.80 OSTEOPENIA, UNSPECIFIED LOCATION: ICD-10-CM

## 2025-03-19 DIAGNOSIS — M79.7 FIBROMYALGIA: ICD-10-CM

## 2025-03-19 DIAGNOSIS — Z12.31 ENCOUNTER FOR SCREENING MAMMOGRAM FOR BREAST CANCER: ICD-10-CM

## 2025-03-19 DIAGNOSIS — J45.20 MILD INTERMITTENT ASTHMA WITHOUT COMPLICATION: ICD-10-CM

## 2025-03-19 DIAGNOSIS — J06.9 UPPER RESPIRATORY TRACT INFECTION, UNSPECIFIED TYPE: ICD-10-CM

## 2025-03-19 DIAGNOSIS — I10 BENIGN ESSENTIAL HYPERTENSION: Primary | ICD-10-CM

## 2025-03-19 DIAGNOSIS — E55.9 VITAMIN D DEFICIENCY: ICD-10-CM

## 2025-03-19 DIAGNOSIS — Z78.9 DRUG INTOLERANCE: ICD-10-CM

## 2025-03-19 DIAGNOSIS — G43.B0 OPHTHALMOPLEGIC MIGRAINE, NOT INTRACTABLE: ICD-10-CM

## 2025-03-19 DIAGNOSIS — Z23 ENCOUNTER FOR IMMUNIZATION: ICD-10-CM

## 2025-03-19 DIAGNOSIS — Z12.11 SCREENING FOR COLON CANCER: ICD-10-CM

## 2025-03-19 PROCEDURE — 99214 OFFICE O/P EST MOD 30 MIN: CPT | Performed by: INTERNAL MEDICINE

## 2025-03-19 RX ORDER — CHLORHEXIDINE GLUCONATE 213 G/1000ML
SOLUTION TOPICAL
COMMUNITY
Start: 2025-03-17

## 2025-03-19 RX ORDER — FLUTICASONE PROPIONATE 50 MCG
1 SPRAY, SUSPENSION (ML) NASAL DAILY
Qty: 16 G | Refills: 0 | Status: SHIPPED | OUTPATIENT
Start: 2025-03-19

## 2025-03-19 RX ORDER — GUAIFENESIN 600 MG/1
600 TABLET, EXTENDED RELEASE ORAL EVERY 12 HOURS SCHEDULED
Qty: 20 TABLET | Refills: 0 | Status: SHIPPED | OUTPATIENT
Start: 2025-03-19

## 2025-03-19 RX ORDER — BENZONATATE 200 MG/1
200 CAPSULE ORAL 3 TIMES DAILY PRN
Qty: 20 CAPSULE | Refills: 0 | Status: SHIPPED | OUTPATIENT
Start: 2025-03-19

## 2025-03-19 RX ORDER — MUPIROCIN 20 MG/G
OINTMENT TOPICAL
COMMUNITY
Start: 2025-03-17

## 2025-03-19 NOTE — PATIENT INSTRUCTIONS
"Patient Education     High blood pressure in adults   The Basics   Written by the doctors and editors at [facility]   What is high blood pressure? -- High blood pressure is a condition that puts you at risk for heart attack, stroke, and kidney disease. It does not usually cause symptoms. But it can be serious.  When your doctor or nurse tells you your blood pressure, they say 2 numbers. For instance, your doctor or nurse might say that your blood pressure is \"130 over 80.\" The top number is the pressure inside your arteries when your heart is garry. The bottom number is the pressure inside your arteries when your heart is relaxed.  \"Elevated blood pressure\" is a term doctors or nurses use as a warning. People with elevated blood pressure do not yet have high blood pressure. But their blood pressure is not as low as it should be for good health.  Many experts define high, elevated, and normal blood pressure as follows:   High - Top number of 130 or above and/or bottom number of 80 or above.   Elevated - Top number between 120 and 129 and bottom number of 79 or below.   Normal - Top number of 119 or below and bottom number of 79 or below.  This information is also in the table (table 1).  How can I lower my blood pressure? -- If your doctor or nurse prescribed blood pressure medicine, the most important thing you can do is to take it. If it causes side effects, do not just stop taking it. Instead, talk to your doctor or nurse about the problems it causes. They might be able to lower your dose or switch you to another medicine. If cost is a problem, mention that, too. They might be able to put you on a less expensive medicine. Taking your blood pressure medicine can keep you from having a heart attack or stroke, and it can save your life!  Can I do anything on my own? -- You have a lot of control over your blood pressure. To lower it:   Lose weight (if you are overweight).   Choose a diet low in fat and rich in " "fruits, vegetables, and low-fat dairy products.   Eat less salt.   Do something active for at least 30 minutes a day on most days of the week.   Drink less alcohol (if you drink more than 2 alcoholic drinks per day).  It's also a good idea to get a home blood pressure meter. People who check their own blood pressure at home do better at keeping it low and can sometimes even reduce the amount of medicine they take.  All topics are updated as new evidence becomes available and our peer review process is complete.  This topic retrieved from [employer] on: Feb 26, 2024.  Topic 07331 Version 23.0  Release: 32.2.4 - C32.56  © 2024 UpToDate, Inc. and/or its affiliates. All rights reserved.  table 1: Definition of normal and high blood pressure  Level  Top number  Bottom number    High 130 or above 80 or above   Elevated 120 to 129 79 or below   Normal 119 or below 79 or below   These definitions are from the American College of Cardiology/American Heart Association. Other expert groups might use slightly different definitions.  \"Elevated blood pressure\" is a term doctor or nurses use as a warning. It means you do not yet have high blood pressure, but your blood pressure is not as low as it should be for good health.  Graphic 87084 Version 6.0  Consumer Information Use and Disclaimer   Disclaimer: This generalized information is a limited summary of diagnosis, treatment, and/or medication information. It is not meant to be comprehensive and should be used as a tool to help the user understand and/or assess potential diagnostic and treatment options. It does NOT include all information about conditions, treatments, medications, side effects, or risks that may apply to a specific patient. It is not intended to be medical advice or a substitute for the medical advice, diagnosis, or treatment of a health care provider based on the health care provider's examination and assessment of a patient's specific and unique circumstances. " Patients must speak with a health care provider for complete information about their health, medical questions, and treatment options, including any risks or benefits regarding use of medications. This information does not endorse any treatments or medications as safe, effective, or approved for treating a specific patient. UpToDate, Inc. and its affiliates disclaim any warranty or liability relating to this information or the use thereof.The use of this information is governed by the Terms of Use, available at https://www.woltersCortexuwer.com/en/know/clinical-effectiveness-terms. 2024© UpToDate, Inc. and its affiliates and/or licensors. All rights reserved.  Copyright   © 2024 UpToDate, Inc. and/or its affiliates. All rights reserved.

## 2025-03-19 NOTE — PROGRESS NOTES
Name: Carolann Foy      : 1965      MRN: [id number]  Encounter Provider: Christo Brady DO  Encounter Date: 3/19/2025   Encounter department: [facility]  :  Assessment & Plan  Encounter for screening mammogram for breast cancer    Orders:    Mammo screening bilateral w 3d and cad; Future    Encounter for immunization    Orders:    Zoster Vaccine Recombinant IM    Screening for colon cancer    Orders:    Cologuard    Ambulatory Referral to Gastroenterology; Future    Benign essential hypertension         Mild intermittent asthma without complication         GERD without esophagitis         Ophthalmoplegic migraine, not intractable         Osteopenia, unspecified location         Psoriatic arthritis (HCC)         Generalized anxiety disorder         Fibromyalgia         Chronic pain disorder         Prediabetes         Vitamin D deficiency         Screening for hyperlipidemia         Immunosuppression (HCC)       A/P; Doing ok and discussed labs. Modify risks for prediabetes. Will treat her URI.  Discussed vaccines defers HZV. . Reports having CRC and will need to track down the report. Order mammo. Discussed a different maintenance MDI, but defers and will continue PRN RAQUEL and singulair. Defers meds for RYNE.  Keep f/u with ortho.  Appreciate rheum input. Continue current treatment and RTC four months for routine. Keep f/u with rheum.   Upper respiratory tract infection, unspecified type    Orders:    amoxicillin-clavulanate (AUGMENTIN) 875-125 mg per tablet; Take 1 tablet by mouth every 12 (twelve) hours for 10 days    guaiFENesin (Mucinex) 600 mg 12 hr tablet; Take 1 tablet (600 mg total) by mouth every 12 (twelve) hours    fluticasone (FLONASE) 50 mcg/act nasal spray; 1 spray into each nostril daily    benzonatate (TESSALON) 200 MG capsule; Take 1 capsule (200 mg total) by mouth 3 (three) times a day as needed for cough    Drug intolerance                History of Present Illness   WF  "RTC for f/u HTN, asthma, etc. Doing ok and no new issues, but will be getting a TKA in the near future. Starting with URI s/s. Asthma controlled, but stopped advair due to coughing. . Remains active as much as her arthritis will allow  and no falls. No reflux. Chronic pain is manageable. RYNE is controlled,but has been high.. Due for  mammo, CRC, and vaccines. Recent labs ok.       Review of Systems   Constitutional:  Negative for activity change, chills, diaphoresis, fatigue and fever.   HENT:  Positive for congestion, postnasal drip, rhinorrhea, sore throat and voice change. Negative for ear discharge, ear pain, facial swelling, sinus pressure and sinus pain.    Eyes:  Negative for visual disturbance.   Respiratory:  Negative for cough, chest tightness, shortness of breath and wheezing.    Cardiovascular:  Negative for chest pain, palpitations and leg swelling.   Gastrointestinal:  Negative for abdominal pain, constipation, diarrhea, nausea and vomiting.   Endocrine: Negative for cold intolerance and heat intolerance.   Genitourinary:  Negative for difficulty urinating, dysuria and frequency.   Musculoskeletal:  Positive for arthralgias. Negative for gait problem and myalgias.   Neurological:  Negative for dizziness, seizures, syncope, weakness, light-headedness and headaches.   Psychiatric/Behavioral:  Negative for confusion, dysphoric mood and sleep disturbance. The patient is nervous/anxious.        Objective   /78   Pulse 68   Temp 97.7 °F (36.5 °C)   Ht 5' 5\" (1.651 m)   Wt 83.8 kg (184 lb 12.8 oz)   SpO2 99%   BMI 30.75 kg/m²      Physical Exam  Vitals and nursing note reviewed.   Constitutional:       General: She is not in acute distress.     Appearance: Normal appearance. She is not ill-appearing.   HENT:      Head: Normocephalic and atraumatic.      Mouth/Throat:      Mouth: Mucous membranes are moist.   Eyes:      Extraocular Movements: Extraocular movements intact.      Conjunctiva/sclera: " Conjunctivae normal.      Pupils: Pupils are equal, round, and reactive to light.   Neck:      Vascular: No carotid bruit.   Cardiovascular:      Rate and Rhythm: Normal rate and regular rhythm.      Heart sounds: Normal heart sounds. No murmur heard.  Pulmonary:      Effort: Pulmonary effort is normal. No respiratory distress.      Breath sounds: Normal breath sounds. No wheezing, rhonchi or rales.   Abdominal:      General: Bowel sounds are normal. There is no distension.      Palpations: Abdomen is soft.      Tenderness: There is no abdominal tenderness.   Musculoskeletal:      Cervical back: Neck supple.      Right lower leg: No edema.      Left lower leg: No edema.   Neurological:      General: No focal deficit present.      Mental Status: She is alert and oriented to person, place, and time. Mental status is at baseline.   Psychiatric:         Mood and Affect: Mood normal.         Behavior: Behavior normal.         Thought Content: Thought content normal.         Judgment: Judgment normal.

## 2025-03-19 NOTE — ASSESSMENT & PLAN NOTE
A/P; Doing ok and discussed labs. Modify risks for prediabetes. Will treat her URI.  Discussed vaccines defers HZV. . Reports having CRC and will need to track down the report. Order mammo. Discussed a different maintenance MDI, but defers and will continue PRN RAQUEL and singulair. Defers meds for RYNE.  Keep f/u with ortho.  Appreciate rheum input. Continue current treatment and RTC four months for routine. Keep f/u with rheum.

## 2025-03-22 DIAGNOSIS — K21.9 GERD WITHOUT ESOPHAGITIS: ICD-10-CM

## 2025-03-23 RX ORDER — OMEPRAZOLE 40 MG/1
40 CAPSULE, DELAYED RELEASE ORAL
Qty: 90 CAPSULE | Refills: 1 | Status: SHIPPED | OUTPATIENT
Start: 2025-03-23

## 2025-04-02 ENCOUNTER — EVALUATION (OUTPATIENT)
Dept: PHYSICAL THERAPY | Facility: CLINIC | Age: 60
End: 2025-04-02
Payer: COMMERCIAL

## 2025-04-02 DIAGNOSIS — M25.562 CHRONIC PAIN OF LEFT KNEE: Primary | ICD-10-CM

## 2025-04-02 DIAGNOSIS — M17.12 PRIMARY OSTEOARTHRITIS OF LEFT KNEE: ICD-10-CM

## 2025-04-02 DIAGNOSIS — G89.29 CHRONIC PAIN OF LEFT KNEE: Primary | ICD-10-CM

## 2025-04-02 PROCEDURE — 97110 THERAPEUTIC EXERCISES: CPT | Performed by: PHYSICAL THERAPIST

## 2025-04-02 PROCEDURE — 97112 NEUROMUSCULAR REEDUCATION: CPT | Performed by: PHYSICAL THERAPIST

## 2025-04-02 PROCEDURE — 97161 PT EVAL LOW COMPLEX 20 MIN: CPT | Performed by: PHYSICAL THERAPIST

## 2025-04-02 NOTE — LETTER
2025    Robin Unger MD  [address] [facility] [address]    Patient: Carolann Foy   YOB: 1965   Date of Visit: 2025     Encounter Diagnosis     ICD-10-CM    1. Chronic pain of left knee  M25.562     G89.29       2. Primary osteoarthritis of left knee  M17.12           Dear Dr. Unger:    Thank you for your recent referral of Carolann Foy. Please review the attached evaluation summary from Carolann's recent visit.     Please verify that you agree with the plan of care by signing the attached order.     If you have any questions or concerns, please do not hesitate to call.     I sincerely appreciate the opportunity to share in the care of one of your patients and hope to have another opportunity to work with you in the near future.       Sincerely,    Víctor Cespedes, PT      Referring Provider:      I certify that I have read the below Plan of Care and certify the need for these services furnished under this plan of treatment while under my care.                    Robin Unger MD  [address] [place] [id number]  Via Fax: [phone]          PT Evaluation     Today's date: 2025  Patient name: Carolann Foy  : 1965  MRN: [id number]  Referring provider: Robin Unger MD  Dx:   Encounter Diagnosis     ICD-10-CM    1. Chronic pain of left knee  M25.562     G89.29       2. Primary osteoarthritis of left knee  M17.12           Start Time: 0800  Stop Time: 0845  Total time in clinic (min): 45 minutes    Assessment  Impairments: abnormal or restricted ROM, activity intolerance, impaired physical strength, lacks appropriate home exercise program, pain with function and activity limitations  Symptom irritability: moderate    Assessment details: Patient is a 60 y/o female with chief c/o L knee pain. Patient is here for a pre operative evaluation for an upcoming L TKA scheduled for 2025. Patient presents with noted tenderness to the L medial joint  line. She has noted crepitus of the L patellofemoral joint during mobility assessment without mobility restriction noted. There is mild/moderate strength deficits to L knee and hip strength during resistance testing today. She has minimal L knee mobility deficit during active and passive mobility assessment. Therapy interventions focused on strengthening interventions which were provided for HEP. Patient is well prepared for her upcoming procedure due to prior experience from having the R knee replaced last year.   Understanding of Dx/Px/POC: good     Prognosis: good    Goals  STGs:  1. Patient will be independent with hep by 1-2 visits.   2. Patient will be prepared for the upcoming surgical procedure including home preparation, knowledge of the surgical procedure, and post operative exercise/mobility instructions by 1-2 visits.     LTGs:  To be established post operatively.     Plan  Patient would benefit from: skilled physical therapy    Planned therapy interventions: ADL training, balance/weight bearing training, functional ROM exercises, home exercise program, manual therapy, strengthening, stretching, therapeutic activities and therapeutic exercise    Frequency: 1x week  Duration in weeks: 2  Plan of Care beginning date: 4/2/2025  Plan of Care expiration date: 4/16/2025  Treatment plan discussed with: patient  Plan details: Patient informed that from this point forward, to ensure adherence to the aforementioned plan of care, all or some of the treatment may be performed and carried out by a Physical Therapy Assistant (PTA) with supervision from a licensed Physical Therapist (PT) in accordance with [facility] Physical Therapy Practice Act.  Patient will continue to benefit from skilled physical therapy to address the functional deficits that were identified during the evaluation today. We will continue to progress the therapy program to address these functional deficits and achieve the established  goals.           Subjective Evaluation    History of Present Illness  Mechanism of injury: Patient presents to out patient physical therapy with chief c/o L knee pain. Patient is here today for a pre operative appointment for an upcoming L TKA scheduled for 2025. Patient reports that she has been having some increased pain and giving way of her L knee most noted with walking down an incline and descending stairs. She notes that when she is off of her feet this will help to decrease the pain she is experiencing in the L knee.           Recurrent probem    Quality of life: good    Patient Goals  Patient goal: Patient wishes to bed educated and prepared for her upcoming L knee procedure.  Pain  Current pain ratin  At best pain ratin  At worst pain ratin  Location: L knee  Quality: discomfort, dull ache, grinding and sharp  Relieving factors: rest  Aggravating factors: stair climbing, walking and standing          Objective     Tenderness   Left Knee   Tenderness in the medial joint line.     Active Range of Motion   Left Knee   Flexion: 139 degrees   Extension: 6 degrees   Extensor la degrees     Right Knee   Flexion: 125 degrees   Extension: 2 degrees   Extensor la degrees     Passive Range of Motion   Left Knee   Flexion: 139 degrees   Extension: 6 degrees     Right Knee   Flexion: 125 degrees   Extension: 2 degrees     Mobility   Patellar Mobility:   Left Knee   WFL: medial, lateral, superior and inferior.     Strength/Myotome Testing     Left Hip   Planes of Motion   Flexion: 4  Extension: 4  Abduction: 3+  Adduction: 4  External rotation: 4-  Internal rotation: 4-    Right Hip   Planes of Motion   Flexion: 4  Extension: 4  Abduction: 3+  Adduction: 4  External rotation: 4-  Internal rotation: 3+    Left Knee   Flexion: 4  Extension: 4-    Right Knee   Flexion: 4  Extension: 4-             Precautions: None      Date  IE       FOTO NA       Manuals                                       "  Neuro Re-Ed        Clamshells 5\" 10x ea.        Reverse clamshells 5\" 10x ea.                                                Ther Ex        LAQ w/ eccentric 5 sec lowering 15x ea.        Seated ER isometric 3\" 15x       Hook lying hip abduction TB Blue 15x ea.        Hook lying TB march Blue 15x                                       Ther Activity                        Gait Training                        Modalities                                               "

## 2025-04-02 NOTE — PROGRESS NOTES
PT Evaluation     Today's date: 2025  Patient name: Carolann Foy  : 1965  MRN: [id number]  Referring provider: Robin Unger MD  Dx:   Encounter Diagnosis     ICD-10-CM    1. Chronic pain of left knee  M25.562     G89.29       2. Primary osteoarthritis of left knee  M17.12           Start Time: 0800  Stop Time: 0845  Total time in clinic (min): 45 minutes    Assessment  Impairments: abnormal or restricted ROM, activity intolerance, impaired physical strength, lacks appropriate home exercise program, pain with function and activity limitations  Symptom irritability: moderate    Assessment details: Patient is a 58 y/o female with chief c/o L knee pain. Patient is here for a pre operative evaluation for an upcoming L TKA scheduled for 2025. Patient presents with noted tenderness to the L medial joint line. She has noted crepitus of the L patellofemoral joint during mobility assessment without mobility restriction noted. There is mild/moderate strength deficits to L knee and hip strength during resistance testing today. She has minimal L knee mobility deficit during active and passive mobility assessment. Therapy interventions focused on strengthening interventions which were provided for HEP. Patient is well prepared for her upcoming procedure due to prior experience from having the R knee replaced last year.   Understanding of Dx/Px/POC: good     Prognosis: good    Goals  STGs:  1. Patient will be independent with hep by 1-2 visits.   2. Patient will be prepared for the upcoming surgical procedure including home preparation, knowledge of the surgical procedure, and post operative exercise/mobility instructions by 1-2 visits.     LTGs:  To be established post operatively.     Plan  Patient would benefit from: skilled physical therapy    Planned therapy interventions: ADL training, balance/weight bearing training, functional ROM exercises, home exercise program, manual therapy, strengthening,  stretching, therapeutic activities and therapeutic exercise    Frequency: 1x week  Duration in weeks: 2  Plan of Care beginning date: 2025  Plan of Care expiration date: 2025  Treatment plan discussed with: patient  Plan details: Patient informed that from this point forward, to ensure adherence to the aforementioned plan of care, all or some of the treatment may be performed and carried out by a Physical Therapy Assistant (PTA) with supervision from a licensed Physical Therapist (PT) in accordance with [facility] Physical Therapy Practice Act.  Patient will continue to benefit from skilled physical therapy to address the functional deficits that were identified during the evaluation today. We will continue to progress the therapy program to address these functional deficits and achieve the established goals.           Subjective Evaluation    History of Present Illness  Mechanism of injury: Patient presents to out patient physical therapy with chief c/o L knee pain. Patient is here today for a pre operative appointment for an upcoming L TKA scheduled for 2025. Patient reports that she has been having some increased pain and giving way of her L knee most noted with walking down an incline and descending stairs. She notes that when she is off of her feet this will help to decrease the pain she is experiencing in the L knee.           Recurrent probem    Quality of life: good    Patient Goals  Patient goal: Patient wishes to bed educated and prepared for her upcoming L knee procedure.  Pain  Current pain ratin  At best pain ratin  At worst pain ratin  Location: L knee  Quality: discomfort, dull ache, grinding and sharp  Relieving factors: rest  Aggravating factors: stair climbing, walking and standing          Objective     Tenderness   Left Knee   Tenderness in the medial joint line.     Active Range of Motion   Left Knee   Flexion: 139 degrees   Extension: 6 degrees   Extensor lag:  "0 degrees     Right Knee   Flexion: 125 degrees   Extension: 2 degrees   Extensor la degrees     Passive Range of Motion   Left Knee   Flexion: 139 degrees   Extension: 6 degrees     Right Knee   Flexion: 125 degrees   Extension: 2 degrees     Mobility   Patellar Mobility:   Left Knee   WFL: medial, lateral, superior and inferior.     Strength/Myotome Testing     Left Hip   Planes of Motion   Flexion: 4  Extension: 4  Abduction: 3+  Adduction: 4  External rotation: 4-  Internal rotation: 4-    Right Hip   Planes of Motion   Flexion: 4  Extension: 4  Abduction: 3+  Adduction: 4  External rotation: 4-  Internal rotation: 3+    Left Knee   Flexion: 4  Extension: 4-    Right Knee   Flexion: 4  Extension: 4-             Precautions: None      Date  IE       FOTO NA       Manuals                                        Neuro Re-Ed        Clamshells 5\" 10x ea.        Reverse clamshells 5\" 10x ea.                                                Ther Ex        LAQ w/ eccentric 5 sec lowering 15x ea.        Seated ER isometric 3\" 15x       Hook lying hip abduction TB Blue 15x ea.        Hook lying TB march Blue 15x                                       Ther Activity                        Gait Training                        Modalities                               "

## 2025-04-07 ENCOUNTER — OFFICE VISIT (OUTPATIENT)
Dept: PHYSICAL THERAPY | Facility: CLINIC | Age: 60
End: 2025-04-07
Payer: COMMERCIAL

## 2025-04-07 DIAGNOSIS — M25.562 CHRONIC PAIN OF LEFT KNEE: ICD-10-CM

## 2025-04-07 DIAGNOSIS — M17.12 PRIMARY OSTEOARTHRITIS OF LEFT KNEE: Primary | ICD-10-CM

## 2025-04-07 DIAGNOSIS — G89.29 CHRONIC PAIN OF LEFT KNEE: ICD-10-CM

## 2025-04-07 PROCEDURE — 97110 THERAPEUTIC EXERCISES: CPT

## 2025-04-07 PROCEDURE — 97112 NEUROMUSCULAR REEDUCATION: CPT

## 2025-04-07 PROCEDURE — 97140 MANUAL THERAPY 1/> REGIONS: CPT

## 2025-04-07 NOTE — PROGRESS NOTES
"Daily Note     Today's date: 2025  Patient name: Carolann Foy  : 1965  MRN: [id number]  Referring provider: Robin Unger MD  Dx:   Encounter Diagnosis     ICD-10-CM    1. Primary osteoarthritis of left knee  M17.12       2. Chronic pain of left knee  M25.562     G89.29           Start Time: 0800  Stop Time: 0845  Total time in clinic (min): 45 minutes    Subjective: I have pain in my left knee mostly when I am doing stairs.       Objective: See treatment diary below      Assessment: Tolerated treatment well. Patient would benefit from continued PT  pt reports that she has most trouble with doing stairs. She states that the pain isnt bad when she is off her feet. Pt was able to use 2# today for her LAQ and increased her reps to 20 x .  Pt had some tightness in both hamstring and quads today and did well with her outlined program. Pt will continue with her exercises at home and prepare for her Surgery at the end of May,       Plan: Continue per plan of care.      Precautions: None      Date  IE       FOTO NA       Manuals        Hamstring / quad  8 min                              Neuro Re-Ed        Clamshells 5\" 10x ea.  5\"  15   x      Reverse clamshells 5\" 10x ea.  5\"   10 x  red ball                                              Ther Ex        LAQ w/ eccentric 5 sec lowering 15x ea.  5 \" lowering 20 x   2#       Seated ER isometric 3\" 15x 3\" 15 x   2#      Hook lying hip abduction TB Blue 15x ea.  Blue  20x    5\"      Hook lying TB march Blue 15x Blue  20 x      Nustep  L 5  5 min                              Ther Activity        Squats   20 x      Step ups  8\"  20 x      Gait Training                        Modalities                               "

## 2025-05-02 ENCOUNTER — APPOINTMENT (OUTPATIENT)
Dept: RADIOLOGY | Facility: CLINIC | Age: 60
End: 2025-05-02
Payer: COMMERCIAL

## 2025-05-02 ENCOUNTER — APPOINTMENT (OUTPATIENT)
Dept: LAB | Facility: CLINIC | Age: 60
End: 2025-05-02
Attending: PHYSICIAN ASSISTANT
Payer: COMMERCIAL

## 2025-05-02 DIAGNOSIS — Z01.812 ENCOUNTER FOR PREPROCEDURAL LABORATORY EXAMINATION: ICD-10-CM

## 2025-05-02 DIAGNOSIS — Z01.818 ENCOUNTER FOR OTHER PREPROCEDURAL EXAMINATION: ICD-10-CM

## 2025-05-02 LAB
ALBUMIN SERPL BCG-MCNC: 4.1 G/DL (ref 3.5–5)
ALP SERPL-CCNC: 93 U/L (ref 34–104)
ALT SERPL W P-5'-P-CCNC: 15 U/L (ref 7–52)
ANION GAP SERPL CALCULATED.3IONS-SCNC: 9 MMOL/L (ref 4–13)
AST SERPL W P-5'-P-CCNC: 18 U/L (ref 13–39)
BACTERIA UR QL AUTO: ABNORMAL /HPF
BILIRUB SERPL-MCNC: 0.36 MG/DL (ref 0.2–1)
BILIRUB UR QL STRIP: NEGATIVE
BUN SERPL-MCNC: 11 MG/DL (ref 5–25)
CALCIUM SERPL-MCNC: 9.5 MG/DL (ref 8.4–10.2)
CHLORIDE SERPL-SCNC: 104 MMOL/L (ref 96–108)
CLARITY UR: CLEAR
CO2 SERPL-SCNC: 27 MMOL/L (ref 21–32)
COLOR UR: COLORLESS
CREAT SERPL-MCNC: 0.8 MG/DL (ref 0.6–1.3)
GFR SERPL CREATININE-BSD FRML MDRD: 80 ML/MIN/1.73SQ M
GLUCOSE P FAST SERPL-MCNC: 96 MG/DL (ref 65–99)
GLUCOSE UR STRIP-MCNC: NEGATIVE MG/DL
HGB UR QL STRIP.AUTO: NEGATIVE
KETONES UR STRIP-MCNC: NEGATIVE MG/DL
LEUKOCYTE ESTERASE UR QL STRIP: ABNORMAL
NITRITE UR QL STRIP: NEGATIVE
NON-SQ EPI CELLS URNS QL MICRO: ABNORMAL /HPF
PH UR STRIP.AUTO: 6.5 [PH]
POTASSIUM SERPL-SCNC: 3.8 MMOL/L (ref 3.5–5.3)
PROT SERPL-MCNC: 7 G/DL (ref 6.4–8.4)
PROT UR STRIP-MCNC: NEGATIVE MG/DL
RBC #/AREA URNS AUTO: ABNORMAL /HPF
SODIUM SERPL-SCNC: 140 MMOL/L (ref 135–147)
SP GR UR STRIP.AUTO: 1.01 (ref 1–1.03)
UROBILINOGEN UR STRIP-ACNC: <2 MG/DL
WBC #/AREA URNS AUTO: ABNORMAL /HPF

## 2025-05-02 PROCEDURE — 36415 COLL VENOUS BLD VENIPUNCTURE: CPT

## 2025-05-02 PROCEDURE — 71046 X-RAY EXAM CHEST 2 VIEWS: CPT

## 2025-05-02 PROCEDURE — 81001 URINALYSIS AUTO W/SCOPE: CPT

## 2025-05-02 PROCEDURE — 80053 COMPREHEN METABOLIC PANEL: CPT

## 2025-05-05 ENCOUNTER — RESULTS FOLLOW-UP (OUTPATIENT)
Dept: INTERNAL MEDICINE CLINIC | Facility: CLINIC | Age: 60
End: 2025-05-05

## 2025-05-06 ENCOUNTER — APPOINTMENT (OUTPATIENT)
Dept: LAB | Facility: CLINIC | Age: 60
End: 2025-05-06
Attending: PHYSICIAN ASSISTANT
Payer: COMMERCIAL

## 2025-05-06 DIAGNOSIS — Z01.812 ENCOUNTER FOR PREPROCEDURAL LABORATORY EXAMINATION: ICD-10-CM

## 2025-05-06 DIAGNOSIS — Z01.818 ENCOUNTER FOR OTHER PREPROCEDURAL EXAMINATION: ICD-10-CM

## 2025-05-06 LAB
BASOPHILS # BLD AUTO: 0.06 THOUSANDS/ÂΜL (ref 0–0.1)
BASOPHILS NFR BLD AUTO: 1 % (ref 0–1)
EOSINOPHIL # BLD AUTO: 0.3 THOUSAND/ÂΜL (ref 0–0.61)
EOSINOPHIL NFR BLD AUTO: 3 % (ref 0–6)
ERYTHROCYTE [DISTWIDTH] IN BLOOD BY AUTOMATED COUNT: 13.3 % (ref 11.6–15.1)
HCT VFR BLD AUTO: 40.4 % (ref 34.8–46.1)
HGB BLD-MCNC: 12.4 G/DL (ref 11.5–15.4)
IMM GRANULOCYTES # BLD AUTO: 0.03 THOUSAND/UL (ref 0–0.2)
IMM GRANULOCYTES NFR BLD AUTO: 0 % (ref 0–2)
LYMPHOCYTES # BLD AUTO: 3.94 THOUSANDS/ÂΜL (ref 0.6–4.47)
LYMPHOCYTES NFR BLD AUTO: 39 % (ref 14–44)
MCH RBC QN AUTO: 27.4 PG (ref 26.8–34.3)
MCHC RBC AUTO-ENTMCNC: 30.7 G/DL (ref 31.4–37.4)
MCV RBC AUTO: 89 FL (ref 82–98)
MONOCYTES # BLD AUTO: 0.78 THOUSAND/ÂΜL (ref 0.17–1.22)
MONOCYTES NFR BLD AUTO: 8 % (ref 4–12)
NEUTROPHILS # BLD AUTO: 4.88 THOUSANDS/ÂΜL (ref 1.85–7.62)
NEUTS SEG NFR BLD AUTO: 49 % (ref 43–75)
NRBC BLD AUTO-RTO: 0 /100 WBCS
PLATELET # BLD AUTO: 454 THOUSANDS/UL (ref 149–390)
PMV BLD AUTO: 9.4 FL (ref 8.9–12.7)
RBC # BLD AUTO: 4.52 MILLION/UL (ref 3.81–5.12)
WBC # BLD AUTO: 9.99 THOUSAND/UL (ref 4.31–10.16)

## 2025-05-06 PROCEDURE — 36415 COLL VENOUS BLD VENIPUNCTURE: CPT

## 2025-05-06 PROCEDURE — 85025 COMPLETE CBC W/AUTO DIFF WBC: CPT

## 2025-05-07 ENCOUNTER — TELEPHONE (OUTPATIENT)
Dept: INTERNAL MEDICINE CLINIC | Facility: CLINIC | Age: 60
End: 2025-05-07

## 2025-05-07 ENCOUNTER — CONSULT (OUTPATIENT)
Dept: INTERNAL MEDICINE CLINIC | Facility: CLINIC | Age: 60
End: 2025-05-07
Payer: COMMERCIAL

## 2025-05-07 VITALS
HEART RATE: 64 BPM | BODY MASS INDEX: 30.89 KG/M2 | OXYGEN SATURATION: 98 % | WEIGHT: 185.4 LBS | DIASTOLIC BLOOD PRESSURE: 78 MMHG | TEMPERATURE: 97 F | HEIGHT: 65 IN | SYSTOLIC BLOOD PRESSURE: 118 MMHG

## 2025-05-07 DIAGNOSIS — M17.0 BILATERAL PRIMARY OSTEOARTHRITIS OF KNEE: ICD-10-CM

## 2025-05-07 DIAGNOSIS — Z01.818 PRE-OP EXAMINATION: Primary | ICD-10-CM

## 2025-05-07 DIAGNOSIS — I10 BENIGN ESSENTIAL HYPERTENSION: ICD-10-CM

## 2025-05-07 DIAGNOSIS — J45.20 MILD INTERMITTENT ASTHMA WITHOUT COMPLICATION: ICD-10-CM

## 2025-05-07 DIAGNOSIS — L40.50 PSORIATIC ARTHRITIS (HCC): ICD-10-CM

## 2025-05-07 PROBLEM — D84.9 IMMUNOSUPPRESSION (HCC): Status: RESOLVED | Noted: 2021-01-29 | Resolved: 2025-05-07

## 2025-05-07 PROCEDURE — 99214 OFFICE O/P EST MOD 30 MIN: CPT | Performed by: INTERNAL MEDICINE

## 2025-05-07 PROCEDURE — 93000 ELECTROCARDIOGRAM COMPLETE: CPT | Performed by: INTERNAL MEDICINE

## 2025-05-07 NOTE — PROGRESS NOTES
Name: Carolann Foy      : 1965      MRN: [id number]  Encounter Provider: Christo Brady DO  Encounter Date: 2025   Encounter department: formerly [facility]  :  Assessment & Plan  Pre-op examination    Orders:    POCT ECG    Bilateral primary osteoarthritis of knee         Psoriatic arthritis (HCC)         Benign essential hypertension         Mild intermittent asthma without complication         A/P: PAT tests c/o labs, cxr, and EKG reviewed. Labs ok. CXR ok. EKG w/o acute changes and borderline SB.. Pt and co-morbidities are stable. Pt is a Dillon's Class I and carries a cardiac risk of about 1%.. Recommend holding any ASA, NSAID's, omega 3, and MVT one week prior to the procedure. Unless surgeon wants ASA and certain vitamins/minerals in preparation for sx.  On the day of sx, may take with a sip of water, her metoprolol, singulair, and omeprazole. May resume the rest of her meds once taking PO. Recommend pt use her DALLAS prior to sx. Watch for bronchospasm post op. Aggressive DVT prophylaxis recommended. Note pt has been on intermittent steroids in the past, but doesn't appear to need jonny op supplement, but if pt develops s/s of adrenal insufficiency, would accordingly. Watch for hyperemesis after sx.  Recommend ATC OTC tylenol 48 hours prior to sx for aide in pain management. No other recs at this time and is cleared. . Thanks and good luck.        History of Present Illness   WF presents at the request of Dr. Unger for pre-op eval for upcoming left TKA  tentatively scheduled for 25. Since last visit, doing well and no recent illnesses. Remains active w/o difficulty and no falls. No travel history. Denies depression. No recent illnesses. No fever, chills, or sweats. No unexplained wt changes. Denies CP, SOB, or palpitations. No edema. No orthopnea or PND. No sz or syncope. No changes in bowel or bladder habits. PMH includes RA, HTN, DJD, asthma, allergies, GERD, migraines, RYNE, vit d  "def, and pre diabetes. Past sx include tubal, TKA, hysterectomy, tonsils, hand sx, foot sx, appy, choly, and c section and reports no problems with prior procedures or anesthesia except for some hyperemesis.. Denies smoking and social ETOH use. No history of DVT or PE. NO history of bleeding issues and is on Motrin, but  not on anti-coagulants. Denies dental plates. Denies C spine issues. No objections to getting blood products if deemed necessary. Had PAT testing done.          Review of Systems   Constitutional:  Positive for activity change. Negative for chills, diaphoresis, fatigue and fever.   HENT: Negative.     Eyes:  Negative for visual disturbance.   Respiratory:  Negative for cough, chest tightness, shortness of breath and wheezing.    Cardiovascular:  Negative for chest pain, palpitations and leg swelling.   Gastrointestinal:  Negative for abdominal pain, constipation, diarrhea, nausea and vomiting.   Endocrine: Negative for cold intolerance and heat intolerance.   Genitourinary:  Negative for difficulty urinating, dysuria and frequency.   Musculoskeletal:  Positive for arthralgias and gait problem. Negative for joint swelling and myalgias.   Neurological:  Negative for dizziness, seizures, syncope, weakness, light-headedness and headaches.   Psychiatric/Behavioral:  Negative for confusion, dysphoric mood and sleep disturbance. The patient is not nervous/anxious.        Objective   /78   Pulse 64   Temp (!) 97 °F (36.1 °C)   Ht 5' 5\" (1.651 m)   Wt 84.1 kg (185 lb 6.4 oz)   SpO2 98%   BMI 30.85 kg/m²      Physical Exam  Vitals and nursing note reviewed.   Constitutional:       General: She is not in acute distress.     Appearance: Normal appearance. She is not ill-appearing.   HENT:      Head: Normocephalic and atraumatic.      Comments: No oropharyngeal obstruction.     Mouth/Throat:      Mouth: Mucous membranes are moist.   Eyes:      Extraocular Movements: Extraocular movements intact.      " Conjunctiva/sclera: Conjunctivae normal.      Pupils: Pupils are equal, round, and reactive to light.   Neck:      Vascular: No carotid bruit.      Comments: No C spine restrictions.   Cardiovascular:      Rate and Rhythm: Normal rate and regular rhythm.      Heart sounds: Normal heart sounds. No murmur heard.  Pulmonary:      Effort: Pulmonary effort is normal. No respiratory distress.      Breath sounds: Normal breath sounds. No wheezing, rhonchi or rales.   Abdominal:      General: Bowel sounds are normal. There is no distension.      Palpations: Abdomen is soft.      Tenderness: There is no abdominal tenderness.   Musculoskeletal:      Cervical back: Normal range of motion and neck supple. No rigidity or tenderness.      Right lower leg: No edema.      Left lower leg: No edema.   Lymphadenopathy:      Cervical: No cervical adenopathy.   Neurological:      General: No focal deficit present.      Mental Status: She is alert and oriented to person, place, and time. Mental status is at baseline.   Psychiatric:         Mood and Affect: Mood normal.         Behavior: Behavior normal.         Thought Content: Thought content normal.         Judgment: Judgment normal.

## 2025-05-07 NOTE — TELEPHONE ENCOUNTER
----- Message from Christo Brady DO sent at 5/7/2025  7:45 AM EDT -----  Send EKG and preop to surgeon and anesthesia.

## 2025-05-30 ENCOUNTER — OFFICE VISIT (OUTPATIENT)
Dept: PHYSICAL THERAPY | Facility: CLINIC | Age: 60
End: 2025-05-30
Payer: COMMERCIAL

## 2025-05-30 DIAGNOSIS — M25.562 ACUTE PAIN OF LEFT KNEE: Primary | ICD-10-CM

## 2025-05-30 DIAGNOSIS — Z96.652 STATUS POST TOTAL LEFT KNEE REPLACEMENT: ICD-10-CM

## 2025-05-30 PROCEDURE — 97164 PT RE-EVAL EST PLAN CARE: CPT | Performed by: PHYSICAL THERAPIST

## 2025-05-30 PROCEDURE — 97140 MANUAL THERAPY 1/> REGIONS: CPT | Performed by: PHYSICAL THERAPIST

## 2025-05-30 PROCEDURE — 97110 THERAPEUTIC EXERCISES: CPT | Performed by: PHYSICAL THERAPIST

## 2025-05-30 NOTE — LETTER
May 30, 2025    Robin Unger MD  [address] [place] [id number]    Patient: Carolann Foy   YOB: 1965   Date of Visit: 2025     Encounter Diagnosis     ICD-10-CM    1. Acute pain of left knee  M25.562       2. Status post total left knee replacement  Z96.652           Dear Dr. Robin Unger MD:    Thank you for your recent referral of Carolann Foy. Please review the attached evaluation summary from Carolann's recent visit.     Please verify that you agree with the plan of care by signing the attached order.     If you have any questions or concerns, please do not hesitate to call.     I sincerely appreciate the opportunity to share in the care of one of your patients and hope to have another opportunity to work with you in the near future.       Sincerely,    Víctor Cespedes, PT      Referring Provider:      I certify that I have read the below Plan of Care and certify the need for these services furnished under this plan of treatment while under my care.                    Robin Unger MD  [address] [place] [id number]  Via Fax: [phone]          PT Re-Evaluation     Today's date: 2025  Patient name: Carolann Foy  : 1965  MRN: [id number]  Referring provider: Robin Unger MD  Dx:   Encounter Diagnosis     ICD-10-CM    1. Acute pain of left knee  M25.562       2. Status post total left knee replacement  Z96.652           Start Time: 930  Stop Time: 1015  Total time in clinic (min): 45 minutes    Assessment  Impairments: abnormal gait, abnormal or restricted ROM, activity intolerance, impaired balance, impaired physical strength, lacks appropriate home exercise program, pain with function, unable to perform ADL and activity limitations  Symptom irritability: moderate    Assessment details: Patient is a 60 y/o female s/p L TKA performed on 2025. She presents with mild to moderate edema to the L knee with unrestricted patellar mobility noted. There  "is noted weakness of the L quad with current inabilities to perform an unassisted SLR. Her incision site looks healthy without signs of infection noted. She denies any calf tenderness and there is no excessive redness nor warmth to this area. She has good response to therapy interventions today which focused on improving quad activation and L knee mobility. Reviewed exercises for HEP.   Understanding of Dx/Px/POC: good     Prognosis: good    Goals  STGs:  \"Patient will be independent with hep by 2-3 visits.   Improve knee flexion to 90 degrees for improved tolerance with ambulation and transfers by 3-4 weeks.  Improve knee extension to 0 degrees for improved tolerance with ambulation by 3-4 weeks.   Decrease pain levels by 50% for improved tolerance with adls and ambulation by 3-4 weeks. \"    LTGs:  Improve FOTO score from 20 to 62 indicating improved tolerance for activities involving the LE by discharge.   Improve knee rom and strength to wnl for improved tolerance for ambulation and adls by discharge.   Patient will be able to ambulate up and down a flight of stairs with reciprocal gait pattern by discharge.   Ambulation will be performed on all surfaces without limitation or deviation by discharge. \"      Plan  Patient would benefit from: skilled physical therapy  Planned modality interventions: cryotherapy    Planned therapy interventions: ADL retraining, balance/weight bearing training, functional ROM exercises, flexibility, gait training, home exercise program, therapeutic exercise, therapeutic activities, stretching, strengthening, neuromuscular re-education and manual therapy    Frequency: 2-3x week  Duration in weeks: 6  Plan of Care beginning date: 5/30/2025  Plan of Care expiration date: 7/11/2025  Treatment plan discussed with: patient  Plan details: Patient informed that from this point forward, to ensure adherence to the aforementioned plan of care, all or some of the treatment may be performed and " carried out by a Physical Therapy Assistant (PTA) with supervision from a licensed Physical Therapist (PT) in accordance with [facility] Physical Therapy Practice Act.  Patient will continue to benefit from skilled physical therapy to address the functional deficits that were identified during the evaluation today. We will continue to progress the therapy program to address these functional deficits and achieve the established goals.                 Subjective Evaluation    History of Present Illness  Mechanism of injury: Patient presents to out patient physical therapy with chief c/o L knee pain. Patient is here today for a pre operative appointment for an upcoming L TKA scheduled for 2025. Patient reports that she has been having some increased pain and giving way of her L knee most noted with walking down an incline and descending stairs. She notes that when she is off of her feet this will help to decrease the pain she is experiencing in the L knee.     Update 2025:  Patient presenting today s/p L TKA performed on 2025. Patient reports that her knee feels more stiff and tight than she remembers from her last procedure. She notes that her  pain has been pretty well controlled at this time with the medication but that she still has some bouts of nausea since the surgery.           Recurrent probem    Quality of life: good    Pain  Current pain rating: 3  At best pain ratin  At worst pain ratin  Location: L knee  Quality: discomfort, burning, tight, sharp and throbbing  Relieving factors: rest, medications and ice  Aggravating factors: stair climbing, walking, standing and sitting          Objective     Active Range of Motion   Left Knee   Flexion: 68 degrees     Additional Active Range of Motion Details  Unable to perform SLR active extension not tested.     Passive Range of Motion   Left Knee   Flexion: 88 degrees   Extension: -3 degrees     Strength/Myotome Testing     Left Knee  "  Flexion: 3+  Extension: 2+  Quadriceps contraction: fair    Tests   Left Ankle/Foot   Negative for Homans' sign.     Ambulation   Weight-Bearing Status   Weight-Bearing Status (Left): full weight bearing   Weight-Bearing Status (Right): full weight-bearing    Assistive device used: front-wheeled walker    Observational Gait     Additional Observational Gait Details  Step to gait pattern observed with decreased stance time noted to the L LE.              Precautions: None      Date 4/2 IE 4/7 5/30 Reassess     FOTO NA  20 SC     Manuals        Gastroc and hamstring stretch  8 min 30\" 5x ea.                              Neuro Re-Ed        Clamshells 5\" 10x ea.  5\"  15   x      Reverse clamshells 5\" 10x ea.  5\"   10 x  red ball                                              Ther Ex        LAQ w/ eccentric 5 sec lowering 15x ea.  5 \" lowering 20 x   2#       Seated ER isometric 3\" 15x 3\" 15 x   2#      Hook lying hip abduction TB Blue 15x ea.  Blue  20x    5\"      Hook lying TB march Blue 15x Blue  20 x      Nustep  L 5  5 min      QS, TC   5\" 15x ea.      SLR   10x aarom     Heel slide   5\" 15x     Ther Activity        Squats   20 x      Step ups  8\"  20 x      Gait Training                        Modalities                                               "

## 2025-05-30 NOTE — PROGRESS NOTES
"PT Re-Evaluation     Today's date: 2025  Patient name: Carolann Foy  : 1965  MRN: [id number]  Referring provider: Robin Unger MD  Dx:   Encounter Diagnosis     ICD-10-CM    1. Acute pain of left knee  M25.562       2. Status post total left knee replacement  Z96.652           Start Time: 0930  Stop Time: 1015  Total time in clinic (min): 45 minutes    Assessment  Impairments: abnormal gait, abnormal or restricted ROM, activity intolerance, impaired balance, impaired physical strength, lacks appropriate home exercise program, pain with function, unable to perform ADL and activity limitations  Symptom irritability: moderate    Assessment details: Patient is a 60 y/o female s/p L TKA performed on 2025. She presents with mild to moderate edema to the L knee with unrestricted patellar mobility noted. There is noted weakness of the L quad with current inabilities to perform an unassisted SLR. Her incision site looks healthy without signs of infection noted. She denies any calf tenderness and there is no excessive redness nor warmth to this area. She has good response to therapy interventions today which focused on improving quad activation and L knee mobility. Reviewed exercises for HEP.   Understanding of Dx/Px/POC: good     Prognosis: good    Goals  STGs:  \"Patient will be independent with hep by 2-3 visits.   Improve knee flexion to 90 degrees for improved tolerance with ambulation and transfers by 3-4 weeks.  Improve knee extension to 0 degrees for improved tolerance with ambulation by 3-4 weeks.   Decrease pain levels by 50% for improved tolerance with adls and ambulation by 3-4 weeks. \"    LTGs:  Improve FOTO score from 20 to 62 indicating improved tolerance for activities involving the LE by discharge.   Improve knee rom and strength to wnl for improved tolerance for ambulation and adls by discharge.   Patient will be able to ambulate up and down a flight of stairs with reciprocal gait pattern " "by discharge.   Ambulation will be performed on all surfaces without limitation or deviation by discharge. \"      Plan  Patient would benefit from: skilled physical therapy  Planned modality interventions: cryotherapy    Planned therapy interventions: ADL retraining, balance/weight bearing training, functional ROM exercises, flexibility, gait training, home exercise program, therapeutic exercise, therapeutic activities, stretching, strengthening, neuromuscular re-education and manual therapy    Frequency: 2-3x week  Duration in weeks: 6  Plan of Care beginning date: 5/30/2025  Plan of Care expiration date: 7/11/2025  Treatment plan discussed with: patient  Plan details: Patient informed that from this point forward, to ensure adherence to the aforementioned plan of care, all or some of the treatment may be performed and carried out by a Physical Therapy Assistant (PTA) with supervision from a licensed Physical Therapist (PT) in accordance with [facility] Physical Therapy Practice Act.  Patient will continue to benefit from skilled physical therapy to address the functional deficits that were identified during the evaluation today. We will continue to progress the therapy program to address these functional deficits and achieve the established goals.                 Subjective Evaluation    History of Present Illness  Mechanism of injury: Patient presents to out patient physical therapy with chief c/o L knee pain. Patient is here today for a pre operative appointment for an upcoming L TKA scheduled for 5/28/2025. Patient reports that she has been having some increased pain and giving way of her L knee most noted with walking down an incline and descending stairs. She notes that when she is off of her feet this will help to decrease the pain she is experiencing in the L knee.     Update 5/30/2025:  Patient presenting today s/p L TKA performed on 5/28/2025. Patient reports that her knee feels more stiff and " "tight than she remembers from her last procedure. She notes that her  pain has been pretty well controlled at this time with the medication but that she still has some bouts of nausea since the surgery.           Recurrent probem    Quality of life: good    Pain  Current pain rating: 3  At best pain ratin  At worst pain ratin  Location: L knee  Quality: discomfort, burning, tight, sharp and throbbing  Relieving factors: rest, medications and ice  Aggravating factors: stair climbing, walking, standing and sitting          Objective     Active Range of Motion   Left Knee   Flexion: 68 degrees     Additional Active Range of Motion Details  Unable to perform SLR active extension not tested.     Passive Range of Motion   Left Knee   Flexion: 88 degrees   Extension: -3 degrees     Strength/Myotome Testing     Left Knee   Flexion: 3+  Extension: 2+  Quadriceps contraction: fair    Tests   Left Ankle/Foot   Negative for Homans' sign.     Ambulation   Weight-Bearing Status   Weight-Bearing Status (Left): full weight bearing   Weight-Bearing Status (Right): full weight-bearing    Assistive device used: front-wheeled walker    Observational Gait     Additional Observational Gait Details  Step to gait pattern observed with decreased stance time noted to the L LE.              Precautions: None      Date  IE  Reassess     FOTO NA  20 SC     Manuals        Gastroc and hamstring stretch  8 min 30\" 5x ea.                              Neuro Re-Ed        Clamshells 5\" 10x ea.  5\"  15   x      Reverse clamshells 5\" 10x ea.  5\"   10 x  red ball                                              Ther Ex        LAQ w/ eccentric 5 sec lowering 15x ea.  5 \" lowering 20 x   2#       Seated ER isometric 3\" 15x 3\" 15 x   2#      Hook lying hip abduction TB Blue 15x ea.  Blue  20x    5\"      Hook lying TB march Blue 15x Blue  20 x      Nustep  L 5  5 min      QS, TC   5\" 15x ea.      SLR   10x aarom     Heel slide   5\" 15x   " "  Ther Activity        Squats   20 x      Step ups  8\"  20 x      Gait Training                        Modalities                               "

## 2025-06-02 ENCOUNTER — APPOINTMENT (OUTPATIENT)
Dept: PHYSICAL THERAPY | Facility: CLINIC | Age: 60
End: 2025-06-02
Payer: COMMERCIAL

## 2025-06-02 ENCOUNTER — OFFICE VISIT (OUTPATIENT)
Dept: PHYSICAL THERAPY | Facility: CLINIC | Age: 60
End: 2025-06-02
Payer: COMMERCIAL

## 2025-06-02 DIAGNOSIS — Z96.652 STATUS POST TOTAL LEFT KNEE REPLACEMENT: Primary | ICD-10-CM

## 2025-06-02 DIAGNOSIS — M25.562 ACUTE PAIN OF LEFT KNEE: ICD-10-CM

## 2025-06-02 PROCEDURE — 97140 MANUAL THERAPY 1/> REGIONS: CPT

## 2025-06-02 PROCEDURE — 97110 THERAPEUTIC EXERCISES: CPT

## 2025-06-02 NOTE — PROGRESS NOTES
"Daily Note     Today's date: 2025  Patient name: Carolann Foy  : 1965  MRN: [id number]  Referring provider: Robin Unger MD  Dx:   Encounter Diagnosis     ICD-10-CM    1. Status post total left knee replacement  Z96.652       2. Acute pain of left knee  M25.562           Start Time: 0845  Stop Time: 0950  Total time in clinic (min): 65 minutes    Subjective:  My left knee is very stiff.,  I only really have pain now when I move it,  the pain is about 3/10.      Objective: See treatment diary below      Assessment: Tolerated treatment well. Patient would benefit from continued PT   pt is still using the walker for ambulation and is doing well,  she still has the steri strips in place over the incision site,.  Pt is wearing compression stockings on both legs., we worked on her motion and quad control. Pt did well with her exercises. She did have some trouble with step ups as well as reverse clams today with some cramping in her right hip,.  Her welling was mild to mild plus, No drainage was noted coming from the incision site,  pt had 100 degrees of flexion today with heel slides., we iced post for 10 min to her left knee straight out on the mat table,  pt was only able to tolerate a few min with the leg straight,       Plan: Continue per plan of care.      Precautions: None      Date  IE  Reassess     FOTO NA  20 SC     Manuals        Gastroc and hamstring stretch. Quad and ext  8 min 30\" 5x ea.  12 min                              Neuro Re-Ed        Clamshells 5\" 10x ea.  5\"  15   x  5\" 15 x    Reverse clamshells 5\" 10x ea.  5\"   10 x  red ball  5\" 10 x red ball                                            Ther Ex        LAQ w/ eccentric 5 sec lowering 15x ea.  5 \" lowering 20 x   2#   3\"   15 x    Seated ER isometric 3\" 15x 3\" 15 x   2#  2#   3\"   15 x    Hook lying hip abduction TB Blue 15x ea.  Blue  20x    5\"  Blue  20 x  5\"    Hook lying TB march Blue 15x Blue  20 x  Blue 20 x   " "  Nustep  L 5  5 min      QS, TC   5\" 15x ea.  5'  15x    SLR   10x aarom 10 x  aarom    Heel slide   5\" 15x 5\" 15 x    Ther Activity        Squats   20 x 20 x 20 x    Step ups  8\"  20 x 8\" 20 x 8\" 10x    Gait Training                        Modalities                               "

## 2025-06-04 ENCOUNTER — APPOINTMENT (OUTPATIENT)
Dept: PHYSICAL THERAPY | Facility: CLINIC | Age: 60
End: 2025-06-04
Payer: COMMERCIAL

## 2025-06-04 ENCOUNTER — OFFICE VISIT (OUTPATIENT)
Dept: PHYSICAL THERAPY | Facility: CLINIC | Age: 60
End: 2025-06-04
Payer: COMMERCIAL

## 2025-06-04 DIAGNOSIS — Z96.652 STATUS POST TOTAL LEFT KNEE REPLACEMENT: Primary | ICD-10-CM

## 2025-06-04 DIAGNOSIS — M25.562 ACUTE PAIN OF LEFT KNEE: ICD-10-CM

## 2025-06-04 PROCEDURE — 97530 THERAPEUTIC ACTIVITIES: CPT | Performed by: PHYSICAL THERAPIST

## 2025-06-04 PROCEDURE — 97140 MANUAL THERAPY 1/> REGIONS: CPT | Performed by: PHYSICAL THERAPIST

## 2025-06-04 PROCEDURE — 97110 THERAPEUTIC EXERCISES: CPT | Performed by: PHYSICAL THERAPIST

## 2025-06-04 NOTE — PROGRESS NOTES
"Daily Note     Today's date: 2025  Patient name: Carolann Foy  : 1965  MRN: [id number]  Referring provider: Robin Unger MD  Dx:   Encounter Diagnosis     ICD-10-CM    1. Status post total left knee replacement  Z96.652       2. Acute pain of left knee  M25.562           Start Time: 0845  Stop Time: 0935  Total time in clinic (min): 50 minutes    Subjective: Pt reports yesterday having lots of swelling. Pt states she was cooking and standing for a longtime and over exerted herself while cooking. Pt reports pain as a 1/10 pre tx. Pt reported a tight feeling behind the knee.       Objective: See treatment diary below      Assessment: Tolerated treatment well. Patient demonstrated fatigue post treatment, exhibited good technique with therapeutic exercises, and would benefit from continued PT. Pt knee flexion was measured pre tx at 110 degrees. Pt reports tight and sore feeling in knee post tx. Pt continues to benefit from skilled PT in order to regain knee mobility and lower extremity strength to return to function and improve quality of life.      Plan: Continue per plan of care.      Precautions: None      Date  IE  Reassess    FOTO NA  20 SC     Manuals        Passive gastroc stretch  8 min 30\" 5x ea.  12 min   6 min   Passive knee extension     6 min                   Neuro Re-Ed                                                Ther Ex        Hook lying hip abduction TB Blue 15x ea.  Blue  20x    5\"  Blue  20 x  5\" Blue 20 x 5'   Nustep  L 5  5 min   L5 7 min   QS, TC   5\" 15x ea.  5'  15x 5\" 15x   Marches     20x ea.   SLR   10x aarom 10 x  aarom 10x AROM   Heel slide   5\" 15x 5\" 15 x 5\" 15x   Ther Activity        Squats   20 x 20 x 20 x 20x   Step ups  8\"  20 x 8\" 20 x 8\" 10x 8\" 20x   Gait Training                        Modalities        Ice     8 min                    "

## 2025-06-09 ENCOUNTER — OFFICE VISIT (OUTPATIENT)
Dept: PHYSICAL THERAPY | Facility: CLINIC | Age: 60
End: 2025-06-09
Payer: COMMERCIAL

## 2025-06-09 ENCOUNTER — APPOINTMENT (OUTPATIENT)
Dept: PHYSICAL THERAPY | Facility: CLINIC | Age: 60
End: 2025-06-09
Payer: COMMERCIAL

## 2025-06-09 DIAGNOSIS — M25.562 ACUTE PAIN OF LEFT KNEE: Primary | ICD-10-CM

## 2025-06-09 DIAGNOSIS — Z96.652 STATUS POST TOTAL LEFT KNEE REPLACEMENT: ICD-10-CM

## 2025-06-09 PROCEDURE — 97110 THERAPEUTIC EXERCISES: CPT

## 2025-06-09 PROCEDURE — 97530 THERAPEUTIC ACTIVITIES: CPT

## 2025-06-09 PROCEDURE — 97140 MANUAL THERAPY 1/> REGIONS: CPT

## 2025-06-09 NOTE — PROGRESS NOTES
"Daily Note     Today's date: 2025  Patient name: Carolann Foy  : 1965  MRN: [id number]  Referring provider: Robin Unger MD  Dx:   Encounter Diagnosis     ICD-10-CM    1. Acute pain of left knee  M25.562       2. Status post total left knee replacement  Z96.652           Start Time: 0800  Stop Time: 0845  Total time in clinic (min): 45 minutes    Subjective:   The discoloration is fading some,  I dont have any drainage from my knee,  I am still wearing the compression stockings and am still using the cane,       Objective: See treatment diary below      Assessment: Tolerated treatment well. Patient would benefit from continued PT  PT continues to use the cane for ambulation, the steri strips are still in place. We began seated kicks today with some weakness noted,  pt states that she is only able to do one step at a time,  we added standing hip abd today . Pt had a fair plus quad set today.  She still has a lag when doing SLR today , but, is improving,   the Physical therapist trimmed the long surture end today from the left leg,  we ended with a cold pack post for 8 min to her left knee,       Plan: Continue per plan of care.      Precautions: None      Date  Reassess    FOTO   20 SC     Manuals        Passive gastroc stretch 6 min 8 min 30\" 5x ea.  12 min   6 min   Passive knee extension 6 min    6 min                   Neuro Re-Ed        Tandem walking  5 laps                                       Ther Ex        Hook lying hip abduction TB Blue 15x ea.  Blue  20x    5\"  Blue  20 x  5\" Blue 20 x 5'   Nustep L 5 7 MIN L 5  5 min   L5 7 min   QS, TC 5\" 15 x  5\" 15x ea.  5'  15x 5\" 15x   Marches 20 x    20x ea.   Laq 15 X   5\"       SLR 15x   10x aarom 10 x  aarom 10x AROM   Standing abd 20 x       Heel slide 5\" 15 x  5\" 15x 5\" 15 x 5\" 15x   Ther Activity        Squats  20 x 20 x 20 x 20 x 20x   Step ups 8\" 20 x 8\"  20 x 8\" 20 x 8\" 10x 8\" 20x   Gait Training                      "   Modalities        Ice 8 min    8 min

## 2025-06-12 ENCOUNTER — APPOINTMENT (OUTPATIENT)
Dept: PHYSICAL THERAPY | Facility: CLINIC | Age: 60
End: 2025-06-12
Payer: COMMERCIAL

## 2025-06-12 ENCOUNTER — OFFICE VISIT (OUTPATIENT)
Dept: PHYSICAL THERAPY | Facility: CLINIC | Age: 60
End: 2025-06-12
Payer: COMMERCIAL

## 2025-06-12 DIAGNOSIS — Z96.652 STATUS POST TOTAL LEFT KNEE REPLACEMENT: ICD-10-CM

## 2025-06-12 DIAGNOSIS — M25.562 ACUTE PAIN OF LEFT KNEE: Primary | ICD-10-CM

## 2025-06-12 PROCEDURE — 97112 NEUROMUSCULAR REEDUCATION: CPT

## 2025-06-12 PROCEDURE — 97140 MANUAL THERAPY 1/> REGIONS: CPT

## 2025-06-12 PROCEDURE — 97110 THERAPEUTIC EXERCISES: CPT

## 2025-06-12 NOTE — PROGRESS NOTES
"Daily Note     Today's date: 2025  Patient name: Carolann Foy  : 1965  MRN: [id number]  Referring provider: Robin Unger MD  Dx:   Encounter Diagnosis     ICD-10-CM    1. Acute pain of left knee  M25.562       2. Status post total left knee replacement  Z96.652           Start Time: 0750  Stop Time: 0835  Total time in clinic (min): 45 minutes    Subjective: Patient reports that her sciatica has been bothering her more than the knee when trying to sleep. She feels that the knee overall is doing quite well and she has been using the cane for assistance with ambulation.       Objective: See treatment diary below      Assessment: Tolerated treatment well. Patient demonstrated fatigue post treatment, exhibited good technique with therapeutic exercises, and would benefit from continued PT Patient continues to be challenged with progression of therapy interventions to progress strength and mobility of the L knee. She had better tolerance for manual stretching today with less irritation noted to the L posterior hip and low back.       Plan: Continue per plan of care.  Progress treatment as tolerated.       Precautions: None      Date  Reassess    FOTO   20 SC     Manuals        Passive gastroc stretch 6 min 30\" 5x 30\" 5x ea.  12 min   6 min   Passive knee extension 6 min 30\" 5x   6 min                   Neuro Re-Ed        Tandem walking  5 laps 5 laps airex      Tandem stance  20\" 4x ea.                               Ther Ex        Nustep L 5 7 MIN L7 7 min   L5 7 min   Marches 20 x 2# 20x   20x ea.   Laq 15 X   5\"       SLR 15x  2x10 10x aarom 10 x  aarom 10x AROM   Standing abd 20 x 2# 20x      SHC  2# 20x      Heel slide 5\" 15 x  5\" 15x 5\" 15 x 5\" 15x   Ther Activity        Squats  20 x 30x 20 x 20 x 20x   Step ups 8\" 20 x 8\" 20x 8\" 20 x 8\" 10x 8\" 20x   Gait Training                        Modalities        Ice 8 min    8 min                        "

## 2025-06-16 ENCOUNTER — APPOINTMENT (OUTPATIENT)
Dept: PHYSICAL THERAPY | Facility: CLINIC | Age: 60
End: 2025-06-16
Payer: COMMERCIAL

## 2025-06-16 ENCOUNTER — OFFICE VISIT (OUTPATIENT)
Dept: PHYSICAL THERAPY | Facility: CLINIC | Age: 60
End: 2025-06-16
Payer: COMMERCIAL

## 2025-06-16 DIAGNOSIS — M25.562 ACUTE PAIN OF LEFT KNEE: ICD-10-CM

## 2025-06-16 DIAGNOSIS — Z96.652 STATUS POST TOTAL LEFT KNEE REPLACEMENT: Primary | ICD-10-CM

## 2025-06-16 PROCEDURE — 97140 MANUAL THERAPY 1/> REGIONS: CPT

## 2025-06-16 PROCEDURE — 97112 NEUROMUSCULAR REEDUCATION: CPT

## 2025-06-16 PROCEDURE — 97110 THERAPEUTIC EXERCISES: CPT

## 2025-06-16 NOTE — PROGRESS NOTES
"Daily Note     Today's date: 2025  Patient name: Carolann Foy  : 1965  MRN: [id number]  Referring provider: Robin Unger MD  Dx:   Encounter Diagnosis     ICD-10-CM    1. Status post total left knee replacement  Z96.652       2. Acute pain of left knee  M25.562           Start Time: 0750  Stop Time: 0845  Total time in clinic (min): 55 minutes    Subjective:  it is usually stiff in the morning , I feel better when I get moving and loosen it up,  I am still using the cane to get around.      Objective: See treatment diary below      Assessment: Tolerated treatment well. Patient would benefit from continued PT  pt completes her full program today with some tightness noted in her knee,  she had some difficulty with step ups and squats,  she still reports having trouble with stairs and going down is harder,  she states that she does one at a time because that is what she is use to doing, pt reports having most of her tightness over her lateral and posterior left knee,  swelling is mild .  Pt was able to use 2# for her standing exercises today,  pt reports having tightness in her hamstring today with passive stretching and with passive knee ext.,  we ended with a cold pack post to her left knee in supine.      Plan: Continue per plan of care.      Precautions: None      Date  6   FOTO   62 JF     Manuals        Passive gastroc stretch 6 min 30\" 5x 5 min 12 min   6 min   Passive knee extension 6 min 30\" 5x 30\" 5 x  6 min   Quad stretch   30\" 5x             Neuro Re-Ed        Tandem walking  5 laps 5 laps airex 5 laps airex     Tandem stance  20\" 4x ea.  20 \"  4 x                              Ther Ex        Nustep L 5 7 MIN L7 7 min L 6  7 min  L5 7 min   Marches 20 x 2# 20x 2#  20 x  20x ea.   Laq/ SAQ 15 X   5\"  3#   20 x     SLR 15x  2x10 20 x  3#  10 x  aarom 10x AROM   Standing abd 20 x 2# 20x 2# 20 x     SHC  2# 20x 2#  20 x     Heel slide 5\" 15 x  5\" 15x 5\" 15 x 5\" 15x   Ther " "Activity        Squats  20 x 30x 30 x 20 x 20x   Step ups 8\" 20 x 8\" 20x 8\" 20 x 8\" 10x 8\" 20x   Gait Training                        Modalities        Ice 8 min    8 min                          "

## 2025-06-19 ENCOUNTER — OFFICE VISIT (OUTPATIENT)
Dept: PHYSICAL THERAPY | Facility: CLINIC | Age: 60
End: 2025-06-19
Payer: COMMERCIAL

## 2025-06-19 ENCOUNTER — APPOINTMENT (OUTPATIENT)
Dept: PHYSICAL THERAPY | Facility: CLINIC | Age: 60
End: 2025-06-19
Payer: COMMERCIAL

## 2025-06-19 DIAGNOSIS — M25.562 ACUTE PAIN OF LEFT KNEE: ICD-10-CM

## 2025-06-19 DIAGNOSIS — Z96.652 STATUS POST TOTAL LEFT KNEE REPLACEMENT: Primary | ICD-10-CM

## 2025-06-19 PROCEDURE — 97110 THERAPEUTIC EXERCISES: CPT | Performed by: PHYSICAL THERAPIST

## 2025-06-19 PROCEDURE — 97140 MANUAL THERAPY 1/> REGIONS: CPT | Performed by: PHYSICAL THERAPIST

## 2025-06-19 NOTE — PROGRESS NOTES
"Daily Note     Today's date: 2025  Patient name: Carolann Foy  : 1965  MRN: [id number]  Referring provider: Robin Unger MD  Dx:   Encounter Diagnosis     ICD-10-CM    1. Status post total left knee replacement  Z96.652       2. Acute pain of left knee  M25.562           Start Time: 0800  Stop Time: 0855  Total time in clinic (min): 55 minutes    Subjective: Pt reports that the knee feels good today with slight stiffness and aches. Pt states that Monday post tx experienced extra swelling and had to rest extra to recover from stiffness and painful aches, utilizing ice. Pt stated that the quad stretch caused increased stiffness and increased painful aches after last tx. Pt still reports having difficulty with stairs, especially going down when at home      Objective: See treatment diary below      Assessment: Tolerated treatment well. Patient demonstrated fatigue post treatment, exhibited good technique with therapeutic exercises, and would benefit from continued PT. Pt tolerated increase in weight to 3# today very well demonstrating an increase in muscle strength. Pt states that there was no increase in pain or stiffness post tx. Pt continues to benefit from skilled PT in order to regain mobility at the knee and increase muscle strength to improve ability to ascend and descend stairs while at home for a better quality of life.       Plan: Continue per plan of care.      Precautions: None      Date  6   FOTO   62 JF     Manuals        Passive gastroc stretch 6 min 30\" 5x 5 min 5 min 6 min   Passive knee extension 6 min 30\" 5x 30\" 5 x 30\" 5x 6 min   Quad stretch   30\" 5x             Neuro Re-Ed        Tandem walking  5 laps 5 laps airex 5 laps airex 5 laps airex    Tandem stance  20\" 4x ea.  20 \"  4 x  20\" 4x                            Ther Ex        Nustep L 5 7 MIN L7 7 min L 6  7 min L6 7 min L5 7 min   Marches 20 x 2# 20x 2#  20 x 3# 20x 20x ea.   Laq/ SAQ 15 X   5\"  3#   20 x 3# " "20x    SLR 15x  2x10 20 x  3#  20 x 3# 10x AROM   Standing abd 20 x 2# 20x 2# 20 x 3# 20x    SHC  2# 20x 2#  20 x 3# 20x    Heel slide 5\" 15 x  5\" 15x 5\" 15x 5\" 15x   Ther Activity        Squats  20 x 30x 30 x 30x 20x   Step ups 8\" 20 x 8\" 20x 8\" 20 x 8\" 20x 8\" 20x   Step Downs    6\" 20x    Gait Training                        Modalities        Ice 8 min   8 min 8 min                            "

## 2025-06-23 ENCOUNTER — APPOINTMENT (OUTPATIENT)
Dept: PHYSICAL THERAPY | Facility: CLINIC | Age: 60
End: 2025-06-23
Payer: COMMERCIAL

## 2025-06-23 ENCOUNTER — EVALUATION (OUTPATIENT)
Dept: PHYSICAL THERAPY | Facility: CLINIC | Age: 60
End: 2025-06-23
Payer: COMMERCIAL

## 2025-06-23 DIAGNOSIS — Z96.652 STATUS POST TOTAL LEFT KNEE REPLACEMENT: Primary | ICD-10-CM

## 2025-06-23 DIAGNOSIS — M25.562 ACUTE PAIN OF LEFT KNEE: ICD-10-CM

## 2025-06-23 PROCEDURE — 97530 THERAPEUTIC ACTIVITIES: CPT | Performed by: PHYSICAL THERAPIST

## 2025-06-23 PROCEDURE — 97112 NEUROMUSCULAR REEDUCATION: CPT | Performed by: PHYSICAL THERAPIST

## 2025-06-23 PROCEDURE — 97110 THERAPEUTIC EXERCISES: CPT | Performed by: PHYSICAL THERAPIST

## 2025-06-23 NOTE — PROGRESS NOTES
"PT Re-Evaluation     Today's date: 2025  Patient name: Carolann Foy  : 1965  MRN: [id number]  Referring provider: Robin Unger MD  Dx:   Encounter Diagnosis     ICD-10-CM    1. Status post total left knee replacement  Z96.652       2. Acute pain of left knee  M25.562           Start Time: 0845  Stop Time: 930  Total time in clinic (min): 45 minutes    Assessment  Impairments: impaired balance, impaired physical strength and lacks appropriate home exercise program  Symptom irritability: low    Assessment details: Patient has attended 8 physical therapy visits to date. She is demonstrating noted improvements with knee mobility and LE strength per the evaluation today. She has the greatest deficit during resisted knee extension. She is ambulating with minimal deviation at this time and is utilizing no assistive device. She has good patellar mobility and scar tissue mobility noted upon exam today.   Understanding of Dx/Px/POC: good     Prognosis: good    Goals  STGs:  \"Patient will be independent with hep by 2-3 visits. - MET  Improve knee flexion to 90 degrees for improved tolerance with ambulation and transfers by 3-4 weeks. - MET  Improve knee extension to 0 degrees for improved tolerance with ambulation by 3-4 weeks. - MET  Decrease pain levels by 50% for improved tolerance with adls and ambulation by 3-4 weeks. \"- MET    LTGs:  Improve FOTO score from 20 to 62 indicating improved tolerance for activities involving the LE by discharge. - MET  Improve knee rom and strength to wnl for improved tolerance for ambulation and adls by discharge. - Progressing  Patient will be able to ambulate up and down a flight of stairs with reciprocal gait pattern by discharge. - MET  Ambulation will be performed on all surfaces without limitation or deviation by discharge. \" - Progressing      Plan  Patient would benefit from: skilled physical therapy  Planned modality interventions: cryotherapy    Planned therapy " interventions: ADL retraining, balance/weight bearing training, functional ROM exercises, flexibility, gait training, home exercise program, therapeutic exercise, therapeutic activities, stretching, strengthening, neuromuscular re-education and manual therapy    Frequency: 2-3x week  Duration in weeks: 3  Plan of Care beginning date: 6/23/2025  Plan of Care expiration date: 7/14/2025  Treatment plan discussed with: patient  Plan details: Patient informed that from this point forward, to ensure adherence to the aforementioned plan of care, all or some of the treatment may be performed and carried out by a Physical Therapy Assistant (PTA) with supervision from a licensed Physical Therapist (PT) in accordance with [facility] Physical Therapy Practice Act.  Patient will continue to benefit from skilled physical therapy to address the functional deficits that were identified during the evaluation today. We will continue to progress the therapy program to address these functional deficits and achieve the established goals.                 Subjective Evaluation    History of Present Illness  Mechanism of injury: Patient presents to out patient physical therapy with chief c/o L knee pain. Patient is here today for a pre operative appointment for an upcoming L TKA scheduled for 5/28/2025. Patient reports that she has been having some increased pain and giving way of her L knee most noted with walking down an incline and descending stairs. She notes that when she is off of her feet this will help to decrease the pain she is experiencing in the L knee.     Update 5/30/2025:  Patient presenting today s/p L TKA performed on 5/28/2025. Patient reports that her knee feels more stiff and tight than she remembers from her last procedure. She notes that her  pain has been pretty well controlled at this time with the medication but that she still has some bouts of nausea since the surgery.     Update 6/23/2025:  Patient  "reports that overall she has been doing quite well. She is scheduled to see the doctor later this week and will discuss possible discharge to a home program. She is negotiating stairs without issue. She has minimal pain at rest and finds that if she is on her feet for longer periods of time the knee will get sore but overall is doing well.           Recurrent probem    Quality of life: good    Patient Goals  Patient goals for therapy: decreased pain, increased motion, increased strength and independence with ADLs/IADLs    Pain  Current pain ratin  At best pain ratin  At worst pain ratin  Location: L knee  Quality: discomfort  Relieving factors: rest and ice  Aggravating factors: stair climbing and walking          Objective     Active Range of Motion   Left Knee   Flexion: 124 degrees   Extension: 1 degrees   Extensor la degrees     Passive Range of Motion   Left Knee   Flexion: 128 degrees   Extension: 1 degrees     Strength/Myotome Testing     Left Hip   Planes of Motion   Flexion: 4  Extension: 4  Abduction: 4-  Adduction: 4    Left Knee   Flexion: 4  Extension: 3+  Quadriceps contraction: good    Tests   Left Ankle/Foot   Negative for Homans' sign.     Ambulation   Weight-Bearing Status   Weight-Bearing Status (Left): full weight bearing   Weight-Bearing Status (Right): full weight-bearing    Assistive device used: front-wheeled walker    Observational Gait     Additional Observational Gait Details  Step to gait pattern observed with decreased stance time noted to the L LE.              Precautions: None      Date  Reassess   FOTO   62 JF     Manuals        Passive gastroc stretch 6 min 30\" 5x 5 min 5 min    Passive knee extension 6 min 30\" 5x 30\" 5 x 30\" 5x    Quad stretch   30\" 5x             Neuro Re-Ed        Tandem walking  5 laps 5 laps airex 5 laps airex 5 laps airex 7 laps airex, low hurdles   SLS  20\" 4x ea.  20 \"  4 x  20\" 4x Airex 20\" 4x                         " "  Ther Ex        Bike for strengthening L 5 7 MIN L7 7 min L 6  7 min L6 7 min L1 7 min   Marches 20 x 2# 20x 2#  20 x 3# 20x 4# 20x   Laq/ SAQ 15 X   5\"  3#   20 x 3# 20x 4# 20x   SLR 15x  2x10 20 x  3#  20 x 3#    Standing abd 20 x 2# 20x 2# 20 x 3# 20x 4# 20x   SHC  2# 20x 2#  20 x 3# 20x 4# 20x   Objective updates     8 min   Heel slide 5\" 15 x  5\" 15x 5\" 15x    Ther Activity        Squats  20 x 30x 30 x 30x    Step ups 8\" 20 x 8\" 20x 8\" 20 x 8\" 20x 10\" 20x   Step Downs    6\" 20x 6\" 20x   Gait Training                        Modalities        Ice 8 min   8 min                   " 17-Oct-2023

## 2025-06-23 NOTE — LETTER
2025    Robin Unger MD  [address] [place] [id number]    Patient: Carolann Foy   YOB: 1965   Date of Visit: 2025     Encounter Diagnosis     ICD-10-CM    1. Status post total left knee replacement  Z96.652       2. Acute pain of left knee  M25.562           Dear Dr. Robin Unger MD:    Thank you for your recent referral of Carolann Foy. Please review the attached evaluation summary from Carolann's recent visit.     Please verify that you agree with the plan of care by signing the attached order.     If you have any questions or concerns, please do not hesitate to call.     I sincerely appreciate the opportunity to share in the care of one of your patients and hope to have another opportunity to work with you in the near future.       Sincerely,    Víctor Cespedes, PT      Referring Provider:      I certify that I have read the below Plan of Care and certify the need for these services furnished under this plan of treatment while under my care.                    Robin Unger MD  [address] [place] [address]  Via Fax: [phone]          PT Re-Evaluation     Today's date: 2025  Patient name: Carolann Foy  : 1965  MRN: [id number]  Referring provider: Robin Unger MD  Dx:   Encounter Diagnosis     ICD-10-CM    1. Status post total left knee replacement  Z96.652       2. Acute pain of left knee  M25.562           Start Time: 845  Stop Time: 930  Total time in clinic (min): 45 minutes    Assessment  Impairments: impaired balance, impaired physical strength and lacks appropriate home exercise program  Symptom irritability: low    Assessment details: Patient has attended 8 physical therapy visits to date. She is demonstrating noted improvements with knee mobility and LE strength per the evaluation today. She has the greatest deficit during resisted knee extension. She is ambulating with minimal deviation at this time and is utilizing no  "assistive device. She has good patellar mobility and scar tissue mobility noted upon exam today.   Understanding of Dx/Px/POC: good     Prognosis: good    Goals  STGs:  \"Patient will be independent with hep by 2-3 visits. - MET  Improve knee flexion to 90 degrees for improved tolerance with ambulation and transfers by 3-4 weeks. - MET  Improve knee extension to 0 degrees for improved tolerance with ambulation by 3-4 weeks. - MET  Decrease pain levels by 50% for improved tolerance with adls and ambulation by 3-4 weeks. \"- MET    LTGs:  Improve FOTO score from 20 to 62 indicating improved tolerance for activities involving the LE by discharge. - MET  Improve knee rom and strength to wnl for improved tolerance for ambulation and adls by discharge. - Progressing  Patient will be able to ambulate up and down a flight of stairs with reciprocal gait pattern by discharge. - MET  Ambulation will be performed on all surfaces without limitation or deviation by discharge. \" - Progressing      Plan  Patient would benefit from: skilled physical therapy  Planned modality interventions: cryotherapy    Planned therapy interventions: ADL retraining, balance/weight bearing training, functional ROM exercises, flexibility, gait training, home exercise program, therapeutic exercise, therapeutic activities, stretching, strengthening, neuromuscular re-education and manual therapy    Frequency: 2-3x week  Duration in weeks: 3  Plan of Care beginning date: 6/23/2025  Plan of Care expiration date: 7/14/2025  Treatment plan discussed with: patient  Plan details: Patient informed that from this point forward, to ensure adherence to the aforementioned plan of care, all or some of the treatment may be performed and carried out by a Physical Therapy Assistant (PTA) with supervision from a licensed Physical Therapist (PT) in accordance with [facility] Physical Therapy Practice Act.  Patient will continue to benefit from skilled physical " therapy to address the functional deficits that were identified during the evaluation today. We will continue to progress the therapy program to address these functional deficits and achieve the established goals.                 Subjective Evaluation    History of Present Illness  Mechanism of injury: Patient presents to out patient physical therapy with chief c/o L knee pain. Patient is here today for a pre operative appointment for an upcoming L TKA scheduled for 2025. Patient reports that she has been having some increased pain and giving way of her L knee most noted with walking down an incline and descending stairs. She notes that when she is off of her feet this will help to decrease the pain she is experiencing in the L knee.     Update 2025:  Patient presenting today s/p L TKA performed on 2025. Patient reports that her knee feels more stiff and tight than she remembers from her last procedure. She notes that her  pain has been pretty well controlled at this time with the medication but that she still has some bouts of nausea since the surgery.     Update 2025:  Patient reports that overall she has been doing quite well. She is scheduled to see the doctor later this week and will discuss possible discharge to a home program. She is negotiating stairs without issue. She has minimal pain at rest and finds that if she is on her feet for longer periods of time the knee will get sore but overall is doing well.           Recurrent probem    Quality of life: good    Patient Goals  Patient goals for therapy: decreased pain, increased motion, increased strength and independence with ADLs/IADLs    Pain  Current pain ratin  At best pain ratin  At worst pain ratin  Location: L knee  Quality: discomfort  Relieving factors: rest and ice  Aggravating factors: stair climbing and walking          Objective     Active Range of Motion   Left Knee   Flexion: 124 degrees   Extension: 1 degrees  "  Extensor la degrees     Passive Range of Motion   Left Knee   Flexion: 128 degrees   Extension: 1 degrees     Strength/Myotome Testing     Left Hip   Planes of Motion   Flexion: 4  Extension: 4  Abduction: 4-  Adduction: 4    Left Knee   Flexion: 4  Extension: 3+  Quadriceps contraction: good    Tests   Left Ankle/Foot   Negative for Homans' sign.     Ambulation   Weight-Bearing Status   Weight-Bearing Status (Left): full weight bearing   Weight-Bearing Status (Right): full weight-bearing    Assistive device used: front-wheeled walker    Observational Gait     Additional Observational Gait Details  Step to gait pattern observed with decreased stance time noted to the L LE.              Precautions: None      Date  Reassess   FOTO   62 JF     Manuals        Passive gastroc stretch 6 min 30\" 5x 5 min 5 min    Passive knee extension 6 min 30\" 5x 30\" 5 x 30\" 5x    Quad stretch   30\" 5x             Neuro Re-Ed        Tandem walking  5 laps 5 laps airex 5 laps airex 5 laps airex 7 laps airex, low hurdles   SLS  20\" 4x ea.  20 \"  4 x  20\" 4x Airex 20\" 4x                           Ther Ex        Bike for strengthening L 5 7 MIN L7 7 min L 6  7 min L6 7 min L1 7 min   Marches 20 x 2# 20x 2#  20 x 3# 20x 4# 20x   Laq/ SAQ 15 X   5\"  3#   20 x 3# 20x 4# 20x   SLR 15x  2x10 20 x  3#  20 x 3#    Standing abd 20 x 2# 20x 2# 20 x 3# 20x 4# 20x   SHC  2# 20x 2#  20 x 3# 20x 4# 20x   Objective updates     8 min   Heel slide 5\" 15 x  5\" 15x 5\" 15x    Ther Activity        Squats  20 x 30x 30 x 30x    Step ups 8\" 20 x 8\" 20x 8\" 20 x 8\" 20x 10\" 20x   Step Downs    6\" 20x 6\" 20x   Gait Training                        Modalities        Ice 8 min   8 min                                   "

## 2025-06-27 ENCOUNTER — APPOINTMENT (OUTPATIENT)
Dept: PHYSICAL THERAPY | Facility: CLINIC | Age: 60
End: 2025-06-27
Payer: COMMERCIAL

## 2025-06-27 ENCOUNTER — OFFICE VISIT (OUTPATIENT)
Dept: PHYSICAL THERAPY | Facility: CLINIC | Age: 60
End: 2025-06-27
Payer: COMMERCIAL

## 2025-06-27 DIAGNOSIS — M25.562 ACUTE PAIN OF LEFT KNEE: ICD-10-CM

## 2025-06-27 DIAGNOSIS — Z96.652 STATUS POST TOTAL LEFT KNEE REPLACEMENT: Primary | ICD-10-CM

## 2025-06-27 PROCEDURE — 97530 THERAPEUTIC ACTIVITIES: CPT | Performed by: PHYSICAL THERAPIST

## 2025-06-27 PROCEDURE — 97110 THERAPEUTIC EXERCISES: CPT | Performed by: PHYSICAL THERAPIST

## 2025-06-27 NOTE — PROGRESS NOTES
"Daily Note     Today's date: 2025  Patient name: Carolann Foy  : 1965  MRN: [id number]  Referring provider: Robin Unger MD  Dx:   Encounter Diagnosis     ICD-10-CM    1. Status post total left knee replacement  Z96.652       2. Acute pain of left knee  M25.562           Start Time: 0757  Stop Time: 0850  Total time in clinic (min): 53 minutes    Subjective: Patient reports that she saw the doctor's team yesterday who were very pleased with her current progress. She would like today to be her last day of therapy as she is going to continue to progress her strength independently.       Objective: See treatment diary below      Assessment: Tolerated treatment well. Patient demonstrated fatigue post treatment and exhibited good technique with therapeutic exercises We progressed interventions today for carry over to HEP which patient is comfortable performing on her own at this time. She is to contact the office with any issues moving forward and is aware that she should continue to build strength over the next few months.   Ezequiel Bashir, PT, DPT participated in direct supervision of this treatment session      Plan: D/C to HEP     Precautions: None      Date  DC  Reassess   FOTO 73 SC  63 JF     Manuals        Passive gastroc stretch  30\" 5x 5 min 5 min    Passive knee extension  30\" 5x 30\" 5 x 30\" 5x    Quad stretch   30\" 5x             Neuro Re-Ed        Tandem walking  5 laps airex 5 laps airex 5 laps airex 7 laps airex, low hurdles   SLS  20\" 4x ea.  20 \"  4 x  20\" 4x Airex 20\" 4x                           Ther Ex        Bike for strengthening  L7 7 min L 6  7 min L6 7 min L1 7 min   Marches  2# 20x 2#  20 x 3# 20x 4# 20x   Laq/ SAQ 30# 3x10  3#   20 x 3# 20x 4# 20x   SLR  2x10 20 x  3#  20 x 3#    Standing abd  2# 20x 2# 20 x 3# 20x 4# 20x   SHC Machine 45# 2x15 2# 20x 2#  20 x 3# 20x 4# 20x   Objective updates     8 min   Heel slide   5\" 15x 5\" 15x    Ther Activity      " "  CC retro walking P4 10x       Squats  Leg press 30# 2x15 30x 30 x 30x    Step ups 11\" 20x 8\" 20x 8\" 20 x 8\" 20x 10\" 20x   Step Downs 6\" 20x   6\" 20x 6\" 20x   Gait Training                        Modalities        Ice    8 min                   "

## 2025-06-29 DIAGNOSIS — G47.9 SLEEP DISORDER: ICD-10-CM

## 2025-07-01 RX ORDER — TRAZODONE HYDROCHLORIDE 50 MG/1
50 TABLET ORAL
Qty: 90 TABLET | Refills: 1 | Status: SHIPPED | OUTPATIENT
Start: 2025-07-01

## 2025-07-23 ENCOUNTER — OFFICE VISIT (OUTPATIENT)
Dept: INTERNAL MEDICINE CLINIC | Facility: CLINIC | Age: 60
End: 2025-07-23
Payer: COMMERCIAL

## 2025-07-23 VITALS
TEMPERATURE: 97.8 F | HEIGHT: 65 IN | HEART RATE: 76 BPM | SYSTOLIC BLOOD PRESSURE: 118 MMHG | BODY MASS INDEX: 30.06 KG/M2 | OXYGEN SATURATION: 98 % | DIASTOLIC BLOOD PRESSURE: 76 MMHG | WEIGHT: 180.4 LBS

## 2025-07-23 DIAGNOSIS — Z12.11 SCREENING FOR COLORECTAL CANCER: ICD-10-CM

## 2025-07-23 DIAGNOSIS — E55.9 VITAMIN D DEFICIENCY: ICD-10-CM

## 2025-07-23 DIAGNOSIS — L40.50 PSORIATIC ARTHRITIS (HCC): ICD-10-CM

## 2025-07-23 DIAGNOSIS — J45.20 MILD INTERMITTENT ASTHMA WITHOUT COMPLICATION: ICD-10-CM

## 2025-07-23 DIAGNOSIS — I10 BENIGN ESSENTIAL HYPERTENSION: Primary | ICD-10-CM

## 2025-07-23 DIAGNOSIS — Z83.6 FAMILY HISTORY OF INTERSTITIAL LUNG DISEASE: ICD-10-CM

## 2025-07-23 DIAGNOSIS — F41.1 GENERALIZED ANXIETY DISORDER: ICD-10-CM

## 2025-07-23 DIAGNOSIS — Z13.220 SCREENING FOR HYPERLIPIDEMIA: ICD-10-CM

## 2025-07-23 DIAGNOSIS — R73.03 PREDIABETES: ICD-10-CM

## 2025-07-23 DIAGNOSIS — Z23 ENCOUNTER FOR IMMUNIZATION: ICD-10-CM

## 2025-07-23 DIAGNOSIS — G43.B0 OPHTHALMOPLEGIC MIGRAINE, NOT INTRACTABLE: ICD-10-CM

## 2025-07-23 DIAGNOSIS — M85.80 OSTEOPENIA, UNSPECIFIED LOCATION: ICD-10-CM

## 2025-07-23 DIAGNOSIS — Z12.12 SCREENING FOR COLORECTAL CANCER: ICD-10-CM

## 2025-07-23 DIAGNOSIS — G89.4 CHRONIC PAIN DISORDER: ICD-10-CM

## 2025-07-23 DIAGNOSIS — R39.9 UTI SYMPTOMS: ICD-10-CM

## 2025-07-23 DIAGNOSIS — J30.9 ALLERGIC RHINITIS, UNSPECIFIED SEASONALITY, UNSPECIFIED TRIGGER: ICD-10-CM

## 2025-07-23 DIAGNOSIS — K21.9 GERD WITHOUT ESOPHAGITIS: ICD-10-CM

## 2025-07-23 PROCEDURE — 99214 OFFICE O/P EST MOD 30 MIN: CPT | Performed by: INTERNAL MEDICINE

## 2025-07-23 RX ORDER — CIPROFLOXACIN 500 MG/1
500 TABLET, FILM COATED ORAL EVERY 12 HOURS SCHEDULED
Qty: 10 TABLET | Refills: 0 | Status: SHIPPED | OUTPATIENT
Start: 2025-07-23 | End: 2025-07-28

## 2025-07-23 RX ORDER — PHENAZOPYRIDINE HYDROCHLORIDE 200 MG/1
200 TABLET, FILM COATED ORAL
Qty: 6 TABLET | Refills: 0 | Status: SHIPPED | OUTPATIENT
Start: 2025-07-23

## 2025-07-23 NOTE — PROGRESS NOTES
Name: Carolann Foy      : 1965      MRN: [id number]  Encounter Provider: Christo Brady DO  Encounter Date: 2025   Encounter department: [facility]  :  Assessment & Plan  Screening for colorectal cancer    Orders:    Ambulatory Referral to Gastroenterology; Future    Encounter for immunization    Orders:    Zoster Vaccine Recombinant IM    Benign essential hypertension    Orders:    Albumin / creatinine urine ratio; Future    Mild intermittent asthma without complication         Allergic rhinitis, unspecified seasonality, unspecified trigger         GERD without esophagitis         Ophthalmoplegic migraine, not intractable           Osteopenia, unspecified location         Psoriatic arthritis (HCC)    Orders:    Hepatic function panel; Future    Ambulatory Referral to Pulmonology; Future    Generalized anxiety disorder    Orders:    TSH, 3rd generation; Future    Chronic pain disorder         Prediabetes    Orders:    Hemoglobin A1C; Future    Vitamin D deficiency    Orders:    Vitamin D 25 hydroxy; Future    Screening for hyperlipidemia    Orders:    Lipid Panel with Direct LDL reflex; Future    UTI symptoms    Orders:    ciprofloxacin (CIPRO) 500 mg tablet; Take 1 tablet (500 mg total) by mouth every 12 (twelve) hours for 5 days    phenazopyridine (PYRIDIUM) 200 mg tablet; Take 1 tablet (200 mg total) by mouth 3 (three) times a day with meals    Family history of interstitial lung disease    Orders:    Ambulatory Referral to Pulmonology; Future      A/P: Doing ok and will check labs. Hold on urine sample as pt unable to provide a sample and is taking OTC pyridium. Will empirically treat. Refer to pulmonary for eval of ILD risk given strong FH and her CTD.   Continue current treatment and RTC four months for routine.      History of Present Illness   WF RTC for f/u HTN, asthma, etc. Doing ok and no new issues, but reports her aunt now dx with ILD as was her mother and is concerned.  " Remains active w/o difficulty and no falls. Recovered from sx. Chronic pain and arthritis are manageable. RYNE is controlled. Due for labs.       Review of Systems   Constitutional:  Negative for activity change, chills, diaphoresis, fatigue and fever.   HENT: Negative.     Eyes:  Negative for visual disturbance.   Respiratory:  Negative for cough, chest tightness, shortness of breath and wheezing.    Cardiovascular:  Negative for chest pain, palpitations and leg swelling.   Gastrointestinal:  Negative for abdominal pain, constipation, diarrhea, nausea and vomiting.   Endocrine: Negative for cold intolerance and heat intolerance.   Genitourinary:  Positive for dysuria, frequency and urgency. Negative for difficulty urinating.   Musculoskeletal:  Negative for arthralgias, gait problem and myalgias.   Neurological:  Negative for dizziness, seizures, syncope, weakness, light-headedness and headaches.   Psychiatric/Behavioral:  Negative for confusion, dysphoric mood and sleep disturbance. The patient is not nervous/anxious.        Objective   /76   Pulse 76   Temp 97.8 °F (36.6 °C)   Ht 5' 5\" (1.651 m)   Wt 81.8 kg (180 lb 6.4 oz)   SpO2 98%   BMI 30.02 kg/m²      Physical Exam  Vitals and nursing note reviewed.   Constitutional:       General: She is not in acute distress.     Appearance: Normal appearance. She is not ill-appearing.   HENT:      Head: Normocephalic and atraumatic.      Mouth/Throat:      Mouth: Mucous membranes are moist.     Eyes:      Extraocular Movements: Extraocular movements intact.      Conjunctiva/sclera: Conjunctivae normal.      Pupils: Pupils are equal, round, and reactive to light.     Neck:      Vascular: No carotid bruit.     Cardiovascular:      Rate and Rhythm: Normal rate and regular rhythm.      Heart sounds: Normal heart sounds. No murmur heard.  Pulmonary:      Effort: Pulmonary effort is normal. No respiratory distress.      Breath sounds: Normal breath sounds. No " wheezing, rhonchi or rales.   Abdominal:      General: There is no distension.      Palpations: Abdomen is soft.      Tenderness: There is no abdominal tenderness.     Musculoskeletal:      Cervical back: Neck supple.      Right lower leg: No edema.      Left lower leg: No edema.     Neurological:      General: No focal deficit present.      Mental Status: She is alert and oriented to person, place, and time. Mental status is at baseline.     Psychiatric:         Mood and Affect: Mood normal.         Behavior: Behavior normal.         Thought Content: Thought content normal.         Judgment: Judgment normal.

## 2025-07-23 NOTE — ASSESSMENT & PLAN NOTE
{If prescribing CGRP gepants (Nurtec, Ubrelvy, Qulipta) or monoclonal antibodies (Emagality, Ajovy, Aimovig) that currently do not have a prior auth on file, click here to fill out prior auth smartform and then hit F2 with this smartlist to insert prior auth documentation (Optional):[id number]}

## 2025-07-23 NOTE — PATIENT INSTRUCTIONS
"Patient Education     High blood pressure in adults   The Basics   Written by the doctors and editors at [facility]   What is high blood pressure? -- High blood pressure is a condition that puts you at risk for heart attack, stroke, and kidney disease. It does not usually cause symptoms. But it can be serious.  When your doctor or nurse tells you your blood pressure, they say 2 numbers. For instance, your doctor or nurse might say that your blood pressure is \"130 over 80.\" The top number is the pressure inside your arteries when your heart is garry. The bottom number is the pressure inside your arteries when your heart is relaxed.  \"Elevated blood pressure\" is a term doctors or nurses use as a warning. People with elevated blood pressure do not yet have high blood pressure. But their blood pressure is not as low as it should be for good health.  Many experts define high, elevated, and normal blood pressure as follows:   High - Top number of 130 or above and/or bottom number of 80 or above.   Elevated - Top number between 120 and 129 and bottom number of 79 or below.   Normal - Top number of 119 or below and bottom number of 79 or below.  This information is also in the table (table 1).  How can I lower my blood pressure? -- If your doctor or nurse prescribed blood pressure medicine, the most important thing you can do is to take it. If it causes side effects, do not just stop taking it. Instead, talk to your doctor or nurse about the problems it causes. They might be able to lower your dose or switch you to another medicine. If cost is a problem, mention that, too. They might be able to put you on a less expensive medicine. Taking your blood pressure medicine can keep you from having a heart attack or stroke, and it can save your life!  Can I do anything on my own? -- You have a lot of control over your blood pressure. To lower it:   Lose weight (if you are overweight).   Choose a diet low in fat and rich in " "fruits, vegetables, and low-fat dairy products.   Eat less salt.   Do something active for at least 30 minutes a day on most days of the week.   Drink less alcohol (if you drink more than 2 alcoholic drinks per day).  It's also a good idea to get a home blood pressure meter. People who check their own blood pressure at home do better at keeping it low and can sometimes even reduce the amount of medicine they take.  All topics are updated as new evidence becomes available and our peer review process is complete.  This topic retrieved from [employer] on: Feb 26, 2024.  Topic 56158 Version 23.0  Release: 32.2.4 - C32.56  © 2024 UpToDate, Inc. and/or its affiliates. All rights reserved.  table 1: Definition of normal and high blood pressure  Level  Top number  Bottom number    High 130 or above 80 or above   Elevated 120 to 129 79 or below   Normal 119 or below 79 or below   These definitions are from the American College of Cardiology/American Heart Association. Other expert groups might use slightly different definitions.  \"Elevated blood pressure\" is a term doctor or nurses use as a warning. It means you do not yet have high blood pressure, but your blood pressure is not as low as it should be for good health.  Graphic 16605 Version 6.0  Consumer Information Use and Disclaimer   Disclaimer: This generalized information is a limited summary of diagnosis, treatment, and/or medication information. It is not meant to be comprehensive and should be used as a tool to help the user understand and/or assess potential diagnostic and treatment options. It does NOT include all information about conditions, treatments, medications, side effects, or risks that may apply to a specific patient. It is not intended to be medical advice or a substitute for the medical advice, diagnosis, or treatment of a health care provider based on the health care provider's examination and assessment of a patient's specific and unique circumstances. " Patients must speak with a health care provider for complete information about their health, medical questions, and treatment options, including any risks or benefits regarding use of medications. This information does not endorse any treatments or medications as safe, effective, or approved for treating a specific patient. Up[employer]Date, Inc. and its affiliates disclaim any warranty or liability relating to this information or the use thereof.The use of this information is governed by the Terms of Use, available at https://www.woltersCoqueluxuwer.com/en/know/clinical-effectiveness-terms. 2024© UpToDate, Inc. and its affiliates and/or licensors. All rights reserved.  Copyright   © 2024 Up[employer]Date, Inc. and/or its affiliates. All rights reserved.

## 2025-07-27 DIAGNOSIS — E55.9 VITAMIN D DEFICIENCY: ICD-10-CM

## 2025-07-29 RX ORDER — ERGOCALCIFEROL 1.25 MG/1
CAPSULE, LIQUID FILLED ORAL
Qty: 12 CAPSULE | Refills: 1 | Status: SHIPPED | OUTPATIENT
Start: 2025-07-29